# Patient Record
Sex: FEMALE | Race: WHITE | NOT HISPANIC OR LATINO | Employment: FULL TIME | URBAN - METROPOLITAN AREA
[De-identification: names, ages, dates, MRNs, and addresses within clinical notes are randomized per-mention and may not be internally consistent; named-entity substitution may affect disease eponyms.]

---

## 2019-02-08 ENCOUNTER — TRANSCRIBE ORDERS (OUTPATIENT)
Dept: ADMINISTRATIVE | Facility: HOSPITAL | Age: 34
End: 2019-02-08

## 2019-02-08 DIAGNOSIS — R10.9 ABDOMINAL PAIN, UNSPECIFIED ABDOMINAL LOCATION: Primary | ICD-10-CM

## 2019-02-08 DIAGNOSIS — E04.1 NONTOXIC UNINODULAR GOITER: ICD-10-CM

## 2019-02-08 DIAGNOSIS — N28.9 KIDNEY LESION: ICD-10-CM

## 2019-02-08 DIAGNOSIS — E01.0 THYROMEGALY: ICD-10-CM

## 2019-02-15 ENCOUNTER — HOSPITAL ENCOUNTER (OUTPATIENT)
Dept: RADIOLOGY | Facility: IMAGING CENTER | Age: 34
Discharge: HOME | End: 2019-02-15
Payer: COMMERCIAL

## 2019-02-15 DIAGNOSIS — E01.0 THYROMEGALY: ICD-10-CM

## 2019-02-15 DIAGNOSIS — R10.9 ABDOMINAL PAIN, UNSPECIFIED ABDOMINAL LOCATION: ICD-10-CM

## 2019-02-15 PROCEDURE — 76700 US EXAM ABDOM COMPLETE: CPT

## 2019-02-15 PROCEDURE — 76536 US EXAM OF HEAD AND NECK: CPT

## 2019-03-01 ENCOUNTER — HOSPITAL ENCOUNTER (OUTPATIENT)
Dept: RADIOLOGY | Facility: HOSPITAL | Age: 34
Discharge: HOME | End: 2019-03-01
Admitting: INTERNAL MEDICINE
Payer: COMMERCIAL

## 2019-03-01 DIAGNOSIS — E04.1 NONTOXIC UNINODULAR GOITER: ICD-10-CM

## 2019-03-01 PROCEDURE — 88173 CYTOPATH EVAL FNA REPORT: CPT | Performed by: PATHOLOGY

## 2019-03-01 PROCEDURE — 10005 FNA BX W/US GDN 1ST LES: CPT

## 2019-03-01 PROCEDURE — 88172 CYTP DX EVAL FNA 1ST EA SITE: CPT | Performed by: PATHOLOGY

## 2019-03-01 RX ORDER — LIDOCAINE HYDROCHLORIDE 10 MG/ML
1.5 INJECTION, SOLUTION INFILTRATION; PERINEURAL ONCE
Status: COMPLETED | OUTPATIENT
Start: 2019-03-01 | End: 2019-03-01

## 2019-03-01 RX ADMIN — LIDOCAINE HYDROCHLORIDE 1.5 ML: 10 INJECTION, SOLUTION INFILTRATION; PERINEURAL at 11:05

## 2019-03-06 ENCOUNTER — HOSPITAL ENCOUNTER (OUTPATIENT)
Dept: RADIOLOGY | Facility: HOSPITAL | Age: 34
Discharge: HOME | End: 2019-03-06
Payer: COMMERCIAL

## 2019-03-06 DIAGNOSIS — N28.9 KIDNEY LESION: ICD-10-CM

## 2019-03-06 PROCEDURE — 74170 CT ABD WO CNTRST FLWD CNTRST: CPT

## 2019-03-06 RX ADMIN — IOHEXOL 100 ML: 350 INJECTION, SOLUTION INTRAVENOUS at 19:07

## 2019-04-04 ENCOUNTER — OFFICE VISIT (OUTPATIENT)
Dept: URGENT CARE | Age: 34
End: 2019-04-04
Payer: COMMERCIAL

## 2019-04-04 VITALS
RESPIRATION RATE: 24 BRPM | OXYGEN SATURATION: 98 % | HEART RATE: 86 BPM | DIASTOLIC BLOOD PRESSURE: 108 MMHG | SYSTOLIC BLOOD PRESSURE: 160 MMHG | TEMPERATURE: 96.4 F | HEIGHT: 64 IN | WEIGHT: 293 LBS | BODY MASS INDEX: 50.02 KG/M2

## 2019-04-04 DIAGNOSIS — J22 LOWER RESPIRATORY INFECTION: Primary | ICD-10-CM

## 2019-04-04 PROCEDURE — 99213 OFFICE O/P EST LOW 20 MIN: CPT | Performed by: PHYSICIAN ASSISTANT

## 2019-04-04 RX ORDER — BENZONATATE 100 MG/1
100 CAPSULE ORAL 3 TIMES DAILY PRN
Qty: 15 CAPSULE | Refills: 0 | Status: SHIPPED | OUTPATIENT
Start: 2019-04-04 | End: 2021-06-15

## 2019-04-04 RX ORDER — LEVOTHYROXINE SODIUM 0.05 MG/1
50 TABLET ORAL DAILY
COMMUNITY
End: 2019-12-30 | Stop reason: DRUGHIGH

## 2019-04-04 RX ORDER — FLUTICASONE PROPIONATE 50 MCG
2 SPRAY, SUSPENSION (ML) NASAL DAILY
Qty: 16 G | Refills: 0 | Status: SHIPPED | OUTPATIENT
Start: 2019-04-04

## 2019-04-04 RX ORDER — ALBUTEROL SULFATE 90 UG/1
2 AEROSOL, METERED RESPIRATORY (INHALATION) EVERY 6 HOURS PRN
Qty: 1 INHALER | Refills: 0 | Status: SHIPPED | OUTPATIENT
Start: 2019-04-04 | End: 2021-06-15

## 2019-04-04 RX ORDER — PREDNISONE 50 MG/1
50 TABLET ORAL DAILY
Qty: 5 TABLET | Refills: 0 | Status: SHIPPED | OUTPATIENT
Start: 2019-04-04 | End: 2019-04-09

## 2019-04-04 RX ORDER — AZITHROMYCIN 250 MG/1
TABLET, FILM COATED ORAL
Qty: 6 TABLET | Refills: 0 | Status: SHIPPED | OUTPATIENT
Start: 2019-04-04 | End: 2019-04-08

## 2019-04-29 ENCOUNTER — TRANSCRIBE ORDERS (OUTPATIENT)
Dept: ADMINISTRATIVE | Facility: HOSPITAL | Age: 34
End: 2019-04-29

## 2019-04-29 DIAGNOSIS — R00.2 PALPITATION: Primary | ICD-10-CM

## 2019-04-29 DIAGNOSIS — R06.00 DYSPNEA, UNSPECIFIED TYPE: ICD-10-CM

## 2019-05-03 ENCOUNTER — OFFICE VISIT (OUTPATIENT)
Dept: URGENT CARE | Age: 34
End: 2019-05-03
Payer: COMMERCIAL

## 2019-05-03 VITALS
SYSTOLIC BLOOD PRESSURE: 130 MMHG | WEIGHT: 293 LBS | OXYGEN SATURATION: 99 % | BODY MASS INDEX: 50.02 KG/M2 | HEART RATE: 87 BPM | HEIGHT: 64 IN | TEMPERATURE: 97 F | RESPIRATION RATE: 16 BRPM | DIASTOLIC BLOOD PRESSURE: 80 MMHG

## 2019-05-03 DIAGNOSIS — A08.4 VIRAL GASTROENTERITIS: Primary | ICD-10-CM

## 2019-05-03 PROCEDURE — 99213 OFFICE O/P EST LOW 20 MIN: CPT | Performed by: PHYSICIAN ASSISTANT

## 2019-05-03 RX ORDER — DICYCLOMINE HCL 20 MG
20 TABLET ORAL EVERY 6 HOURS PRN
Qty: 20 TABLET | Refills: 0 | Status: SHIPPED | OUTPATIENT
Start: 2019-05-03 | End: 2021-06-15

## 2019-05-03 RX ORDER — LORATADINE 10 MG/1
10 TABLET ORAL DAILY
COMMUNITY

## 2019-05-03 RX ORDER — ONDANSETRON 4 MG/1
4 TABLET, ORALLY DISINTEGRATING ORAL EVERY 8 HOURS PRN
Qty: 15 TABLET | Refills: 0 | Status: SHIPPED | OUTPATIENT
Start: 2019-05-03

## 2019-12-30 ENCOUNTER — OFFICE VISIT (OUTPATIENT)
Dept: URGENT CARE | Age: 34
End: 2019-12-30
Payer: COMMERCIAL

## 2019-12-30 ENCOUNTER — APPOINTMENT (OUTPATIENT)
Dept: RADIOLOGY | Age: 34
End: 2019-12-30
Payer: COMMERCIAL

## 2019-12-30 VITALS
RESPIRATION RATE: 20 BRPM | DIASTOLIC BLOOD PRESSURE: 74 MMHG | OXYGEN SATURATION: 98 % | WEIGHT: 293 LBS | HEART RATE: 94 BPM | BODY MASS INDEX: 48.82 KG/M2 | SYSTOLIC BLOOD PRESSURE: 120 MMHG | HEIGHT: 65 IN | TEMPERATURE: 98.2 F

## 2019-12-30 DIAGNOSIS — J20.9 ACUTE BRONCHITIS, UNSPECIFIED ORGANISM: Primary | ICD-10-CM

## 2019-12-30 DIAGNOSIS — H93.8X1 EAR PRESSURE, RIGHT: ICD-10-CM

## 2019-12-30 DIAGNOSIS — R05.9 COUGH: ICD-10-CM

## 2019-12-30 PROCEDURE — 71046 X-RAY EXAM CHEST 2 VIEWS: CPT

## 2019-12-30 PROCEDURE — 99213 OFFICE O/P EST LOW 20 MIN: CPT | Performed by: PHYSICIAN ASSISTANT

## 2019-12-30 RX ORDER — LEVOFLOXACIN 750 MG/1
750 TABLET ORAL DAILY
Refills: 0 | COMMUNITY
Start: 2019-12-27 | End: 2021-06-15

## 2019-12-30 RX ORDER — GUAIFENESIN 600 MG
600 TABLET, EXTENDED RELEASE 12 HR ORAL EVERY 12 HOURS SCHEDULED
Qty: 10 TABLET | Refills: 0 | Status: SHIPPED | OUTPATIENT
Start: 2019-12-30 | End: 2021-06-15

## 2019-12-30 RX ORDER — MONTELUKAST SODIUM 10 MG/1
10 TABLET ORAL
COMMUNITY
Start: 2019-05-03 | End: 2021-06-15

## 2019-12-30 RX ORDER — LEVOTHYROXINE SODIUM 112 UG/1
112 TABLET ORAL DAILY
Refills: 5 | COMMUNITY
Start: 2019-12-14 | End: 2020-10-27

## 2019-12-30 RX ORDER — ALBUTEROL SULFATE 90 UG/1
2 AEROSOL, METERED RESPIRATORY (INHALATION) EVERY 6 HOURS PRN
Qty: 18 G | Refills: 0 | Status: SHIPPED | OUTPATIENT
Start: 2019-12-30 | End: 2022-04-29 | Stop reason: SDUPTHER

## 2019-12-30 NOTE — LETTER
December 30, 2019     Patient: Estevan Gama   YOB: 1985   Date of Visit: 12/30/2019       To Whom it May Concern:    Estevan Gama was seen in my clinic on 12/30/2019  She may return to work on 12/31/2019  If you have any questions or concerns, please don't hesitate to call           Sincerely,          Radha Coto PA-C        CC: No Recipients

## 2019-12-30 NOTE — PROGRESS NOTES
[facility]        NAME: Jayda Dhillon is a 36 y o  female  : 1985    MRN: [id number]  DATE: 2019  TIME: 8:40 PM    Assessment and Plan   Acute bronchitis, unspecified organism [J20 9]  1  Acute bronchitis, unspecified organism  albuterol (VENTOLIN HFA) 90 mcg/act inhaler    guaiFENesin (MUCINEX) 600 mg 12 hr tablet   2  Cough  XR chest pa & lateral   3  Ear pressure, right           Patient Instructions     Follow up with PCP in 3-5 days  Proceed to  ER if symptoms worsen  Patient placed on albuterol as needed  Continue with antibiotic treatment as per primary care doctor  Continue with symptomatic treatment  Continue with nasal saline as directed  Tylenol as needed for fever of chills   Warm salt gargles as needed for sore throat     Chief Complaint     Chief Complaint   Patient presents with    Earache     Right ear pain, worse stabbing when coughing observed, sounds more diminished than normal   PCP Friday, on levaquin for bronchitis (on day 4 of med)  States symptoms are worse when she wakes up with wheezing and "crackling" sound when she is flat  No recent doses of albuterol (ran out of supply)  Has been effective in the past   OTC APAP, warm moist compress to ear  History of Present Illness       Patient 29-year-old female presenting the office upper respiratory symptoms  Patient states her symptoms began approximately 5 days in duration  Patient states that she has seen Friday and was prescribed Levaquin for acute bacterial bronchitis  Patient states that she was not given steroids at that time  Patient states that she feels slightly better from the time of treatment but states that she still has chest congestion and wheezing  Patient states that she typically takes albuterol inhalers but states that she is currently out of the medication    It associates from with the upper respiratory infection she also has right ear pain and pressure which has been ongoing for past 2 days  Patient states that the pain is associated with coughing at night  Patient also has nasal congestion  Patient states she does have documented temperatures of 101°  Patient states her temperature is morning was 99 9  Patient admits to postnasal drip type symptoms  Patient denies any nausea vomiting does admit to having episodes of diarrhea  Earache    There is pain in the right ear  This is a new problem  The current episode started in the past 7 days  The problem occurs every few minutes  The problem has been gradually worsening  The maximum temperature recorded prior to her arrival was 100 4 - 100 9 F  The fever has been present for 1 to 2 days  The pain is at a severity of 6/10  The pain is moderate  Associated symptoms include coughing, diarrhea, hearing loss, rhinorrhea and a sore throat  Pertinent negatives include no abdominal pain, ear discharge, headaches, neck pain, rash or vomiting  She has tried acetaminophen and heat packs for the symptoms  The treatment provided mild relief  There is no history of a chronic ear infection, hearing loss or a tympanostomy tube  Review of Systems   Review of Systems   Constitutional: Positive for chills and fever  Negative for activity change, appetite change, diaphoresis, fatigue and unexpected weight change  HENT: Positive for congestion, ear pain, hearing loss, postnasal drip, rhinorrhea, sinus pressure, sinus pain and sore throat  Negative for dental problem, drooling, ear discharge, facial swelling, mouth sores, nosebleeds, sneezing, tinnitus, trouble swallowing and voice change  Eyes: Negative  Respiratory: Positive for cough and wheezing  Negative for apnea, choking, chest tightness, shortness of breath and stridor  Cardiovascular: Negative  Gastrointestinal: Positive for diarrhea  Negative for abdominal distention, abdominal pain, anal bleeding, blood in stool, constipation, nausea, rectal pain and vomiting  Endocrine: Negative  Genitourinary: Negative  Musculoskeletal: Negative  Negative for neck pain  Skin: Negative  Negative for rash  Allergic/Immunologic: Negative  Neurological: Negative  Negative for headaches  Hematological: Negative  Psychiatric/Behavioral: Negative            Current Medications       Current Outpatient Medications:     dicyclomine (BENTYL) 20 mg tablet, Take 1 tablet (20 mg total) by mouth every 6 (six) hours as needed (diarrhea), Disp: 20 tablet, Rfl: 0    fluticasone (FLONASE) 50 mcg/act nasal spray, 2 sprays into each nostril daily, Disp: 16 g, Rfl: 0    levofloxacin (LEVAQUIN) 750 mg tablet, Take 750 mg by mouth daily, Disp: , Rfl: 0    levonorgestrel (MIRENA) 20 MCG/24HR IUD, 1 each by Intrauterine route, Disp: , Rfl:     levothyroxine 112 mcg tablet, Take 112 mcg by mouth daily, Disp: , Rfl: 5    loratadine (CLARITIN) 10 mg tablet, Take 10 mg by mouth daily, Disp: , Rfl:     montelukast (SINGULAIR) 10 mg tablet, Take 10 mg by mouth, Disp: , Rfl:     ondansetron (ZOFRAN-ODT) 4 mg disintegrating tablet, Take 1 tablet (4 mg total) by mouth every 8 (eight) hours as needed for nausea or vomiting, Disp: 15 tablet, Rfl: 0    albuterol (PROVENTIL HFA,VENTOLIN HFA) 90 mcg/act inhaler, Inhale 2 puffs every 6 (six) hours as needed for wheezing (Patient not taking: Reported on 12/30/2019), Disp: 1 Inhaler, Rfl: 0    albuterol (VENTOLIN HFA) 90 mcg/act inhaler, Inhale 2 puffs every 6 (six) hours as needed for wheezing or shortness of breath, Disp: 18 g, Rfl: 0    benzonatate (TESSALON PERLES) 100 mg capsule, Take 1 capsule (100 mg total) by mouth 3 (three) times a day as needed for cough (Patient not taking: Reported on 5/3/2019), Disp: 15 capsule, Rfl: 0    guaiFENesin (MUCINEX) 600 mg 12 hr tablet, Take 1 tablet (600 mg total) by mouth every 12 (twelve) hours, Disp: 10 tablet, Rfl: 0    Current Allergies     Allergies as of 12/30/2019 - Reviewed 12/30/2019 Allergen Reaction Noted    Dust mite extract Other (See Comments) 12/03/2019    Latex Blisters 06/03/2019    Molds & smuts Other (See Comments) 03/20/2019    Penicillins  02/28/2019    Jorge Luis grass pollen allergen Other (See Comments) 12/03/2019            The following portions of the patient's history were reviewed and updated as appropriate: allergies, current medications, past family history, past medical history, past social history, past surgical history and problem list      Past Medical History:   Diagnosis Date    Cancer ([address] )     Disease of thyroid gland        Past Surgical History:   Procedure Laterality Date    ADENOIDECTOMY      TONSILLECTOMY      US GUIDED THYROID BIOPSY  3/1/2019       No family history on file  Medications have been verified  Objective   /74   Pulse 94   Temp 98 2 °F (36 8 °C) (Tympanic)   Resp 20   Ht 5' 5" (1 651 m)   Wt (!) 156 kg (343 lb)   LMP 12/17/2019 Comment: short, recent D&C procedure  SpO2 98%   BMI 57 08 kg/m²        Physical Exam     Physical Exam   Constitutional: She is oriented to person, place, and time  She appears well-developed and well-nourished  HENT:   Head: Normocephalic  Right Ear: Hearing, external ear and ear canal normal  Tympanic membrane is not perforated, not erythematous, not retracted and not bulging  Tympanic membrane mobility is normal  A middle ear effusion is present  Left Ear: Hearing, tympanic membrane, external ear and ear canal normal    Nose: Nose normal    Mouth/Throat: Oropharynx is clear and moist    Eyes: Pupils are equal, round, and reactive to light  Conjunctivae and EOM are normal    Neck: Normal range of motion  Cardiovascular: Normal rate, regular rhythm, normal heart sounds and intact distal pulses  Pulmonary/Chest: Effort normal and breath sounds normal    Abdominal: Soft  Bowel sounds are normal    Neurological: She is alert and oriented to person, place, and time     Skin: Skin is warm  Capillary refill takes less than 2 seconds  Psychiatric: She has a normal mood and affect  Her behavior is normal  Judgment and thought content normal    Nursing note and vitals reviewed

## 2019-12-30 NOTE — PATIENT INSTRUCTIONS
Follow up with PCP in 3-5 days  Proceed to  ER if symptoms worsen  Patient placed on albuterol as needed  Continue with antibiotic treatment as per primary care doctor  Continue with symptomatic treatment  Continue with nasal saline as directed  Tylenol as needed for fever of chills   Warm salt gargles as needed for sore throat     Acute Bronchitis   WHAT YOU NEED TO KNOW:   Acute bronchitis is swelling and irritation in the air passages of your lungs  This irritation may cause you to cough or have other breathing problems  Acute bronchitis often starts because of another illness, such as a cold or the flu  The illness spreads from your nose and throat to your windpipe and airways  Bronchitis is often called a chest cold  Acute bronchitis lasts about 3 to 6 weeks and is usually not a serious illness  Your cough can last for several weeks  DISCHARGE INSTRUCTIONS:   Return to the emergency department if:   · You cough up blood  · Your lips or fingernails turn blue  · You feel like you are not getting enough air when you breathe  Contact your healthcare provider if:   · You have a fever  · Your breathing problems do not go away or get worse  · Your cough does not get better within 4 weeks  · You have questions or concerns about your condition or care  Self-care:   · Get more rest   Rest helps your body to heal  Slowly start to do more each day  Rest when you feel it is needed  · Avoid irritants in the air  Avoid chemicals, fumes, and dust  Wear a face mask if you must work around dust or fumes  Stay inside on days when air pollution levels are high  If you have allergies, stay inside when pollen counts are high  Do not use aerosol products, such as spray-on deodorant, bug spray, and hair spray  · Do not smoke or be around others who smoke  Nicotine and other chemicals in cigarettes and cigars damages the cilia that move mucus out of your lungs   Ask your healthcare provider for information if you currently smoke and need help to quit  E-cigarettes or smokeless tobacco still contain nicotine  Talk to your healthcare provider before you use these products  · Drink liquids as directed  Liquids help keep your air passages moist and help you cough up mucus  You may need to drink more liquids when you have acute bronchitis  Ask how much liquid to drink each day and which liquids are best for you  · Use a humidifier or vaporizer  Use a cool mist humidifier or a vaporizer to increase air moisture in your home  This may make it easier for you to breathe and help decrease your cough  Decrease risk for acute bronchitis:   · Get the vaccinations you need  Ask your healthcare provider if you should get vaccinated against the flu or pneumonia  · Prevent the spread of germs  You can decrease your risk of acute bronchitis and other illnesses by doing the following:     [facility] your hands often with soap and water  Carry germ-killing hand lotion or gel with you  You can use the lotion or gel to clean your hands when soap and water are not available  ¨ Do not touch your eyes, nose, or mouth unless you have washed your hands first     ¨ Always cover your mouth when you cough to prevent the spread of germs  It is best to cough into a tissue or your shirt sleeve instead of into your hand  Ask those around you cover their mouths when they cough  ¨ Try to avoid people who have a cold or the flu  If you are sick, stay away from others as much as possible  Medicines: Your healthcare provider may  give you any of the following:  · Ibuprofen or acetaminophen  are medicines that help lower your fever  They are available without a doctor's order  Ask your healthcare provider which medicine is right for you  Ask how much to take and how often to take it  Follow directions  These medicines can cause stomach bleeding if not taken correctly  Ibuprofen can cause kidney damage   Do not take ibuprofen if you have kidney disease, an ulcer, or allergies to aspirin  Acetaminophen can cause liver damage  Do not take more than 4,000 milligrams in 24 hours  · Decongestants  help loosen mucus in your lungs and make it easier to cough up  This can help you breathe easier  · Cough suppressants  decrease your urge to cough  If your cough produces mucus, do not take a cough suppressant unless your healthcare provider tells you to  Your healthcare provider may suggest that you take a cough suppressant at night so you can rest     · Inhalers  may be given  Your healthcare provider may give you one or more inhalers to help you breathe easier and cough less  An inhaler gives your medicine to open your airways  Ask your healthcare provider to show you how to use your inhaler correctly  · Take your medicine as directed  Contact your healthcare provider if you think your medicine is not helping or if you have side effects  Tell him of her if you are allergic to any medicine  Keep a list of the medicines, vitamins, and herbs you take  Include the amounts, and when and why you take them  Bring the list or the pill bottles to follow-up visits  Carry your medicine list with you in case of an emergency  Follow up with your healthcare provider as directed:  Write down questions you have so you will remember to ask them during your follow-up visits  © 2017 [employer]  Information is for End User's use only and may not be sold, redistributed or otherwise used for commercial purposes  All illustrations and images included in CareNotes® are the copyrighted property of [employer]  or Jassi Stacy  The above information is an  only  It is not intended as medical advice for individual conditions or treatments  Talk to your doctor, nurse or pharmacist before following any medical regimen to see if it is safe and effective for you

## 2020-10-16 ENCOUNTER — TELEPHONE (OUTPATIENT)
Dept: INTERNAL MEDICINE CLINIC | Facility: CLINIC | Age: 35
End: 2020-10-16

## 2020-10-16 DIAGNOSIS — E03.8 HYPOTHYROIDISM DUE TO HASHIMOTO'S THYROIDITIS: Primary | ICD-10-CM

## 2020-10-16 DIAGNOSIS — E06.3 HYPOTHYROIDISM DUE TO HASHIMOTO'S THYROIDITIS: Primary | ICD-10-CM

## 2020-10-26 ENCOUNTER — TELEPHONE (OUTPATIENT)
Dept: INTERNAL MEDICINE CLINIC | Facility: CLINIC | Age: 35
End: 2020-10-26

## 2020-10-27 ENCOUNTER — TELEMEDICINE (OUTPATIENT)
Dept: INTERNAL MEDICINE CLINIC | Facility: CLINIC | Age: 35
End: 2020-10-27
Payer: COMMERCIAL

## 2020-10-27 DIAGNOSIS — R21 RASH OF FACE: ICD-10-CM

## 2020-10-27 DIAGNOSIS — J01.00 ACUTE MAXILLARY SINUSITIS, RECURRENCE NOT SPECIFIED: ICD-10-CM

## 2020-10-27 DIAGNOSIS — E06.3 HYPOTHYROIDISM DUE TO HASHIMOTO'S THYROIDITIS: Primary | ICD-10-CM

## 2020-10-27 DIAGNOSIS — M25.50 ARTHRALGIA, UNSPECIFIED JOINT: ICD-10-CM

## 2020-10-27 DIAGNOSIS — E03.8 HYPOTHYROIDISM DUE TO HASHIMOTO'S THYROIDITIS: Primary | ICD-10-CM

## 2020-10-27 PROBLEM — E66.01 MORBID OBESITY WITH BMI OF 50.0-59.9, ADULT (HCC): Status: ACTIVE | Noted: 2019-07-19

## 2020-10-27 PROBLEM — E04.1 THYROID NODULE: Status: ACTIVE | Noted: 2019-07-19

## 2020-10-27 PROBLEM — E03.9 HYPOTHYROIDISM: Status: ACTIVE | Noted: 2020-10-27

## 2020-10-27 PROBLEM — N93.9 ABNORMAL UTERINE BLEEDING: Status: ACTIVE | Noted: 2019-12-09

## 2020-10-27 PROBLEM — N85.02 EIN (ENDOMETRIAL INTRAEPITHELIAL NEOPLASIA): Status: ACTIVE | Noted: 2019-12-28

## 2020-10-27 PROCEDURE — 99214 OFFICE O/P EST MOD 30 MIN: CPT | Performed by: INTERNAL MEDICINE

## 2020-10-27 RX ORDER — SULFAMETHOXAZOLE AND TRIMETHOPRIM 800; 160 MG/1; MG/1
1 TABLET ORAL EVERY 12 HOURS SCHEDULED
Qty: 16 TABLET | Refills: 0 | Status: SHIPPED | OUTPATIENT
Start: 2020-10-27 | End: 2020-11-04

## 2020-10-27 RX ORDER — LEVOTHYROXINE SODIUM 0.12 MG/1
125 TABLET ORAL
Qty: 30 TABLET | Refills: 5 | Status: SHIPPED | OUTPATIENT
Start: 2020-10-27 | End: 2021-07-03

## 2020-11-02 ENCOUNTER — TELEMEDICINE (OUTPATIENT)
Dept: INTERNAL MEDICINE CLINIC | Facility: CLINIC | Age: 35
End: 2020-11-02
Payer: COMMERCIAL

## 2020-11-02 DIAGNOSIS — B34.9 VIRAL ILLNESS: Primary | ICD-10-CM

## 2020-11-02 DIAGNOSIS — R21 MALAR RASH: ICD-10-CM

## 2020-11-02 PROCEDURE — 99213 OFFICE O/P EST LOW 20 MIN: CPT | Performed by: INTERNAL MEDICINE

## 2020-11-02 PROCEDURE — 1036F TOBACCO NON-USER: CPT | Performed by: INTERNAL MEDICINE

## 2020-11-03 DIAGNOSIS — B34.9 VIRAL ILLNESS: ICD-10-CM

## 2020-11-03 PROCEDURE — U0003 INFECTIOUS AGENT DETECTION BY NUCLEIC ACID (DNA OR RNA); SEVERE ACUTE RESPIRATORY SYNDROME CORONAVIRUS 2 (SARS-COV-2) (CORONAVIRUS DISEASE [COVID-19]), AMPLIFIED PROBE TECHNIQUE, MAKING USE OF HIGH THROUGHPUT TECHNOLOGIES AS DESCRIBED BY CMS-2020-01-R: HCPCS | Performed by: INTERNAL MEDICINE

## 2020-11-04 LAB — SARS-COV-2 RNA SPEC QL NAA+PROBE: NOT DETECTED

## 2020-11-11 ENCOUNTER — TELEPHONE (OUTPATIENT)
Dept: INTERNAL MEDICINE CLINIC | Facility: CLINIC | Age: 35
End: 2020-11-11

## 2021-05-21 ENCOUNTER — OFFICE VISIT (OUTPATIENT)
Dept: INTERNAL MEDICINE CLINIC | Facility: CLINIC | Age: 36
End: 2021-05-21
Payer: COMMERCIAL

## 2021-05-21 VITALS
WEIGHT: 293 LBS | TEMPERATURE: 97.7 F | SYSTOLIC BLOOD PRESSURE: 146 MMHG | HEIGHT: 65 IN | BODY MASS INDEX: 48.82 KG/M2 | DIASTOLIC BLOOD PRESSURE: 72 MMHG | HEART RATE: 70 BPM

## 2021-05-21 DIAGNOSIS — L02.416 CELLULITIS AND ABSCESS OF LEFT LEG: Primary | ICD-10-CM

## 2021-05-21 DIAGNOSIS — L30.9 ECZEMA, UNSPECIFIED TYPE: ICD-10-CM

## 2021-05-21 DIAGNOSIS — L03.116 CELLULITIS AND ABSCESS OF LEFT LEG: Primary | ICD-10-CM

## 2021-05-21 PROCEDURE — 99214 OFFICE O/P EST MOD 30 MIN: CPT | Performed by: INTERNAL MEDICINE

## 2021-05-21 RX ORDER — AZITHROMYCIN 500 MG/1
500 TABLET, FILM COATED ORAL DAILY
Qty: 7 TABLET | Refills: 0 | Status: SHIPPED | OUTPATIENT
Start: 2021-05-21 | End: 2021-05-28

## 2021-05-21 RX ORDER — TRIAMCINOLONE ACETONIDE 1 MG/G
CREAM TOPICAL
Qty: 453.6 G | Refills: 1 | Status: SHIPPED | OUTPATIENT
Start: 2021-05-21 | End: 2021-07-19

## 2021-05-21 NOTE — PROGRESS NOTES
Assessment/Plan:    No problem-specific Assessment & Plan notes found for this encounter  Diagnoses and all orders for this visit:    Cellulitis and abscess of left leg  -     azithromycin (ZITHROMAX) 500 MG tablet; Take 1 tablet (500 mg total) by mouth daily for 7 days    Eczema, unspecified type  -     triamcinolone (KENALOG) 0 1 % cream; Apply topically daily at bedtime as needed for rash          Subjective:      Patient ID: Herminia Grady is a 30 y o  female  2019 cyst leg  No pain  Now bigger and pain 1 week  Non drainage  Leg elevated       Chapped hands  Wash hands alot      The following portions of the patient's history were reviewed and updated as appropriate: allergies, current medications, past family history, past medical history, past social history, past surgical history, and problem list     Review of Systems   Constitutional: Negative for activity change and fatigue  HENT: Negative for ear discharge, ear pain, rhinorrhea and sore throat  Eyes: Negative for pain and visual disturbance  Respiratory: Negative for cough and shortness of breath  Cardiovascular: Negative for chest pain and leg swelling  Gastrointestinal: Negative for abdominal pain, constipation and diarrhea  Endocrine: Negative for cold intolerance and polyuria  Genitourinary: Negative for flank pain and hematuria  Musculoskeletal: Negative for back pain and joint swelling  Skin: Negative for pallor and wound  Neurological: Negative for dizziness, seizures and speech difficulty  Psychiatric/Behavioral: Negative for confusion and hallucinations  Objective:      /72 (Cuff Size: Thigh)   Pulse 70   Temp 97 7 °F (36 5 °C)   Ht 5' 5" (1 651 m)   Wt (!) 173 kg (381 lb)   BMI 63 40 kg/m²          Physical Exam  Vitals signs and nursing note reviewed  Constitutional:       General: She is not in acute distress  Appearance: Normal appearance  She is not ill-appearing     HENT: Head: Normocephalic  Right Ear: External ear normal  There is no impacted cerumen  Left Ear: External ear normal  There is no impacted cerumen  Nose: No congestion or rhinorrhea  Mouth/Throat:      Pharynx: No posterior oropharyngeal erythema  Eyes:      General: No scleral icterus  Right eye: No discharge  Left eye: No discharge  Neck:      Musculoskeletal: No neck rigidity  Vascular: No carotid bruit  Cardiovascular:      Rate and Rhythm: Normal rate and regular rhythm  Heart sounds: Normal heart sounds  No murmur  No friction rub  No gallop  Pulmonary:      Breath sounds: No wheezing or rhonchi  Abdominal:      General: There is no distension  Tenderness: There is no abdominal tenderness  There is no guarding  Musculoskeletal:         General: No swelling  Right lower leg: No edema  Left lower leg: No edema  Lymphadenopathy:      Cervical: No cervical adenopathy  Skin:     Coloration: Skin is not jaundiced  Neurological:      Mental Status: She is alert  Cranial Nerves: No cranial nerve deficit  Motor: No weakness        Coordination: Coordination normal    Psychiatric:         Mood and Affect: Mood normal        warm red  Middle thigh

## 2021-06-01 ENCOUNTER — TELEPHONE (OUTPATIENT)
Dept: INTERNAL MEDICINE CLINIC | Facility: CLINIC | Age: 36
End: 2021-06-01

## 2021-06-01 NOTE — TELEPHONE ENCOUNTER
She called with update, has finished ab and the redness and hot in her leg is gone but still swollen and she now says the swelling is moving down her leg

## 2021-06-02 ENCOUNTER — TELEPHONE (OUTPATIENT)
Dept: INTERNAL MEDICINE CLINIC | Facility: CLINIC | Age: 36
End: 2021-06-02

## 2021-06-02 ENCOUNTER — TRANSCRIBE ORDERS (OUTPATIENT)
Dept: ADMINISTRATIVE | Facility: HOSPITAL | Age: 36
End: 2021-06-02

## 2021-06-02 DIAGNOSIS — L02.416 CUTANEOUS ABSCESS OF LEFT LOWER LIMB: ICD-10-CM

## 2021-06-02 DIAGNOSIS — L02.416 CELLULITIS AND ABSCESS OF LEFT LEG: Primary | ICD-10-CM

## 2021-06-02 DIAGNOSIS — L03.116 CELLULITIS OF LEFT LOWER LIMB: Primary | ICD-10-CM

## 2021-06-02 DIAGNOSIS — L03.116 CELLULITIS AND ABSCESS OF LEFT LEG: Primary | ICD-10-CM

## 2021-06-02 NOTE — TELEPHONE ENCOUNTER
Patient called back regarding the message sushma sent to you yesterday,  She needs an order for the ultrasound you wanted her to get

## 2021-06-04 ENCOUNTER — HOSPITAL ENCOUNTER (OUTPATIENT)
Dept: RADIOLOGY | Facility: IMAGING CENTER | Age: 36
Discharge: HOME | End: 2021-06-04
Payer: COMMERCIAL

## 2021-06-04 DIAGNOSIS — L03.116 CELLULITIS OF LEFT LOWER LIMB: ICD-10-CM

## 2021-06-04 DIAGNOSIS — L02.416 CUTANEOUS ABSCESS OF LEFT LOWER LIMB: ICD-10-CM

## 2021-06-04 PROCEDURE — 76882 US LMTD JT/FCL EVL NVASC XTR: CPT

## 2021-06-09 ENCOUNTER — TELEPHONE (OUTPATIENT)
Dept: INTERNAL MEDICINE CLINIC | Facility: CLINIC | Age: 36
End: 2021-06-09

## 2021-06-15 ENCOUNTER — OFFICE VISIT (OUTPATIENT)
Dept: INTERNAL MEDICINE CLINIC | Age: 36
End: 2021-06-15
Payer: COMMERCIAL

## 2021-06-15 VITALS
BODY MASS INDEX: 50.02 KG/M2 | OXYGEN SATURATION: 98 % | HEIGHT: 64 IN | SYSTOLIC BLOOD PRESSURE: 136 MMHG | TEMPERATURE: 97.8 F | HEART RATE: 92 BPM | WEIGHT: 293 LBS | DIASTOLIC BLOOD PRESSURE: 86 MMHG

## 2021-06-15 DIAGNOSIS — I89.0 LYMPH EDEMA: Primary | ICD-10-CM

## 2021-06-15 DIAGNOSIS — E03.8 HYPOTHYROIDISM DUE TO HASHIMOTO'S THYROIDITIS: ICD-10-CM

## 2021-06-15 DIAGNOSIS — E06.3 HYPOTHYROIDISM DUE TO HASHIMOTO'S THYROIDITIS: ICD-10-CM

## 2021-06-15 PROBLEM — N93.9 ABNORMAL UTERINE BLEEDING: Status: RESOLVED | Noted: 2019-12-09 | Resolved: 2021-06-15

## 2021-06-15 PROBLEM — N85.02 EIN (ENDOMETRIAL INTRAEPITHELIAL NEOPLASIA): Status: RESOLVED | Noted: 2019-12-28 | Resolved: 2021-06-15

## 2021-06-15 PROBLEM — R21 RASH OF FACE: Status: RESOLVED | Noted: 2020-10-27 | Resolved: 2021-06-15

## 2021-06-15 PROBLEM — B34.9 VIRAL ILLNESS: Status: RESOLVED | Noted: 2020-11-02 | Resolved: 2021-06-15

## 2021-06-15 PROCEDURE — 1036F TOBACCO NON-USER: CPT | Performed by: INTERNAL MEDICINE

## 2021-06-15 PROCEDURE — 3725F SCREEN DEPRESSION PERFORMED: CPT | Performed by: INTERNAL MEDICINE

## 2021-06-15 PROCEDURE — 3008F BODY MASS INDEX DOCD: CPT | Performed by: INTERNAL MEDICINE

## 2021-06-15 PROCEDURE — 99203 OFFICE O/P NEW LOW 30 MIN: CPT | Performed by: INTERNAL MEDICINE

## 2021-06-15 RX ORDER — FUROSEMIDE 40 MG/1
40 TABLET ORAL DAILY
Qty: 30 TABLET | Refills: 5 | Status: SHIPPED | OUTPATIENT
Start: 2021-06-15

## 2021-06-16 PROBLEM — L02.416 CELLULITIS AND ABSCESS OF LEFT LEG: Status: RESOLVED | Noted: 2021-05-21 | Resolved: 2021-06-16

## 2021-06-16 PROBLEM — I89.0 LYMPH EDEMA: Status: ACTIVE | Noted: 2021-06-16

## 2021-06-16 PROBLEM — L03.116 CELLULITIS AND ABSCESS OF LEFT LEG: Status: RESOLVED | Noted: 2021-05-21 | Resolved: 2021-06-16

## 2021-06-16 NOTE — ASSESSMENT & PLAN NOTE
In the past patient did have a thyroid nodule noted and eventually underwent surgery for removal of same

## 2021-06-16 NOTE — ASSESSMENT & PLAN NOTE
Patient remains morbidly obese and is starting to health difficulties due to she has been on except successful conservatively losing weight and is interested in bariatric surgery but her  is having at this year and many wise a cannot afford to do both of them the same year

## 2021-06-16 NOTE — PROGRESS NOTES
Assessment/Plan:    Thyroid nodule  In the past patient did have a thyroid nodule noted and eventually underwent surgery for removal of same  Hypothyroidism  She currently is on levothyroxine for control of same  Morbid obesity with BMI of 50 0-59 9, adult [facility])  Patient remains morbidly obese and is starting to health difficulties due to she has been on except successful conservatively losing weight and is interested in bariatric surgery but her  is having at this year and many wise a cannot afford to do both of them the same year    Lymph edema  Patient numb has had a workup for increased swelling from her knees down that failed to show anything other than adipose and lymphedema  Will try adding low-dose Lasix and refer her to the lymphedema clinic  I do agree that weight loss would help       Diagnoses and all orders for this visit:    Lymph edema  -     furosemide (LASIX) 40 mg tablet; Take 1 tablet (40 mg total) by mouth daily  -     Ambulatory referral to PT/OT lymphedema therapy; Future    BMI 50 0-59 9, adult (HCC)    Hypothyroidism due to Hashimoto's thyroiditis          Subjective:      Patient ID: Stephanie Blue is a 30 y o  female  Patient has been complaining over the last year of increasing swelling especially around her knees and extending down to her feet  She was thoroughly evaluated with an ultrasound that showed adipose tissue and lymphedema  There has been no cyst lipoma  She was even treated for a cellulitis without response  She is planning and treating the bariatric program next year but cannot afford it now because her  is in it  Thyroid disease appears stable cording to testing  Review of Systems   Constitutional: Negative for chills, fatigue, fever and unexpected weight change  HENT: Negative for congestion, ear pain, hearing loss, postnasal drip, sinus pressure, sore throat, trouble swallowing and voice change      Eyes: Negative for visual disturbance  Respiratory: Negative for cough, chest tightness, shortness of breath and wheezing  Cardiovascular: Positive for leg swelling  Negative for chest pain and palpitations  Gastrointestinal: Negative for abdominal distention, abdominal pain, anal bleeding, blood in stool, constipation, diarrhea and nausea  Endocrine: Negative for cold intolerance, polydipsia, polyphagia and polyuria  Genitourinary: Negative for dysuria, flank pain, frequency, hematuria and urgency  Musculoskeletal: Negative for arthralgias, back pain, gait problem, joint swelling, myalgias and neck pain  Skin: Negative for rash  Allergic/Immunologic: Negative for immunocompromised state  Neurological: Negative for dizziness, syncope, facial asymmetry, weakness, light-headedness, numbness and headaches  Hematological: Negative for adenopathy  Psychiatric/Behavioral: Negative for confusion, sleep disturbance and suicidal ideas  Objective:      /86 (BP Location: Left arm, Patient Position: Sitting, Cuff Size: Standard)   Pulse 92   Temp 97 8 °F (36 6 °C)   Ht 5' 4" (1 626 m)   Wt (!) 173 kg (382 lb 3 2 oz)   SpO2 98%   BMI 65 60 kg/m²          Physical Exam  Constitutional:       General: She is not in acute distress  Appearance: She is well-developed  HENT:      Right Ear: External ear normal       Left Ear: External ear normal       Nose: Nose normal       Mouth/Throat:      Pharynx: No oropharyngeal exudate  Eyes:      Pupils: Pupils are equal, round, and reactive to light  Neck:      Thyroid: No thyromegaly  Vascular: No JVD  Cardiovascular:      Rate and Rhythm: Normal rate and regular rhythm  Heart sounds: Normal heart sounds  No murmur heard  No gallop  Pulmonary:      Effort: Pulmonary effort is normal  No respiratory distress  Breath sounds: Normal breath sounds  No wheezing or rales  Abdominal:      General: Bowel sounds are normal  There is no distension  Palpations: Abdomen is soft  There is no mass  Tenderness: There is no abdominal tenderness  Musculoskeletal:         General: No tenderness  Normal range of motion  Cervical back: Normal range of motion and neck supple  Right lower leg: Edema present  Left lower leg: Edema present  Lymphadenopathy:      Cervical: No cervical adenopathy  Skin:     Findings: No rash  Neurological:      Mental Status: She is alert and oriented to person, place, and time  Cranial Nerves: No cranial nerve deficit  Coordination: Coordination normal    Psychiatric:         Behavior: Behavior normal          Thought Content:  Thought content normal          Judgment: Judgment normal

## 2021-06-16 NOTE — PATIENT INSTRUCTIONS
Leg Edema   WHAT YOU NEED TO KNOW:   Leg edema is swelling caused by fluid buildup  Your legs may swell if you sit or stand for long periods of time, are pregnant, or are injured  Swelling may also occur if you have heart failure or circulation problems  This means that your heart does not pump blood through your body as it should  DISCHARGE INSTRUCTIONS:   Call your local emergency number (911 in the [address] [facility] [facility]) for any of the following:   · You cannot walk  · You have chest pain or trouble breathing that is worse when you lie down  · You suddenly feel lightheaded and have trouble breathing  · You have new and sudden chest pain  You may have more pain when you take deep breaths or cough  · You cough up blood  Return to the emergency department if:   · You feel faint or confused  · Your skin turns blue or gray  · Your leg feels warm, tender, and painful  It may be swollen and red  Call your doctor if:   · You have a fever or feel more tired than usual     · The veins in your legs look larger than usual  They may look full or bulging  · Your legs itch or feel heavy  · You have red or white areas or sores on your legs  The skin may also appear dimpled or have indentations  · You are gaining weight  · You have trouble moving your ankles  · The swelling does not go away, or other parts of your body swell  · You have questions or concerns about your condition or care  Self-care:   · Elevate your legs  Raise your legs above the level of your heart as often as you can  This will help decrease swelling and pain  Prop your legs on pillows or blankets to keep them elevated comfortably  · Wear pressure stockings, if directed  These tight stockings put pressure on your legs to promote blood flow and prevent blood clots  Put them on before you get out of bed  Wear the stockings during the day  Do not wear them while you sleep  · Stay active    Do not stand or sit for long periods of time  Ask your healthcare provider about the best exercise plan for you  · Eat healthy foods  Healthy foods include fruits, vegetables, whole-grain breads, low-fat dairy products, beans, lean meats, and fish  Ask if you need to be on a special diet  · Limit sodium (salt)  Salt will make your body hold even more fluid  Your healthcare provider will tell you how many milligrams (mg) of salt you can have each day  Follow up with your doctor as directed:  Write down your questions so you remember to ask them during your visits  © Copyright [address] [place] [address] Drive Information is for End User's use only and may not be sold, redistributed or otherwise used for commercial purposes  All illustrations and images included in CareNotes® are the copyrighted property of [employer]  or [facility] Sandi Daley   The above information is an  only  It is not intended as medical advice for individual conditions or treatments  Talk to your doctor, nurse or pharmacist before following any medical regimen to see if it is safe and effective for you

## 2021-06-16 NOTE — ASSESSMENT & PLAN NOTE
Patient numb has had a workup for increased swelling from her knees down that failed to show anything other than adipose and lymphedema  Will try adding low-dose Lasix and refer her to the lymphedema clinic    I do agree that weight loss would help

## 2021-06-30 ENCOUNTER — EVALUATION (OUTPATIENT)
Dept: PHYSICAL THERAPY | Facility: CLINIC | Age: 36
End: 2021-06-30
Payer: COMMERCIAL

## 2021-06-30 DIAGNOSIS — I89.0 LYMPH EDEMA: Primary | ICD-10-CM

## 2021-06-30 DIAGNOSIS — Z13.220 SCREENING, LIPID: ICD-10-CM

## 2021-06-30 DIAGNOSIS — E06.3 HYPOTHYROIDISM DUE TO HASHIMOTO'S THYROIDITIS: Primary | ICD-10-CM

## 2021-06-30 DIAGNOSIS — E03.8 HYPOTHYROIDISM DUE TO HASHIMOTO'S THYROIDITIS: Primary | ICD-10-CM

## 2021-06-30 DIAGNOSIS — I89.0 LYMPH EDEMA: ICD-10-CM

## 2021-06-30 PROCEDURE — 97112 NEUROMUSCULAR REEDUCATION: CPT | Performed by: PHYSICAL THERAPIST

## 2021-06-30 PROCEDURE — 97161 PT EVAL LOW COMPLEX 20 MIN: CPT | Performed by: PHYSICAL THERAPIST

## 2021-07-01 DIAGNOSIS — E03.8 HYPOTHYROIDISM DUE TO HASHIMOTO'S THYROIDITIS: ICD-10-CM

## 2021-07-01 DIAGNOSIS — E06.3 HYPOTHYROIDISM DUE TO HASHIMOTO'S THYROIDITIS: ICD-10-CM

## 2021-07-01 NOTE — PROGRESS NOTES
PT Evaluation     Today's date: 2021  Patient name: Mikey Carrillo  : 1985  MRN: [id number]  Referring provider: Gerardo Lind MD  Dx:   Encounter Diagnosis     ICD-10-CM    1  Lymph edema  I89 0 Ambulatory referral to PT/OT lymphedema therapy                  Assessment  Assessment details: Mikey Carrillo is a 30y o  year old female presenting to PT with edema, decreased range of motion, decreased strength, and decreased tolerance to activity  Signs and symptoms are consistent with referring diagnosis of lymphedema  Zahida Burleson would benefit from skilled PT services to address these issues and to maximize function  Home exercise provided and all questions answered  Thank you for the referral     Impairments: abnormal or restricted ROM  Other impairment: pitting edema    Goals  STGs to be achieved in 2 - 4 weeks  Demonstrate at least 75% compliance with self MLD  Demonstrate at least 75% compliance with self compression  Demonstrate at least 75% compliance with self skin and nail inspection  Free from s/s of infection  Demonstrate understanding of importance of compliance with POC    LTGs to be achieved in 4 - 6 weeks  Demonstrate at least 100% compliance with self MLD  Demonstrate at least 100% compliance with self compression  Demonstrate at least 100% compliance with self skin and nail inspection  Free from s/s of infection  Demonstrate understanding of day/night compression wearing schedule  Obtain alternative compression to ensure independence with self management      Plan  Planned therapy interventions: manual therapy, patient education and therapeutic exercise  Frequency: 3x week  Duration in weeks: 6        Subjective Evaluation    History of Present Illness  Mechanism of injury: Elena Jordan reports increasing LLE swelling since CARLOTTA  Additionally, history of partial thyroid removal with Hashitmoto's  The combination of these have contributed to weight gain and fluid retention    Fluid retention is the worst in her LLE, with pitting edema and recent history of cellulitis infection  She is agreeable with PT treatment to include short stretch bandaging with MLD  She has been taking a water pill with some improvement in her symptoms      Pain  No pain reported    Treatments  Previous treatment: medication  Current treatment: medication and physical therapy  Patient Goals  Patient goals for therapy: increased motion and decreased edema          Objective     Hemosiderin staining (-)  Skin discoloration (-)  Open wounds/Infection - hx of cellulitis - now resolved  Peau d'orange (-)  Weeping  (-)  Pitting edema (+)  Skin dryness/flaking (-) current use of low pH moisturizer         Precautions: LLE lymphedema       Manuals                                                                 Neuro Re-Ed             Education compression, MLD, long term management 30'                                                                                          Ther Ex                                                                                                                     Ther Activity                                       Gait Training                                       Modalities

## 2021-07-03 RX ORDER — LEVOTHYROXINE SODIUM 0.12 MG/1
TABLET ORAL
Qty: 30 TABLET | Refills: 5 | Status: SHIPPED | OUTPATIENT
Start: 2021-07-03 | End: 2021-07-27 | Stop reason: SDUPTHER

## 2021-07-12 ENCOUNTER — OFFICE VISIT (OUTPATIENT)
Dept: PHYSICAL THERAPY | Facility: CLINIC | Age: 36
End: 2021-07-12
Payer: COMMERCIAL

## 2021-07-12 DIAGNOSIS — I89.0 LYMPH EDEMA: Primary | ICD-10-CM

## 2021-07-12 PROCEDURE — 97140 MANUAL THERAPY 1/> REGIONS: CPT | Performed by: PHYSICAL THERAPIST

## 2021-07-12 NOTE — PROGRESS NOTES
Daily Note     Today's date: 2021  Patient name: Tawnya Samuel  : 1985  MRN: [id number]  Referring provider: Osmel Ramires MD  Dx:   Encounter Diagnosis     ICD-10-CM    1  Lymph edema  I89 0                   Subjective: Isael Barraza agrees to begin short stretch bandaging  Recently lost nail for big toe on the LLE  Objective: See treatment diary below      Assessment: Tolerated treatment fair and difficulty finding a comfortable footwear option after wrapping  Modified lacing on her existing shoe in place of the medical shoe she purchased after IE  Short stretch bandaging aplied over tubigrip to minimize bulk  Discussed possible addiiton of open cell foam next visit to add structure to the wrapping, improve comfort at natural creases    Patient would benefit from continued PT      Plan: Continue per plan of care  Progress treatment as tolerated         Precautions: LLE lymphedema       Manuals             Short stretch bandaging 45                                                   Neuro Re-Ed             Education compression, MLD, long term management 30'                                                                                          Ther Ex                                                                                                                     Ther Activity                                       Gait Training                                       Modalities

## 2021-07-14 ENCOUNTER — OFFICE VISIT (OUTPATIENT)
Dept: PHYSICAL THERAPY | Facility: CLINIC | Age: 36
End: 2021-07-14
Payer: COMMERCIAL

## 2021-07-14 DIAGNOSIS — I89.0 LYMPH EDEMA: Primary | ICD-10-CM

## 2021-07-14 PROCEDURE — 97140 MANUAL THERAPY 1/> REGIONS: CPT | Performed by: PHYSICAL THERAPIST

## 2021-07-14 NOTE — PROGRESS NOTES
Daily Note     Today's date: 2021  Patient name: Kate Kraus  : 1985  MRN: [id number]  Referring provider: Larry Anand MD  Dx:   Encounter Diagnosis     ICD-10-CM    1  Lymph edema  I89 0                   Subjective: Pa Keller reports that her foot felt numb by the time she got home after last visit, and wraps fells off after going up 60 stairs to enter her house  Objective: See treatment diary below      Assessment: Tolerated treatment fair and modified wrapping to add open cell foam at the calf to better match the size of the thigh and reduce slipping of bandages  Also elected to compress foot using a sneaker at patient request, this allowed for increased comfort at the foot  Patient to enter house from a different entrance today, requiring only 24 stairs to enter    Patient would benefit from continued PT      Plan: Continue per plan of care  Progress treatment as tolerated         Precautions: LLE lymphedema       Manuals            Short stretch bandaging 45 35                                                  Neuro Re-Ed             Education compression, MLD, long term management 30'                                                                                          Ther Ex                                                                                                                     Ther Activity                                       Gait Training                                       Modalities

## 2021-07-16 ENCOUNTER — OFFICE VISIT (OUTPATIENT)
Dept: PHYSICAL THERAPY | Facility: CLINIC | Age: 36
End: 2021-07-16
Payer: COMMERCIAL

## 2021-07-16 DIAGNOSIS — I89.0 LYMPH EDEMA: Primary | ICD-10-CM

## 2021-07-16 PROCEDURE — 97140 MANUAL THERAPY 1/> REGIONS: CPT | Performed by: PHYSICAL THERAPIST

## 2021-07-16 NOTE — PROGRESS NOTES
Daily Note     Today's date: 2021  Patient name: Kristen Wong  : 1985  MRN: [id number]  Referring provider: Peyman Brooks MD  Dx:   Encounter Diagnosis     ICD-10-CM    1  Lymph edema  I89 0                   Subjective: Improved tolerance for wrapping, with reduction in LLE volume  Objective: See treatment diary below      Assessment: Tolerated treatment well and increased height of wrapping, plus addition of MLD to medial knee  Patient would benefit from continued PT      Plan: Continue per plan of care        Precautions: LLE lymphedema       Manuals           Short stretch bandaging 45 35 35          MLD   10'                                    Neuro Re-Ed             Education compression, MLD, long term management 30'                                                                                          Ther Ex                                                                                                                     Ther Activity                                       Gait Training                                       Modalities

## 2021-07-18 DIAGNOSIS — L30.9 ECZEMA, UNSPECIFIED TYPE: ICD-10-CM

## 2021-07-19 ENCOUNTER — OFFICE VISIT (OUTPATIENT)
Dept: PHYSICAL THERAPY | Facility: CLINIC | Age: 36
End: 2021-07-19
Payer: COMMERCIAL

## 2021-07-19 DIAGNOSIS — I89.0 LYMPH EDEMA: Primary | ICD-10-CM

## 2021-07-19 PROCEDURE — 97140 MANUAL THERAPY 1/> REGIONS: CPT | Performed by: PHYSICAL THERAPIST

## 2021-07-19 RX ORDER — TRIAMCINOLONE ACETONIDE 1 MG/G
CREAM TOPICAL
Qty: 80 G | Refills: 1 | Status: SHIPPED | OUTPATIENT
Start: 2021-07-19

## 2021-07-19 NOTE — PROGRESS NOTES
Daily Note     Today's date: 2021  Patient name: Opal King  : 1985  MRN: [id number]  Referring provider: Wild Javier MD  Dx:   Encounter Diagnosis     ICD-10-CM    1  Lymph edema  I89 0                   Subjective: Natalie Quijano reports significant reduction in her lymphedema, with bandages falling off Saturday evening   the swelling returned with the lobe above her medial knee becoming red and swollen  Objective: See treatment diary below      Assessment: Tolerated treatment well and held MLD today due to tenderness throughout the medial knee  Wrapping modified to increase support along medial knee joint line    Patient would benefit from continued PT      Plan: Continue per plan of care        Precautions: LLE lymphedema       Manuals          Short stretch bandaging 45 35 35 40'         MLD   10'                                    Neuro Re-Ed             Education compression, MLD, long term management 27'                                                                                          Ther Ex                                                                                                                     Ther Activity                                       Gait Training                                       Modalities

## 2021-07-21 ENCOUNTER — OFFICE VISIT (OUTPATIENT)
Dept: PHYSICAL THERAPY | Facility: CLINIC | Age: 36
End: 2021-07-21
Payer: COMMERCIAL

## 2021-07-21 DIAGNOSIS — I89.0 LYMPH EDEMA: Primary | ICD-10-CM

## 2021-07-21 PROCEDURE — 97140 MANUAL THERAPY 1/> REGIONS: CPT | Performed by: PHYSICAL THERAPIST

## 2021-07-21 NOTE — PROGRESS NOTES
Daily Note     Today's date: 2021  Patient name: Karla Edwards  : 1985  MRN: [id number]  Referring provider: Mackenzie Beverly MD  Dx:   Encounter Diagnosis     ICD-10-CM    1  Lymph edema  I89 0                   Subjective: Wilfredo Ojeda reports bandages fell off late Wednesday even due to reduction in limb volume  Her knee is less painful today, but swelling returned this afternoon  Objective: See treatment diary below      Assessment: Tolerated treatment well and her spouse is present to learn strategies for LE wrapping and management of loosening bandages  They will trial adding layers to the thigh as edema reduces this evening to maintain the integrity of the wrapping longer between visits    Patient would benefit from continued PT      Plan: Continue per plan of care        Precautions: LLE lymphedema       Manuals         Short stretch bandaging 45 35 35 40' 30        MLD   10'                                    Neuro Re-Ed             Education compression, MLD, long term management 30'                                                                                          Ther Ex                                                                                                                     Ther Activity                                       Gait Training                                       Modalities

## 2021-07-23 ENCOUNTER — OFFICE VISIT (OUTPATIENT)
Dept: PHYSICAL THERAPY | Facility: CLINIC | Age: 36
End: 2021-07-23
Payer: COMMERCIAL

## 2021-07-23 DIAGNOSIS — I89.0 LYMPH EDEMA: Primary | ICD-10-CM

## 2021-07-23 PROCEDURE — 97140 MANUAL THERAPY 1/> REGIONS: CPT | Performed by: PHYSICAL THERAPIST

## 2021-07-23 NOTE — PROGRESS NOTES
Daily Note     Today's date: 2021  Patient name: Enedelia Herbert  : 1985  MRN: [id number]  Referring provider: Yefri Jackson MD  Dx:   Encounter Diagnosis     ICD-10-CM    1  Lymph edema  I89 0                   Subjective: Mona Dunlap notices continued reduction in LLE volume with short stretch bandaging  It has been helpful for her to have her  adjust bandages as they loosen  Objective: See treatment diary below      Assessment: Tolerated treatment well and increased bandaging above the knee today   Patient would benefit from continued PT      Plan: Continue per plan of care        Precautions: LLE lymphedema       Manuals        Short stretch bandaging 45 35 35 40' 30 40'       MLD   10'                                    Neuro Re-Ed             Education compression, MLD, long term management 27'                                                                                          Ther Ex                                                                                                                     Ther Activity                                       Gait Training                                       Modalities

## 2021-07-26 ENCOUNTER — OFFICE VISIT (OUTPATIENT)
Dept: PHYSICAL THERAPY | Facility: CLINIC | Age: 36
End: 2021-07-26
Payer: COMMERCIAL

## 2021-07-26 DIAGNOSIS — I89.0 LYMPH EDEMA: Primary | ICD-10-CM

## 2021-07-26 PROCEDURE — 97140 MANUAL THERAPY 1/> REGIONS: CPT | Performed by: PHYSICAL THERAPIST

## 2021-07-26 PROCEDURE — 97112 NEUROMUSCULAR REEDUCATION: CPT | Performed by: PHYSICAL THERAPIST

## 2021-07-26 NOTE — PROGRESS NOTES
PT Re-Evaluation     Today's date: 2021  Patient name: Rayo Sol  : 1985  MRN: [id number]  Referring provider: Everette Severs, MD  Dx:   Encounter Diagnosis     ICD-10-CM    1  Lymph edema  I89 0                   Assessment  Assessment details: Maldonado Elder has been compliant with attending PT and home exercise program since initial eval and reports 25% improvement since start of care  Maldonado Eledr reports and demonstrates decreased pain, improved joint mobility and decreased joint effusion since initial eval but is still limited compared to prior level of function  One barrier to PT has been limited duration of short stretch bandaging before limb volume decreases and the bandages fall down  Her  has attended PT to learn how to assist with wrapping as able  In addition, she will benefit from use of home compression pumps to manage edema between PT visits as bandages fall off regularly  Maldonado Elder continues with above listed impairments and would benefit from additional skilled PT to address these deficits to return to prior level of function        Impairments: abnormal or restricted ROM  Other impairment: pitting edema  Understanding of Dx/Px/POC: good   Prognosis: good    Goals  STGs to be achieved in 2 - 4 weeks  Demonstrate at least 75% compliance with self MLD - met  Demonstrate at least 75% compliance with self compression - met  Demonstrate at least 75% compliance with self skin and nail inspection - met  Free from s/s of infection - met  Demonstrate understanding of importance of compliance with POC - met    LTGs to be achieved in 4 - 6 weeks  Demonstrate at least 100% compliance with self MLD - progressing  Demonstrate at least 100% compliance with self compression - progressing  Demonstrate at least 100% compliance with self skin and nail inspection - progressing  Free from s/s of infection - progressing  Demonstrate understanding of day/night compression wearing schedule - progressing  Obtain alternative compression to ensure independence with self management - progressing      Plan  Planned therapy interventions: manual therapy, patient education and therapeutic exercise  Frequency: 3x week  Duration in weeks: 6        Subjective Evaluation    History of Present Illness  Mechanism of injury: [employer] reports increasing LLE swelling since CARLOTTA  Additionally, history of partial thyroid removal with Hashitmoto's  The combination of these have contributed to weight gain and fluid retention  Fluid retention is the worst in her LLE, with pitting edema and recent history of cellulitis infection  She is agreeable with PT treatment to include short stretch bandaging with MLD  She has been taking a water pill with some improvement in her symptoms      Pain  No pain reported    Treatments  Previous treatment: medication  Current treatment: medication and physical therapy  Patient Goals  Patient goals for therapy: increased motion and decreased edema          Objective     Hemosiderin staining (-)  Skin discoloration (-)  Open wounds/Infection - hx of cellulitis - now resolved  Peau d'orange (-)  Weeping  (-)  Pitting edema (+)  Skin dryness/flaking (-) current use of low pH moisturizer    7/26/21  LE girth measurements  Right Left    Metatarsals     Malleolus 31 5 34 5   +10 cm  43 0 45 8   +20 cm  61 8 68 8   +30 cm      Knee joint line 66 7 76 0   +40 cm  82 7 88 2   +50 cm      Inseam  59 0            Precautions: LLE lymphedema       Manuals 7/12 7/14 7/16 7/19 7/21 7/23 7/26      Short stretch bandaging 45 35 35 40' 30 40' 20      MLD   10'                                    Neuro Re-Ed             Education compression, MLD, long term management 30'      15                                                                                    Ther Ex                                                                                                                     Ther Activity Gait Training                                       Modalities

## 2021-07-27 ENCOUNTER — OFFICE VISIT (OUTPATIENT)
Dept: INTERNAL MEDICINE CLINIC | Age: 36
End: 2021-07-27
Payer: COMMERCIAL

## 2021-07-27 VITALS
BODY MASS INDEX: 50.02 KG/M2 | SYSTOLIC BLOOD PRESSURE: 128 MMHG | HEART RATE: 89 BPM | TEMPERATURE: 97.6 F | RESPIRATION RATE: 16 BRPM | OXYGEN SATURATION: 97 % | DIASTOLIC BLOOD PRESSURE: 82 MMHG | WEIGHT: 293 LBS | HEIGHT: 64 IN

## 2021-07-27 DIAGNOSIS — E03.8 HYPOTHYROIDISM DUE TO HASHIMOTO'S THYROIDITIS: ICD-10-CM

## 2021-07-27 DIAGNOSIS — E06.3 HYPOTHYROIDISM DUE TO HASHIMOTO'S THYROIDITIS: ICD-10-CM

## 2021-07-27 DIAGNOSIS — E66.01 MORBID OBESITY WITH BMI OF 50.0-59.9, ADULT (HCC): Primary | ICD-10-CM

## 2021-07-27 DIAGNOSIS — E66.01 MORBID OBESITY WITH BMI OF 60.0-69.9, ADULT (HCC): ICD-10-CM

## 2021-07-27 DIAGNOSIS — Z00.00 ANNUAL PHYSICAL EXAM: ICD-10-CM

## 2021-07-27 DIAGNOSIS — I89.0 LYMPH EDEMA: ICD-10-CM

## 2021-07-27 PROCEDURE — 3008F BODY MASS INDEX DOCD: CPT | Performed by: INTERNAL MEDICINE

## 2021-07-27 PROCEDURE — 99395 PREV VISIT EST AGE 18-39: CPT | Performed by: INTERNAL MEDICINE

## 2021-07-27 PROCEDURE — 1036F TOBACCO NON-USER: CPT | Performed by: INTERNAL MEDICINE

## 2021-07-27 RX ORDER — LEVOTHYROXINE SODIUM 0.12 MG/1
125 TABLET ORAL DAILY
Qty: 30 TABLET | Refills: 5 | Status: SHIPPED | OUTPATIENT
Start: 2021-07-27 | End: 2021-08-03 | Stop reason: SDUPTHER

## 2021-07-27 NOTE — PROGRESS NOTES
[facility] [employer]    NAME: Leslie Bailey  AGE: 30 y o  SEX: female  : 1985     DATE: 2021     Assessment and Plan:     Problem List Items Addressed This Visit        Endocrine    Hypothyroidism       We are still awaiting repeat blood work         Relevant Medications    levothyroxine 125 mcg tablet       Other    Morbid obesity with BMI of 50 0-59 9, adult ([facility] [address] ) - Primary      She has not lost any weight yet         Relevant Orders    XR foot 3+ vw right    Lymph edema      She is faithful [address] attending the lymphedema program 3 times a week and is having some improvement  In the upcoming week they are planning on measuring her for custom compression stockings  Other Visit Diagnoses     Annual physical exam        Morbid obesity with BMI of 60 0-69 9, adult ([facility] )              Immunizations and preventive care screenings were discussed with patient today  Appropriate education was printed on patient's after visit summary  Counseling:  · Exercise: the importance of regular exercise/physical activity was discussed  Recommend exercise 3-5 times per week for at least 30 minutes  BMI Counseling: Body mass index is 65 83 kg/m²  The BMI is above normal  Nutrition recommendations include decreasing portion sizes, encouraging healthy choices of fruits and vegetables and consuming healthier snacks  Exercise recommendations include exercising 3-5 times per week and strength training exercises  No follow-ups on file  Chief Complaint:     Chief Complaint   Patient presents with    Follow-up      History of Present Illness:     Adult Annual Physical   Patient here for a comprehensive physical exam  The patient reports Morbid obesity and lymphedema along with hypothyroidism  Diet and Physical Activity  · Diet/Nutrition: low carb diet  · Exercise: walking        Depression Screening  PHQ-9 Worried About [facility] in the Last Year:    [address] in the Last Year:    Transportation Needs:     Lack of Transportation (Medical):  Lack of Transportation (Non-Medical):    Physical Activity:     Days of Exercise per Week:     Minutes of Exercise per Session:    Stress:     Feeling of Stress :    Social Connections:     Frequency of Communication with Friends and Family:     Frequency of Social Gatherings with Friends and Family:     Attends Sikhism Services:     Active Member of Clubs or Organizations:     Attends Club or Organization Meetings:     Marital Status:    Intimate Partner Violence:     Fear of Current or Ex-Partner:     Emotionally Abused:     Physically Abused:     Sexually Abused:       Family History:     No family history on file  Current Medications:     Current Outpatient Medications   Medication Sig Dispense Refill    albuterol (VENTOLIN HFA) 90 mcg/act inhaler Inhale 2 puffs every 6 (six) hours as needed for wheezing or shortness of breath 18 g 0    fluticasone (FLONASE) 50 mcg/act nasal spray 2 sprays into each nostril daily 16 g 0    furosemide (LASIX) 40 mg tablet Take 1 tablet (40 mg total) by mouth daily 30 tablet 5    levothyroxine 125 mcg tablet Take 1 tablet (125 mcg total) by mouth daily 30 tablet 5    loratadine (CLARITIN) 10 mg tablet Take 10 mg by mouth daily      ondansetron (ZOFRAN-ODT) 4 mg disintegrating tablet Take 1 tablet (4 mg total) by mouth every 8 (eight) hours as needed for nausea or vomiting 15 tablet 0    triamcinolone (KENALOG) 0 1 % cream APPLY TOPICALLY DAILY AT BEDTIME AS NEEDED FOR RASH 80 g 1     No current facility-administered medications for this visit  Allergies:      Allergies   Allergen Reactions    Dust Mite Extract Other (See Comments)     Sinus inflammation    Latex Blisters    Molds & Smuts Other (See Comments)     Sinus inflammation    Penicillins     Jorge Luis Grass Pollen Allergen Other (See

## 2021-07-27 NOTE — PATIENT INSTRUCTIONS
Wellness Visit for Adults   AMBULATORY CARE:   A wellness visit  is when you see your healthcare provider to get screened for health problems  Your healthcare provider will also give you advice on how to stay healthy  Write down your questions so you remember to ask them  Ask your healthcare provider how often you should have a wellness visit  What happens at a wellness visit:  Your healthcare provider will ask about your health, and your family history of health problems  This includes high blood pressure, heart disease, and cancer  He or she will ask if you have symptoms that concern you, if you smoke, and about your mood  You may also be asked about your intake of medicines, supplements, food, and alcohol  Any of the following may be done:  · Your weight  will be checked  Your height may also be checked so your body mass index (BMI) can be calculated  Your BMI shows if you are at a healthy weight  · Your blood pressure  and heart rate will be checked  Your temperature may also be checked  · Blood and urine tests  may be done  Blood tests may be done to check your cholesterol levels  Abnormal cholesterol levels increase your risk for heart disease and stroke  You may also need a blood or urine test to check for diabetes if you are at increased risk  Urine tests may be done to look for signs of an infection or kidney disease  · A physical exam  includes checking your heartbeat and lungs with a stethoscope  Your healthcare provider may also check your skin to look for sun damage  · Screening tests  may be recommended  A screening test is done to check for diseases that may not cause symptoms  The screening tests you may need depend on your age, gender, family history, and lifestyle habits  For example, colorectal screening may be recommended if you are 48years old or older  Screening tests you need if you are a woman:   · A Pap smear  is used to screen for cervical cancer   Pap smears are usually done every 3 to 5 years depending on your age  You may need them more often if you have had abnormal Pap smear test results in the past  Ask your healthcare provider how often you should have a Pap smear  · A mammogram  is an x-ray of your breasts to screen for breast cancer  Experts recommend mammograms every 2 years starting at age 48 years  You may need a mammogram at age 52 years or younger if you have an increased risk for breast cancer  Talk to your healthcare provider about when you should start having mammograms and how often you need them  Vaccines you may need:   · Get an influenza vaccine  every year  The influenza vaccine protects you from the flu  Several types of viruses cause the flu  The viruses change over time, so new vaccines are made each year  · Get a tetanus-diphtheria (Td) booster vaccine  every 10 years  This vaccine protects you against tetanus and diphtheria  Tetanus is a severe infection that may cause painful muscle spasms and lockjaw  Diphtheria is a severe bacterial infection that causes a thick covering in the back of your mouth and throat  · Get a human papillomavirus (HPV) vaccine  if you are female and aged 23 to 32 or male 23 to 24 and never received it  This vaccine protects you from HPV infection  HPV is the most common infection spread by sexual contact  HPV may also cause vaginal, penile, and anal cancers  · Get a pneumococcal vaccine  if you are aged 72 years or older  The pneumococcal vaccine is an injection given to protect you from pneumococcal disease  Pneumococcal disease is an infection caused by pneumococcal bacteria  The infection may cause pneumonia, meningitis, or an ear infection  · Get a shingles vaccine  if you are 60 or older, even if you have had shingles before  The shingles vaccine is an injection to protect you from the varicella-zoster virus  This is the same virus that causes chickenpox   Shingles is a painful rash that develops in people who had chickenpox or have been exposed to the virus  How to eat healthy:  My Plate is a model for planning healthy meals  It shows the types and amounts of foods that should go on your plate  Fruits and vegetables make up about half of your plate, and grains and protein make up the other half  A serving of dairy is included on the side of your plate  The amount of calories and serving sizes you need depends on your age, gender, weight, and height  Examples of healthy foods are listed below:  · Eat a variety of vegetables  such as dark green, red, and orange vegetables  You can also include canned vegetables low in sodium (salt) and frozen vegetables without added butter or sauces  · Eat a variety of fresh fruits , canned fruit in 100% juice, frozen fruit, and dried fruit  · Include whole grains  At least half of the grains you eat should be whole grains  Examples include whole-wheat bread, wheat pasta, brown rice, and whole-grain cereals such as oatmeal     · Eat a variety of protein foods such as seafood (fish and shellfish), lean meat, and poultry without skin (turkey and chicken)  Examples of lean meats include pork leg, shoulder, or tenderloin, and beef round, sirloin, tenderloin, and extra lean ground beef  Other protein foods include eggs and egg substitutes, beans, peas, soy products, nuts, and seeds  · Choose low-fat dairy products such as skim or 1% milk or low-fat yogurt, cheese, and cottage cheese  · Limit unhealthy fats  such as butter, hard margarine, and shortening  Exercise:  Exercise at least 30 minutes per day on most days of the week  Some examples of exercise include walking, biking, dancing, and swimming  You can also fit in more physical activity by taking the stairs instead of the elevator or parking farther away from stores  Include muscle strengthening activities 2 days each week  Regular exercise provides many health benefits   It helps you manage your weight, and decreases your risk for type 2 diabetes, heart disease, stroke, and high blood pressure  Exercise can also help improve your mood  Ask your healthcare provider about the best exercise plan for you  General health and safety guidelines:   · Do not smoke  Nicotine and other chemicals in cigarettes and cigars can cause lung damage  Ask your healthcare provider for information if you currently smoke and need help to quit  E-cigarettes or smokeless tobacco still contain nicotine  Talk to your healthcare provider before you use these products  · Limit alcohol  A drink of alcohol is 12 ounces of beer, 5 ounces of wine, or 1½ ounces of liquor  · Lose weight, if needed  Being overweight increases your risk of certain health conditions  These include heart disease, high blood pressure, type 2 diabetes, and certain types of cancer  · Protect your skin  Do not sunbathe or use tanning beds  Use sunscreen with a SPF 15 or higher  Apply sunscreen at least 15 minutes before you go outside  Reapply sunscreen every 2 hours  Wear protective clothing, hats, and sunglasses when you are outside  · Drive safely  Always wear your seatbelt  Make sure everyone in your car wears a seatbelt  A seatbelt can save your life if you are in an accident  Do not use your cell phone when you are driving  This could distract you and cause an accident  Pull over if you need to make a call or send a text message  · Practice safe sex  Use latex condoms if are sexually active and have more than one partner  Your healthcare provider may recommend screening tests for sexually transmitted infections (STIs)  · Wear helmets, lifejackets, and protective gear  Always wear a helmet when you ride a bike or motorcycle, go skiing, or play sports that could cause a head injury  Wear protective equipment when you play sports  Wear a lifejacket when you are on a boat or doing water sports      © Copyright [employer] 2021 Information is for End User's use only and may not be sold, redistributed or otherwise used for commercial purposes  All illustrations and images included in CareNotes® are the copyrighted property of [employer] M [employer]  or Theresa Collins  The above information is an  only  It is not intended as medical advice for individual conditions or treatments  Talk to your doctor, nurse or pharmacist before following any medical regimen to see if it is safe and effective for you  Obesity   AMBULATORY CARE:   Obesity  is when your body mass index (BMI) is greater than 30  Your healthcare provider will use your height and weight to measure your BMI  The risks of obesity include  many health problems, such as injuries or physical disability  You may need tests to check for the following:  · Diabetes    · High blood pressure or high cholesterol    · Heart disease    · Gallbladder or liver disease    · Cancer of the colon, breast, prostate, liver, or kidney    · Sleep apnea    · Arthritis or gout    Seek care immediately if:   · You have a severe headache, confusion, or difficulty speaking  · You have weakness on one side of your body  · You have chest pain, sweating, or shortness of breath  Contact your healthcare provider if:   · You have symptoms of gallbladder or liver disease, such as pain in your upper abdomen  · You have knee or hip pain and discomfort while walking  · You have symptoms of diabetes, such as intense hunger and thirst, and frequent urination  · You have symptoms of sleep apnea, such as snoring or daytime sleepiness  · You have questions or concerns about your condition or care  Treatment for obesity  focuses on helping you lose weight to improve your health  Even a small decrease in BMI can reduce the risk for many health problems  Your healthcare provider will help you set a weight-loss goal   · Lifestyle changes  are the first step in treating obesity   These include making healthy food choices and getting regular physical activity  Your healthcare provider may suggest a weight-loss program that involves coaching, education, and therapy  · Medicine  may help you lose weight when it is used with a healthy diet and physical activity  · Surgery  can help you lose weight if you are very obese and have other health problems  There are several types of weight-loss surgery  Ask your healthcare provider for more information  Be successful losing weight:   · Set small, realistic goals  An example of a small goal is to walk for 20 minutes 5 days a week  Anther goal is to lose 5% of your body weight  · Tell friends, family members, and coworkers about your goals  and ask for their support  Ask a friend to lose weight with you, or join a weight-loss support group  · Identify foods or triggers that may cause you to overeat , and find ways to avoid them  Remove tempting high-calorie foods from your home and workplace  Place a bowl of fresh fruit on your kitchen counter  If stress causes you to eat, then find other ways to cope with stress  · Keep a diary to track what you eat and drink  Also write down how many minutes of physical activity you do each day  Weigh yourself once a week and record it in your diary  Eating changes: You will need to eat 500 to 1,000 fewer calories each day than you currently eat to lose 1 to 2 pounds a week  The following changes will help you cut calories:  · Eat smaller portions  Use small plates, no larger than 9 inches in diameter  Fill your plate half full of fruits and vegetables  Measure your food using measuring cups until you know what a serving size looks like  · Eat 3 meals and 1 or 2 snacks each day  Plan your meals in advance  Rob Lambing and eat at home most of the time  Eat slowly  Do not skip meals  Skipping meals can lead to overeating later in the day  This can make it harder for you to lose weight   Talk with a dietitian to help you make a meal plan and schedule that is right for you  · Eat fruits and vegetables at every meal   They are low in calories and high in fiber, which makes you feel full  Do not add butter, margarine, or cream sauce to vegetables  Use herbs to season steamed vegetables  · Eat less fat and fewer fried foods  Eat more baked or grilled chicken and fish  These protein sources are lower in calories and fat than red meat  Limit fast food  Dress your salads with olive oil and vinegar instead of bottled dressing  · Limit the amount of sugar you eat  Do not drink sugary beverages  Limit alcohol  Activity changes:  Physical activity is good for your body in many ways  It helps you burn calories and build strong muscles  It decreases stress and depression, and improves your mood  It can also help you sleep better  Talk to your healthcare provider before you begin an exercise program   · Exercise for at least 30 minutes 5 days a week  Start slowly  Set aside time each day for physical activity that you enjoy and that is convenient for you  It is best to do both weight training and an activity that increases your heart rate, such as walking, bicycling, or swimming  · Find ways to be more active  Do yard work and housecleaning  Walk up the stairs instead of using elevators  Spend your leisure time going to events that require walking, such as outdoor festivals or fairs  This extra physical activity can help you lose weight and keep it off  Follow up with your healthcare provider as directed: You may need to meet with a dietitian  Write down your questions so you remember to ask them during your visits  © Copyright [employer] 2021 Information is for End User's use only and may not be sold, redistributed or otherwise used for commercial purposes   All illustrations and images included in CareNotes® are the copyrighted property of A [employer] A M , Inc  or Theresa Daley   The above information is an  only  It is not intended as medical advice for individual conditions or treatments  Talk to your doctor, nurse or pharmacist before following any medical regimen to see if it is safe and effective for you  Leg Edema   WHAT YOU NEED TO KNOW:   Leg edema is swelling caused by fluid buildup  Your legs may swell if you sit or stand for long periods of time, are pregnant, or are injured  Swelling may also occur if you have heart failure or circulation problems  This means that your heart does not pump blood through your body as it should  DISCHARGE INSTRUCTIONS:   Call your local emergency number (911 in the [address] [facility]) for any of the following:   · You cannot walk  · You have chest pain or trouble breathing that is worse when you lie down  · You suddenly feel lightheaded and have trouble breathing  · You have new and sudden chest pain  You may have more pain when you take deep breaths or cough  · You cough up blood  Return to the emergency department if:   · You feel faint or confused  · Your skin turns blue or gray  · Your leg feels warm, tender, and painful  It may be swollen and red  Call your doctor if:   · You have a fever or feel more tired than usual     · The veins in your legs look larger than usual  They may look full or bulging  · Your legs itch or feel heavy  · You have red or white areas or sores on your legs  The skin may also appear dimpled or have indentations  · You are gaining weight  · You have trouble moving your ankles  · The swelling does not go away, or other parts of your body swell  · You have questions or concerns about your condition or care  Self-care:   · Elevate your legs  Raise your legs above the level of your heart as often as you can  This will help decrease swelling and pain  Prop your legs on pillows or blankets to keep them elevated comfortably  · Wear pressure stockings, if directed    These tight stockings put pressure on your legs to promote blood flow and prevent blood clots  Put them on before you get out of bed  Wear the stockings during the day  Do not wear them while you sleep  · Stay active  Do not stand or sit for long periods of time  Ask your healthcare provider about the best exercise plan for you  · Eat healthy foods  Healthy foods include fruits, vegetables, whole-grain breads, low-fat dairy products, beans, lean meats, and fish  Ask if you need to be on a special diet  · Limit sodium (salt)  Salt will make your body hold even more fluid  Your healthcare provider will tell you how many milligrams (mg) of salt you can have each day  Follow up with your doctor as directed:  Write down your questions so you remember to ask them during your visits  © Copyright [employer] 2021 Information is for End User's use only and may not be sold, redistributed or otherwise used for commercial purposes  All illustrations and images included in CareNotes® are the copyrighted property of [employer] M [employer] Inc  or [facility] Sandi Daley   The above information is an  only  It is not intended as medical advice for individual conditions or treatments  Talk to your doctor, nurse or pharmacist before following any medical regimen to see if it is safe and effective for you

## 2021-07-28 ENCOUNTER — OFFICE VISIT (OUTPATIENT)
Dept: PHYSICAL THERAPY | Facility: CLINIC | Age: 36
End: 2021-07-28
Payer: COMMERCIAL

## 2021-07-28 DIAGNOSIS — I89.0 LYMPH EDEMA: Primary | ICD-10-CM

## 2021-07-28 PROCEDURE — 97140 MANUAL THERAPY 1/> REGIONS: CPT | Performed by: PHYSICAL THERAPIST

## 2021-07-28 NOTE — PROGRESS NOTES
Daily Note     Today's date: 2021  Patient name: Santa Carter  : 1985  MRN: [id number]  Referring provider: Jacky Terrazas MD  Dx:   Encounter Diagnosis     ICD-10-CM    1  Lymph edema  I89 0                   Subjective: Cele jung notes continued improvement in her leg swelling  She was contacted for   Home pumps today, awaiting PCP approval    Objective: See treatment diary below      Assessment: Tolerated treatment well  Patient demonstrated fatigue post treatment and would benefit from continued PT      Plan: Continue per plan of care        Precautions: LLE lymphedema       Manuals      Short stretch bandaging 45 35 35 40' 30 40' 20 35     MLD   10'                                    Neuro Re-Ed             Education compression, MLD, long term management 30'      15                                                                                    Ther Ex                                                                                                                     Ther Activity                                       Gait Training                                       Modalities

## 2021-07-28 NOTE — ASSESSMENT & PLAN NOTE
She is faithful [place] attending the lymphedema program 3 times a week and is having some improvement  In the upcoming week they are planning on measuring her for custom compression stockings

## 2021-07-30 ENCOUNTER — OFFICE VISIT (OUTPATIENT)
Dept: PHYSICAL THERAPY | Facility: CLINIC | Age: 36
End: 2021-07-30
Payer: COMMERCIAL

## 2021-07-30 DIAGNOSIS — I89.0 LYMPH EDEMA: Primary | ICD-10-CM

## 2021-07-30 PROCEDURE — 97140 MANUAL THERAPY 1/> REGIONS: CPT | Performed by: PHYSICAL THERAPIST

## 2021-07-30 NOTE — PROGRESS NOTES
Daily Note     Today's date: 2021  Patient name: Karla Edwards  : 1985  MRN: [id number]  Referring provider: Mackenzie Beverly MD  Dx:   Encounter Diagnosis     ICD-10-CM    1  Lymph edema  I89 0                   Subjective: Wilfredo Ojeda continues to notice rapid reduction in limb volume with wrapping, but she has been unable to replicate compression once bandage falls off or is removed  Objective: See treatment diary below      Assessment: Tolerated treatment well  Patient would benefit from continued PT      Plan: Continue per plan of care  Request submitted for home compression pumps to manage edema between visits        Precautions: LLE lymphedema       Manuals     Short stretch bandaging 45 35 35 40' 30 40' 20 35 35      MLD   10'                                    Neuro Re-Ed             Education compression, MLD, long term management 30'      15                                                                                    Ther Ex                                                                                                                     Ther Activity                                       Gait Training                                       Modalities

## 2021-08-02 ENCOUNTER — APPOINTMENT (OUTPATIENT)
Dept: PHYSICAL THERAPY | Facility: CLINIC | Age: 36
End: 2021-08-02
Payer: COMMERCIAL

## 2021-08-03 ENCOUNTER — APPOINTMENT (OUTPATIENT)
Dept: RADIOLOGY | Age: 36
End: 2021-08-03
Payer: COMMERCIAL

## 2021-08-03 ENCOUNTER — APPOINTMENT (OUTPATIENT)
Dept: LAB | Age: 36
End: 2021-08-03
Payer: COMMERCIAL

## 2021-08-03 DIAGNOSIS — E66.01 MORBID OBESITY WITH BMI OF 50.0-59.9, ADULT (HCC): ICD-10-CM

## 2021-08-03 DIAGNOSIS — I89.0 LYMPH EDEMA: ICD-10-CM

## 2021-08-03 DIAGNOSIS — E06.3 HYPOTHYROIDISM DUE TO HASHIMOTO'S THYROIDITIS: ICD-10-CM

## 2021-08-03 DIAGNOSIS — E03.8 HYPOTHYROIDISM DUE TO HASHIMOTO'S THYROIDITIS: ICD-10-CM

## 2021-08-03 DIAGNOSIS — Z13.220 SCREENING, LIPID: ICD-10-CM

## 2021-08-03 LAB
ALBUMIN SERPL BCP-MCNC: 3.2 G/DL (ref 3.5–5)
ALP SERPL-CCNC: 99 U/L (ref 46–116)
ALT SERPL W P-5'-P-CCNC: 30 U/L (ref 12–78)
ANION GAP SERPL CALCULATED.3IONS-SCNC: 4 MMOL/L (ref 4–13)
AST SERPL W P-5'-P-CCNC: 25 U/L (ref 5–45)
BILIRUB SERPL-MCNC: 0.4 MG/DL (ref 0.2–1)
BUN SERPL-MCNC: 16 MG/DL (ref 5–25)
CALCIUM ALBUM COR SERPL-MCNC: 9.9 MG/DL (ref 8.3–10.1)
CALCIUM SERPL-MCNC: 9.3 MG/DL (ref 8.3–10.1)
CHLORIDE SERPL-SCNC: 107 MMOL/L (ref 100–108)
CHOLEST SERPL-MCNC: 195 MG/DL (ref 50–200)
CO2 SERPL-SCNC: 26 MMOL/L (ref 21–32)
CREAT SERPL-MCNC: 0.81 MG/DL (ref 0.6–1.3)
GFR SERPL CREATININE-BSD FRML MDRD: 95 ML/MIN/1.73SQ M
GLUCOSE P FAST SERPL-MCNC: 82 MG/DL (ref 65–99)
HDLC SERPL-MCNC: 57 MG/DL
LDLC SERPL CALC-MCNC: 113 MG/DL (ref 0–100)
NONHDLC SERPL-MCNC: 138 MG/DL
POTASSIUM SERPL-SCNC: 4.8 MMOL/L (ref 3.5–5.3)
PROT SERPL-MCNC: 8.2 G/DL (ref 6.4–8.2)
SODIUM SERPL-SCNC: 137 MMOL/L (ref 136–145)
T4 FREE SERPL-MCNC: 0.92 NG/DL (ref 0.76–1.46)
TRIGL SERPL-MCNC: 126 MG/DL
TSH SERPL DL<=0.05 MIU/L-ACNC: 6.92 UIU/ML (ref 0.36–3.74)

## 2021-08-03 PROCEDURE — 80061 LIPID PANEL: CPT

## 2021-08-03 PROCEDURE — 36415 COLL VENOUS BLD VENIPUNCTURE: CPT

## 2021-08-03 PROCEDURE — 80053 COMPREHEN METABOLIC PANEL: CPT

## 2021-08-03 PROCEDURE — 84443 ASSAY THYROID STIM HORMONE: CPT

## 2021-08-03 PROCEDURE — 84439 ASSAY OF FREE THYROXINE: CPT

## 2021-08-03 PROCEDURE — 73630 X-RAY EXAM OF FOOT: CPT

## 2021-08-04 ENCOUNTER — APPOINTMENT (OUTPATIENT)
Dept: PHYSICAL THERAPY | Facility: CLINIC | Age: 36
End: 2021-08-04
Payer: COMMERCIAL

## 2021-08-06 ENCOUNTER — APPOINTMENT (OUTPATIENT)
Dept: PHYSICAL THERAPY | Facility: CLINIC | Age: 36
End: 2021-08-06
Payer: COMMERCIAL

## 2021-08-16 ENCOUNTER — OFFICE VISIT (OUTPATIENT)
Dept: PHYSICAL THERAPY | Facility: CLINIC | Age: 36
End: 2021-08-16
Payer: COMMERCIAL

## 2021-08-16 DIAGNOSIS — I89.0 LYMPH EDEMA: Primary | ICD-10-CM

## 2021-08-16 PROCEDURE — 97140 MANUAL THERAPY 1/> REGIONS: CPT | Performed by: PHYSICAL THERAPIST

## 2021-08-17 ENCOUNTER — OFFICE VISIT (OUTPATIENT)
Dept: INTERNAL MEDICINE CLINIC | Age: 36
End: 2021-08-17

## 2021-08-17 VITALS
TEMPERATURE: 97.7 F | HEIGHT: 64 IN | WEIGHT: 293 LBS | OXYGEN SATURATION: 97 % | SYSTOLIC BLOOD PRESSURE: 154 MMHG | BODY MASS INDEX: 50.02 KG/M2 | HEART RATE: 90 BPM | DIASTOLIC BLOOD PRESSURE: 98 MMHG

## 2021-08-17 DIAGNOSIS — F41.8 MIXED ANXIETY DEPRESSIVE DISORDER: Primary | ICD-10-CM

## 2021-08-17 PROCEDURE — 3008F BODY MASS INDEX DOCD: CPT | Performed by: INTERNAL MEDICINE

## 2021-08-17 PROCEDURE — 1036F TOBACCO NON-USER: CPT | Performed by: INTERNAL MEDICINE

## 2021-08-17 PROCEDURE — 3725F SCREEN DEPRESSION PERFORMED: CPT | Performed by: INTERNAL MEDICINE

## 2021-08-17 PROCEDURE — 99213 OFFICE O/P EST LOW 20 MIN: CPT | Performed by: INTERNAL MEDICINE

## 2021-08-17 RX ORDER — ALPRAZOLAM 0.25 MG/1
0.25 TABLET ORAL 3 TIMES DAILY PRN
Qty: 60 TABLET | Refills: 0 | Status: SHIPPED | OUTPATIENT
Start: 2021-08-17

## 2021-08-17 RX ORDER — CITALOPRAM 20 MG/1
20 TABLET ORAL DAILY
Qty: 30 TABLET | Refills: 5 | Status: SHIPPED | OUTPATIENT
Start: 2021-08-17 | End: 2022-04-22 | Stop reason: SDUPTHER

## 2021-08-17 NOTE — PROGRESS NOTES
Assessment/Plan:    Hypothyroidism   She recently had her thyroid medicine increased and actually feels more active and energy eyes since    Mixed anxiety depressive disorder   She is seeing a counselor but recently she has started having increasing anxiety and mild depression  Add Celexa 20 and Xanax p r n  Diagnoses and all orders for this visit:    Mixed anxiety depressive disorder  -     citalopram (CeleXA) 20 mg tablet; Take 1 tablet (20 mg total) by mouth daily  -     ALPRAZolam (XANAX) 0 25 mg tablet; Take 1 tablet (0 25 mg total) by mouth 3 (three) times a day as needed for anxiety          Subjective:      Patient ID: Tono Elizabeth is a 30 y o  female  Patient does see a therapist but she has been having increasing anxiety and even some mild depression and the therapist feels she gone some sort of medicine  She denies any new stressors such is marital issues or job issues but of course we are in [facility]  She does intermittently have sleep problems but is not significantly worse than it was  And she does admit to a little bit of chronic anxiety          Review of Systems   Constitutional: Positive for fatigue  Negative for chills, fever and unexpected weight change  HENT: Negative for congestion, ear pain, hearing loss, postnasal drip, sinus pressure, sore throat, trouble swallowing and voice change  Eyes: Negative for visual disturbance  Respiratory: Negative for cough, chest tightness, shortness of breath and wheezing  Cardiovascular: Positive for leg swelling ( chronic)  Negative for chest pain and palpitations  Gastrointestinal: Negative for abdominal distention, abdominal pain, anal bleeding, blood in stool, constipation, diarrhea and nausea  Endocrine: Negative for cold intolerance, polydipsia, polyphagia and polyuria  Genitourinary: Negative for dysuria, flank pain, frequency, hematuria and urgency     Musculoskeletal: Negative for arthralgias, back pain, gait problem, joint swelling, myalgias and neck pain  Skin: Negative for rash  Allergic/Immunologic: Negative for immunocompromised state  Neurological: Negative for dizziness, syncope, facial asymmetry, weakness, light-headedness, numbness and headaches  Hematological: Negative for adenopathy  Psychiatric/Behavioral: Positive for sleep disturbance  Negative for confusion and suicidal ideas  The patient is nervous/anxious  Objective:      /98 (BP Location: Left arm, Patient Position: Sitting, Cuff Size: Standard)   Pulse 90   Temp 97 7 °F (36 5 °C)   Ht 5' 4" (1 626 m)   Wt (!) 174 kg (383 lb 9 6 oz)   LMP 12/17/2019   SpO2 97%   BMI 65 84 kg/m²          Physical Exam  Constitutional:       General: She is not in acute distress  Appearance: She is well-developed  She is obese  HENT:      Right Ear: External ear normal       Left Ear: External ear normal       Nose: Nose normal       Mouth/Throat:      Pharynx: No oropharyngeal exudate  Eyes:      Pupils: Pupils are equal, round, and reactive to light  Neck:      Thyroid: No thyromegaly  Vascular: No JVD  Cardiovascular:      Rate and Rhythm: Normal rate and regular rhythm  Heart sounds: Normal heart sounds  No murmur heard  No gallop  Pulmonary:      Effort: Pulmonary effort is normal  No respiratory distress  Breath sounds: Normal breath sounds  No wheezing or rales  Abdominal:      General: Bowel sounds are normal  There is no distension  Palpations: Abdomen is soft  There is no mass  Tenderness: There is no abdominal tenderness  Musculoskeletal:         General: No tenderness  Normal range of motion  Cervical back: Normal range of motion and neck supple  Comments: Chronic lymphedema   Lymphadenopathy:      Cervical: No cervical adenopathy  Skin:     Findings: No rash  Neurological:      Mental Status: She is alert and oriented to person, place, and time        Cranial Nerves: No cranial nerve deficit  Coordination: Coordination normal    Psychiatric:         Mood and Affect: Mood normal          Behavior: Behavior normal          Thought Content:  Thought content normal          Judgment: Judgment normal

## 2021-08-17 NOTE — ASSESSMENT & PLAN NOTE
She recently had her thyroid medicine increased and actually feels more active and energy eyes since

## 2021-08-17 NOTE — ASSESSMENT & PLAN NOTE
She is seeing a counselor but recently she has started having increasing anxiety and mild depression  Add Celexa 20 and Xanax cheryl lopez n

## 2021-08-17 NOTE — PATIENT INSTRUCTIONS
Anxiety   AMBULATORY CARE:   Anxiety  is a condition that causes you to feel extremely worried or nervous  The feelings are so strong that they can cause problems with your daily activities or sleep  Anxiety may be triggered by something you fear, or it may happen without a cause  Family or work stress, smoking, caffeine, and alcohol can increase your risk for anxiety  Certain medicines or health conditions can also increase your risk  Anxiety can become a long-term condition if it is not managed or treated  Common signs and symptoms that may occur with anxiety:   · Fatigue or muscle tightness    · Shaking, restlessness, or irritability    · Problems focusing    · Trouble sleeping    · Feeling jumpy, easily startled, or dizzy    · Rapid heartbeat or shortness of breath    Call your local emergency number (911 in the [address]) if:   · You have chest pain, tightness, or heaviness that may spread to your shoulders, arms, jaw, neck, or back  · You feel like hurting yourself or someone else  Call your doctor if:   · Your symptoms get worse or do not get better with treatment  · Your anxiety keeps you from doing your regular daily activities  · You have new symptoms since your last visit  · You have questions or concerns about your condition or care  Treatment for anxiety  may include medicines to help you feel calm and relaxed, and decrease your symptoms  Medicines are usually given together with therapy or other treatments  Manage anxiety:   · Talk to someone about your anxiety  Your healthcare provider may suggest counseling  Cognitive behavioral therapy can help you understand and change how you react to events that trigger your symptoms  You might feel more comfortable talking with a friend or family member about your anxiety  Choose someone you know will be supportive and encouraging  · Find ways to relax  Activities such as exercise, meditation, or listening to music can help you relax   Spend time with friends, or do things you enjoy  · Practice deep breathing  Deep breathing can help you relax when you feel anxious  Focus on taking slow, deep breaths several times a day, or during an anxiety attack  Breathe in through your nose and out through your mouth  · Create a regular sleep routine  Regular sleep can help you feel calmer during the day  Go to sleep and wake up at the same times every day  Do not watch television or use the computer right before bed  Your room should be comfortable, dark, and quiet  · Eat a variety of healthy foods  Healthy foods include fruits, vegetables, low-fat dairy products, lean meats, fish, whole-grain breads, and cooked beans  Healthy foods can help you feel less anxious and have more energy  · Exercise regularly  Exercise can increase your energy level  Exercise may also lift your mood and help you sleep better  Your healthcare provider can help you create an exercise plan  · Do not smoke  Nicotine and other chemicals in cigarettes and cigars can increase anxiety  Ask your healthcare provider for information if you currently smoke and need help to quit  E-cigarettes or smokeless tobacco still contain nicotine  Talk to your healthcare provider before you use these products  · Do not have caffeine  Caffeine can make your symptoms worse  Do not have foods or drinks that are meant to increase your energy level  · Limit or do not drink alcohol  Ask your healthcare provider if alcohol is safe for you  You may not be able to drink alcohol if you take certain anxiety or depression medicines  Limit alcohol to 1 drink per day if you are a woman  Limit alcohol to 2 drinks per day if you are a man  A drink of alcohol is 12 ounces of beer, 5 ounces of wine, or 1½ ounces of liquor  · Do not use drugs  Drugs can make your anxiety worse  It can also make anxiety hard to manage   Talk to your healthcare provider if you use drugs and want help to quit     Follow up with your doctor within 2 weeks or as directed:  Write down your questions so you remember to ask them during your visits  © Copyright [employer] 2021 Information is for End User's use only and may not be sold, redistributed or otherwise used for commercial purposes  All illustrations and images included in CareNotes® are the copyrighted property of A D A M , Inc  or [facility] Sandi Daley   The above information is an  only  It is not intended as medical advice for individual conditions or treatments  Talk to your doctor, nurse or pharmacist before following any medical regimen to see if it is safe and effective for you

## 2021-08-18 ENCOUNTER — APPOINTMENT (OUTPATIENT)
Dept: PHYSICAL THERAPY | Facility: CLINIC | Age: 36
End: 2021-08-18
Payer: COMMERCIAL

## 2021-08-18 NOTE — PROGRESS NOTES
Daily Note     Today's date: 2021  Patient name: Jean-Pierre Negrete  : 1985  MRN: [id number]  Referring provider: Anna Mary MD  Dx:   Encounter Diagnosis     ICD-10-CM    1  Lymph edema  I89 0                   Subjective: Soheila Gomez has not yet received compression pumps for home use  She would like to resume wrapping in clinic today due to difficulty wrapping with assistance at home  Objective: See treatment diary below      Assessment: Tolerated treatment well and overall demonstrates decrease in LLE limb volume, with greatest reduction in the lower leg  She is able to schedule compression pump delivery for tomorrow with assistance in clinic today    Patient would benefit from continued PT      Plan: Continue per plan of care        Precautions: LLE lymphedema       Manuals    Short stretch bandaging 45 35 35 40' 30 40' 20 35 35   30   MLD   10'                                    Neuro Re-Ed             Education compression, MLD, long term management 30'      15                                                                                    Ther Ex                                                                                                                     Ther Activity                                       Gait Training                                       Modalities

## 2021-08-20 ENCOUNTER — APPOINTMENT (OUTPATIENT)
Dept: PHYSICAL THERAPY | Facility: CLINIC | Age: 36
End: 2021-08-20
Payer: COMMERCIAL

## 2021-08-25 ENCOUNTER — APPOINTMENT (OUTPATIENT)
Dept: PHYSICAL THERAPY | Facility: CLINIC | Age: 36
End: 2021-08-25
Payer: COMMERCIAL

## 2021-08-27 ENCOUNTER — APPOINTMENT (OUTPATIENT)
Dept: PHYSICAL THERAPY | Facility: CLINIC | Age: 36
End: 2021-08-27
Payer: COMMERCIAL

## 2021-08-30 ENCOUNTER — OFFICE VISIT (OUTPATIENT)
Dept: PHYSICAL THERAPY | Facility: CLINIC | Age: 36
End: 2021-08-30
Payer: COMMERCIAL

## 2021-08-30 DIAGNOSIS — I89.0 LYMPH EDEMA: Primary | ICD-10-CM

## 2021-08-30 PROCEDURE — 97140 MANUAL THERAPY 1/> REGIONS: CPT | Performed by: PHYSICAL THERAPIST

## 2021-08-31 NOTE — PROGRESS NOTES
Daily Note     Today's date: 2021  Patient name: Kulwinder Knott  : 1985  MRN: [id number]  Referring provider: James Abraham MD  Dx:   Encounter Diagnosis     ICD-10-CM    1  Lymph edema  I89 0                   Subjective: Sandy received her pumps and and has been tolerating them very well  She is currently using them twice a day  Objective: See treatment diary below      Assessment: Tolerated treatment well and measurements taken for circaid garments for daytime use today  Goal of transition to circaid for daytime use, pumps 2x/day and pm short stretch bandaging PRN  Jensen Spruce Patient wiill continue to monitor limb volume and response to compression      Plan: Continue per plan of care        Precautions: LLE lymphedema       Manuals    Short stretch bandaging 30     40' 20 35 35   30   MLD                                       Neuro Re-Ed             Education compression, MLD, long term management       15                                                                                    Ther Ex                                                                                                                     Ther Activity                                       Gait Training                                       Modalities

## 2021-09-01 ENCOUNTER — APPOINTMENT (OUTPATIENT)
Dept: PHYSICAL THERAPY | Facility: CLINIC | Age: 36
End: 2021-09-01
Payer: COMMERCIAL

## 2021-09-03 ENCOUNTER — APPOINTMENT (OUTPATIENT)
Dept: PHYSICAL THERAPY | Facility: CLINIC | Age: 36
End: 2021-09-03
Payer: COMMERCIAL

## 2021-09-08 ENCOUNTER — OFFICE VISIT (OUTPATIENT)
Dept: PHYSICAL THERAPY | Facility: CLINIC | Age: 36
End: 2021-09-08
Payer: COMMERCIAL

## 2021-09-08 DIAGNOSIS — I89.0 LYMPH EDEMA: Primary | ICD-10-CM

## 2021-09-08 PROCEDURE — 97140 MANUAL THERAPY 1/> REGIONS: CPT | Performed by: PHYSICAL THERAPIST

## 2021-09-08 NOTE — PROGRESS NOTES
Daily Note     Today's date: 2021  Patient name: Marcia Cheung  : 1985  MRN: [id number]  Referring provider: Isiah Pedraza MD  Dx:   Encounter Diagnosis     ICD-10-CM    1  Lymph edema  I89 0                   Subjective: Luis Angel Aguilar reports improved control of edema using home pumps  Objective: See treatment diary below      Assessment: Tolerated treatment well and is noted with decreasing edema and decreased LE tension/fibrosis  She will order circaid  reduction kit for daytime wear    Patient independent with self compression strategies      Plan: Potential discharge next visit       Precautions: LLE lymphedema       Manuals    Short stretch bandaging 30 30    40' 20 35 35   30   MLD                                       Neuro Re-Ed             Education compression, MLD, long term management       15                                                                                    Ther Ex                                                                                                                     Ther Activity                                       Gait Training                                       Modalities

## 2021-09-17 ENCOUNTER — OFFICE VISIT (OUTPATIENT)
Dept: INTERNAL MEDICINE CLINIC | Age: 36
End: 2021-09-17
Payer: COMMERCIAL

## 2021-09-17 VITALS
SYSTOLIC BLOOD PRESSURE: 158 MMHG | WEIGHT: 293 LBS | HEART RATE: 92 BPM | DIASTOLIC BLOOD PRESSURE: 82 MMHG | TEMPERATURE: 97.8 F | OXYGEN SATURATION: 98 % | BODY MASS INDEX: 50.02 KG/M2 | HEIGHT: 64 IN

## 2021-09-17 DIAGNOSIS — I10 ESSENTIAL HYPERTENSION: Primary | ICD-10-CM

## 2021-09-17 DIAGNOSIS — J06.9 VIRAL UPPER RESPIRATORY TRACT INFECTION: ICD-10-CM

## 2021-09-17 PROCEDURE — 3008F BODY MASS INDEX DOCD: CPT | Performed by: INTERNAL MEDICINE

## 2021-09-17 PROCEDURE — 99213 OFFICE O/P EST LOW 20 MIN: CPT | Performed by: INTERNAL MEDICINE

## 2021-09-17 PROCEDURE — 1036F TOBACCO NON-USER: CPT | Performed by: INTERNAL MEDICINE

## 2021-09-17 RX ORDER — LISINOPRIL 20 MG/1
20 TABLET ORAL DAILY
Qty: 90 TABLET | Refills: 3 | Status: SHIPPED | OUTPATIENT
Start: 2021-09-17 | End: 2022-04-27 | Stop reason: SDUPTHER

## 2021-09-17 NOTE — ASSESSMENT & PLAN NOTE
Blood pressure continues to stay elevated so will start lisinopril 20   Have to get an EKG next time too

## 2021-09-17 NOTE — ASSESSMENT & PLAN NOTE
She also complains over the last 2 days increasing viral or URI symptoms with congestion sore throat and postnasal drip she is fully vaccinated for COVID but would like to have it checked

## 2021-09-17 NOTE — PATIENT INSTRUCTIONS
Chronic Hypertension, Ambulatory Care   GENERAL INFORMATION:   Chronic hypertension  is a long-term condition in which your blood pressure (BP) is higher than normal  Your BP is the force of your blood moving against the walls of your arteries  Hypertension is a BP of 140/90 or higher  Common symptoms include the following:   · Headache     · Blurred vision    · Chest pain     · Dizziness or weakness     · Trouble breathing     · Nosebleeds  Seek immediate care for the following symptoms:   · Severe headache or vision loss    · Weakness in an arm or leg    · Confusion or difficulty speaking    · Discomfort in your chest that feels like squeezing, pressure, fullness, or pain    · Suddenly feeling lightheaded or trouble breathing    · Pain or discomfort in your back, neck, jaw, stomach, or arm  Treatment for chronic hypertension  may include medicine to lower your BP  You may also need to make lifestyle changes  Take your medicine exactly as directed  Manage chronic hypertension:   · Take your BP at home  Sit and rest for 5 minutes before you take your BP  Extend your arm and support it on a flat surface  Your arm should be at the same level as your heart  Follow the directions that came with your BP monitor  If possible, take at least 2 BP readings each time  Take your BP at least twice a day at the same times each day, such as morning and evening  Keep a log of your BP readings and bring it to your follow-up visits  · Eat less sodium (salt)  Do not add sodium to your food  Limit foods that are high in sodium, such as canned foods, potato chips, and cold cuts  Your healthcare provider may suggest that you follow the [address] [place] [address] Street  The plan is low in sodium, unhealthy fats, and total fat  It is high in potassium, calcium, and fiber  · Exercise regularly  Exercise at least 30 minutes per day, on most days of the week  This will help decrease your BP   Ask your healthcare provider about the best exercise plan for you  · Limit alcohol  Women should limit alcohol to 1 drink a day  Men should limit alcohol to 2 drinks a day  A drink of alcohol is 12 ounces of beer, 5 ounces of wine, or 1½ ounces of liquor  · Do not smoke  If you smoke, it is never too late to quit  Smoking can increase your BP  Smoking also worsens other health conditions you may have that can increase your risk for hypertension  Ask your healthcare provider for information if you need help quitting  Follow up with your healthcare provider as directed: You will need to return to have your BP checked and to have other lab tests done  Write down your questions so you remember to ask them during your visits  CARE AGREEMENT:   You have the right to help plan your care  Learn about your health condition and how it may be treated  Discuss treatment options with your caregivers to decide what care you want to receive  You always have the right to refuse treatment  The above information is an  only  It is not intended as medical advice for individual conditions or treatments  Talk to your doctor, nurse or pharmacist before following any medical regimen to see if it is safe and effective for you  © 2014 [address] is for End User's use only and may not be sold, redistributed or otherwise used for commercial purposes  All illustrations and images included in CareNotes® are the copyrighted property of A D A M , Inc  or Jassi Stacy

## 2021-09-17 NOTE — PROGRESS NOTES
Assessment/Plan:    Essential hypertension   Blood pressure continues to stay elevated so will start lisinopril 20  Have to get an EKG next time too    Viral upper respiratory tract infection   She also complains over the last 2 days increasing viral or URI symptoms with congestion sore throat and postnasal drip she is fully vaccinated for COVID but would like to have it checked  Diagnoses and all orders for this visit:    Essential hypertension  -     lisinopril (ZESTRIL) 20 mg tablet; Take 1 tablet (20 mg total) by mouth daily    Viral upper respiratory tract infection  -     Novel Coronavirus (COVID-19), PCR [facility] Collected at   [facility] or [facility]          Subjective:      Patient ID: Kulwinder Knott is a 30 y o  female  Hypertension  This is a new problem  The current episode started more than 1 month ago  The problem is unchanged  The problem is uncontrolled  Associated symptoms include palpitations  Pertinent negatives include no chest pain, headaches, neck pain or shortness of breath  (Edema) There are no associated agents to hypertension  Risk factors for coronary artery disease include obesity  Past treatments include diuretics and lifestyle changes  The current treatment provides no improvement  Compliance problems include exercise and diet  There is no history of angina, PVD or retinopathy  Review of Systems   Constitutional: Negative for chills, fatigue, fever and unexpected weight change  HENT: Negative for congestion, ear pain, hearing loss, postnasal drip, sinus pressure, sore throat, trouble swallowing and voice change  Eyes: Negative for pain and visual disturbance  Respiratory: Negative for cough, chest tightness, shortness of breath and wheezing  Cardiovascular: Positive for palpitations and leg swelling  Negative for chest pain     Gastrointestinal: Negative for abdominal distention, abdominal pain, anal bleeding, blood in stool, constipation, diarrhea, nausea and vomiting  Endocrine: Negative for cold intolerance, polydipsia, polyphagia and polyuria  Genitourinary: Negative for dysuria, flank pain, frequency, hematuria and urgency  Musculoskeletal: Negative for arthralgias, back pain, gait problem, joint swelling, myalgias and neck pain  Skin: Negative for color change and rash  Allergic/Immunologic: Negative for immunocompromised state  Neurological: Negative for dizziness, seizures, syncope, facial asymmetry, weakness, light-headedness, numbness and headaches  Hematological: Negative for adenopathy  Psychiatric/Behavioral: Negative for confusion, sleep disturbance and suicidal ideas  The patient is nervous/anxious  All other systems reviewed and are negative  Objective:      /82 (BP Location: Left arm, Patient Position: Sitting, Cuff Size: Standard)   Pulse 92   Temp 97 8 °F (36 6 °C)   Ht 5' 4" (1 626 m)   Wt (!) 175 kg (386 lb 9 6 oz)   LMP 12/17/2019   SpO2 98%   BMI 66 36 kg/m²          Physical Exam  Constitutional:       General: She is not in acute distress  Appearance: She is well-developed  She is obese  HENT:      Right Ear: External ear normal       Left Ear: External ear normal       Nose: Nose normal       Mouth/Throat:      Pharynx: No oropharyngeal exudate  Eyes:      Pupils: Pupils are equal, round, and reactive to light  Neck:      Thyroid: No thyromegaly  Vascular: No JVD  Cardiovascular:      Rate and Rhythm: Normal rate and regular rhythm  Heart sounds: Normal heart sounds  No murmur heard  No gallop  Pulmonary:      Effort: Pulmonary effort is normal  No respiratory distress  Breath sounds: Normal breath sounds  No wheezing or rales  Abdominal:      General: Bowel sounds are normal  There is no distension  Palpations: Abdomen is soft  There is no mass  Tenderness: There is no abdominal tenderness  Musculoskeletal:         General: No tenderness   Normal range of motion  Cervical back: Normal range of motion and neck supple  Right lower leg: Edema present  Left lower leg: Edema present  Lymphadenopathy:      Cervical: No cervical adenopathy  Skin:     Findings: No rash  Neurological:      Mental Status: She is alert and oriented to person, place, and time  Cranial Nerves: No cranial nerve deficit  Coordination: Coordination normal    Psychiatric:         Mood and Affect: Mood normal          Behavior: Behavior normal          Thought Content:  Thought content normal          Judgment: Judgment normal

## 2021-09-20 DIAGNOSIS — Z20.822 CLOSE EXPOSURE TO COVID-19 VIRUS: Primary | ICD-10-CM

## 2021-09-20 DIAGNOSIS — R05.9 COUGH: ICD-10-CM

## 2021-09-20 DIAGNOSIS — J02.9 SORE THROAT: ICD-10-CM

## 2021-09-20 PROCEDURE — U0005 INFEC AGEN DETEC AMPLI PROBE: HCPCS | Performed by: INTERNAL MEDICINE

## 2021-09-20 PROCEDURE — U0003 INFECTIOUS AGENT DETECTION BY NUCLEIC ACID (DNA OR RNA); SEVERE ACUTE RESPIRATORY SYNDROME CORONAVIRUS 2 (SARS-COV-2) (CORONAVIRUS DISEASE [COVID-19]), AMPLIFIED PROBE TECHNIQUE, MAKING USE OF HIGH THROUGHPUT TECHNOLOGIES AS DESCRIBED BY CMS-2020-01-R: HCPCS | Performed by: INTERNAL MEDICINE

## 2021-09-21 LAB — SARS-COV-2 RNA RESP QL NAA+PROBE: NEGATIVE

## 2021-09-24 ENCOUNTER — APPOINTMENT (OUTPATIENT)
Dept: PHYSICAL THERAPY | Facility: CLINIC | Age: 36
End: 2021-09-24
Payer: COMMERCIAL

## 2021-10-08 ENCOUNTER — APPOINTMENT (OUTPATIENT)
Dept: PHYSICAL THERAPY | Facility: CLINIC | Age: 36
End: 2021-10-08
Payer: COMMERCIAL

## 2021-10-11 ENCOUNTER — APPOINTMENT (OUTPATIENT)
Dept: PHYSICAL THERAPY | Facility: CLINIC | Age: 36
End: 2021-10-11
Payer: COMMERCIAL

## 2021-10-18 ENCOUNTER — EVALUATION (OUTPATIENT)
Dept: PHYSICAL THERAPY | Facility: CLINIC | Age: 36
End: 2021-10-18
Payer: COMMERCIAL

## 2021-10-18 DIAGNOSIS — I89.0 LYMPH EDEMA: Primary | ICD-10-CM

## 2021-10-18 PROCEDURE — 97112 NEUROMUSCULAR REEDUCATION: CPT | Performed by: PHYSICAL THERAPIST

## 2021-10-18 PROCEDURE — 97164 PT RE-EVAL EST PLAN CARE: CPT | Performed by: PHYSICAL THERAPIST

## 2021-10-26 ENCOUNTER — OFFICE VISIT (OUTPATIENT)
Dept: INTERNAL MEDICINE CLINIC | Age: 36
End: 2021-10-26
Payer: COMMERCIAL

## 2021-10-26 VITALS
OXYGEN SATURATION: 98 % | WEIGHT: 293 LBS | HEIGHT: 64 IN | BODY MASS INDEX: 50.02 KG/M2 | DIASTOLIC BLOOD PRESSURE: 85 MMHG | SYSTOLIC BLOOD PRESSURE: 135 MMHG | TEMPERATURE: 97.2 F | HEART RATE: 88 BPM

## 2021-10-26 DIAGNOSIS — E06.3 HYPOTHYROIDISM DUE TO HASHIMOTO'S THYROIDITIS: ICD-10-CM

## 2021-10-26 DIAGNOSIS — F41.8 MIXED ANXIETY DEPRESSIVE DISORDER: ICD-10-CM

## 2021-10-26 DIAGNOSIS — E66.01 MORBID OBESITY WITH BMI OF 50.0-59.9, ADULT (HCC): ICD-10-CM

## 2021-10-26 DIAGNOSIS — Z23 NEED FOR IMMUNIZATION AGAINST INFLUENZA: Primary | ICD-10-CM

## 2021-10-26 DIAGNOSIS — E03.8 HYPOTHYROIDISM DUE TO HASHIMOTO'S THYROIDITIS: ICD-10-CM

## 2021-10-26 DIAGNOSIS — I10 ESSENTIAL HYPERTENSION: ICD-10-CM

## 2021-10-26 DIAGNOSIS — I89.0 LYMPH EDEMA: ICD-10-CM

## 2021-10-26 PROBLEM — J06.9 VIRAL UPPER RESPIRATORY TRACT INFECTION: Status: RESOLVED | Noted: 2021-09-17 | Resolved: 2021-10-26

## 2021-10-26 PROBLEM — E04.1 THYROID NODULE: Status: RESOLVED | Noted: 2019-07-19 | Resolved: 2021-10-26

## 2021-10-26 PROCEDURE — 90471 IMMUNIZATION ADMIN: CPT | Performed by: INTERNAL MEDICINE

## 2021-10-26 PROCEDURE — 3075F SYST BP GE 130 - 139MM HG: CPT | Performed by: INTERNAL MEDICINE

## 2021-10-26 PROCEDURE — 90682 RIV4 VACC RECOMBINANT DNA IM: CPT | Performed by: INTERNAL MEDICINE

## 2021-10-26 PROCEDURE — 99213 OFFICE O/P EST LOW 20 MIN: CPT | Performed by: INTERNAL MEDICINE

## 2021-10-26 PROCEDURE — 3008F BODY MASS INDEX DOCD: CPT | Performed by: INTERNAL MEDICINE

## 2021-10-26 PROCEDURE — 3079F DIAST BP 80-89 MM HG: CPT | Performed by: INTERNAL MEDICINE

## 2021-11-01 ENCOUNTER — OFFICE VISIT (OUTPATIENT)
Dept: INTERNAL MEDICINE CLINIC | Age: 36
End: 2021-11-01
Payer: COMMERCIAL

## 2021-11-01 VITALS
BODY MASS INDEX: 50.02 KG/M2 | OXYGEN SATURATION: 98 % | HEART RATE: 88 BPM | WEIGHT: 293 LBS | SYSTOLIC BLOOD PRESSURE: 136 MMHG | TEMPERATURE: 97.8 F | DIASTOLIC BLOOD PRESSURE: 80 MMHG | HEIGHT: 64 IN

## 2021-11-01 DIAGNOSIS — R10.11 RUQ ABDOMINAL PAIN: Primary | ICD-10-CM

## 2021-11-01 PROCEDURE — 3008F BODY MASS INDEX DOCD: CPT | Performed by: INTERNAL MEDICINE

## 2021-11-01 PROCEDURE — 1036F TOBACCO NON-USER: CPT | Performed by: INTERNAL MEDICINE

## 2021-11-01 PROCEDURE — 3075F SYST BP GE 130 - 139MM HG: CPT | Performed by: INTERNAL MEDICINE

## 2021-11-01 PROCEDURE — 99214 OFFICE O/P EST MOD 30 MIN: CPT | Performed by: INTERNAL MEDICINE

## 2021-11-01 PROCEDURE — 3079F DIAST BP 80-89 MM HG: CPT | Performed by: INTERNAL MEDICINE

## 2021-11-01 RX ORDER — CIPROFLOXACIN 500 MG/1
500 TABLET, FILM COATED ORAL EVERY 12 HOURS SCHEDULED
Qty: 14 TABLET | Refills: 0 | Status: SHIPPED | OUTPATIENT
Start: 2021-11-01 | End: 2021-11-08

## 2021-11-02 ENCOUNTER — HOSPITAL ENCOUNTER (OUTPATIENT)
Dept: RADIOLOGY | Facility: IMAGING CENTER | Age: 36
Discharge: HOME | End: 2021-11-02
Payer: COMMERCIAL

## 2021-11-02 DIAGNOSIS — R10.11 RUQ ABDOMINAL PAIN: ICD-10-CM

## 2021-11-02 PROCEDURE — 76700 US EXAM ABDOM COMPLETE: CPT

## 2021-12-09 ENCOUNTER — OFFICE VISIT (OUTPATIENT)
Dept: INTERNAL MEDICINE CLINIC | Age: 36
End: 2021-12-09
Payer: COMMERCIAL

## 2021-12-09 VITALS
HEIGHT: 64 IN | WEIGHT: 293 LBS | DIASTOLIC BLOOD PRESSURE: 78 MMHG | SYSTOLIC BLOOD PRESSURE: 126 MMHG | TEMPERATURE: 97.8 F | HEART RATE: 98 BPM | BODY MASS INDEX: 50.02 KG/M2 | OXYGEN SATURATION: 97 %

## 2021-12-09 DIAGNOSIS — R05.9 COUGH: Primary | ICD-10-CM

## 2021-12-09 DIAGNOSIS — E66.01 MORBID OBESITY WITH BMI OF 50.0-59.9, ADULT (HCC): ICD-10-CM

## 2021-12-09 PROCEDURE — 3074F SYST BP LT 130 MM HG: CPT | Performed by: INTERNAL MEDICINE

## 2021-12-09 PROCEDURE — 3078F DIAST BP <80 MM HG: CPT | Performed by: INTERNAL MEDICINE

## 2021-12-09 PROCEDURE — 3008F BODY MASS INDEX DOCD: CPT | Performed by: INTERNAL MEDICINE

## 2021-12-09 PROCEDURE — 99213 OFFICE O/P EST LOW 20 MIN: CPT | Performed by: INTERNAL MEDICINE

## 2021-12-09 PROCEDURE — 1036F TOBACCO NON-USER: CPT | Performed by: INTERNAL MEDICINE

## 2021-12-09 RX ORDER — DOXYCYCLINE HYCLATE 100 MG/1
100 CAPSULE ORAL EVERY 12 HOURS SCHEDULED
Qty: 14 CAPSULE | Refills: 0 | Status: SHIPPED | OUTPATIENT
Start: 2021-12-09 | End: 2021-12-16

## 2021-12-17 ENCOUNTER — RA CDI HCC (OUTPATIENT)
Dept: OTHER | Facility: HOSPITAL | Age: 36
End: 2021-12-17

## 2022-01-11 ENCOUNTER — OFFICE VISIT (OUTPATIENT)
Dept: BARIATRICS | Facility: CLINIC | Age: 37
End: 2022-01-11

## 2022-01-11 VITALS
HEIGHT: 65 IN | SYSTOLIC BLOOD PRESSURE: 130 MMHG | DIASTOLIC BLOOD PRESSURE: 86 MMHG | TEMPERATURE: 96.8 F | HEART RATE: 95 BPM | WEIGHT: 293 LBS | BODY MASS INDEX: 48.82 KG/M2

## 2022-01-11 DIAGNOSIS — Z01.812 BLOOD TESTS PRIOR TO TREATMENT OR PROCEDURE: ICD-10-CM

## 2022-01-11 DIAGNOSIS — E66.01 MORBID OBESITY (HCC): Primary | ICD-10-CM

## 2022-01-11 DIAGNOSIS — R63.5 ABNORMAL WEIGHT GAIN: ICD-10-CM

## 2022-01-11 DIAGNOSIS — Z01.818 PREOPERATIVE CLEARANCE: ICD-10-CM

## 2022-01-11 DIAGNOSIS — E66.01 MORBID OBESITY WITH BMI OF 50.0-59.9, ADULT (HCC): ICD-10-CM

## 2022-01-11 DIAGNOSIS — E66.01 MORBID (SEVERE) OBESITY DUE TO EXCESS CALORIES (HCC): Primary | ICD-10-CM

## 2022-01-11 PROCEDURE — RECHECK: Performed by: DIETITIAN, REGISTERED
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
## 2022-01-11 NOTE — PROGRESS NOTES
Bariatric Nutrition Assessment Note    Type of surgery    Preop  Surgery Date: TBD  Surgeon: Dr Miki Whitley  29 y o   female     Wt with BMI of 25: 150lbs  Pre-Op Excess Wt: 228 5lbs  /86 (BP Location: Left arm, Patient Position: Sitting, Cuff Size: Standard)   Pulse 95   Temp (!) 96 8 °F (36 °C) (Tympanic)   Ht 5' 5" (1 651 m)   Wt (!) 172 kg (378 lb 8 oz)   LMP 12/17/2019   BMI 62 99 kg/m²     [facility] Equation:     Weight maintenance= 2895 kcal/day  Estimated calories for weight loss 3329-0424 kcal/day ( 1-2# per wk wt loss - sedentary )  Estimated protein needs 68 2-81 8 g/day (1 0-1 2 gms/kg IBW )   Estimated fluid needs 8212-7829 ml/day (30-35 ml/kg IBW )      Weight History   Onset of Obesity: Childhood/puberty  Family history of obesity: No   Spouse had bariatric surgery    Wt Loss Attempts: Commercial Programs ([employer], [employer], etc )  Exercise  FAD Diets (Cabbage soup, Grapefruit, Cleanse, etc )  High Protein/Low CHO diets (Atkins, Union, etc )  Meal Replacements (Medifast, Slim Fast, etc )  Self Created Diets (Portion Control, Healthy Food Choices, etc )  Patient has tried the above for 6 months or more with insufficient weight loss or weight regain, which is why patient has requested to be evaluated for weight loss surgery today  Maximum Wt Lost: 30lbs over 18 months    Review of History and Medications   Past Medical History:   Diagnosis Date    Bruises easily     Cancer ([address] )     Disease of thyroid gland     DELVALLE (dyspnea on exertion)     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    Endometriosis     Follicular thyroid cancer ([address] )     Gastroenteritis     Hepatic steatosis     Hyperlipidemia     Hypothyroid     IBS (irritable bowel syndrome)     Kidney lesion, native, left     Obesity     Palpitation     Pancreatitis     Pneumonia     Polycystic ovarian disease     Polyuria     Thyroid nodule     Thyromegaly     Tinea corporis     Tinea pedis      Past Surgical History:   Procedure Laterality Date    ADENOIDECTOMY      HYSTERECTOMY  02/2020    left ovaries    THYROIDECTOMY, PARTIAL Left 06/2019    benign    TONSILLECTOMY      US GUIDED THYROID BIOPSY  3/1/2019     Social History     Socioeconomic History    Marital status: /Civil Union     Spouse name: None    Number of children: None    Years of education: None    Highest education level: None   Occupational History    None   Tobacco Use    Smoking status: Never Smoker    Smokeless tobacco: Never Used   Vaping Use    Vaping Use: Never used   Substance and Sexual Activity    Alcohol use: Not Currently     Alcohol/week: 0 0 standard drinks    Drug use: Never    Sexual activity: None   Other Topics Concern    None   Social History Narrative    None     Social Determinants of Health     Financial Resource Strain: Not on file   Food Insecurity: Not on file   Transportation Needs: Not on file   Physical Activity: Not on file   Stress: Not on file   Social Connections: Not on file   Intimate Partner Violence: Not on file   Housing Stability: Not on file       Current Outpatient Medications:     albuterol (VENTOLIN HFA) 90 mcg/act inhaler, Inhale 2 puffs every 6 (six) hours as needed for wheezing or shortness of breath, Disp: 18 g, Rfl: 0    ALPRAZolam (XANAX) 0 25 mg tablet, Take 1 tablet (0 25 mg total) by mouth 3 (three) times a day as needed for anxiety, Disp: 60 tablet, Rfl: 0    citalopram (CeleXA) 20 mg tablet, Take 1 tablet (20 mg total) by mouth daily, Disp: 30 tablet, Rfl: 5    fluticasone (FLONASE) 50 mcg/act nasal spray, 2 sprays into each nostril daily, Disp: 16 g, Rfl: 0    furosemide (LASIX) 40 mg tablet, Take 1 tablet (40 mg total) by mouth daily, Disp: 30 tablet, Rfl: 5    levothyroxine 150 mcg tablet, Take 1 tablet (150 mcg total) by mouth daily, Disp: 30 tablet, Rfl: 5    lisinopril (ZESTRIL) 20 mg tablet, Take 1 tablet (20 mg total) by mouth daily, Disp: 90 tablet, Rfl: 3    loratadine (CLARITIN) 10 mg tablet, Take 10 mg by mouth daily, Disp: , Rfl:     ondansetron (ZOFRAN-ODT) 4 mg disintegrating tablet, Take 1 tablet (4 mg total) by mouth every 8 (eight) hours as needed for nausea or vomiting, Disp: 15 tablet, Rfl: 0    triamcinolone (KENALOG) 0 1 % cream, APPLY TOPICALLY DAILY AT BEDTIME AS NEEDED FOR RASH, Disp: 80 g, Rfl: 1     Food Intake and Lifestyle Assessment   Food Intake Assessment completed via usual diet recall  Breakfast: 8:45-9:00am: Kind breakfast bar or wrap with eggwhite  Snack: none   Lunch: sometimes skips, sometimes packs, sometimes orders small soup or [employer] salad or 1 slice pizza  Snack: picks on cashews or 1-2 apples  Dinner: cooks 3-4 nights per week: baked chicken and vegetable  Orders out 3 nights per week: sandwich or 3 slices sicilian pizza  Snack: sugar-free popsicles 1-2  Beverage intake: water and coffee  Protein supplement: none currently  Estimated protein intake per day: 50-70g  Estimated fluid intake per day: 1-3 cups coffee, 32 oz sapp x 4-5  Meals eaten away from home: 3 nights per week orders out for dinner  Gets lunch from bistro or pizza place several days per week  Typical meal pattern: 2 meals per day and 2-3 snacks per day  Eating Behaviors: Consumption of high calorie/ high fat foods and Frequent snacking/ grazing  Food allergies or intolerances: uses almond milk, states sometimes regular milk gives her stomach upset  Allergies   Allergen Reactions    Dust Mite Extract Other (See Comments)     Sinus inflammation    Latex Blisters    Molds & Smuts Other (See Comments)     Sinus inflammation    Penicillins Rash     Skin rash and sheds    Jorge Luis Grass Pollen Allergen Other (See Comments)     Sinus inflammation     Cultural or Catholic considerations: none noted    Physical Assessment  Physical Activity  Types of exercise: None  Sedentary job  Tries to walk around work building on lunch break  Current physical limitations: lymphedema in left leg since hysterectomy, back pain    Psychosocial Assessment   Support systems: spouse:  Spouse had bariatric surgery in August and is very successful  Cousin and friend are also post bariatric   Socioeconomic factors: works for [employer]  Mental health services  Nutrition Diagnosis  Diagnosis: Overweight / Obesity (NC-3 3) and Excessive energy intake (NI-1 5)  Related to: Physical inactivity and Excessive energy intake  As Evidenced by: BMI >25, Excessive energy intake and Unintentional weight gain     Nutrition Prescription: Recommend the following diet  Regular    Interventions and Teaching   Discussed pre-op and post-op nutrition guidelines  Patient educated and handouts provided    Surgical changes to stomach / GI  Capacity of post-surgery stomach  Diet progression  Adequate hydration  Sugar and fat restriction to decrease "dumping syndrome"  Expected weight loss  Weight loss plateaus/ possibility of weight regain  Exercise  Suggestions for pre-op diet  Nutrition considerations after surgery  Protein supplements  Meal planning and preparation  Appropriate carbohydrate, protein, and fat intake, and food/fluid choices to maximize safe weight loss, nutrient intake, and tolerance   Dietary and lifestyle changes  Possible problems with poor eating habits  Techniques for self monitoring and keeping daily food journal  Potential for food intolerance after surgery, and ways to deal with them including: lactose intolerance, nausea, reflux, vomiting, diarrhea, food intolerance, appetite changes, gas  Vitamin / Mineral supplementation of Multivitamin with minerals and Vitamin D  Takes one a day multi    Patient is not currently pregnant and doesn't desire to become pregnant a minimum of one year post-op    Education provided to: patient and spouse  Barriers to learning: No barriers identified  Readiness to change: preparation  Prior research on procedure: pre-op class and friends or family  Comprehension: needs reinforcement and verbalizes understanding   Expected Compliance: good    Recommendations  Pt is an appropriate candidate for surgery  Yes  5% wt loss=18 925#=Goal of 359 575# TO SCHEDULE SURGERY  10% wt loss=37 85#=Goal of 340 65# ON DAY OF SURGERY  Pt had TSH, Lipids, CMP drawn 8/3/21  TSH elevated, needs repeated  Printed copy of lab order from PCP from October  Needs updated CBC+CMP after 2/3/22  Ordered today       Evaluation / Monitoring  Dietitian to Monitor: Eating pattern as discussed Tolerance of nutrition prescription Body weight Lab values Physical activity    Goals  Eliminate sugar sweetened beverages, Food journal, Exercise 30 minutes 5 times per week, Complete lession plans 1-6, Eat 3 meals per day and Eliminate mindless snacking    Time Spent:   1 Hour Tissue Cultured Epidermal Autograft Text: The defect edges were debeveled with a #15 scalpel blade.  Given the location of the defect, shape of the defect and the proximity to free margins a tissue cultured epidermal autograft was deemed most appropriate.  The graft was then trimmed to fit the size of the defect.  The graft was then placed in the primary defect and oriented appropriately.

## 2022-01-14 ENCOUNTER — OFFICE VISIT (OUTPATIENT)
Dept: BARIATRICS | Facility: CLINIC | Age: 37
End: 2022-01-14
Payer: COMMERCIAL

## 2022-01-14 VITALS
WEIGHT: 293 LBS | BODY MASS INDEX: 48.82 KG/M2 | HEIGHT: 65 IN | HEART RATE: 99 BPM | SYSTOLIC BLOOD PRESSURE: 142 MMHG | TEMPERATURE: 98 F | DIASTOLIC BLOOD PRESSURE: 88 MMHG

## 2022-01-14 DIAGNOSIS — E66.01 MORBID (SEVERE) OBESITY DUE TO EXCESS CALORIES (HCC): Primary | ICD-10-CM

## 2022-01-14 PROCEDURE — 3079F DIAST BP 80-89 MM HG: CPT | Performed by: SURGERY

## 2022-01-14 PROCEDURE — 99203 OFFICE O/P NEW LOW 30 MIN: CPT | Performed by: SURGERY

## 2022-01-14 PROCEDURE — 3077F SYST BP >= 140 MM HG: CPT | Performed by: SURGERY

## 2022-01-14 PROCEDURE — 3008F BODY MASS INDEX DOCD: CPT | Performed by: SURGERY

## 2022-01-14 PROCEDURE — 1036F TOBACCO NON-USER: CPT | Performed by: SURGERY

## 2022-01-14 NOTE — PROGRESS NOTES
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    Endometriosis     Follicular [facility]     Gastroenteritis     Hepatic steatosis     Hyperlipidemia     Hypothyroid     IBS (irritable bowel syndrome)     Kidney lesion, native, left     Obesity     Palpitation     Pancreatitis     Pneumonia     Polycystic ovarian disease     Polyuria     Thyroid nodule     Thyromegaly     Tinea corporis     Tinea pedis      Past Surgical History:   Procedure Laterality Date    ADENOIDECTOMY      HYSTERECTOMY  02/2020    left ovaries    THYROIDECTOMY, PARTIAL Left 06/2019    benign    TONSILLECTOMY      US GUIDED THYROID BIOPSY  3/1/2019     Social History   Social History     Substance and Sexual Activity   Alcohol Use Not Currently    Alcohol/week: 0 0 standard drinks     Social History     Substance and Sexual Activity   Drug Use Never     Social History     Tobacco Use   Smoking Status Never Smoker   Smokeless Tobacco Never Used     Family History: non-contributory    Meds/Allergies   all medications and allergies reviewed  Allergies   Allergen Reactions    Dust Mite Extract Other (See Comments)     Sinus inflammation    Latex Blisters    Molds & Smuts Other (See Comments)     Sinus inflammation    Penicillins Rash     Skin rash and sheds    Jorge Luis Grass Pollen Allergen Other (See Comments)     Sinus inflammation       Objective       Current Vitals:   Blood Pressure: 142/88 (01/14/22 1425)  Pulse: 99 (01/14/22 1425)  Temperature: 98 °F (36 7 °C) (01/14/22 1425)  Temp Source: Tympanic (01/14/22 1425)  Height: 5' 5" (165 1 cm) (01/14/22 1425)  Weight - Scale: (!) 173 kg (381 lb) (01/14/22 1425)  Body mass index is 63 4 kg/m²  Invasive Devices  Report    None                 Physical Exam    Lab Results: I have personally reviewed pertinent lab results  Imaging: I have personally reviewed pertinent reports  EKG, Pathology, and Other Studies: I have personally reviewed pertinent reports  Code Status: [unfilled]  Advance Directive and Living Will:      Power of :    POLST:      Assessment/PLAN:            Patient has a long history of morbid obesity and is presenting to discuss the surgical weight loss options  Despite the patient best efforts patient was unable to lose any meaningful or sustainable weight using nonsurgical means  We had a long discussion regarding all the surgical weight-loss options at our disposal at this point and reviewed the risks and benefits of each procedure in details as it relates to her age, BMI and medical conditions  Patient elected to undergo RYGB    Risks and benefits were explained to the patient  We also discussed the importance and need of a preoperative workup to make sure that the patient can undergo the procedure safely  Preoperative workup includes sleep apnea screening, cardiac evaluation, nutrition/psych and preoperative EGD  Risks and benefits of all the preoperative diagnostic tests were discussed with the patient including but not limited to the upper endoscopy  Alternatives to surgery and alternative forms of surgery were also explained  Postsurgical commitment and aftercare programs were discussed and explained to the patient in details       In terms of comorbidities patient suffers mostly of   Past Medical History:   Diagnosis Date    Bruises easily     Cancer ([address] )     Disease of thyroid gland     DELVALLE (dyspnea on exertion)     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  Endometriosis     [facility] [address] [facility]     Gastroenteritis     Hepatic steatosis     Hyperlipidemia     Hypothyroid     IBS (irritable bowel syndrome)     Kidney lesion, native, left     Obesity     Palpitation     Pancreatitis     Pneumonia     Polycystic ovarian disease     Polyuria     Thyroid nodule     Thyromegaly     Tinea corporis     Tinea pedis        I informed the patient that the rate of resolution of comorbid conditions following weight loss surgery is between 60 and 90% depending on the severity of the specific medical condition  I discussed and educated the patient regarding the different components of our multidisciplinary program and the importance of compliance and follow-up in the postoperative period  All questions answered  Patient understands risks and benefits  An image of the procedure was also shown to the patient  After showing the image we discussed all the technical aspects of the procedure and also the potential complications including but not limited to gastrointestinal perforation, leak, obstruction, stricture and hemorrhage  I spent 30 min with the patient more than 50% of the time was spent educating the patient and coordinating care

## 2022-01-31 ENCOUNTER — PREP FOR PROCEDURE (OUTPATIENT)
Dept: BARIATRICS | Facility: CLINIC | Age: 37
End: 2022-01-31

## 2022-01-31 DIAGNOSIS — E66.01 MORBID (SEVERE) OBESITY DUE TO EXCESS CALORIES (HCC): Primary | ICD-10-CM

## 2022-02-10 ENCOUNTER — OFFICE VISIT (OUTPATIENT)
Dept: BARIATRICS | Facility: CLINIC | Age: 37
End: 2022-02-10

## 2022-02-10 VITALS — BODY MASS INDEX: 62.85 KG/M2 | WEIGHT: 293 LBS

## 2022-02-10 DIAGNOSIS — E66.9 OBESITY, CLASS I, BMI 30-34.9: Primary | ICD-10-CM

## 2022-02-10 PROCEDURE — RECHECK

## 2022-02-10 NOTE — PROGRESS NOTES
Behavioral Health Follow Up Note:      No required Weight Checks: Dr Kolby Langston  ( had surgery with Dr Ele Casarez in the past )      Starting weight 378 5  #  Today's weight 377 7  #  Goal:   359 58 #   (lymphedema will challenge her weight loss efforts)      Mental health and wellness -   Watching her calories    is post surgery and already following the bariatric recommendations  Working with her therapist on her insecurities and childhood trauma  Interested in excess skin removal post surgery  Eating behaviors -   Stress eating identified  Activity -  Gym for an hour:  Cardio  Lymphedema in her leg interferes  (has compression pumps, not helping)    Progress toward program requirements    Workflow:  · Psych and/or D+A Clearance: N/A  · PCP Letter: 10/26/2021  · Support Group: PENDING  · Surgeon Appt  : 1/14/2022  · EGD : 3/16/2022  · Cardiac Risk Assessment: 2/24/2022  · Sleep Studies: 2/17/2022  · Bloodwork: PENDING  (plans to get done in march around her EGD)       Goals: Follow up on her pending appointments  Attend a support group    Control her stress eating   (will over eat healthy foods too)  Goal of losing 5# by next month

## 2022-02-17 ENCOUNTER — OFFICE VISIT (OUTPATIENT)
Dept: SLEEP CENTER | Facility: CLINIC | Age: 37
End: 2022-02-17
Payer: COMMERCIAL

## 2022-02-17 VITALS
HEART RATE: 94 BPM | HEIGHT: 65 IN | BODY MASS INDEX: 48.82 KG/M2 | SYSTOLIC BLOOD PRESSURE: 111 MMHG | WEIGHT: 293 LBS | DIASTOLIC BLOOD PRESSURE: 57 MMHG

## 2022-02-17 DIAGNOSIS — Z91.89 AT RISK FOR OBSTRUCTIVE SLEEP APNEA: Primary | ICD-10-CM

## 2022-02-17 DIAGNOSIS — E66.01 MORBID OBESITY (HCC): ICD-10-CM

## 2022-02-17 DIAGNOSIS — G47.33 OSA (OBSTRUCTIVE SLEEP APNEA): ICD-10-CM

## 2022-02-17 PROCEDURE — 99203 OFFICE O/P NEW LOW 30 MIN: CPT | Performed by: PSYCHIATRY & NEUROLOGY

## 2022-02-17 NOTE — ASSESSMENT & PLAN NOTE
She is at risk for obstructive sleep apnea with morbid obesity and snoring  We will plan for a home sleep test   We reviewed that undetected obstructive sleep apnea can increase perioperative morbidity  She is open to this plan, notes her  is treated with CPAP  If her sleep study shows obstructive sleep apnea, I will plan to order her a CPAP machine and then follow up with her approximately 1 month after starting treatment    If her home sleep test is in adequate, we will follow up with an attended polysomnogram

## 2022-02-17 NOTE — PROGRESS NOTES
Assessment/Plan:    1  At risk for obstructive sleep apnea  Assessment & Plan:  She is at risk for obstructive sleep apnea with morbid obesity and snoring  We will plan for a home sleep test   We reviewed that undetected obstructive sleep apnea can increase perioperative morbidity  She is open to this plan, notes her  is treated with CPAP  If her sleep study shows obstructive sleep apnea, I will plan to order her a CPAP machine and then follow up with her approximately 1 month after starting treatment  If her home sleep test is in adequate, we will follow up with an attended polysomnogram     Orders:  -     Home Study; Future; Expected date: 02/17/2022    2  Morbid obesity ([address] )  -     Ambulatory referral to Sleep Medicine    3  ANA LAURA (obstructive sleep apnea)  -     Home Study; Future; Expected date: 02/17/2022        Subjective:      Patient ID: Juan Manuel Alston is a 29 y o  female  Ms Enmanuel Spencer presents as she is planning bariatric surgery, considering a gastric bypass  She recalls that at the age of 8 she had apnea, had a adenotonsillectomy which seemed to have helped  Has not had a sleep study before  She does not have much concerned about her sleep quality  She often sleeps through the night  She is described as a light snorer  She gets in bed at 10 pm and falls asleep quickly  She sleeps thorough the night or wakes once  She awakens at 6 AM to an alarm  Sleep schedule is not much different when not working  When she first awakens she is refreshed  She has thyroid disease and some days feels more fatigued which she attributes to that  Does not doze much except sometimes watching TV at night   Reports napping twice a week on weekends   No sleepiness with driving    Lindsay Sleepiness Scale  Sitting and reading: Slight chance of dozing  Watching TV: High chance of dozing  Sitting, inactive in a public place (e g  a theatre or a meeting): Slight chance of dozing  As a passenger in a car for an hour without a break: Would never doze  Lying down to rest in the afternoon when circumstances permit: Moderate chance of dozing  Sitting and talking to someone: Would never doze  Sitting quietly after a lunch without alcohol: Slight chance of dozing  In a car, while stopped for a few minutes in traffic: Would never doze  Total score: 8    She is told she snores sometimes, more when she has sinus issues  She uses sinus rinse at night  She has not had gasping or choking  No witnessed apnea  Not many AM headaches  She notes when sitting for a long time, more at night, she feels a jerk in her legs  Getting up and walking helps, has had this for 10 years  Occurs most nights  Does not affect sleep  Not present when sitting  Drinks coffee- cutting back and has 1 cup a day, sometimes 2  The following portions of the patient's history were reviewed and updated as appropriate: allergies, current medications, past family history, past medical history, past social history, past surgical history and problem list     Review of Systems      Objective:      /57 (BP Location: Other (Comment), Patient Position: Sitting, Cuff Size: Large) Comment (BP Location): forearm  Pulse 94   Ht 5' 5" (1 651 m)   Wt (!) 173 kg (381 lb)   LMP 12/17/2019   BMI 63 40 kg/m²          Physical Exam  Constitutional:       Appearance: She is obese  HENT:      Head: Normocephalic and atraumatic  Mouth/Throat:      Comments: mallampati 3-4 airway, no tonsillar hypertrophy,  Normal uvula   Eyes:      Extraocular Movements: Extraocular movements intact  Pupils: Pupils are equal, round, and reactive to light  Cardiovascular:      Rate and Rhythm: Normal rate and regular rhythm  Pulses: Normal pulses  Heart sounds: Normal heart sounds  Pulmonary:      Effort: Pulmonary effort is normal       Breath sounds: Normal breath sounds  Musculoskeletal:      Right lower leg: Edema present        Left lower leg: Edema (L> R) present  Psychiatric:         Mood and Affect: Mood normal          Behavior: Behavior normal          Thought Content: Thought content normal          Judgment: Judgment normal          Data reviewed-notes from bariatrics, blood work including serum carbon dioxide level (26 mmol/l = does not raise risk for obesity hypoventilation syndrome) and TSH

## 2022-02-17 NOTE — PROGRESS NOTES
Review of Systems      Genitourinary hot flashes at night   Cardiology ankle/leg swelling   Gastrointestinal none   Neurology forgetfulness, poor concentration or confusion,  and balance problems   Constitutional fatigue and excessive sweating at night   Integumentary rash or dry skin and itching   Psychiatry anxiety, depression and mood change   Musculoskeletal joint pain, muscle aches, back pain and legs twitching/jerking   Pulmonary shortness of breath with activity, wheezing and frequent cough   ENT ringing in ears   Endocrine frequent urination   Hematological none

## 2022-02-24 ENCOUNTER — CONSULT (OUTPATIENT)
Dept: CARDIOLOGY CLINIC | Facility: CLINIC | Age: 37
End: 2022-02-24
Payer: COMMERCIAL

## 2022-02-24 VITALS
WEIGHT: 293 LBS | DIASTOLIC BLOOD PRESSURE: 70 MMHG | HEART RATE: 79 BPM | BODY MASS INDEX: 48.82 KG/M2 | SYSTOLIC BLOOD PRESSURE: 118 MMHG | HEIGHT: 65 IN

## 2022-02-24 DIAGNOSIS — I10 ESSENTIAL HYPERTENSION: ICD-10-CM

## 2022-02-24 DIAGNOSIS — Z01.810 PREOP CARDIOVASCULAR EXAM: Primary | ICD-10-CM

## 2022-02-24 PROCEDURE — 93000 ELECTROCARDIOGRAM COMPLETE: CPT | Performed by: INTERNAL MEDICINE

## 2022-02-24 PROCEDURE — 3074F SYST BP LT 130 MM HG: CPT | Performed by: INTERNAL MEDICINE

## 2022-02-24 PROCEDURE — 99203 OFFICE O/P NEW LOW 30 MIN: CPT | Performed by: INTERNAL MEDICINE

## 2022-02-24 PROCEDURE — 3008F BODY MASS INDEX DOCD: CPT | Performed by: INTERNAL MEDICINE

## 2022-02-24 PROCEDURE — 3078F DIAST BP <80 MM HG: CPT | Performed by: INTERNAL MEDICINE

## 2022-02-24 NOTE — H&P (VIEW-ONLY)
Consultation - Cardiology   Garrett Arenas 29 y o  female MRN: [id number]    Encounter: [id number]  1  Preop cardiovascular exam  Ambulatory referral to Cardiology    POCT ECG   2  Essential hypertension  POCT ECG       Assessment/Plan     Assessment:     Preoperative Cardiac Clearance    Plan:    Barbara Mir has no history of CAD  Her Bp is well controlled on lisinopril  Her resting EKG is normal  She is low cardiovascular risk for surgery  History of Present Illness   Physician Requesting Consult: No att  providers found  Reason for Consult / Principal Problem: Cardiac clearance  HPI: Garrett Arenas is a 29y o  year old female who presents with cardiac clearance for bariatric surgery  She has a history of partial thyroidectomy  She has a history of hypertension  She has no prior history of CAD, CHF or arrhythmia  She has no significant chest pain, dyspnea or palpitations  She has tolerated prior surgeries without any CV complications  Review of Systems   Constitutional: Negative  HENT: Negative  Eyes: Negative  Cardiovascular: Negative  Respiratory: Negative  Endocrine: Negative  Hematologic/Lymphatic: Negative  Skin: Negative  Musculoskeletal: Negative  Gastrointestinal: Negative  Genitourinary: Negative  Neurological: Negative  Psychiatric/Behavioral: Negative  Allergic/Immunologic: Negative          Historical Information   Past Medical History:   Diagnosis Date    Bruises easily     Cancer ([address] )     Disease of thyroid gland     DELVALLE (dyspnea on exertion)     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    Endometriosis     [facility]licular [facility] [address] [address]     Gastroenteritis     Hepatic steatosis     Hyperlipidemia     Hypothyroid     IBS (irritable bowel syndrome)     Kidney lesion, native, left     Obesity     Palpitation     Pancreatitis     Pneumonia     Polycystic ovarian disease     Polyuria     Thyroid nodule     Thyromegaly     Tinea corporis     Tinea pedis      Past Surgical History:   Procedure Laterality Date    ADENOIDECTOMY      HYSTERECTOMY  02/2020    left ovaries    THYROIDECTOMY, PARTIAL Left 06/2019    benign    TONSILLECTOMY      US GUIDED THYROID BIOPSY  3/1/2019       Social History:  Social History     Substance and Sexual Activity   Alcohol Use Not Currently    Alcohol/week: 0 0 standard drinks     Social History     Substance and Sexual Activity   Drug Use Never     Social History     Tobacco Use   Smoking Status Never Smoker   Smokeless Tobacco Never Used       Family History:   Family History   Problem Relation Age of Onset    Sleep apnea Father        Meds/Allergies   Allergies   Allergen Reactions    Dust Mite Extract Other (See Comments)     Sinus inflammation    Latex Blisters    Molds & Smuts Other (See Comments)     Sinus inflammation    Penicillins Rash     Skin rash and sheds    [employer] Pollen Allergen Other (See Comments)     Sinus inflammation       Current Outpatient Medications:     albuterol (VENTOLIN HFA) 90 mcg/act inhaler, Inhale 2 puffs every 6 (six) hours as needed for wheezing or shortness of breath, Disp: 18 g, Rfl: 0    ALPRAZolam (XANAX) 0 25 mg tablet, Take 1 tablet (0 25 mg total) by mouth 3 (three) times a day as needed for anxiety, Disp: 60 tablet, Rfl: 0    citalopram (CeleXA) 20 mg tablet, Take 1 tablet (20 mg total) by mouth daily, Disp: 30 tablet, Rfl: 5    fluticasone (FLONASE) 50 mcg/act nasal spray, 2 sprays into each nostril daily, Disp: 16 g, Rfl: 0    furosemide (LASIX) 40 mg tablet, Take 1 tablet (40 mg total) by mouth daily, Disp: 30 tablet, Rfl: 5    levothyroxine 150 mcg tablet, Take 1 tablet (150 mcg total) by mouth daily, Disp: 30 tablet, Rfl: 5    lisinopril (ZESTRIL) 20 mg tablet, Take 1 tablet (20 mg total) by mouth daily, Disp: 90 tablet, Rfl: 3    loratadine (CLARITIN) 10 mg tablet, Take 10 mg by mouth daily, Disp: , Rfl:    ondansetron (ZOFRAN-ODT) 4 mg disintegrating tablet, Take 1 tablet (4 mg total) by mouth every 8 (eight) hours as needed for nausea or vomiting, Disp: 15 tablet, Rfl: 0    triamcinolone (KENALOG) 0 1 % cream, APPLY TOPICALLY DAILY AT BEDTIME AS NEEDED FOR RASH, Disp: 80 g, Rfl: 1    Vitals:   Pulse: 79  Blood Pressue: 118/70  Weight: (!) 174 kg (383 lb)      Physical Exam  Constitutional:       General: She is not in acute distress  Appearance: She is well-developed  She is not diaphoretic  HENT:      Head: Normocephalic  Eyes:      General: No scleral icterus  Right eye: No discharge  Conjunctiva/sclera: Conjunctivae normal    Neck:      Vascular: No JVD  Cardiovascular:      Rate and Rhythm: Normal rate and regular rhythm  Heart sounds: No murmur heard  No friction rub  No gallop  Pulmonary:      Effort: Pulmonary effort is normal  No respiratory distress  Breath sounds: Normal breath sounds  No wheezing or rales  Abdominal:      General: Bowel sounds are normal  There is no distension  Palpations: Abdomen is soft  Tenderness: There is no abdominal tenderness  There is no rebound  Musculoskeletal:         General: No tenderness or deformity  Cervical back: Normal range of motion  Skin:     General: Skin is warm and dry  Neurological:      Mental Status: She is alert and oriented to person, place, and time  [unfilled]    Invasive Devices  Report    None                 Lab Results   Component Value Date     08/03/2021    CO2 26 08/03/2021    BUN 16 08/03/2021    CREATININE 0 81 08/03/2021    EGFR 95 08/03/2021    CALCIUM 9 3 08/03/2021    AST 25 08/03/2021    ALT 30 08/03/2021    ALKPHOS 99 08/03/2021     No results found for: WBC, HGB, PLT  No components found for: TROP    Imaging:     EKG: NSr Normal ECG     Counseling / Coordination of Care  Total floor / unit time spent today 45 minutes    Greater than 50% of total time was spent with the patient and / or family counseling and / or coordination of care  A description of the counseling / coordination of care

## 2022-02-24 NOTE — PROGRESS NOTES
Consultation - Cardiology   Mason Burroughs 29 y o  female MRN: [id number]    Encounter: [id number]  1  Preop cardiovascular exam  Ambulatory referral to Cardiology    POCT ECG   2  Essential hypertension  POCT ECG       Assessment/Plan     Assessment:     Preoperative Cardiac Clearance    Plan:    Austin Duong has no history of CAD  Her Bp is well controlled on lisinopril  Her resting EKG is normal  She is low cardiovascular risk for surgery  History of Present Illness   Physician Requesting Consult: No att  providers found  Reason for Consult / Principal Problem: Cardiac clearance  HPI: Mason Burroughs is a 29y o  year old female who presents with cardiac clearance for bariatric surgery  She has a history of partial thyroidectomy  She has a history of hypertension  She has no prior history of CAD, CHF or arrhythmia  She has no significant chest pain, dyspnea or palpitations  She has tolerated prior surgeries without any CV complications  Review of Systems   Constitutional: Negative  HENT: Negative  Eyes: Negative  Cardiovascular: Negative  Respiratory: Negative  Endocrine: Negative  Hematologic/Lymphatic: Negative  Skin: Negative  Musculoskeletal: Negative  Gastrointestinal: Negative  Genitourinary: Negative  Neurological: Negative  Psychiatric/Behavioral: Negative  Allergic/Immunologic: Negative          Historical Information   Past Medical History:   Diagnosis Date    Bruises easily     Cancer ([address] )     Disease of thyroid gland     DELVALLE (dyspnea on exertion)     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    Endometriosis     Follicular thyroid cancer ([address] )     Gastroenteritis     Hepatic steatosis     Hyperlipidemia     Hypothyroid     IBS (irritable bowel syndrome)     Kidney lesion, native, left     Obesity     Palpitation     Pancreatitis     Pneumonia     Polycystic ovarian disease     Polyuria     Thyroid nodule     Thyromegaly     Tinea corporis     Tinea pedis      Past Surgical History:   Procedure Laterality Date    ADENOIDECTOMY      HYSTERECTOMY  02/2020    left ovaries    THYROIDECTOMY, PARTIAL Left 06/2019    benign    TONSILLECTOMY      US GUIDED THYROID BIOPSY  3/1/2019       Social History:  Social History     Substance and Sexual Activity   Alcohol Use Not Currently    Alcohol/week: 0 0 standard drinks     Social History     Substance and Sexual Activity   Drug Use Never     Social History     Tobacco Use   Smoking Status Never Smoker   Smokeless Tobacco Never Used       Family History:   Family History   Problem Relation Age of Onset    Sleep apnea Father        Meds/Allergies   Allergies   Allergen Reactions    Dust Mite Extract Other (See Comments)     Sinus inflammation    Latex Blisters    Molds & Smuts Other (See Comments)     Sinus inflammation    Penicillins Rash     Skin rash and sheds    [employer] Pollen Allergen Other (See Comments)     Sinus inflammation       Current Outpatient Medications:     albuterol (VENTOLIN HFA) 90 mcg/act inhaler, Inhale 2 puffs every 6 (six) hours as needed for wheezing or shortness of breath, Disp: 18 g, Rfl: 0    ALPRAZolam (XANAX) 0 25 mg tablet, Take 1 tablet (0 25 mg total) by mouth 3 (three) times a day as needed for anxiety, Disp: 60 tablet, Rfl: 0    citalopram (CeleXA) 20 mg tablet, Take 1 tablet (20 mg total) by mouth daily, Disp: 30 tablet, Rfl: 5    fluticasone (FLONASE) 50 mcg/act nasal spray, 2 sprays into each nostril daily, Disp: 16 g, Rfl: 0    furosemide (LASIX) 40 mg tablet, Take 1 tablet (40 mg total) by mouth daily, Disp: 30 tablet, Rfl: 5    levothyroxine 150 mcg tablet, Take 1 tablet (150 mcg total) by mouth daily, Disp: 30 tablet, Rfl: 5    lisinopril (ZESTRIL) 20 mg tablet, Take 1 tablet (20 mg total) by mouth daily, Disp: 90 tablet, Rfl: 3    loratadine (CLARITIN) 10 mg tablet, Take 10 mg by mouth daily, Disp: , Rfl:    ondansetron (ZOFRAN-ODT) 4 mg disintegrating tablet, Take 1 tablet (4 mg total) by mouth every 8 (eight) hours as needed for nausea or vomiting, Disp: 15 tablet, Rfl: 0    triamcinolone (KENALOG) 0 1 % cream, APPLY TOPICALLY DAILY AT BEDTIME AS NEEDED FOR RASH, Disp: 80 g, Rfl: 1    Vitals:   Pulse: 79  Blood Pressue: 118/70  Weight: (!) 174 kg (383 lb)      Physical Exam  Constitutional:       General: She is not in acute distress  Appearance: She is well-developed  She is not diaphoretic  HENT:      Head: Normocephalic  Eyes:      General: No scleral icterus  Right eye: No discharge  Conjunctiva/sclera: Conjunctivae normal    Neck:      Vascular: No JVD  Cardiovascular:      Rate and Rhythm: Normal rate and regular rhythm  Heart sounds: No murmur heard  No friction rub  No gallop  Pulmonary:      Effort: Pulmonary effort is normal  No respiratory distress  Breath sounds: Normal breath sounds  No wheezing or rales  Abdominal:      General: Bowel sounds are normal  There is no distension  Palpations: Abdomen is soft  Tenderness: There is no abdominal tenderness  There is no rebound  Musculoskeletal:         General: No tenderness or deformity  Cervical back: Normal range of motion  Skin:     General: Skin is warm and dry  Neurological:      Mental Status: She is alert and oriented to person, place, and time  [unfilled]    Invasive Devices  Report    None                 Lab Results   Component Value Date     08/03/2021    CO2 26 08/03/2021    BUN 16 08/03/2021    CREATININE 0 81 08/03/2021    EGFR 95 08/03/2021    CALCIUM 9 3 08/03/2021    AST 25 08/03/2021    ALT 30 08/03/2021    ALKPHOS 99 08/03/2021     No results found for: WBC, HGB, PLT  No components found for: TROP    Imaging:     EKG: NSr Normal ECG     Counseling / Coordination of Care  Total floor / unit time spent today 45 minutes    Greater than 50% of total time was spent with the patient and / or family counseling and / or coordination of care  A description of the counseling / coordination of care

## 2022-02-25 ENCOUNTER — TELEPHONE (OUTPATIENT)
Dept: BARIATRICS | Facility: CLINIC | Age: 37
End: 2022-02-25

## 2022-02-25 ENCOUNTER — HOSPITAL ENCOUNTER (OUTPATIENT)
Dept: SLEEP CENTER | Facility: CLINIC | Age: 37
Discharge: HOME | End: 2022-02-25
Attending: PSYCHIATRY & NEUROLOGY
Payer: COMMERCIAL

## 2022-02-25 DIAGNOSIS — G47.33 OSA (OBSTRUCTIVE SLEEP APNEA): ICD-10-CM

## 2022-02-25 DIAGNOSIS — Z91.89 AT RISK FOR OBSTRUCTIVE SLEEP APNEA: ICD-10-CM

## 2022-02-25 PROCEDURE — G0399 HOME SLEEP TEST/TYPE 3 PORTA: HCPCS

## 2022-02-25 NOTE — TELEPHONE ENCOUNTER
I called left a message for her to call me wondering why her sleep center apts are scheduled for May not sooner

## 2022-02-25 NOTE — TELEPHONE ENCOUNTER
I spoke with Cardiology office requesting something in writing stating she is cleared to proceed with bariatric surgery either through addendum in consult note or under letters

## 2022-02-26 NOTE — PROGRESS NOTES
Home Sleep Study Documentation    Pre-Sleep Home Study:    Set-up and instructions performed by: SOFY    Technician performed demonstration for Patient: yes    Return demonstration performed by Patient: yes    Written instructions provided to Patient: yes    Patient signed consent form: yes        Post-Sleep Home Study:    Additional comments by Patient:        Home Sleep Study Failed:no:    Failure reason: N/A    Reported or Detected: N/A    Scored by: FRANSICO Lee

## 2022-02-28 PROCEDURE — 95806 SLEEP STUDY UNATT&RESP EFFT: CPT | Performed by: PSYCHIATRY & NEUROLOGY

## 2022-03-01 ENCOUNTER — TELEPHONE (OUTPATIENT)
Dept: SLEEP CENTER | Facility: CLINIC | Age: 37
End: 2022-03-01

## 2022-03-01 DIAGNOSIS — G47.33 OSA (OBSTRUCTIVE SLEEP APNEA): Primary | ICD-10-CM

## 2022-03-01 DIAGNOSIS — G47.34 SLEEP RELATED HYPOXIA: ICD-10-CM

## 2022-03-01 DIAGNOSIS — F41.8 MIXED ANXIETY DEPRESSIVE DISORDER: ICD-10-CM

## 2022-03-01 NOTE — TELEPHONE ENCOUNTER
Dr Leslye Patton you recommended CPAP titration study in your report  Did you want to order?    Please advise

## 2022-03-03 ENCOUNTER — TELEPHONE (OUTPATIENT)
Dept: SLEEP CENTER | Facility: CLINIC | Age: 37
End: 2022-03-03

## 2022-03-03 NOTE — TELEPHONE ENCOUNTER
----- Message from Lupe Talbot MD sent at 3/1/2022  1:17 PM EST -----  Please let the patient know she had mild obstructive sleep apnea but I am and I am also a little bit concerned about her oxygen levels when sleeping, due to this I would like her to have a CPAP titration so we can verify that her oxygen levels normalized with treatment, after which I will order her a CPAP machine

## 2022-03-03 NOTE — TELEPHONE ENCOUNTER
Left message for the patient to call back for sleep study results  Patient needs to schedule CPAP study   DME appointment canceled for 3/11//022

## 2022-03-15 ENCOUNTER — APPOINTMENT (OUTPATIENT)
Dept: LAB | Age: 37
End: 2022-03-15
Payer: COMMERCIAL

## 2022-03-15 DIAGNOSIS — Z01.812 BLOOD TESTS PRIOR TO TREATMENT OR PROCEDURE: ICD-10-CM

## 2022-03-15 DIAGNOSIS — E06.3 HYPOTHYROIDISM DUE TO HASHIMOTO'S THYROIDITIS: ICD-10-CM

## 2022-03-15 DIAGNOSIS — E03.8 HYPOTHYROIDISM DUE TO HASHIMOTO'S THYROIDITIS: ICD-10-CM

## 2022-03-15 DIAGNOSIS — E66.01 MORBID OBESITY WITH BMI OF 50.0-59.9, ADULT (HCC): ICD-10-CM

## 2022-03-15 DIAGNOSIS — Z01.818 PREOPERATIVE CLEARANCE: ICD-10-CM

## 2022-03-15 DIAGNOSIS — R63.5 ABNORMAL WEIGHT GAIN: ICD-10-CM

## 2022-03-15 DIAGNOSIS — E66.01 MORBID (SEVERE) OBESITY DUE TO EXCESS CALORIES (HCC): ICD-10-CM

## 2022-03-15 LAB
ALBUMIN SERPL BCP-MCNC: 3.5 G/DL (ref 3.5–5)
ALP SERPL-CCNC: 101 U/L (ref 46–116)
ALT SERPL W P-5'-P-CCNC: 30 U/L (ref 12–78)
ANION GAP SERPL CALCULATED.3IONS-SCNC: 0 MMOL/L (ref 4–13)
AST SERPL W P-5'-P-CCNC: 18 U/L (ref 5–45)
BILIRUB SERPL-MCNC: 0.36 MG/DL (ref 0.2–1)
BUN SERPL-MCNC: 23 MG/DL (ref 5–25)
CALCIUM SERPL-MCNC: 9.6 MG/DL (ref 8.3–10.1)
CHLORIDE SERPL-SCNC: 106 MMOL/L (ref 100–108)
CO2 SERPL-SCNC: 28 MMOL/L (ref 21–32)
CREAT SERPL-MCNC: 0.98 MG/DL (ref 0.6–1.3)
ERYTHROCYTE [DISTWIDTH] IN BLOOD BY AUTOMATED COUNT: 15.8 % (ref 11.6–15.1)
GFR SERPL CREATININE-BSD FRML MDRD: 74 ML/MIN/1.73SQ M
GLUCOSE P FAST SERPL-MCNC: 87 MG/DL (ref 65–99)
HCT VFR BLD AUTO: 40.8 % (ref 34.8–46.1)
HGB BLD-MCNC: 12 G/DL (ref 11.5–15.4)
MCH RBC QN AUTO: 25.2 PG (ref 26.8–34.3)
MCHC RBC AUTO-ENTMCNC: 29.4 G/DL (ref 31.4–37.4)
MCV RBC AUTO: 86 FL (ref 82–98)
PLATELET # BLD AUTO: 290 THOUSANDS/UL (ref 149–390)
PMV BLD AUTO: 12.1 FL (ref 8.9–12.7)
POTASSIUM SERPL-SCNC: 4.4 MMOL/L (ref 3.5–5.3)
PROT SERPL-MCNC: 8.3 G/DL (ref 6.4–8.2)
RBC # BLD AUTO: 4.77 MILLION/UL (ref 3.81–5.12)
SODIUM SERPL-SCNC: 134 MMOL/L (ref 136–145)
T4 FREE SERPL-MCNC: 1.08 NG/DL (ref 0.76–1.46)
TSH SERPL DL<=0.05 MIU/L-ACNC: 3.95 UIU/ML (ref 0.36–3.74)
WBC # BLD AUTO: 10.08 THOUSAND/UL (ref 4.31–10.16)

## 2022-03-15 PROCEDURE — 36415 COLL VENOUS BLD VENIPUNCTURE: CPT

## 2022-03-15 PROCEDURE — 84443 ASSAY THYROID STIM HORMONE: CPT

## 2022-03-15 PROCEDURE — 85027 COMPLETE CBC AUTOMATED: CPT

## 2022-03-15 PROCEDURE — 84439 ASSAY OF FREE THYROXINE: CPT

## 2022-03-15 PROCEDURE — 80053 COMPREHEN METABOLIC PANEL: CPT

## 2022-03-16 ENCOUNTER — HOSPITAL ENCOUNTER (OUTPATIENT)
Dept: GASTROENTEROLOGY | Facility: HOSPITAL | Age: 37
Setting detail: OUTPATIENT SURGERY
Discharge: HOME | End: 2022-03-16
Attending: SURGERY | Admitting: SURGERY
Payer: COMMERCIAL

## 2022-03-16 ENCOUNTER — ANESTHESIA (OUTPATIENT)
Dept: GASTROENTEROLOGY | Facility: HOSPITAL | Age: 37
End: 2022-03-16

## 2022-03-16 ENCOUNTER — ANESTHESIA EVENT (OUTPATIENT)
Dept: GASTROENTEROLOGY | Facility: HOSPITAL | Age: 37
End: 2022-03-16

## 2022-03-16 VITALS
DIASTOLIC BLOOD PRESSURE: 60 MMHG | SYSTOLIC BLOOD PRESSURE: 110 MMHG | OXYGEN SATURATION: 97 % | HEART RATE: 73 BPM | WEIGHT: 293 LBS | HEIGHT: 65 IN | TEMPERATURE: 97.5 F | BODY MASS INDEX: 48.82 KG/M2 | RESPIRATION RATE: 22 BRPM

## 2022-03-16 DIAGNOSIS — E66.01 MORBID (SEVERE) OBESITY DUE TO EXCESS CALORIES (HCC): ICD-10-CM

## 2022-03-16 PROCEDURE — 43239 EGD BIOPSY SINGLE/MULTIPLE: CPT | Performed by: SURGERY

## 2022-03-16 PROCEDURE — 88305 TISSUE EXAM BY PATHOLOGIST: CPT | Performed by: PATHOLOGY

## 2022-03-16 RX ORDER — SODIUM CHLORIDE 9 MG/ML
125 INJECTION, SOLUTION INTRAVENOUS CONTINUOUS
Status: DISCONTINUED | OUTPATIENT
Start: 2022-03-16 | End: 2022-03-20 | Stop reason: HOSPADM

## 2022-03-16 RX ORDER — LIDOCAINE HYDROCHLORIDE 20 MG/ML
INJECTION, SOLUTION EPIDURAL; INFILTRATION; INTRACAUDAL; PERINEURAL AS NEEDED
Status: DISCONTINUED | OUTPATIENT
Start: 2022-03-16 | End: 2022-03-16

## 2022-03-16 RX ORDER — PROPOFOL 10 MG/ML
INJECTION, EMULSION INTRAVENOUS AS NEEDED
Status: DISCONTINUED | OUTPATIENT
Start: 2022-03-16 | End: 2022-03-16

## 2022-03-16 RX ADMIN — LIDOCAINE HYDROCHLORIDE 100 MG: 20 INJECTION, SOLUTION EPIDURAL; INFILTRATION; INTRACAUDAL; PERINEURAL at 11:10

## 2022-03-16 RX ADMIN — SODIUM CHLORIDE 125 ML/HR: 9 INJECTION, SOLUTION INTRAVENOUS at 10:40

## 2022-03-16 RX ADMIN — PROPOFOL 150 MG: 10 INJECTION, EMULSION INTRAVENOUS at 11:11

## 2022-03-16 NOTE — ANESTHESIA POSTPROCEDURE EVALUATION
Post-Op Assessment Note    CV Status:  Stable    Pain management: adequate     Mental Status:  Alert and awake   Hydration Status:  Euvolemic   PONV Controlled:  Controlled   Airway Patency:  Patent      Post Op Vitals Reviewed: Yes      Staff: Anesthesiologist         No complications documented      BP      Temp      Pulse     Resp      SpO2      /60   Pulse 73   Temp 97 5 °F (36 4 °C) (Temporal)   Resp 22   Ht 5' 5" (1 651 m)   Wt (!) 170 kg (375 lb)   LMP 12/17/2019   SpO2 97%   BMI 62 40 kg/m²

## 2022-03-16 NOTE — ANESTHESIA PREPROCEDURE EVALUATION
Procedure:  EGD    Relevant Problems   CARDIO   (+) Essential hypertension      ENDO   (+) Hypothyroidism      NEURO/PSYCH   (+) Mixed anxiety depressive disorder      PULMONARY   (+) ANA LAURA (obstructive sleep apnea)      Other   (+) BMI 60 0-69 9, adult [facility])        Physical Exam    Airway    Mallampati score: II  TM Distance: >3 FB  Neck ROM: full     Dental   No notable dental hx     Cardiovascular  Rhythm: regular, Rate: normal, Cardiovascular exam normal    Pulmonary  Pulmonary exam normal Breath sounds clear to auscultation,     Other Findings        Anesthesia Plan  ASA Score- 3     Anesthesia Type- general with ASA Monitors  Additional Monitors:   Airway Plan:           Plan Factors-    Chart reviewed  Patient summary reviewed  Induction-     Postoperative Plan-     Informed Consent- Anesthetic plan and risks discussed with patient

## 2022-03-18 ENCOUNTER — OFFICE VISIT (OUTPATIENT)
Dept: BARIATRICS | Facility: CLINIC | Age: 37
End: 2022-03-18

## 2022-03-18 VITALS — WEIGHT: 293 LBS | BODY MASS INDEX: 62 KG/M2

## 2022-03-18 PROCEDURE — RECHECK: Performed by: DIETITIAN, REGISTERED

## 2022-03-18 NOTE — PROGRESS NOTES
Bariatric Nutrition Assessment Note    Type of surgery    Preop  Surgery Date: TBD- tentative May-June 2022  Deadline September 2022  Surgeon: Dr Fara Holland  29 y o   female     Wt with BMI of 25: 150lbs  Pre-Op Excess Wt: 228 5lbs  Wt (!) 169 kg (372 lb 9 6 oz)   LMP 12/17/2019   BMI 62 00 kg/m²    5 1lb wt loss from last appointment x 1 month  5 9lb total wt loss since start of program   Pt now needs 13lb wt loss to schedule surgery and 31 95lbs by surgery date  5% wt loss=18 925#=Goal of 359 575# TO SCHEDULE SURGERY  10% wt loss=37 85#=Goal of 340 65# ON DAY OF SURGERY      [facility] Equation:     Weight maintenance= 2895 kcal/day  Estimated calories for weight loss 0215-8446 kcal/day ( 1-2# per wk wt loss - sedentary )  Estimated protein needs 68 2-81 8 g/day (1 0-1 2 gms/kg IBW )   Estimated fluid needs 6962-6872 ml/day (30-35 ml/kg IBW )      Review of History and Medications   Past Medical History:   Diagnosis Date    Anxiety     Bruises easily     Cancer ([address] )     Depression     Disease of thyroid gland     DELVALLE (dyspnea on exertion)     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    Endometriosis     Follicular thyroid cancer ([address] )     Gastroenteritis     Hashimoto's disease     Hepatic steatosis     Hyperlipidemia     Hypertension     Hypothyroid     IBS (irritable bowel syndrome)     Kidney lesion, native, left     Lymphedema     left leg    Obesity     Palpitation     Pancreatitis     Pneumonia     Polycystic ovarian disease     Polyuria     Sleep apnea     Thyroid nodule     Thyromegaly     Tinea corporis     Tinea pedis      Past Surgical History:   Procedure Laterality Date    ADENOIDECTOMY      DILATION AND CURETTAGE OF UTERUS  2020    HYSTERECTOMY  02/2020    left ovaries    THYROIDECTOMY, PARTIAL Left 06/2019    benign    TONSILLECTOMY      US GUIDED THYROID BIOPSY  03/01/2019     Social History     Socioeconomic History    Marital status: /Civil Union     Spouse name: Not on file    Number of children: Not on file    Years of education: Not on file    Highest education level: Not on file   Occupational History    Not on file   Tobacco Use    Smoking status: Never Smoker    Smokeless tobacco: Never Used   Vaping Use    Vaping Use: Never used   Substance and Sexual Activity    Alcohol use: Not Currently     Alcohol/week: 0 0 standard drinks    Drug use: Never    Sexual activity: Not on file   Other Topics Concern    Not on file   Social History Narrative    Not on file     Social Determinants of Health     Financial Resource Strain: Not on file   Food Insecurity: Not on file   Transportation Needs: Not on file   Physical Activity: Not on file   Stress: Not on file   Social Connections: Not on file   Intimate Partner Violence: Not on file   Housing Stability: Not on file       Current Outpatient Medications:     albuterol (VENTOLIN HFA) 90 mcg/act inhaler, Inhale 2 puffs every 6 (six) hours as needed for wheezing or shortness of breath, Disp: 18 g, Rfl: 0    ALPRAZolam (XANAX) 0 25 mg tablet, Take 1 tablet (0 25 mg total) by mouth 3 (three) times a day as needed for anxiety, Disp: 60 tablet, Rfl: 0    citalopram (CeleXA) 20 mg tablet, Take 1 tablet (20 mg total) by mouth daily, Disp: 30 tablet, Rfl: 5    fluticasone (FLONASE) 50 mcg/act nasal spray, 2 sprays into each nostril daily, Disp: 16 g, Rfl: 0    furosemide (LASIX) 40 mg tablet, Take 1 tablet (40 mg total) by mouth daily, Disp: 30 tablet, Rfl: 5    levothyroxine 150 mcg tablet, Take 1 tablet (150 mcg total) by mouth daily, Disp: 30 tablet, Rfl: 5    lisinopril (ZESTRIL) 20 mg tablet, Take 1 tablet (20 mg total) by mouth daily, Disp: 90 tablet, Rfl: 3    loratadine (CLARITIN) 10 mg tablet, Take 10 mg by mouth daily, Disp: , Rfl:     ondansetron (ZOFRAN-ODT) 4 mg disintegrating tablet, Take 1 tablet (4 mg total) by mouth every 8 (eight) hours as needed for nausea or vomiting, Disp: 15 tablet, Rfl: 0    triamcinolone (KENALOG) 0 1 % cream, APPLY TOPICALLY DAILY AT BEDTIME AS NEEDED FOR RASH, Disp: 80 g, Rfl: 1     Food Intake and Lifestyle Assessment   Food Intake Assessment completed via usual diet recall  Breakfast: yogurt, [employer] shake  Lunch: sometimes skips, sometimes packs, sometimes orders small soup or panera salad or 1 slice pizza  Snack: baby carrots and hummus  Dinner: meal prepping: lean protein: chicken, ground turkey, ground beef chili, bariatric recipes   Snack: sugar-free popsicles 1-2  Beverage intake: water and coffee  Protein supplement: none currently  Estimated protein intake per day: 50-70g  Estimated fluid intake per day: 1-3 cups coffee, 32 oz sapp x 4-5  Meals eaten away from home: 3 nights per week orders out for dinner  Gets lunch from bistro or pizza place several days per week  Typical meal pattern: 2 meals per day and 2-3 snacks per day  Eating Behaviors: Consumption of high calorie/ high fat foods and Frequent snacking/ grazing  Food allergies or intolerances: uses almond milk, states sometimes regular milk gives her stomach upset  Allergies   Allergen Reactions    Dust Mite Extract Other (See Comments)     Sinus inflammation    Latex Blisters    Molds & Smuts Other (See Comments)     Sinus inflammation    Penicillins Rash     Skin rash and sheds    [employer] Grass Pollen Allergen Other (See Comments)     Sinus inflammation     Cultural or Anabaptism considerations: none noted    Physical Assessment  Physical Activity  Types of exercise: None  Sedentary job  Tries to walk around work building on lunch break  Current physical limitations: lymphedema in left leg since hysterectomy, back pain    Psychosocial Assessment   Support systems: spouse:  Spouse had bariatric surgery in August and is very successful    Cousin and friend are also post bariatric   Socioeconomic factors: works for [employer]:  Mental health services  Nutrition Diagnosis-continued  Diagnosis: Overweight / Obesity (NC-3 3) and Excessive energy intake (NI-1 5)  Related to: Physical inactivity and Excessive energy intake  As Evidenced by: BMI >25, Excessive energy intake and Unintentional weight gain     Nutrition Prescription: Recommend the following diet  Regular    Interventions and Teaching   Discussed pre-op and post-op nutrition guidelines  Patient educated and handouts provided    Surgical changes to stomach / GI  Capacity of post-surgery stomach  Diet progression  Adequate hydration  Sugar and fat restriction to decrease "dumping syndrome"  Expected weight loss  Weight loss plateaus/ possibility of weight regain  Exercise  Suggestions for pre-op diet  Nutrition considerations after surgery  Protein supplements  Meal planning and preparation  Appropriate carbohydrate, protein, and fat intake, and food/fluid choices to maximize safe weight loss, nutrient intake, and tolerance   Dietary and lifestyle changes  Possible problems with poor eating habits  Techniques for self monitoring and keeping daily food journal  Potential for food intolerance after surgery, and ways to deal with them including: lactose intolerance, nausea, reflux, vomiting, diarrhea, food intolerance, appetite changes, gas  Vitamin / Mineral supplementation of Multivitamin with minerals and Vitamin D  Takes one a day multi    Patient is not currently pregnant and doesn't desire to become pregnant a minimum of one year post-op    Education provided to: patient and spouse  Barriers to learning: No barriers identified  Readiness to change: preparation  Prior research on procedure: pre-op class and friends or family  Comprehension: needs reinforcement and verbalizes understanding   Expected Compliance: good    Evaluation / Monitoring  Dietitian to Monitor: Eating pattern as discussed Tolerance of nutrition prescription Body weight Lab values Physical activity    Goals  Eliminate sugar sweetened beverages, Food journal, Exercise 30 minutes 5 times per week, Complete lession plans 1-6, Eat 3 meals per day and Eliminate mindless snacking    Remaining Workflow:   Blood work:  o CBC, CMP, TSH done 3/15/22  o Lipids done 8/3/21   Weight Loss:  o 5% wt loss=18 925#=Goal of 359 575# TO SCHEDULE SURGERY  o 10% wt loss=37 85#=Goal of 340 65# ON DAY OF SURGERY      Time Spent:   30 minutes

## 2022-03-22 ENCOUNTER — TELEPHONE (OUTPATIENT)
Dept: INTERNAL MEDICINE CLINIC | Age: 37
End: 2022-03-22

## 2022-03-22 NOTE — TELEPHONE ENCOUNTER
Pt had TSH completed and it was 3 950 and T$ was 1 08  She would like to know if you want to adjust her thyroid med? If so, please send to pharm w/ new dose or send in refill please

## 2022-03-23 ENCOUNTER — HOSPITAL ENCOUNTER (OUTPATIENT)
Dept: SLEEP CENTER | Facility: CLINIC | Age: 37
Discharge: HOME | End: 2022-03-23
Payer: COMMERCIAL

## 2022-03-23 DIAGNOSIS — G47.34 SLEEP RELATED HYPOXIA: ICD-10-CM

## 2022-03-23 DIAGNOSIS — G47.33 OSA (OBSTRUCTIVE SLEEP APNEA): ICD-10-CM

## 2022-03-23 DIAGNOSIS — F41.8 MIXED ANXIETY DEPRESSIVE DISORDER: ICD-10-CM

## 2022-03-23 PROCEDURE — 95811 POLYSOM 6/>YRS CPAP 4/> PARM: CPT

## 2022-03-24 DIAGNOSIS — G47.33 OSA (OBSTRUCTIVE SLEEP APNEA): Primary | ICD-10-CM

## 2022-03-24 NOTE — PROGRESS NOTES
Sleep Study Documentation    Pre-Sleep Study       Sleep testing procedure explained to patient:YES    Patient napped prior to study:NO    Caffeine:Dayshift worker after 12PM   Caffeine use:NO    Alcohol:Dayshift workers after 5PM: Alcohol use:NO    Typical day for patient:YES       Study Documentation    Sleep Study Indications: ANA LAURA    Sleep Study: Treatment   Optimal PAP pressure: 15  Leak:None  Snore:Eliminated  REM Obtained:yes  Supplemental O2: no    Minimum SaO2 75  Baseline SaO2 98  PAP mask tried (list all)Airfit P10  PAP mask choice (final)Airfit P10  PAP mask type:pillows  PAP pressure at which snoring was eliminated 4  Minimum SaO2 at final PAP pressure 88  Mode of Therapy:CPAP  ETCO2:No  CPAP changed to BiPAP:No    Mode of Therapy:CPAP    EKG abnormalities: no     EEG abnormalities: no    Sleep Study Recorded < 2 hours: N/A    Sleep Study Recorded > 2 hours but incomplete study: N/A    Sleep Study Recorded 6 hours but no sleep obtained: NO    Patient classification: employed       Post-Sleep Study    Medication used at bedtime or during sleep study:YES other prescription medications    Patient reports time it took to fall asleep:30 to 60 minutes    Patient reports waking up during study:1 to 2 times  Patient reports returning to sleep in 10 to 30 minutes  Patient reports sleeping 4 to 6 hours with dreaming  Patient reports sleep during study:better than usual    Patient rated sleepiness: Not sleepy or tired    PAP treatment:yes: Post PAP treatment patient reports feeling better and  would wear PAP mask at home

## 2022-03-25 ENCOUNTER — TELEPHONE (OUTPATIENT)
Dept: SLEEP CENTER | Facility: CLINIC | Age: 37
End: 2022-03-25

## 2022-03-25 NOTE — TELEPHONE ENCOUNTER
Left message for patient  Advised sleep study resulted and APAP ordered  Patient has DME set up scheduled 5/13/22 and compliance follow up 7/14/22  Advised patient in message to call back to reschedule these appointments sooner

## 2022-03-25 NOTE — TELEPHONE ENCOUNTER
Reviewed sleep study results with patient and rescheduled DME set up and compliance follow up appointments  DME set up appointment information emailed to [facility]

## 2022-03-25 NOTE — TELEPHONE ENCOUNTER
----- Message from Huong Doshi MD sent at 3/24/2022 11:38 AM EDT -----  CPAP titration was effective, her oxygen levels normalized with CPAP  Order placed for CPAP machine, she should follow up with me approximately 1 month after starting treatment  I see her set up is scheduled in mid-May, in general would be better to move this up if possible

## 2022-03-28 DIAGNOSIS — E03.8 HYPOTHYROIDISM DUE TO HASHIMOTO'S THYROIDITIS: ICD-10-CM

## 2022-03-28 DIAGNOSIS — E06.3 HYPOTHYROIDISM DUE TO HASHIMOTO'S THYROIDITIS: ICD-10-CM

## 2022-03-28 RX ORDER — LEVOTHYROXINE SODIUM 0.15 MG/1
150 TABLET ORAL DAILY
Qty: 30 TABLET | Refills: 0 | Status: SHIPPED | OUTPATIENT
Start: 2022-03-28 | End: 2022-04-22 | Stop reason: SDUPTHER

## 2022-04-08 ENCOUNTER — TELEPHONE (OUTPATIENT)
Dept: SLEEP CENTER | Facility: CLINIC | Age: 37
End: 2022-04-08

## 2022-04-15 ENCOUNTER — OFFICE VISIT (OUTPATIENT)
Dept: BARIATRICS | Facility: CLINIC | Age: 37
End: 2022-04-15

## 2022-04-15 ENCOUNTER — TELEPHONE (OUTPATIENT)
Dept: BARIATRICS | Facility: CLINIC | Age: 37
End: 2022-04-15

## 2022-04-15 VITALS — BODY MASS INDEX: 61.21 KG/M2 | WEIGHT: 293 LBS

## 2022-04-15 DIAGNOSIS — G47.33 OSA (OBSTRUCTIVE SLEEP APNEA): ICD-10-CM

## 2022-04-15 PROCEDURE — RECHECK: Performed by: DIETITIAN, REGISTERED

## 2022-04-15 NOTE — PROGRESS NOTES
Bariatric Nutrition Follow-Up Note    Type of surgery    Preop, no required weight checks  Surgery Date: TBD- tentative May-June 2022  Deadline September 2022  Surgeon: Dr Deepak Vaughn  29 y o   female     Wt with BMI of 25: 150lbs  Pre-Op Excess Wt: 228 5lbs  Wt (!) 167 kg (367 lb 12 8 oz)   LMP 12/17/2019   BMI 61 21 kg/m²    4 8lb wt loss from last appointment x 1 month   10 8lb total wt loss since start of program   Pt now needs 8 225lb wt loss to schedule surgery and 27 15lbs by surgery date  5% wt loss=18 925#=Goal of 359 575# TO SCHEDULE SURGERY  10% wt loss=37 85#=Goal of 340 65# ON DAY OF SURGERY      [facility] Equation:     Weight maintenance= 2895 kcal/day  Estimated calories for weight loss 4931-2253 kcal/day ( 1-2# per wk wt loss - sedentary )  Estimated protein needs 68 2-81 8 g/day (1 0-1 2 gms/kg IBW )   Estimated fluid needs 1898-5703 ml/day (30-35 ml/kg IBW )      Review of History and Medications   Past Medical History:   Diagnosis Date    Anxiety     Bruises easily     Cancer ([address] )     Depression     Disease of thyroid gland     DELVALLE (dyspnea on exertion)     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    Endometriosis     Follicular [address] [address]     Gastroenteritis     Hashimoto's disease     Hepatic steatosis     Hyperlipidemia     Hypertension     Hypothyroid     IBS (irritable bowel syndrome)     Kidney lesion, native, left     Lymphedema     left leg    Obesity     Palpitation     Pancreatitis     Pneumonia     Polycystic ovarian disease     Polyuria     Sleep apnea     Thyroid nodule     Thyromegaly     Tinea corporis     Tinea pedis      Past Surgical History:   Procedure Laterality Date    ADENOIDECTOMY      DILATION AND CURETTAGE OF UTERUS  2020    HYSTERECTOMY  02/2020    left ovaries    THYROIDECTOMY, PARTIAL Left 06/2019    benign    TONSILLECTOMY      US GUIDED THYROID BIOPSY  03/01/2019     Social History     Socioeconomic History    Marital status: /Civil Union     Spouse name: Not on file    Number of children: Not on file    Years of education: Not on file    Highest education level: Not on file   Occupational History    Not on file   Tobacco Use    Smoking status: Never Smoker    Smokeless tobacco: Never Used   Vaping Use    Vaping Use: Never used   Substance and Sexual Activity    Alcohol use: Not Currently     Alcohol/week: 0 0 standard drinks    Drug use: Never    Sexual activity: Not on file   Other Topics Concern    Not on file   Social History Narrative    Not on file     Social Determinants of Health     Financial Resource Strain: Not on file   Food Insecurity: Not on file   Transportation Needs: Not on file   Physical Activity: Not on file   Stress: Not on file   Social Connections: Not on file   Intimate Partner Violence: Not on file   Housing Stability: Not on file       Current Outpatient Medications:     albuterol (VENTOLIN HFA) 90 mcg/act inhaler, Inhale 2 puffs every 6 (six) hours as needed for wheezing or shortness of breath, Disp: 18 g, Rfl: 0    ALPRAZolam (XANAX) 0 25 mg tablet, Take 1 tablet (0 25 mg total) by mouth 3 (three) times a day as needed for anxiety, Disp: 60 tablet, Rfl: 0    citalopram (CeleXA) 20 mg tablet, Take 1 tablet (20 mg total) by mouth daily, Disp: 30 tablet, Rfl: 5    fluticasone (FLONASE) 50 mcg/act nasal spray, 2 sprays into each nostril daily, Disp: 16 g, Rfl: 0    furosemide (LASIX) 40 mg tablet, Take 1 tablet (40 mg total) by mouth daily, Disp: 30 tablet, Rfl: 5    levothyroxine 150 mcg tablet, Take 1 tablet (150 mcg total) by mouth daily, Disp: 30 tablet, Rfl: 0    lisinopril (ZESTRIL) 20 mg tablet, Take 1 tablet (20 mg total) by mouth daily, Disp: 90 tablet, Rfl: 3    loratadine (CLARITIN) 10 mg tablet, Take 10 mg by mouth daily, Disp: , Rfl:     ondansetron (ZOFRAN-ODT) 4 mg disintegrating tablet, Take 1 tablet (4 mg total) by mouth every 8 (eight) hours as needed for nausea or vomiting, Disp: 15 tablet, Rfl: 0    triamcinolone (KENALOG) 0 1 % cream, APPLY TOPICALLY DAILY AT BEDTIME AS NEEDED FOR RASH, Disp: 80 g, Rfl: 1     Food Intake and Lifestyle Assessment   Food Intake Assessment completed via usual diet recall  Breakfast: Fair Life shake, apple or orange  Lunch: 1pm: Fair Life shake 30g protein  Snack: baby carrots and hummus (1 g protein)  Dinner: meal prepping: lean protein: chicken, ground turkey, ground beef chili, bariatric recipes   Snack: sugar-free popsicles 1-2  Beverage intake: water and coffee  Protein supplement: Fair Life  Estimated protein intake per day: 50-70g  Estimated fluid intake per day: 1-3 cups coffee, 32 oz sapp x 4-5  Meals eaten away from home: 3 nights per week orders out for dinner  Gets lunch from bistro or pizza place several days per week  Typical meal pattern: 2 meals per day and 2-3 snacks per day  Eating Behaviors: Consumption of high calorie/ high fat foods and Frequent snacking/ grazing  Food allergies or intolerances: uses almond milk, states sometimes regular milk gives her stomach upset  Allergies   Allergen Reactions    Dust Mite Extract Other (See Comments)     Sinus inflammation    Latex Blisters    Molds & Smuts Other (See Comments)     Sinus inflammation    Penicillins Rash     Skin rash and sheds    Jorge Luis Grass Pollen Allergen Other (See Comments)     Sinus inflammation     Cultural or Methodist considerations: none noted    Physical Assessment  Physical Activity  Types of exercise: Sedentary job  Tries to walk around work building on lunch break  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  Pt reports she will sometimes put meetings on her cell phone with earbuds and walk during them    Current physical limitations: lymphedema in left leg since hysterectomy, back pain    Psychosocial Assessment   Support systems: spouse:  Spouse had bariatric surgery in August and is very successful  Cousin and friend are also post bariatric   Socioeconomic factors: works for [employer]:  Mental health services  Nutrition Diagnosis-continued  Diagnosis: Overweight / Obesity (NC-3 3) and Excessive energy intake (NI-1 5)  Related to: Physical inactivity and Excessive energy intake  As Evidenced by: BMI >25, Excessive energy intake and Unintentional weight gain     Nutrition Prescription: Recommend the following diet  Regular    Interventions and Teaching   Discussed pre-op and post-op nutrition guidelines  Patient educated and handouts provided  Surgical changes to stomach / GI  Capacity of post-surgery stomach  Diet progression  Adequate hydration  Sugar and fat restriction to decrease "dumping syndrome"  Expected weight loss:  Reviewed pre-op weight loss goals and current weight loss progress  Weight loss plateaus/ possibility of weight regain  Exercise:  Discussed using office and home stairs  Suggestions for pre-op diet  Nutrition considerations after surgery  Protein supplements  Meal planning and preparation:  Discussed meal preparation methods for moist/tender meats post-op    Appropriate carbohydrate, protein, and fat intake, and food/fluid choices to maximize safe weight loss, nutrient intake, and tolerance   Dietary and lifestyle changes  Possible problems with poor eating habits  Techniques for self monitoring and keeping daily food journal  Potential for food intolerance after surgery, and ways to deal with them including: lactose intolerance, nausea, reflux, vomiting, diarrhea, food intolerance, appetite changes, gas  Vitamin / Mineral supplementation of Multivitamin with minerals and Vitamin D  Takes one a day multi    Patient is not currently pregnant and doesn't desire to become pregnant a minimum of one year post-op    Education provided to: patient and spouse  Barriers to learning: No barriers identified  Readiness to change: action  Prior research on procedure: pre-op class and friends or family  Comprehension: needs reinforcement and verbalizes understanding   Expected Compliance: good    Evaluation / Monitoring  Dietitian to Monitor: Eating pattern as discussed Tolerance of nutrition prescription Body weight Lab values Physical activity    Goals  Eliminate sugar sweetened beverages, Food journal, Exercise 30 minutes 5 times per week, Complete lession plans 1-6, Eat 3 meals per day and Eliminate mindless snacking     Provided pt with sample 4-day modified pre-op diet sample manus  Goal under 360lbs by next appointment  Remaining Workflow: Complete except for weight loss:   Weight Loss:  o 5% wt loss=18 925#=Goal of 359 575# TO SCHEDULE SURGERY  o 10% wt loss=37 85#=Goal of 340 65# ON DAY OF SURGERY  Time Spent:   30 minutes  Follow-up scheduled with RD in two weeks for weight check

## 2022-04-22 DIAGNOSIS — E03.8 HYPOTHYROIDISM DUE TO HASHIMOTO'S THYROIDITIS: ICD-10-CM

## 2022-04-22 DIAGNOSIS — E06.3 HYPOTHYROIDISM DUE TO HASHIMOTO'S THYROIDITIS: ICD-10-CM

## 2022-04-22 DIAGNOSIS — F41.8 MIXED ANXIETY DEPRESSIVE DISORDER: ICD-10-CM

## 2022-04-25 RX ORDER — CITALOPRAM 20 MG/1
20 TABLET ORAL DAILY
Qty: 30 TABLET | Refills: 0 | Status: SHIPPED | OUTPATIENT
Start: 2022-04-25 | End: 2022-04-29 | Stop reason: SDUPTHER

## 2022-04-25 RX ORDER — LEVOTHYROXINE SODIUM 0.15 MG/1
150 TABLET ORAL DAILY
Qty: 30 TABLET | Refills: 0 | Status: SHIPPED | OUTPATIENT
Start: 2022-04-25 | End: 2022-06-01

## 2022-04-27 DIAGNOSIS — I10 ESSENTIAL HYPERTENSION: ICD-10-CM

## 2022-04-28 ENCOUNTER — OFFICE VISIT (OUTPATIENT)
Dept: BARIATRICS | Facility: CLINIC | Age: 37
End: 2022-04-28

## 2022-04-28 VITALS — WEIGHT: 293 LBS | HEIGHT: 65 IN | BODY MASS INDEX: 48.82 KG/M2

## 2022-04-28 DIAGNOSIS — E66.01 MORBID (SEVERE) OBESITY DUE TO EXCESS CALORIES (HCC): Primary | ICD-10-CM

## 2022-04-28 PROCEDURE — RECHECK: Performed by: DIETITIAN, REGISTERED

## 2022-04-28 RX ORDER — LISINOPRIL 20 MG/1
20 TABLET ORAL DAILY
Qty: 90 TABLET | Refills: 0 | Status: SHIPPED | OUTPATIENT
Start: 2022-04-28 | End: 2022-04-29

## 2022-04-28 NOTE — PROGRESS NOTES
Bariatric Nutrition Follow-Up Note    Type of surgery    Preop, no required weight checks  Surgery Date: TBD- tentative May-June 2022  Deadline September 2022  Surgeon: Dr Mayes Age  29 y o   female     Wt with BMI of 25: 150lbs  Pre-Op Excess Wt: 228 5lbs  LMP 12/17/2019    4 8lb wt loss from last appointment x 1 month   10 8lb total wt loss since start of program   Pt now needs 8 225lb wt loss to schedule surgery and 27 15lbs by surgery date  5% wt loss=18 925#=Goal of 359 575# TO SCHEDULE SURGERY  10% wt loss=37 85#=Goal of 340 65# ON DAY OF SURGERY      [facility] Equation:     Weight maintenance= 2895 kcal/day  Estimated calories for weight loss 3248-6194 kcal/day ( 1-2# per wk wt loss - sedentary )  Estimated protein needs 68 2-81 8 g/day (1 0-1 2 gms/kg IBW )   Estimated fluid needs 6988-4516 ml/day (30-35 ml/kg IBW )      Review of History and Medications   Past Medical History:   Diagnosis Date    Anxiety     Bruises easily     Cancer ([address] )     Depression     Disease of thyroid gland     DELVALLE (dyspnea on exertion)     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    Endometriosis     Follicular thyroid cancer ([address] )     Gastroenteritis     Hashimoto's disease     Hepatic steatosis     Hyperlipidemia     Hypertension     Hypothyroid     IBS (irritable bowel syndrome)     Kidney lesion, native, left     Lymphedema     left leg    Obesity     Palpitation     Pancreatitis     Pneumonia     Polycystic ovarian disease     Polyuria     Sleep apnea     Thyroid nodule     Thyromegaly     Tinea corporis     Tinea pedis      Past Surgical History:   Procedure Laterality Date    ADENOIDECTOMY      DILATION AND CURETTAGE OF UTERUS  2020    HYSTERECTOMY  02/2020    left ovaries    THYROIDECTOMY, PARTIAL Left 06/2019    benign    TONSILLECTOMY      US GUIDED THYROID BIOPSY  03/01/2019     Social History     Socioeconomic History    Marital status: /Civil Union     Spouse name: Not on file    Number of children: Not on file    Years of education: Not on file    Highest education level: Not on file   Occupational History    Not on file   Tobacco Use    Smoking status: Never Smoker    Smokeless tobacco: Never Used   Vaping Use    Vaping Use: Never used   Substance and Sexual Activity    Alcohol use: Not Currently     Alcohol/week: 0 0 standard drinks    Drug use: Never    Sexual activity: Not on file   Other Topics Concern    Not on file   Social History Narrative    Not on file     Social Determinants of Health     Financial Resource Strain: Not on file   Food Insecurity: Not on file   Transportation Needs: Not on file   Physical Activity: Not on file   Stress: Not on file   Social Connections: Not on file   Intimate Partner Violence: Not on file   Housing Stability: Not on file       Current Outpatient Medications:     albuterol (VENTOLIN HFA) 90 mcg/act inhaler, Inhale 2 puffs every 6 (six) hours as needed for wheezing or shortness of breath, Disp: 18 g, Rfl: 0    ALPRAZolam (XANAX) 0 25 mg tablet, Take 1 tablet (0 25 mg total) by mouth 3 (three) times a day as needed for anxiety, Disp: 60 tablet, Rfl: 0    citalopram (CeleXA) 20 mg tablet, Take 1 tablet (20 mg total) by mouth daily, Disp: 30 tablet, Rfl: 0    fluticasone (FLONASE) 50 mcg/act nasal spray, 2 sprays into each nostril daily, Disp: 16 g, Rfl: 0    furosemide (LASIX) 40 mg tablet, Take 1 tablet (40 mg total) by mouth daily, Disp: 30 tablet, Rfl: 5    levothyroxine 150 mcg tablet, Take 1 tablet (150 mcg total) by mouth daily, Disp: 30 tablet, Rfl: 0    lisinopril (ZESTRIL) 20 mg tablet, Take 1 tablet (20 mg total) by mouth daily, Disp: 90 tablet, Rfl: 3    loratadine (CLARITIN) 10 mg tablet, Take 10 mg by mouth daily, Disp: , Rfl:     ondansetron (ZOFRAN-ODT) 4 mg disintegrating tablet, Take 1 tablet (4 mg total) by mouth every 8 (eight) hours as needed for nausea or vomiting, Disp: 15 tablet, Rfl: 0    triamcinolone (KENALOG) 0 1 % cream, APPLY TOPICALLY DAILY AT BEDTIME AS NEEDED FOR RASH, Disp: 80 g, Rfl: 1     Food Intake and Lifestyle Assessment updates in bold  Sick for the past 2 weeks - respiratory and GI (diarrhea, stomach cramping) so was incorporating things like toast to help add bulk to her stool, also reports increased swelling in her left leg (lymphadema)  Food Intake Assessment completed via usual diet recall  Breakfast: Fair Life shake, apple or orange  Lunch: 1pm: Fair Life shake 30g protein switched to modified pre-op menu using 1 protein shake so no including a meal at lunch (4oz protein, 1 5 cup no starchy vegetables, 1/2 SF pudding)  Snack: baby carrots and hummus (1 g protein)  Dinner: meal prepping: lean protein: chicken, ground turkey, ground beef chili, bariatric recipes   Snack: sugar-free popsicles 1-2 keto snack (ie 2 hard boiled eggs)  Beverage intake: water and coffee  Protein supplement: Fair Life  Estimated protein intake per day: 50-70g  Estimated fluid intake per day: 1-3 cups coffee, 32 oz sapp x 4-5  Meals eaten away from home: 3 nights per week orders out for dinner  Gets lunch from [employer] or [employer] several days per week  1 night per week (salad works)  Typical meal pattern: 2 meals per day and 2-3 snacks per day  Eating Behaviors: Consumption of high calorie/ high fat foods and Frequent snacking/ grazing  Food allergies or intolerances: uses almond milk, states sometimes regular milk gives her stomach upset    Allergies   Allergen Reactions    Dust Mite Extract Other (See Comments)     Sinus inflammation    Latex Blisters    Molds & Smuts Other (See Comments)     Sinus inflammation    Penicillins Rash     Skin rash and sheds    Jorge Luis Grass Pollen Allergen Other (See Comments)     Sinus inflammation     Cultural or Gnosticism considerations: none noted    Physical Assessment -updates in bold  Physical Activity  Types of exercise: Sedentary job  Tries to walk around work building on lunch break  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  Pt reports she will sometimes put meetings on her cell phone with earbuds and walk during them  Will go to the gym across from her office and walkon treadmill or ell[employer]ical  Current physical limitations: lymphedema in left leg since hysterectomy, back pain    Psychosocial Assessment   Support systems: spouse:  Spouse had bariatric surgery in August and is very successful  Cousin and friend are also post bariatric   Socioeconomic factors: works for [employer]:  Mental health services  Nutrition Diagnosis-continued  Diagnosis: Overweight / Obesity (NC-3 3) and Excessive energy intake (NI-1 5)  Related to: Physical inactivity and Excessive energy intake  As Evidenced by: BMI >25, Excessive energy intake and Unintentional weight gain     Nutrition Prescription: Recommend the following diet  Regular    Interventions and Teaching   Discussed pre-op and post-op nutrition guidelines  Patient educated and handouts provided  Surgical changes to stomach / GI  Capacity of post-surgery stomach  Diet progression  Adequate hydration  Sugar and fat restriction to decrease "dumping syndrome"  Expected weight loss:  Reviewed pre-op weight loss goals and current weight loss progress  Weight loss plateaus/ possibility of weight regain  Exercise:  Discussed using office and home stairs  Suggestions for pre-op diet  Nutrition considerations after surgery  Protein supplements  Meal planning and preparation:  Discussed meal preparation methods for moist/tender meats post-op    Appropriate carbohydrate, protein, and fat intake, and food/fluid choices to maximize safe weight loss, nutrient intake, and tolerance   Dietary and lifestyle changes  Possible problems with poor eating habits  Techniques for self monitoring and keeping daily food journal  Potential for food intolerance after surgery, and ways to deal with them including: lactose intolerance, nausea, reflux, vomiting, diarrhea, food intolerance, appetite changes, gas  Vitamin / Mineral supplementation of Multivitamin with minerals and Vitamin D  Takes one a day multi    Patient is not currently pregnant and doesn't desire to become pregnant a minimum of one year post-op    Education provided to: patient and spouse  Barriers to learning: No barriers identified  Readiness to change: action  Prior research on procedure: pre-op class and friends or family  Comprehension: needs reinforcement and verbalizes understanding   Expected Compliance: good    Evaluation / Monitoring  Dietitian to Monitor: Eating pattern as discussed Tolerance of nutrition prescription Body weight Lab values Physical activity    Goals  Eliminate sugar sweetened beverages, Food journal, Exercise 30 minutes 5 times per week, Complete lession plans 1-6, Eat 3 meals per day and Eliminate mindless snacking     Patient to continue with modified pre-op diet as previosuly prescribed due to not being able to completely follow due to being sick  Patient to food journal daily  Patient to f/u in 1 month with ANTON  Rec patient f/u with PCP in regards to recent illness and complaints about increasing lymphadeama    Remaining Workflow: Complete except for weight loss:   Weight Loss:  o 5% wt loss=18 925#=Goal of 359 575# TO SCHEDULE SURGERY  o 10% wt loss=37 85#=Goal of 340 65# ON DAY OF SURGERY  Time Spent:   30 minutes  Follow-up scheduled with ANTON in4 weeks for weight check

## 2022-04-29 ENCOUNTER — OFFICE VISIT (OUTPATIENT)
Dept: INTERNAL MEDICINE CLINIC | Age: 37
End: 2022-04-29
Payer: COMMERCIAL

## 2022-04-29 VITALS
SYSTOLIC BLOOD PRESSURE: 134 MMHG | BODY MASS INDEX: 48.82 KG/M2 | WEIGHT: 293 LBS | OXYGEN SATURATION: 94 % | DIASTOLIC BLOOD PRESSURE: 88 MMHG | HEIGHT: 65 IN | TEMPERATURE: 98.3 F | HEART RATE: 100 BPM

## 2022-04-29 DIAGNOSIS — J20.9 ACUTE BRONCHITIS, UNSPECIFIED ORGANISM: ICD-10-CM

## 2022-04-29 DIAGNOSIS — E03.8 HYPOTHYROIDISM DUE TO HASHIMOTO'S THYROIDITIS: ICD-10-CM

## 2022-04-29 DIAGNOSIS — J06.9 UPPER RESPIRATORY TRACT INFECTION, UNSPECIFIED TYPE: ICD-10-CM

## 2022-04-29 DIAGNOSIS — E06.3 HYPOTHYROIDISM DUE TO HASHIMOTO'S THYROIDITIS: ICD-10-CM

## 2022-04-29 DIAGNOSIS — I10 ESSENTIAL HYPERTENSION: ICD-10-CM

## 2022-04-29 DIAGNOSIS — F41.8 MIXED ANXIETY DEPRESSIVE DISORDER: ICD-10-CM

## 2022-04-29 PROBLEM — E28.2 PCOS (POLYCYSTIC OVARIAN SYNDROME): Status: ACTIVE | Noted: 2022-04-29

## 2022-04-29 PROBLEM — J00 ACUTE NASOPHARYNGITIS: Status: ACTIVE | Noted: 2022-04-29

## 2022-04-29 PROCEDURE — 3075F SYST BP GE 130 - 139MM HG: CPT | Performed by: INTERNAL MEDICINE

## 2022-04-29 PROCEDURE — 99213 OFFICE O/P EST LOW 20 MIN: CPT | Performed by: INTERNAL MEDICINE

## 2022-04-29 PROCEDURE — 1036F TOBACCO NON-USER: CPT | Performed by: INTERNAL MEDICINE

## 2022-04-29 PROCEDURE — 3008F BODY MASS INDEX DOCD: CPT | Performed by: INTERNAL MEDICINE

## 2022-04-29 PROCEDURE — 3079F DIAST BP 80-89 MM HG: CPT | Performed by: INTERNAL MEDICINE

## 2022-04-29 RX ORDER — AZITHROMYCIN 250 MG/1
TABLET, FILM COATED ORAL
Qty: 6 TABLET | Refills: 0 | Status: SHIPPED | OUTPATIENT
Start: 2022-04-29 | End: 2022-05-04

## 2022-04-29 RX ORDER — LOSARTAN POTASSIUM 100 MG/1
100 TABLET ORAL DAILY
Qty: 90 TABLET | Refills: 3 | Status: SHIPPED | OUTPATIENT
Start: 2022-04-29

## 2022-04-29 RX ORDER — CITALOPRAM 20 MG/1
20 TABLET ORAL DAILY
Qty: 30 TABLET | Refills: 0 | Status: SHIPPED | OUTPATIENT
Start: 2022-04-29 | End: 2022-07-12 | Stop reason: SDUPTHER

## 2022-04-29 RX ORDER — ALBUTEROL SULFATE 90 UG/1
2 AEROSOL, METERED RESPIRATORY (INHALATION) EVERY 6 HOURS PRN
Qty: 18 G | Refills: 0 | Status: SHIPPED | OUTPATIENT
Start: 2022-04-29

## 2022-04-29 NOTE — PATIENT INSTRUCTIONS
Acute Cough   AMBULATORY CARE:   An acute cough  can last up to 3 weeks  Common causes of an acute cough include a cold, allergies, or a lung infection  Seek care immediately if:   · You have trouble breathing or feel short of breath  · You cough up blood, or you see blood in your mucus  · You faint or feel weak or dizzy  · You have chest pain when you cough or take a deep breath  · You have new wheezing  Contact your healthcare provider if:   · You have a fever  · Your cough lasts longer than 4 weeks  · Your symptoms do not improve with treatment  · You have questions or concerns about your condition or care  Treatment:  An acute cough usually goes away on its own  Ask your healthcare provider about medicines you can take to decrease your cough  You may need medicine to stop the cough, decrease swelling in your airways, or help open your airways  Medicine may also be given to help you cough up mucus  If you have an infection caused by bacteria, you may need antibiotics  Manage your symptoms:   · Do not smoke and stay away from others who smoke  Nicotine and other chemicals in cigarettes and cigars can cause lung damage and make your cough worse  Ask your healthcare provider for information if you currently smoke and need help to quit  E-cigarettes or smokeless tobacco still contain nicotine  Talk to your healthcare provider before you use these products  · Drink extra liquids as directed  Liquids will help thin and loosen mucus so you can cough it up  Liquids will also help prevent dehydration  Examples of good liquids to drink include water, fruit juice, and broth  Do not drink liquids that contain caffeine  Caffeine can increase your risk for dehydration  Ask your healthcare provider how much liquid to drink each day  · Rest as directed  Do not do activities that make your cough worse, such as exercise  · Use a humidifier or vaporizer    Use a cool mist humidifier or a vaporizer to increase air moisture in your home  This may make it easier for you to breathe and help decrease your cough  · Eat 2 to 5 mL of honey 2 times each day  Honey can help thin mucus and decrease your cough  · Use cough drops or lozenges  These can help decrease throat irritation and your cough  Follow up with your healthcare provider as directed:  Write down your questions so you remember to ask them during your visits  © Copyright [employer] 2022 Information is for End User's use only and may not be sold, redistributed or otherwise used for commercial purposes  All illustrations and images included in CareNotes® are the copyrighted property of A D A M , Inc  or [facility] Sandi Daley   The above information is an  only  It is not intended as medical advice for individual conditions or treatments  Talk to your doctor, nurse or pharmacist before following any medical regimen to see if it is safe and effective for you

## 2022-04-29 NOTE — PROGRESS NOTES
Assessment/Plan:    No problem-specific Assessment & Plan notes found for this encounter  Diagnoses and all orders for this visit:    BMI 60 0-69 9, adult ([address] )    Essential hypertension  -     losartan (Cozaar) 100 MG tablet; Take 1 tablet (100 mg total) by mouth daily    Upper respiratory tract infection, unspecified type  -     azithromycin (ZITHROMAX) 250 mg tablet; Take 2 tablets today then 1 tablet daily x 4 days    Acute bronchitis, unspecified organism  -     albuterol (Ventolin HFA) 90 mcg/act inhaler; Inhale 2 puffs every 6 (six) hours as needed for wheezing or shortness of breath    Mixed anxiety depressive disorder  -     citalopram (CeleXA) 20 mg tablet; Take 1 tablet (20 mg total) by mouth daily    Hypothyroidism due to Hashimoto's thyroiditis  -     TSH, 3rd generation with Free T4 reflex; Future          Subjective:      Patient ID: Esa Agee is a 29 y o  female  Cough  The current episode started more than 1 month ago  The problem has been waxing and waning  The problem occurs hourly  The cough is non-productive, productive of blood-tinged sputum and productive of purulent sputum  Associated symptoms include ear congestion, ear pain, headaches, nasal congestion, postnasal drip, rhinorrhea, a sore throat and wheezing  Pertinent negatives include no chest pain, chills, fever, heartburn, hemoptysis, myalgias, rash, shortness of breath, sweats or weight loss  Nothing aggravates the symptoms  She has tried cool air and a beta-agonist inhaler for the symptoms  The treatment provided no relief  Her past medical history is significant for asthma  hypertension       Review of Systems   Constitutional: Negative for chills, fever and weight loss  HENT: Positive for ear pain, postnasal drip, rhinorrhea and sore throat  Respiratory: Positive for cough and wheezing  Negative for hemoptysis, chest tightness and shortness of breath  Cardiovascular: Negative for chest pain and leg swelling  Gastrointestinal: Negative for heartburn  Musculoskeletal: Negative for myalgias  Skin: Negative for rash  Neurological: Positive for headaches  Objective:      /88 (BP Location: Left arm, Patient Position: Sitting)   Pulse 100   Temp 98 3 °F (36 8 °C) (Tympanic)   Ht 5' 5" (1 651 m)   Wt (!) 168 kg (371 lb 3 2 oz)   LMP 12/17/2019   SpO2 94%   BMI 61 77 kg/m²          Physical Exam  Constitutional:       General: She is not in acute distress  Appearance: She is well-developed  She is obese  HENT:      Right Ear: Tympanic membrane and external ear normal       Left Ear: Tympanic membrane and external ear normal       Nose: Congestion (Mild) present  Mouth/Throat:      Pharynx: No oropharyngeal exudate  Eyes:      Pupils: Pupils are equal, round, and reactive to light  Neck:      Thyroid: No thyromegaly  Vascular: No JVD  Cardiovascular:      Rate and Rhythm: Normal rate and regular rhythm  Heart sounds: Normal heart sounds  No murmur heard  No gallop  Pulmonary:      Effort: Pulmonary effort is normal  No respiratory distress  Breath sounds: Normal breath sounds  No wheezing or rales  Abdominal:      General: Bowel sounds are normal  There is no distension  Palpations: Abdomen is soft  There is no mass  Tenderness: There is no abdominal tenderness  Musculoskeletal:         General: No tenderness  Normal range of motion  Cervical back: Normal range of motion and neck supple  Lymphadenopathy:      Cervical: No cervical adenopathy  Skin:     Findings: No rash  Neurological:      Mental Status: She is alert and oriented to person, place, and time  Cranial Nerves: No cranial nerve deficit  Coordination: Coordination normal    Psychiatric:         Behavior: Behavior normal          Thought Content:  Thought content normal          Judgment: Judgment normal

## 2022-04-29 NOTE — ASSESSMENT & PLAN NOTE
Patient complains of an ongoing dry cough and postnasal drip over the last several weeks  I do wonder if the starting of lisinopril is making a difference  In the meantime will give her a Z-Andres

## 2022-05-10 ENCOUNTER — OFFICE VISIT (OUTPATIENT)
Dept: SLEEP CENTER | Facility: CLINIC | Age: 37
End: 2022-05-10
Payer: COMMERCIAL

## 2022-05-10 VITALS
WEIGHT: 293 LBS | SYSTOLIC BLOOD PRESSURE: 125 MMHG | BODY MASS INDEX: 48.82 KG/M2 | HEIGHT: 65 IN | HEART RATE: 103 BPM | DIASTOLIC BLOOD PRESSURE: 73 MMHG

## 2022-05-10 DIAGNOSIS — E61.1 IRON DEFICIENCY: ICD-10-CM

## 2022-05-10 DIAGNOSIS — G47.33 OSA (OBSTRUCTIVE SLEEP APNEA): Primary | ICD-10-CM

## 2022-05-10 DIAGNOSIS — G25.81 RLS (RESTLESS LEGS SYNDROME): ICD-10-CM

## 2022-05-10 PROBLEM — Z91.89 AT RISK FOR OBSTRUCTIVE SLEEP APNEA: Status: RESOLVED | Noted: 2022-02-17 | Resolved: 2022-05-10

## 2022-05-10 PROCEDURE — 3078F DIAST BP <80 MM HG: CPT | Performed by: PSYCHIATRY & NEUROLOGY

## 2022-05-10 PROCEDURE — 99214 OFFICE O/P EST MOD 30 MIN: CPT | Performed by: PSYCHIATRY & NEUROLOGY

## 2022-05-10 PROCEDURE — 3074F SYST BP LT 130 MM HG: CPT | Performed by: PSYCHIATRY & NEUROLOGY

## 2022-05-10 PROCEDURE — 1036F TOBACCO NON-USER: CPT | Performed by: PSYCHIATRY & NEUROLOGY

## 2022-05-10 NOTE — PATIENT INSTRUCTIONS
Your CPAP numbers look great- continue to use the machine each night, It should be used around bariatric surgery and I would recommend continuing it after surgery

## 2022-05-10 NOTE — PROGRESS NOTES
Assessment/Plan:      1  ANA LAURA (obstructive sleep apnea)  Assessment & Plan:  Ms Mg Marley is doing very well with treatment, I reviewed her CPAP data and her AHI is normal, treatment is beneficial and effective  No change is needed in her current settings  She has my clearance for bariatric surgery  CPAP should be use in the perioperative state and when asleep and caution should be used by Anesthesia  Her current settings are auto CPAP 8-13 cm H2O, if one set pressure is needed, I would recommend 13 cm H2O  We discussed that her case of sleep apnea may improve or resolve after bariatric surgery in significant weight loss  Her CPAP machine does not seem to be transmitting data, I will try to reach out to [employer] to see if I can determine if this is a widespread problem or an issue specific to her machine, as her machine should be under are warranty      2  Iron deficiency  -     Iron Panel (Includes Ferritin, Iron Sat%, Iron, and TIBC); Future    3  RLS (restless legs syndrome)  Comments:  She notes symptoms of restless leg syndrome in the middle the night, I would like to check iron levels to assess for iron deficiency  Orders:  -     Iron Panel (Includes Ferritin, Iron Sat%, Iron, and TIBC); Future         Subjective:      Patient ID: Ermias Plascencia is a 29 y o  female  Ms Mg Marley returns in followup for ANA LAURA, started CPAP last month with a [employer] Airsense 11 Auto,  Uses it regularly and notes benefit, sleeping better- more rested  Snoring is better  She goes to bed 930-10 pm and is asleep in 15 min,  Wakes 6 AM    [place] [address]    Prior sleep test- The test results are from Night of 2/25/2022  The total time in bed (analysis time) was 6 hours 57 minutes  The patient had a total of 82 respiratory events made up of 4 obstructive apneas, 0 central/mixed apneas, and 78 hypopneas resulting in a respiratory event index (PIERCE) of 11 9  The lowest SpO2 recorded is 75 0%      Mild dry mouth, no nose bleeds  + nasal congestion but she has pre-existing sinus problems  PAP Data- airsense 11 auto set at 8-13 cm h20  Average session 5 hr 19 min   AHI 0 5  Used 30/30 nights  95% pressure- 12 7     She wakes up and has a feeling of restless legs, lasts 15 min - feels tingly,has to get up and move, R leg       [place] Sleepiness Scale:     Sitting and reading: Moderate chance of dozing  Watching TV: Moderate chance of dozing  Sitting, inactive in a public place (e g  a theatre or a meeting): High chance of dozing  As a passenger in a car for an hour without a break:  Moderate chance of dozing  Lying down to rest in the afternoon when circumstances permit: Moderate chance of dozing  Sitting and talking to someone: Would never doze  Sitting quietly after a lunch without alcohol: Would never doze  In a car, while stopped for a few minutes in traffic: Would never doze  Total score: 11     The following portions of the patient's history were reviewed and updated as appropriate: allergies, current medications, past family history, past medical history, past social history, past surgical history, and problem list     Review of Systems    Genitourinary hot flashes at night   Cardiology ankle/leg swelling   Gastrointestinal none   Neurology frequent headaches, awaken with headache, need to move extremities, numbness/tingling of an extremity and difficulty with memory   Constitutional fatigue and excessive sweating at night   Integumentary rash or dry skin and itching   Psychiatry anxiety and depression   Musculoskeletal legs twitching/jerking   Pulmonary shortness of breath with activity, frequent cough and difficulty breathing when lying flat    ENT ringing in ears   Endocrine frequent urination   Hematological none        Objective:     /73   Pulse 103   Ht 5' 5" (1 651 m)   Wt (!) 170 kg (374 lb 9 6 oz)   LMP 12/17/2019   BMI 62 34 kg/m²     Data reviewed-CPAP data, previous ferritin level from 2020-level was low at 10 ng/mL, note from bariatric

## 2022-05-10 NOTE — ASSESSMENT & PLAN NOTE
Ms Burgess Leon is doing very well with treatment, I reviewed her CPAP data and her AHI is normal, treatment is beneficial and effective  No change is needed in her current settings  She has my clearance for bariatric surgery  CPAP should be use in the perioperative state and when asleep and caution should be used by Anesthesia  Her current settings are auto CPAP 8-13 cm H2O, if one set pressure is needed, I would recommend 13 cm H2O  We discussed that her case of sleep apnea may improve or resolve after bariatric surgery in significant weight loss      Her CPAP machine does not seem to be transmitting data, I will try to reach out to [employer] to see if I can determine if this is a widespread problem or an issue specific to her machine, as her machine should be under are warranty

## 2022-05-10 NOTE — PROGRESS NOTES
Review of Systems      Genitourinary hot flashes at night   Cardiology ankle/leg swelling   Gastrointestinal none   Neurology frequent headaches, awaken with headache, need to move extremities, numbness/tingling of an extremity and difficulty with memory   Constitutional fatigue and excessive sweating at night   Integumentary rash or dry skin and itching   Psychiatry anxiety and depression   Musculoskeletal legs twitching/jerking   Pulmonary shortness of breath with activity, frequent cough and difficulty breathing when lying flat    ENT ringing in ears   Endocrine frequent urination   Hematological none

## 2022-06-01 DIAGNOSIS — E03.8 HYPOTHYROIDISM DUE TO HASHIMOTO'S THYROIDITIS: ICD-10-CM

## 2022-06-01 DIAGNOSIS — E06.3 HYPOTHYROIDISM DUE TO HASHIMOTO'S THYROIDITIS: ICD-10-CM

## 2022-06-01 RX ORDER — LEVOTHYROXINE SODIUM 0.15 MG/1
TABLET ORAL
Qty: 30 TABLET | Refills: 2 | Status: SHIPPED | OUTPATIENT
Start: 2022-06-01

## 2022-06-14 ENCOUNTER — OFFICE VISIT (OUTPATIENT)
Dept: BARIATRICS | Facility: CLINIC | Age: 37
End: 2022-06-14

## 2022-06-14 VITALS — BODY MASS INDEX: 48.82 KG/M2 | HEIGHT: 65 IN | WEIGHT: 293 LBS

## 2022-06-14 DIAGNOSIS — E66.01 MORBID OBESITY (HCC): Primary | ICD-10-CM

## 2022-06-14 DIAGNOSIS — E66.01 MORBID (SEVERE) OBESITY DUE TO EXCESS CALORIES (HCC): Primary | ICD-10-CM

## 2022-06-14 PROCEDURE — RECHECK: Performed by: DIETITIAN, REGISTERED

## 2022-06-14 PROCEDURE — 3008F BODY MASS INDEX DOCD: CPT | Performed by: PSYCHIATRY & NEUROLOGY

## 2022-06-14 NOTE — PROGRESS NOTES
Bariatric Nutrition Follow-Up Note    Type of surgery    Preop, no required weight checks  Surgery Date: TBD- tentative May-June 2022  Deadline September 2022  Surgeon: Dr Kassie Ross  29 y o   female     Wt with BMI of 25: 150lbs  Pre-Op Excess Wt: 228 5lbs  Ht 5' 5" (1 651 m)   Wt (!) 167 kg (367 lb 11 2 oz)   LMP 12/17/2019   BMI 61 19 kg/m²    10 8lb total wt loss since start of program (3%)  Pt now needs 8 225lb wt loss to schedule surgery and 27 15lbs by surgery date  5% wt loss=18 925#=Goal of 359 575# TO SCHEDULE SURGERY  10% wt loss=37 85#=Goal of 340 65# ON DAY OF SURGERY      [facility] Equation:     Weight maintenance= 2895 kcal/day  Estimated calories for weight loss 7193-1826 kcal/day ( 1-2# per wk wt loss - sedentary )  Estimated protein needs 68 2-81 8 g/day (1 0-1 2 gms/kg IBW )   Estimated fluid needs 3216-3893 ml/day (30-35 ml/kg IBW )      Review of History and Medications   Past Medical History:   Diagnosis Date    Anxiety     Bruises easily     Cancer ([address] )     Depression     Disease of thyroid gland     DELVALLE (dyspnea on exertion)     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    Endometriosis     Follicular thyroid cancer ([address] )     Gastroenteritis     Hashimoto's disease     Hepatic steatosis     Hyperlipidemia     Hypertension     Hypothyroid     IBS (irritable bowel syndrome)     Kidney lesion, native, left     Lymphedema     left leg    Obesity     Palpitation     Pancreatitis     Pneumonia     Polycystic ovarian disease     Polyuria     Sleep apnea     Thyroid nodule     Thyromegaly     Tinea corporis     Tinea pedis      Past Surgical History:   Procedure Laterality Date    ADENOIDECTOMY      DILATION AND CURETTAGE OF UTERUS  2020    HYSTERECTOMY  02/2020    left ovaries    THYROIDECTOMY, PARTIAL Left 06/2019    benign    TONSILLECTOMY      US GUIDED THYROID BIOPSY  03/01/2019 Social History     Socioeconomic History    Marital status: /Civil Union     Spouse name: Not on file    Number of children: Not on file    Years of education: Not on file    Highest education level: Not on file   Occupational History    Not on file   Tobacco Use    Smoking status: Never Smoker    Smokeless tobacco: Never Used   Vaping Use    Vaping Use: Never used   Substance and Sexual Activity    Alcohol use: Not Currently     Alcohol/week: 0 0 standard drinks    Drug use: Never    Sexual activity: Not on file   Other Topics Concern    Not on file   Social History Narrative    Not on file     Social Determinants of Health     Financial Resource Strain: Not on file   Food Insecurity: Not on file   Transportation Needs: Not on file   Physical Activity: Not on file   Stress: Not on file   Social Connections: Not on file   Intimate Partner Violence: Not on file   Housing Stability: Not on file       Current Outpatient Medications:     albuterol (Ventolin HFA) 90 mcg/act inhaler, Inhale 2 puffs every 6 (six) hours as needed for wheezing or shortness of breath, Disp: 18 g, Rfl: 0    ALPRAZolam (XANAX) 0 25 mg tablet, Take 1 tablet (0 25 mg total) by mouth 3 (three) times a day as needed for anxiety, Disp: 60 tablet, Rfl: 0    citalopram (CeleXA) 20 mg tablet, Take 1 tablet (20 mg total) by mouth daily, Disp: 30 tablet, Rfl: 0    fluticasone (FLONASE) 50 mcg/act nasal spray, 2 sprays into each nostril daily, Disp: 16 g, Rfl: 0    furosemide (LASIX) 40 mg tablet, Take 1 tablet (40 mg total) by mouth daily, Disp: 30 tablet, Rfl: 5    levothyroxine 150 mcg tablet, TAKE ONE TABLET BY MOUTH EVERY DAY, Disp: 30 tablet, Rfl: 2    loratadine (CLARITIN) 10 mg tablet, Take 10 mg by mouth daily, Disp: , Rfl:     losartan (Cozaar) 100 MG tablet, Take 1 tablet (100 mg total) by mouth daily, Disp: 90 tablet, Rfl: 3    ondansetron (ZOFRAN-ODT) 4 mg disintegrating tablet, Take 1 tablet (4 mg total) by mouth every 8 (eight) hours as needed for nausea or vomiting, Disp: 15 tablet, Rfl: 0    triamcinolone (KENALOG) 0 1 % cream, APPLY TOPICALLY DAILY AT BEDTIME AS NEEDED FOR RASH, Disp: 80 g, Rfl: 1     Food Intake and Lifestyle Assessment updates in bold    Food Intake Assessment completed via usual diet recall  Breakfast: Fair Life shake, apple or orange   Lunch: 1pm: [employer] Life shake 30g protein switched to modified pre-op menu using 1 protein shake so no including a meal at lunch (4oz protein, 1 5 cup no starchy vegetables, 1/2 SF pudding)  Snack: baby carrots and hummus (1 g protein)  Dinner: meal prepping: lean protein: chicken, ground turkey, ground beef chili, bariatric recipes   Snack: sugar-free popsicles 1-2 keto snack (ie 2 hard boiled eggs)  Beverage intake: water and coffee  Protein supplement: Fair Life  Estimated protein intake per day: 50-70g  Estimated fluid intake per day: 1-3 cups coffee, 32 oz sapp x 4-5  Meals eaten away from home: 3 nights per week orders out for dinner  Gets lunch from [employer] or [employer] place several days per week  1 night per week (salad works)  Typical meal pattern: 2 meals per day and 2-3 snacks per day  Eating Behaviors: Consumption of high calorie/ high fat foods and Frequent snacking/ grazing  Food allergies or intolerances: uses almond milk, states sometimes regular milk gives her stomach upset  Allergies   Allergen Reactions    Dust Mite Extract Other (See Comments)     Sinus inflammation    Latex Blisters    Molds & Smuts Other (See Comments)     Sinus inflammation    Penicillins Rash     Skin rash and sheds    Jorge Luis Grass Pollen Allergen Other (See Comments)     Sinus inflammation     Cultural or Christian considerations: none noted    Physical Assessment -updates in bold  Physical Activity  Types of exercise: Sedentary job  Tries to walk around work building on lunch break  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    Pt reports she will sometimes put meetings on her cell phone with earbuds and walk during them  Will go to the gym across from her office and walkon treadmill or ellpitical  Current physical limitations: lymphedema in left leg since hysterectomy, back pain    Psychosocial Assessment   Support systems: spouse:  Spouse had bariatric surgery in August and is very successful  Cousin and friend are also post bariatric   Socioeconomic factors: works for [employer]:  Mental health services  Nutrition Diagnosis-continued  Diagnosis: Overweight / Obesity (NC-3 3) and Excessive energy intake (NI-1 5)  Related to: Physical inactivity and Excessive energy intake  As Evidenced by: BMI >25, Excessive energy intake and Unintentional weight gain     Nutrition Prescription: Recommend the following diet  Regular    Interventions and Teaching   Discussed pre-op and post-op nutrition guidelines  Patient educated and handouts provided  Surgical changes to stomach / GI  Capacity of post-surgery stomach  Diet progression  Adequate hydration  Sugar and fat restriction to decrease "dumping syndrome"  Expected weight loss:  Reviewed pre-op weight loss goals and current weight loss progress  Weight loss plateaus/ possibility of weight regain  Exercise:  Discussed using office and home stairs  Suggestions for pre-op diet  Nutrition considerations after surgery  Protein supplements  Meal planning and preparation:  Discussed meal preparation methods for moist/tender meats post-op    Appropriate carbohydrate, protein, and fat intake, and food/fluid choices to maximize safe weight loss, nutrient intake, and tolerance   Dietary and lifestyle changes  Possible problems with poor eating habits  Techniques for self monitoring and keeping daily food journal  Potential for food intolerance after surgery, and ways to deal with them including: lactose intolerance, nausea, reflux, vomiting, diarrhea, food intolerance, appetite changes, gas  Vitamin / Mineral supplementation of Multivitamin with minerals and Vitamin D  Takes one a day multi    Patient is not currently pregnant and doesn't desire to become pregnant a minimum of one year post-op    Education provided to: patient and spouse  Barriers to learning: No barriers identified  Readiness to change: action  Prior research on procedure: pre-op class and friends or family  Comprehension: needs reinforcement and verbalizes understanding   Expected Compliance: good    Evaluation / Monitoring  Dietitian to Monitor: Eating pattern as discussed Tolerance of nutrition prescription Body weight Lab values Physical activity   Patient admits to not journaling as diligently as she should be  She has followed up with her PCP about her lyphadema  Reports that not seeing the weight can trigger her depression and emotional eating  Patient is scheduled to meet with MARIAA next weight check  She also does work with a therapist    Goals  Eliminate sugar sweetened beverages, Food journal, Exercise 30 minutes 5 times per week, Complete lession plans 1-6, Eat 3 meals per day and Eliminate mindless snacking     Patient to continue with modified pre-op diet as previosuly prescribed due to not being able to completely follow due to being sick  Patient to food journal daily  Patient to f/u in 1 month with RD    Remaining Workflow: Complete except for weight loss:   Weight Loss:  o 5% wt loss=18 925#=Goal of 359 575# TO SCHEDULE SURGERY  o 10% wt loss=37 85#=Goal of 340 65# ON DAY OF SURGERY  Time Spent:   30 minutes  Follow-up scheduled with RD in4 weeks for weight check

## 2022-07-12 DIAGNOSIS — F41.8 MIXED ANXIETY DEPRESSIVE DISORDER: ICD-10-CM

## 2022-07-12 RX ORDER — CITALOPRAM 20 MG/1
20 TABLET ORAL DAILY
Qty: 30 TABLET | Refills: 0 | Status: SHIPPED | OUTPATIENT
Start: 2022-07-12 | End: 2022-08-07 | Stop reason: SDUPTHER

## 2022-07-14 ENCOUNTER — OFFICE VISIT (OUTPATIENT)
Dept: BARIATRICS | Facility: CLINIC | Age: 37
End: 2022-07-14

## 2022-07-14 VITALS — BODY MASS INDEX: 62.1 KG/M2 | WEIGHT: 293 LBS

## 2022-07-14 DIAGNOSIS — I10 ESSENTIAL HYPERTENSION: ICD-10-CM

## 2022-07-14 DIAGNOSIS — Z01.812 BLOOD TESTS PRIOR TO TREATMENT OR PROCEDURE: ICD-10-CM

## 2022-07-14 DIAGNOSIS — E66.01 MORBID (SEVERE) OBESITY DUE TO EXCESS CALORIES (HCC): ICD-10-CM

## 2022-07-14 DIAGNOSIS — G47.33 OSA (OBSTRUCTIVE SLEEP APNEA): Primary | ICD-10-CM

## 2022-07-14 DIAGNOSIS — Z01.818 PREOPERATIVE CLEARANCE: ICD-10-CM

## 2022-07-14 DIAGNOSIS — R63.5 ABNORMAL WEIGHT GAIN: ICD-10-CM

## 2022-07-14 PROCEDURE — RECHECK: Performed by: DIETITIAN, REGISTERED

## 2022-07-14 NOTE — PROGRESS NOTES
Bariatric Nutrition Follow-Up Note    Type of surgery    Preop, no required weight checks  Surgery Date: TBD- tentative May-June 2022  Deadline September 2022  Surgeon: Dr Brett Keller  29 y o   female     Wt with BMI of 25: 150lbs  Pre-Op Excess Wt: 228 5lbs  Wt (!) 169 kg (373 lb 3 2 oz)   LMP 12/17/2019   BMI 62 10 kg/m²    5 5lb weight regain since last month  Net 5 3lb wt loss since starting program in January  Pt now needs 13 625lb wt loss to schedule surgery and 32 55lbs by surgery date  5% wt loss=18 925#=Goal of 359 575# TO SCHEDULE SURGERY  10% wt loss=37 85#=Goal of 340 65# ON DAY OF SURGERY      [facility] Equation:     Weight maintenance= 2895 kcal/day  Estimated calories for weight loss 5610-4261 kcal/day ( 1-2# per wk wt loss - sedentary )  Estimated protein needs 68 2-81 8 g/day (1 0-1 2 gms/kg IBW )   Estimated fluid needs 5368-4785 ml/day (30-35 ml/kg IBW )      Review of History and Medications   Past Medical History:   Diagnosis Date    Anxiety     Bruises easily     Cancer ([address] )     Depression     Disease of thyroid gland     DELVALLE (dyspnea on exertion)     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    Endometriosis     [facility]licular [address] [facility] [address]     Gastroenteritis     Hashimoto's disease     Hepatic steatosis     Hyperlipidemia     Hypertension     Hypothyroid     IBS (irritable bowel syndrome)     Kidney lesion, native, left     Lymphedema     left leg    Obesity     Palpitation     Pancreatitis     Pneumonia     Polycystic ovarian disease     Polyuria     Sleep apnea     Thyroid nodule     Thyromegaly     Tinea corporis     Tinea pedis      Past Surgical History:   Procedure Laterality Date    ADENOIDECTOMY      DILATION AND CURETTAGE OF UTERUS  2020    HYSTERECTOMY  02/2020    left ovaries    THYROIDECTOMY, PARTIAL Left 06/2019    benign    TONSILLECTOMY      US GUIDED THYROID BIOPSY  03/01/2019     Social History     Socioeconomic History    Marital status: /Civil Union     Spouse name: Not on file    Number of children: Not on file    Years of education: Not on file    Highest education level: Not on file   Occupational History    Not on file   Tobacco Use    Smoking status: Never Smoker    Smokeless tobacco: Never Used   Vaping Use    Vaping Use: Never used   Substance and Sexual Activity    Alcohol use: Not Currently     Alcohol/week: 0 0 standard drinks    Drug use: Never    Sexual activity: Not on file   Other Topics Concern    Not on file   Social History Narrative    Not on file     Social Determinants of Health     Financial Resource Strain: Not on file   Food Insecurity: Not on file   Transportation Needs: Not on file   Physical Activity: Not on file   Stress: Not on file   Social Connections: Not on file   Intimate Partner Violence: Not on file   Housing Stability: Not on file       Current Outpatient Medications:     albuterol (Ventolin HFA) 90 mcg/act inhaler, Inhale 2 puffs every 6 (six) hours as needed for wheezing or shortness of breath, Disp: 18 g, Rfl: 0    ALPRAZolam (XANAX) 0 25 mg tablet, Take 1 tablet (0 25 mg total) by mouth 3 (three) times a day as needed for anxiety, Disp: 60 tablet, Rfl: 0    citalopram (CeleXA) 20 mg tablet, Take 1 tablet (20 mg total) by mouth daily, Disp: 30 tablet, Rfl: 0    fluticasone (FLONASE) 50 mcg/act nasal spray, 2 sprays into each nostril daily, Disp: 16 g, Rfl: 0    furosemide (LASIX) 40 mg tablet, Take 1 tablet (40 mg total) by mouth daily, Disp: 30 tablet, Rfl: 5    levothyroxine 150 mcg tablet, TAKE ONE TABLET BY MOUTH EVERY DAY, Disp: 30 tablet, Rfl: 2    loratadine (CLARITIN) 10 mg tablet, Take 10 mg by mouth daily, Disp: , Rfl:     losartan (Cozaar) 100 MG tablet, Take 1 tablet (100 mg total) by mouth daily, Disp: 90 tablet, Rfl: 3    ondansetron (ZOFRAN-ODT) 4 mg disintegrating tablet, Take 1 tablet (4 mg total) by mouth every 8 (eight) hours as needed for nausea or vomiting, Disp: 15 tablet, Rfl: 0    triamcinolone (KENALOG) 0 1 % cream, APPLY TOPICALLY DAILY AT BEDTIME AS NEEDED FOR RASH, Disp: 80 g, Rfl: 1     Food Intake and Lifestyle Assessment updates in bold  Food Intake Assessment completed via usual diet recall  Breakfast: Fair Life shake, apple or orange   Lunch: 1pm: Fair Life shake 30g protein switched to modified pre-op menu using 1 protein shake so no including a meal at lunch (4oz protein, 1 5 cup no starchy vegetables, 1/2 SF pudding)  Snack: baby carrots and hummus (1 g protein)  Dinner: meal prepping: lean protein: chicken, ground turkey, ground beef chili, bariatric recipes   Snack: sugar-free popsicles 1-2 keto snack (ie 2 hard boiled eggs)  Beverage intake: water and coffee  Protein supplement: Fair Life or Premier  Estimated protein intake per day: 50-70g  Estimated fluid intake per day: 1-3 cups coffee, 32 oz sapp x 4-5  Meals eaten away from home: 3 nights per week orders out for dinner  Gets lunch from [employer] or pizza place several days per week  1 night per week (salad works)  Typical meal pattern: 2 meals per day and 2-3 snacks per day  Eating Behaviors: Consumption of high calorie/ high fat foods and Frequent snacking/ grazing  Pt reports that in the last month she has struggled with stress eating/emotional eating  Pt reports main triggers of stress are financial and her stressful job  Food allergies or intolerances: uses almond milk, states sometimes regular milk gives her stomach upset    Allergies   Allergen Reactions    Dust Mite Extract Other (See Comments)     Sinus inflammation    Latex Blisters    Molds & Smuts Other (See Comments)     Sinus inflammation    Penicillins Rash     Skin rash and sheds    Jorge Luis Grass Pollen Allergen Other (See Comments)     Sinus inflammation     Cultural or Orthodoxy considerations: none noted    Physical Assessment -updates in bold  Physical Activity  Types of exercise: Sedentary job  Tries to walk around work building on lunch break  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  Pt reports she will sometimes put meetings on her cell phone with earbuds and walk during them  Will go to the gym across from her office and walk on treadmill or ellpitical  Current physical limitations: lymphedema in left leg since hysterectomy, back pain    Psychosocial Assessment   Support systems: spouse:  Spouse had bariatric surgery in August 2021 and is very successful  Cousin and friend are also post bariatric  Socioeconomic factors: works for [employer]:  Mental health services  Pt reports that she has been addressing her emotional eating with her therapist whom she sees monthly  Also provided pt with the [place] today  Nutrition Diagnosis-continued  Diagnosis: Overweight / Obesity (NC-3 3) and Excessive energy intake (NI-1 5)  Related to: Physical inactivity and Excessive energy intake  As Evidenced by: BMI >25, Excessive energy intake and Unintentional weight gain     Nutrition Prescription: Recommend the following diet  Regular    Interventions and Teaching   Discussed pre-op and post-op nutrition guidelines  Patient educated and handouts provided  Surgical changes to stomach / GI  Capacity of post-surgery stomach  Diet progression  Adequate hydration  Sugar and fat restriction to decrease "dumping syndrome"  Expected weight loss:  Reviewed pre-op weight loss goals and current weight loss progress  Weight loss plateaus/ possibility of weight regain  Exercise:  Discussed using office and home stairs  Suggestions for pre-op diet  Nutrition considerations after surgery  Protein supplements  Meal planning and preparation:  Discussed meal preparation methods for moist/tender meats post-op    Appropriate carbohydrate, protein, and fat intake, and food/fluid choices to maximize safe weight loss, nutrient intake, and tolerance   Dietary and lifestyle changes  Possible problems with poor eating habits  Discussed with pt that if emotional eating and/or binge eating are not addressed pre-operatively it can manifest as grazing and lead to weight regain post-operatively  Discussed alternative coping methods  Discussed stimulus control  Techniques for self monitoring and keeping daily food journal   Discussed benefits of food journaling, which pt has not been compliant with  Potential for food intolerance after surgery, and ways to deal with them including: lactose intolerance, nausea, reflux, vomiting, diarrhea, food intolerance, appetite changes, gas  Vitamin / Mineral supplementation of Multivitamin with minerals and Vitamin D  Takes one a day multi    Patient is not currently pregnant and doesn't desire to become pregnant a minimum of one year post-op    Education provided to: patient  Barriers to learning: No barriers identified  Readiness to change: action  Prior research on procedure: pre-op class and friends or family  Comprehension: needs reinforcement and verbalizes understanding   Expected Compliance: fair    Evaluation / Monitoring  Dietitian to Monitor: Eating pattern as discussed Tolerance of nutrition prescription Body weight Lab values Physical activity   Patient admits to not journaling as diligently as she should be  She has followed up with her PCP about her lyphadema  Reports that not seeing the weight can trigger her depression and emotional eating  Patient is scheduled to meet with SW next weight check  She also does work with a therapist     Goals  Eliminate sugar sweetened beverages, Food journal, Exercise 30 minutes 5 times per week, Complete lession plans 1-6, Eat 3 meals per day and Eliminate mindless snacking   Previous Session Goals:  Patient to continue with modified pre-op diet as previosuly prescribed: Not done  Patient to food journal daily: not done      This Session Goals:  Reviewed Modified Pre-Op Diet sample menus with pt   Informed pt that if she follows this strictly she should be able to lose at least 8lbs by next appointment  Instructed pt to bring completed food journals to review at next appointment  If pt does not complete these goals she may not be ready to move forward with surgery  Remaining Workflow: Complete except for weight loss:   Weight Loss:  o 5% wt loss=18 925#=Goal of 359 575# TO SCHEDULE SURGERY  o 10% wt loss=37 85#=Goal of 340 65# ON DAY OF SURGERY  Time Spent:   30 minutes  Follow-up scheduled with ANTON+SW in 4 weeks to assess compliance

## 2022-07-22 ENCOUNTER — TELEPHONE (OUTPATIENT)
Dept: CARDIOLOGY CLINIC | Facility: CLINIC | Age: 37
End: 2022-07-22

## 2022-07-22 ENCOUNTER — OFFICE VISIT (OUTPATIENT)
Dept: LAB | Facility: CLINIC | Age: 37
End: 2022-07-22
Payer: COMMERCIAL

## 2022-07-22 DIAGNOSIS — E66.01 MORBID OBESITY (HCC): ICD-10-CM

## 2022-07-22 PROCEDURE — 93005 ELECTROCARDIOGRAM TRACING: CPT

## 2022-07-22 NOTE — TELEPHONE ENCOUNTER
Pt called requesting an updated CC for bariatric surgery   Pt LOV was 2/24/22     Pt to do a walk in EKG as she needs a more recent one prior to surgery, no date set as of yet for surgery     Please advise

## 2022-07-25 LAB
ATRIAL RATE: 78 BPM
P AXIS: 18 DEGREES
PR INTERVAL: 134 MS
QRS AXIS: 45 DEGREES
QRSD INTERVAL: 84 MS
QT INTERVAL: 396 MS
QTC INTERVAL: 451 MS
T WAVE AXIS: 28 DEGREES
VENTRICULAR RATE: 78 BPM

## 2022-07-25 PROCEDURE — 93010 ELECTROCARDIOGRAM REPORT: CPT | Performed by: INTERNAL MEDICINE

## 2022-07-26 ENCOUNTER — TELEPHONE (OUTPATIENT)
Dept: CARDIOLOGY CLINIC | Facility: CLINIC | Age: 37
End: 2022-07-26

## 2022-07-26 ENCOUNTER — TELEPHONE (OUTPATIENT)
Dept: BARIATRICS | Facility: CLINIC | Age: 37
End: 2022-07-26

## 2022-07-26 NOTE — LETTER
Cardiology Pre Operative Clearance      PRE OPERATIVE CARDIAC RISK ASSESSMENT    07/26/22    Luisa Plush  1985  [id number]    Date of Surgery: TBD     Type of Surgery: Bariatric Surgery     Surgeon: Charles Pool    No Cardiac Contraindication for Planned Surgical Procedures    Anticoagulation: no    Physician Comment: See office note      Electronically Signed: Hollie Peabody

## 2022-07-26 NOTE — TELEPHONE ENCOUNTER
I called cardiology after patient called me stating cardiology needed a fax number to fax clearance spoke with Arabella Hunter stating something written under letters is fine

## 2022-07-28 ENCOUNTER — APPOINTMENT (OUTPATIENT)
Dept: LAB | Age: 37
End: 2022-07-28
Payer: COMMERCIAL

## 2022-07-28 ENCOUNTER — TELEPHONE (OUTPATIENT)
Dept: SLEEP CENTER | Facility: CLINIC | Age: 37
End: 2022-07-28

## 2022-07-28 DIAGNOSIS — R63.5 ABNORMAL WEIGHT GAIN: ICD-10-CM

## 2022-07-28 DIAGNOSIS — G25.81 RLS (RESTLESS LEGS SYNDROME): ICD-10-CM

## 2022-07-28 DIAGNOSIS — Z01.818 PREOPERATIVE CLEARANCE: ICD-10-CM

## 2022-07-28 DIAGNOSIS — E06.3 HYPOTHYROIDISM DUE TO HASHIMOTO'S THYROIDITIS: ICD-10-CM

## 2022-07-28 DIAGNOSIS — E61.1 IRON DEFICIENCY: ICD-10-CM

## 2022-07-28 DIAGNOSIS — Z01.812 BLOOD TESTS PRIOR TO TREATMENT OR PROCEDURE: ICD-10-CM

## 2022-07-28 DIAGNOSIS — G47.33 OSA (OBSTRUCTIVE SLEEP APNEA): ICD-10-CM

## 2022-07-28 DIAGNOSIS — E66.01 MORBID (SEVERE) OBESITY DUE TO EXCESS CALORIES (HCC): ICD-10-CM

## 2022-07-28 DIAGNOSIS — E03.8 HYPOTHYROIDISM DUE TO HASHIMOTO'S THYROIDITIS: ICD-10-CM

## 2022-07-28 DIAGNOSIS — I10 ESSENTIAL HYPERTENSION: ICD-10-CM

## 2022-07-28 LAB
ALBUMIN SERPL BCP-MCNC: 3.3 G/DL (ref 3.5–5)
ALP SERPL-CCNC: 98 U/L (ref 46–116)
ALT SERPL W P-5'-P-CCNC: 30 U/L (ref 12–78)
ANION GAP SERPL CALCULATED.3IONS-SCNC: 3 MMOL/L (ref 4–13)
AST SERPL W P-5'-P-CCNC: 17 U/L (ref 5–45)
BASOPHILS # BLD AUTO: 0.08 THOUSANDS/ΜL (ref 0–0.1)
BASOPHILS NFR BLD AUTO: 1 % (ref 0–1)
BILIRUB SERPL-MCNC: 0.42 MG/DL (ref 0.2–1)
BUN SERPL-MCNC: 20 MG/DL (ref 5–25)
CALCIUM ALBUM COR SERPL-MCNC: 9.8 MG/DL (ref 8.3–10.1)
CALCIUM SERPL-MCNC: 9.2 MG/DL (ref 8.3–10.1)
CHLORIDE SERPL-SCNC: 108 MMOL/L (ref 96–108)
CHOLEST SERPL-MCNC: 191 MG/DL
CO2 SERPL-SCNC: 27 MMOL/L (ref 21–32)
CREAT SERPL-MCNC: 0.79 MG/DL (ref 0.6–1.3)
EOSINOPHIL # BLD AUTO: 0.16 THOUSAND/ΜL (ref 0–0.61)
EOSINOPHIL NFR BLD AUTO: 2 % (ref 0–6)
ERYTHROCYTE [DISTWIDTH] IN BLOOD BY AUTOMATED COUNT: 16.7 % (ref 11.6–15.1)
FERRITIN SERPL-MCNC: 26 NG/ML (ref 8–388)
GFR SERPL CREATININE-BSD FRML MDRD: 97 ML/MIN/1.73SQ M
GLUCOSE P FAST SERPL-MCNC: 87 MG/DL (ref 65–99)
HCT VFR BLD AUTO: 37.8 % (ref 34.8–46.1)
HDLC SERPL-MCNC: 56 MG/DL
HGB BLD-MCNC: 11.1 G/DL (ref 11.5–15.4)
IMM GRANULOCYTES # BLD AUTO: 0.06 THOUSAND/UL (ref 0–0.2)
IMM GRANULOCYTES NFR BLD AUTO: 1 % (ref 0–2)
IRON SATN MFR SERPL: 10 % (ref 15–50)
IRON SERPL-MCNC: 44 UG/DL (ref 50–170)
LDLC SERPL CALC-MCNC: 117 MG/DL (ref 0–100)
LYMPHOCYTES # BLD AUTO: 2.27 THOUSANDS/ΜL (ref 0.6–4.47)
LYMPHOCYTES NFR BLD AUTO: 22 % (ref 14–44)
MCH RBC QN AUTO: 24.9 PG (ref 26.8–34.3)
MCHC RBC AUTO-ENTMCNC: 29.4 G/DL (ref 31.4–37.4)
MCV RBC AUTO: 85 FL (ref 82–98)
MONOCYTES # BLD AUTO: 0.71 THOUSAND/ΜL (ref 0.17–1.22)
MONOCYTES NFR BLD AUTO: 7 % (ref 4–12)
NEUTROPHILS # BLD AUTO: 7.18 THOUSANDS/ΜL (ref 1.85–7.62)
NEUTS SEG NFR BLD AUTO: 67 % (ref 43–75)
NRBC BLD AUTO-RTO: 0 /100 WBCS
PLATELET # BLD AUTO: 273 THOUSANDS/UL (ref 149–390)
PMV BLD AUTO: 11.6 FL (ref 8.9–12.7)
POTASSIUM SERPL-SCNC: 4.4 MMOL/L (ref 3.5–5.3)
PROT SERPL-MCNC: 8.1 G/DL (ref 6.4–8.4)
RBC # BLD AUTO: 4.46 MILLION/UL (ref 3.81–5.12)
SODIUM SERPL-SCNC: 138 MMOL/L (ref 135–147)
T4 FREE SERPL-MCNC: 0.78 NG/DL (ref 0.76–1.46)
TIBC SERPL-MCNC: 444 UG/DL (ref 250–450)
TRIGL SERPL-MCNC: 91 MG/DL
TSH SERPL DL<=0.05 MIU/L-ACNC: 18.2 UIU/ML (ref 0.45–4.5)
WBC # BLD AUTO: 10.46 THOUSAND/UL (ref 4.31–10.16)

## 2022-07-28 PROCEDURE — 82728 ASSAY OF FERRITIN: CPT

## 2022-07-28 PROCEDURE — 80061 LIPID PANEL: CPT

## 2022-07-28 PROCEDURE — 85025 COMPLETE CBC W/AUTO DIFF WBC: CPT

## 2022-07-28 PROCEDURE — 80053 COMPREHEN METABOLIC PANEL: CPT

## 2022-07-28 PROCEDURE — 36415 COLL VENOUS BLD VENIPUNCTURE: CPT

## 2022-07-28 PROCEDURE — 84439 ASSAY OF FREE THYROXINE: CPT

## 2022-07-28 PROCEDURE — 83540 ASSAY OF IRON: CPT

## 2022-07-28 PROCEDURE — 84443 ASSAY THYROID STIM HORMONE: CPT

## 2022-07-28 PROCEDURE — 83550 IRON BINDING TEST: CPT

## 2022-07-28 NOTE — TELEPHONE ENCOUNTER
----- Message from Christie Blevins MD sent at 7/28/2022 12:15 PM EDT -----  Test shows iron deficiency, this may contribute to restless leg syndrome  I would recommend iron supplementation- Vitron C, one tablet daily with recheck of blood work in 3 months  She should also review with her PCP to determine if any other workup is needed regarding cause of iron deficiency

## 2022-08-02 DIAGNOSIS — E03.8 HYPOTHYROIDISM DUE TO HASHIMOTO'S THYROIDITIS: ICD-10-CM

## 2022-08-02 DIAGNOSIS — E06.3 HYPOTHYROIDISM DUE TO HASHIMOTO'S THYROIDITIS: ICD-10-CM

## 2022-08-02 DIAGNOSIS — R79.89 ELEVATED TSH: ICD-10-CM

## 2022-08-02 DIAGNOSIS — E61.1 LOW SERUM IRON: ICD-10-CM

## 2022-08-02 DIAGNOSIS — D64.9 LOW HEMOGLOBIN: ICD-10-CM

## 2022-08-02 DIAGNOSIS — E06.3 HASHIMOTO'S THYROIDITIS: Primary | ICD-10-CM

## 2022-08-07 DIAGNOSIS — F41.8 MIXED ANXIETY DEPRESSIVE DISORDER: ICD-10-CM

## 2022-08-08 RX ORDER — CITALOPRAM 20 MG/1
20 TABLET ORAL DAILY
Qty: 30 TABLET | Refills: 0 | Status: SHIPPED | OUTPATIENT
Start: 2022-08-08 | End: 2022-09-05 | Stop reason: SDUPTHER

## 2022-08-08 NOTE — PROGRESS NOTES
Behavioral Health Follow Up Note:       No required Weight Checks: Dr Jimbo Adler  ( had surgery with Dr Charles Garsia in the past )  Approved for surgery         Starting weight 378 5  #  Today's weight 368 6  #     Goal:   359 58 #   (lymphedema will challenge her weight loss efforts)        Mental health and wellness -   Watching her calories   Received her blood result letter and added iron to her routine  Feels more energy since adding iron   has even noticed the change  Making an appointment with PCP to address her TSH level  Some bowel issues  Her supervisor was out of the office and just returned  Barbara Mir was able to request time off for her PCP appt  To address the TSH  Going to call today for the appt  Less stress and feels her emotional eating is more controlled  Working with her therapist for life coping skills  Feels it has been helpful  Working on stress management through coping skills  Doing more compression socks due to heat and humidity  Eating behaviors -   using the Baritastic to track her food intake  Also writing it down  Did bring in to show the RD  Had a protein shake today for breakfast and a servicing of cashews  Hydrating with water  May drink a gallon a day      Activity -  Gym at work for an hour:  Cardio  Using the stairs at work  Being mindful with her tolerance  Lymphedema in her leg interferes  (has compression pumps, not helping)     Progress toward program requirements     Workflow:  · Psych and/or D+A Clearance: N/A  · PCP Letter: 10/26/2021  · Support Group: 2/22/2022  · Surgeon Appt  : 1/14/2022  · EGD : 3/16/2022  · Cardiac Risk Assessment: 2/24/2022    New letter 7/26/22  · Sleep Studies: 2/17/2022  · Bloodwork:    PENDING  TSH high

## 2022-08-08 NOTE — PROGRESS NOTES
Bariatric Nutrition Follow-Up Note    Type of surgery    Preop, no required weight checks  Surgery Date: TBD- tentative May-June 2022  Deadline September 2022  Surgeon: Dr Ricardo Lui  29 y o   female     Wt with BMI of 25: 150lbs  Pre-Op Excess Wt: 228 5lbs  Wt (!) 167 kg (368 lb 9 6 oz)   LMP 12/17/2019   BMI 61 34 kg/m²    4 6lb weight loss since last month  Net 9 9lb wt loss since starting program in January  Pt now needs 9lb wt loss to schedule surgery and 28lbs by surgery date  5% wt loss=18 925#=Goal of 359 575# TO SCHEDULE SURGERY  10% wt loss=37 85#=Goal of 340 65# ON DAY OF SURGERY      [facility] Equation:     Weight maintenance= 2895 kcal/day  Estimated calories for weight loss 5946-8895 kcal/day ( 1-2# per wk wt loss - sedentary )  Estimated protein needs 68 2-81 8 g/day (1 0-1 2 gms/kg IBW )   Estimated fluid needs 9295-6446 ml/day (30-35 ml/kg IBW )      Review of History and Medications   Past Medical History:   Diagnosis Date    Anxiety     Bruises easily     Cancer ([address] )     Depression     Disease of thyroid gland     DELVALLE (dyspnea on exertion)     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    Endometriosis     [facility]licular [facility] [address]     Gastroenteritis     Hashimoto's disease     Hepatic steatosis     Hyperlipidemia     Hypertension     Hypothyroid     IBS (irritable bowel syndrome)     Kidney lesion, native, left     Lymphedema     left leg    Obesity     Palpitation     Pancreatitis     Pneumonia     Polycystic ovarian disease     Polyuria     Sleep apnea     Thyroid nodule     Thyromegaly     Tinea corporis     Tinea pedis      Past Surgical History:   Procedure Laterality Date    ADENOIDECTOMY      DILATION AND CURETTAGE OF UTERUS  2020    HYSTERECTOMY  02/2020    left ovaries    THYROIDECTOMY, PARTIAL Left 06/2019    benign    TONSILLECTOMY      US GUIDED THYROID BIOPSY 03/01/2019     Social History     Socioeconomic History    Marital status: /Civil Union     Spouse name: Not on file    Number of children: Not on file    Years of education: Not on file    Highest education level: Not on file   Occupational History    Not on file   Tobacco Use    Smoking status: Never Smoker    Smokeless tobacco: Never Used   Vaping Use    Vaping Use: Never used   Substance and Sexual Activity    Alcohol use: Not Currently     Alcohol/week: 0 0 standard drinks    Drug use: Never    Sexual activity: Not on file   Other Topics Concern    Not on file   Social History Narrative    Not on file     Social Determinants of Health     Financial Resource Strain: Not on file   Food Insecurity: Not on file   Transportation Needs: Not on file   Physical Activity: Not on file   Stress: Not on file   Social Connections: Not on file   Intimate Partner Violence: Not on file   Housing Stability: Not on file       Current Outpatient Medications:     albuterol (Ventolin HFA) 90 mcg/act inhaler, Inhale 2 puffs every 6 (six) hours as needed for wheezing or shortness of breath, Disp: 18 g, Rfl: 0    ALPRAZolam (XANAX) 0 25 mg tablet, Take 1 tablet (0 25 mg total) by mouth 3 (three) times a day as needed for anxiety, Disp: 60 tablet, Rfl: 0    citalopram (CeleXA) 20 mg tablet, Take 1 tablet (20 mg total) by mouth daily, Disp: 30 tablet, Rfl: 0    fluticasone (FLONASE) 50 mcg/act nasal spray, 2 sprays into each nostril daily, Disp: 16 g, Rfl: 0    furosemide (LASIX) 40 mg tablet, Take 1 tablet (40 mg total) by mouth daily, Disp: 30 tablet, Rfl: 5    levothyroxine 150 mcg tablet, TAKE ONE TABLET BY MOUTH EVERY DAY, Disp: 30 tablet, Rfl: 2    loratadine (CLARITIN) 10 mg tablet, Take 10 mg by mouth daily, Disp: , Rfl:     losartan (Cozaar) 100 MG tablet, Take 1 tablet (100 mg total) by mouth daily, Disp: 90 tablet, Rfl: 3    ondansetron (ZOFRAN-ODT) 4 mg disintegrating tablet, Take 1 tablet (4 mg total) by mouth every 8 (eight) hours as needed for nausea or vomiting, Disp: 15 tablet, Rfl: 0    triamcinolone (KENALOG) 0 1 % cream, APPLY TOPICALLY DAILY AT BEDTIME AS NEEDED FOR RASH, Disp: 80 g, Rfl: 1     Food Intake and Lifestyle Assessment  Food Intake Assessment completed via [address] logs  Breakfast: Fair Life shake, apple or orange   Lunch: 1pm: 4oz protein, 1 5 cup no starchy vegetables, 1/2 SF pudding  Snack: baby carrots and hummus (1 g protein)  Dinner: meal prepping: lean protein: chicken, ground turkey, ground beef chili, bariatric recipes   Snack: sugar-free popsicles, light popcorn  Beverage intake: water and coffee, propel  Protein supplement: Fair Life or Premier  Estimated protein intake per day: 50-70g  Estimated fluid intake per day: 1-3 cups coffee, 32 oz sapp x 4-5  Meals eaten away from home: 3 nights per week orders out for dinner  Gets lunch from [employer] or [employer] place several days per week  1 night per week (salad works)  Typical meal pattern: 2 meals per day and 2-3 snacks per day  Eating Behaviors: Consumption of high calorie/ high fat foods and Frequent snacking/ grazing  Pt reports past struggles with stress eating/emotional eating  Pt reports main triggers of stress are financial and her stressful job  Pt reports improvements in this the last month  Much less stress eating triggers  Food allergies or intolerances: uses almond milk, states sometimes regular milk gives her stomach upset  Allergies   Allergen Reactions    Dust Mite Extract Other (See Comments)     Sinus inflammation    Latex Blisters    Molds & Smuts Other (See Comments)     Sinus inflammation    Penicillins Rash     Skin rash and sheds    Jorge Luis Grass Pollen Allergen Other (See Comments)     Sinus inflammation     Cultural or Mormonism considerations: none noted    Physical Assessment -updates in bold  Physical Activity  Types of exercise: Sedentary job    Tries to walk around work building on lunch break  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  Pt reports she will sometimes put meetings on her cell phone with earbuds and walk during them  Pt reports since our last meeting she has increased her walking up and down stairs: six flights of stairs per day  Will go to the gym across from her office and walk on treadmill or ellpitical  Current physical limitations: lymphedema in left leg since hysterectomy, back pain    Psychosocial Assessment   Support systems: spouse:  Spouse had bariatric surgery in August 2021 and is very successful  Cousin and friend are also post bariatric  Socioeconomic factors: works for [employer]:  Mental health services  Pt reports that she has been addressing her emotional eating with her therapist whom she sees monthly  Also provided pt with the [employer]  Nutrition Diagnosis-continued  Diagnosis: Overweight / Obesity (NC-3 3) and Excessive energy intake (NI-1 5)  Related to: Physical inactivity and Excessive energy intake  As Evidenced by: BMI >25, Excessive energy intake and Unintentional weight gain     Nutrition Prescription: Recommend the following diet  Regular    Interventions and Teaching   Discussed pre-op and post-op nutrition guidelines  Patient educated and handouts provided  Surgical changes to stomach / GI  Capacity of post-surgery stomach  Diet progression  Adequate hydration  Sugar and fat restriction to decrease "dumping syndrome"  Expected weight loss:  Reviewed pre-op weight loss goals and current weight loss progress    Weight loss plateaus/ possibility of weight regain  Exercise:  Discussed using office and home stairs  Suggestions for pre-op diet  Nutrition considerations after surgery  Protein supplements  Meal planning and preparation:   Appropriate carbohydrate, protein, and fat intake, and food/fluid choices to maximize safe weight loss, nutrient intake, and tolerance   Dietary and lifestyle changes  Possible problems with poor eating habits  Discussed with pt that if emotional eating and/or binge eating are not addressed pre-operatively it can manifest as grazing and lead to weight regain post-operatively  Discussed alternative coping methods  Discussed stimulus control  Techniques for self monitoring and keeping daily food journal   Reviewed Baritastic journals with pt  Potential for food intolerance after surgery, and ways to deal with them including: lactose intolerance, nausea, reflux, vomiting, diarrhea, food intolerance, appetite changes, gas  Reviewed post-op bowel habit protocol in ch 3 of manual with pt  Vitamin / Mineral supplementation of Multivitamin with minerals and Vitamin D  Takes one a day multi    Patient is not currently pregnant and doesn't desire to become pregnant a minimum of one year post-op    Education provided to: patient  Barriers to learning: No barriers identified  Readiness to change: action  Prior research on procedure: pre-op class and friends or family  Comprehension: needs reinforcement and verbalizes understanding   Expected Compliance: fair    Evaluation / Monitoring  Dietitian to Monitor: Eating pattern as discussed Tolerance of nutrition prescription Body weight Lab values Physical activity    Goals  Eliminate sugar sweetened beverages, Food journal, Exercise 30 minutes 5 times per week, Complete lession plans 1-6, Eat 3 meals per day and Eliminate mindless snacking   Previous Session Goals:  Reviewed Modified Pre-Op Diet sample menus with pt  Informed pt that if she follows this strictly she should be able to lose at least 8lbs by next appointment  Pt lost 4 6lbs  Instructed pt to bring completed food journals to review at next appointment  Pt has been doing some food logging  Pt has made progress towards being ready for surgery  Remaining Workflow:    TSH significantly elevated:  Needs to check with PCP about med adjustments, re-check TSH in 6 weeks   Iron stores low    Continue Vitron-C daily as tolerated   Weight Loss:  o 5% wt loss=18 925#=Goal of 359 575# TO SCHEDULE SURGERY  o 10% wt loss=37 85#=Goal of 340 65# ON DAY OF SURGERY      Time Spent:   30 minutes

## 2022-08-09 ENCOUNTER — TELEPHONE (OUTPATIENT)
Dept: FAMILY MEDICINE CLINIC | Facility: CLINIC | Age: 37
End: 2022-08-09

## 2022-08-09 NOTE — TELEPHONE ENCOUNTER
----- Message from Roma Archer MD sent at 8/3/2022  9:20 AM EDT -----  Needs apptf or thyroid    ----- Message -----  From: Heavenly Aguirre RD  Sent: 8/2/2022  10:52 AM EDT  To: Roma Archer MD

## 2022-08-11 ENCOUNTER — OFFICE VISIT (OUTPATIENT)
Dept: BARIATRICS | Facility: CLINIC | Age: 37
End: 2022-08-11

## 2022-08-11 VITALS — BODY MASS INDEX: 61.34 KG/M2 | WEIGHT: 293 LBS

## 2022-08-11 DIAGNOSIS — G47.33 OSA (OBSTRUCTIVE SLEEP APNEA): ICD-10-CM

## 2022-08-11 PROCEDURE — RECHECK

## 2022-08-13 DIAGNOSIS — I10 ESSENTIAL HYPERTENSION: ICD-10-CM

## 2022-08-15 RX ORDER — LOSARTAN POTASSIUM 100 MG/1
100 TABLET ORAL DAILY
Qty: 90 TABLET | Refills: 0 | Status: SHIPPED | OUTPATIENT
Start: 2022-08-15

## 2022-08-16 ENCOUNTER — OFFICE VISIT (OUTPATIENT)
Dept: INTERNAL MEDICINE CLINIC | Age: 37
End: 2022-08-16
Payer: COMMERCIAL

## 2022-08-16 VITALS
SYSTOLIC BLOOD PRESSURE: 140 MMHG | TEMPERATURE: 98.2 F | DIASTOLIC BLOOD PRESSURE: 98 MMHG | BODY MASS INDEX: 48.82 KG/M2 | HEART RATE: 94 BPM | OXYGEN SATURATION: 98 % | WEIGHT: 293 LBS | HEIGHT: 65 IN

## 2022-08-16 DIAGNOSIS — E03.8 HYPOTHYROIDISM DUE TO HASHIMOTO'S THYROIDITIS: ICD-10-CM

## 2022-08-16 DIAGNOSIS — E06.3 HYPOTHYROIDISM DUE TO HASHIMOTO'S THYROIDITIS: ICD-10-CM

## 2022-08-16 PROBLEM — R05.9 COUGH: Status: RESOLVED | Noted: 2021-12-09 | Resolved: 2022-08-16

## 2022-08-16 PROBLEM — J00 ACUTE NASOPHARYNGITIS: Status: RESOLVED | Noted: 2022-04-29 | Resolved: 2022-08-16

## 2022-08-16 PROCEDURE — 3077F SYST BP >= 140 MM HG: CPT | Performed by: INTERNAL MEDICINE

## 2022-08-16 PROCEDURE — 3080F DIAST BP >= 90 MM HG: CPT | Performed by: INTERNAL MEDICINE

## 2022-08-16 PROCEDURE — 99214 OFFICE O/P EST MOD 30 MIN: CPT | Performed by: INTERNAL MEDICINE

## 2022-08-16 RX ORDER — LEVOTHYROXINE SODIUM 0.2 MG/1
200 TABLET ORAL DAILY
Qty: 30 TABLET | Refills: 5 | Status: SHIPPED | OUTPATIENT
Start: 2022-08-16

## 2022-08-16 NOTE — ASSESSMENT & PLAN NOTE
Continues to have increasing TSH levels despite having her dose of Synthroid increased    She is status post after her thyroid being surgically removed in the past   Will increase her dose to 200 put in a referral for endocrine face a dozen or

## 2022-08-16 NOTE — ASSESSMENT & PLAN NOTE
Her blood pressure elsewhere is always in the 120s over 80    She does remain on losartan and diuretic

## 2022-08-16 NOTE — PATIENT INSTRUCTIONS
Hypothyroidism   WHAT YOU NEED TO KNOW:   What is hypothyroidism? Hypothyroidism is a condition that develops when the thyroid gland does not make enough thyroid hormone  Thyroid hormones help control body temperature, heart rate, growth, and weight  What causes or increases my risk for hypothyroidism? A family history of hypothyroidism    Age 61 years or older or being female    Autoimmune disease, such as inflammation of your thyroid, or Hashimoto disease    Thyroid surgery, head or neck radiation therapy, or medicines such as lithium, sedatives, or narcotics    Thyroid cancer, a goiter, or a viral infection    Low iodine level    Down syndrome or Huff syndrome    What are the signs and symptoms of hypothyroidism? The signs and symptoms may develop slowly, sometimes over several years  Exhaustion, depression, or irritability    Sensitivity to cold    Muscle aches, headaches, weakness, or trouble concentrating    Dry, flaky skin, brittle nails, or thinning hair    Recent weight gain without overeating, or constipation    Heavy or irregular monthly periods    Puffiness around your eyes or swelling in your hands and feet    Low heart rate, changes in your blood pressure    How is hypothyroidism diagnosed? Your healthcare provider will ask about your symptoms and what medicines you take  He or she will ask about your medical history and if anyone in your family has hypothyroidism  A blood test will show your thyroid hormone level  How is hypothyroidism treated? Thyroid hormone replacement medicine may bring your thyroid hormone level back to normal  You will need to take this medicine for the rest of your life to control hypothyroidism  It is important to take the medicine every day as directed  You will be given instructions for what to do if you miss a dose  Ask your healthcare provider for more information on other medicines you may need  How can I manage hypothyroidism? Get more iodine    The thyroid gland uses iodine to work correctly and to make thyroid hormones  Your healthcare provider may tell you to eat foods that are rich in iodine  He or she will tell you how much of these foods to eat  Milk and seafood are good sources of iodine  You may also need iodine supplements  Keep track of your blood pressure and weight:      Check your blood pressure  and write it down as often as directed  It is important to measure your blood pressure on the same arm and in the same position each time  Keep track of your blood pressure readings, along with the date and time you took them  Take this record with you to your followup visits  Weigh yourself daily  before breakfast after you urinate  Weight gain may be a sign of extra fluid in your body  Keep track of your daily weights and take the record with you to your followup visits  Call your local emergency number [phone] in the [facility]) or have someone call if:   You have sudden chest pain or shortness of breath  You have a seizure  You feel like you are going to faint  When should I seek immediate care? You have diarrhea, tremors, or trouble sleeping  Your legs, ankles, or feet are swollen  When should I call my doctor? You have a fever  You have chills, a cough, or feel weak and achy  You have pain and swelling in your muscles and joints  Your skin is itchy, swollen, or you have a rash  Your signs and symptoms return or get worse, even after treatment  You have questions or concerns about your condition or care  CARE AGREEMENT:   You have the right to help plan your care  Learn about your health condition and how it may be treated  Discuss treatment options with your healthcare providers to decide what care you want to receive  You always have the right to refuse treatment  The above information is an  only  It is not intended as medical advice for individual conditions or treatments   Talk to your doctor, nurse or pharmacist before following any medical regimen to see if it is safe and effective for you  © Copyright [facility] 2022 Information is for End User's use only and may not be sold, redistributed or otherwise used for commercial purposes   All illustrations and images included in CareNotes® are the copyrighted property of [employer]  or [address] [place] [address]

## 2022-08-16 NOTE — PROGRESS NOTES
Assessment/Plan:    Hypothyroidism  Continues to have increasing TSH levels despite having her dose of Synthroid increased  She is status post after her thyroid being surgically removed in the past   Will increase her dose to 200 put in a referral for endocrine face a dozen or    PCOS (polycystic ovarian syndrome)  She continues to follow with gyn    Essential hypertension  Her blood pressure elsewhere is always in the 120s over 80  She does remain on losartan and diuretic    BMI 60 0-69 9, adult [facility])  She continues to follow bariatric step and the hopes that she can have bariatric surgery in the near future  However they want her thyroid fixed before hand    Mixed anxiety depressive disorder  Remains on p r n  Xanax and an SSRI       Diagnoses and all orders for this visit:    Hypothyroidism due to Hashimoto's thyroiditis  -     levothyroxine 200 mcg tablet; Take 1 tablet (200 mcg total) by mouth daily  -     Ambulatory Referral to Endocrinology; Future  -     Iron-Vitamin C  MG TABS; Take 1 tablet by mouth 2 (two) times a day  -     TSH, 3rd generation with Free T4 reflex; Future          Subjective:      Patient ID: Luisa Hernandez is a 29 y o  female  Patient is currently in the process of getting approved for bariatric surgery  However despite having increasing doses of Synthroid her TSH continues to increase  They want that stabilize before they will consider going to surgery    Thyroid Problem  Presents for follow-up visit  Symptoms include anxiety, depressed mood, fatigue, leg swelling and weight gain  Patient reports no cold intolerance, constipation, diarrhea or palpitations  The symptoms have been stable  Review of Systems   Constitutional: Positive for fatigue and weight gain  Negative for chills, fever and unexpected weight change  HENT: Negative for congestion, ear pain, hearing loss, postnasal drip, sinus pressure, sore throat, trouble swallowing and voice change      Eyes: Negative for visual disturbance  Respiratory: Negative for cough, chest tightness, shortness of breath and wheezing  Cardiovascular: Positive for leg swelling  Negative for chest pain and palpitations  Gastrointestinal: Negative for abdominal distention, abdominal pain, anal bleeding, blood in stool, constipation, diarrhea and nausea  Endocrine: Negative for cold intolerance, polydipsia, polyphagia and polyuria  Genitourinary: Negative for dysuria, flank pain, frequency, hematuria and urgency  Musculoskeletal: Negative for arthralgias, back pain, gait problem, joint swelling, myalgias and neck pain  Skin: Negative for rash  Allergic/Immunologic: Negative for immunocompromised state  Neurological: Negative for dizziness, syncope, facial asymmetry, weakness, light-headedness, numbness and headaches  Hematological: Negative for adenopathy  Psychiatric/Behavioral: Positive for dysphoric mood  Negative for confusion, sleep disturbance and suicidal ideas  The patient is nervous/anxious  Objective:      /98 (BP Location: Left arm, Patient Position: Sitting, Cuff Size: Standard)   Pulse 94   Temp 98 2 °F (36 8 °C)   Ht 5' 5" (1 651 m)   Wt (!) 170 kg (374 lb)   LMP 12/17/2019   SpO2 98%   BMI 62 24 kg/m²          Physical Exam  Constitutional:       General: She is not in acute distress  Appearance: She is well-developed  She is obese  HENT:      Right Ear: External ear normal       Left Ear: External ear normal       Nose: Nose normal       Mouth/Throat:      Pharynx: No oropharyngeal exudate  Eyes:      Pupils: Pupils are equal, round, and reactive to light  Neck:      Thyroid: No thyromegaly  Vascular: No JVD  Comments: Goiter  Cardiovascular:      Rate and Rhythm: Normal rate and regular rhythm  Heart sounds: Normal heart sounds  No murmur heard  No gallop  Pulmonary:      Effort: Pulmonary effort is normal  No respiratory distress        Breath sounds: Normal breath sounds  No wheezing or rales  Abdominal:      General: Bowel sounds are normal  There is no distension  Palpations: Abdomen is soft  There is no mass  Tenderness: There is no abdominal tenderness  Musculoskeletal:         General: No tenderness  Normal range of motion  Cervical back: Normal range of motion and neck supple  Right lower leg: Edema present  Left lower leg: Edema present  Lymphadenopathy:      Cervical: No cervical adenopathy  Skin:     Findings: No rash  Neurological:      Mental Status: She is alert and oriented to person, place, and time  Mental status is at baseline  Cranial Nerves: No cranial nerve deficit  Coordination: Coordination normal    Psychiatric:         Behavior: Behavior normal          Thought Content:  Thought content normal          Judgment: Judgment normal

## 2022-08-16 NOTE — ASSESSMENT & PLAN NOTE
She continues to follow bariatric step and the hopes that she can have bariatric surgery in the near future    However they want her thyroid fixed before hand

## 2022-09-05 DIAGNOSIS — F41.8 MIXED ANXIETY DEPRESSIVE DISORDER: ICD-10-CM

## 2022-09-06 RX ORDER — CITALOPRAM 20 MG/1
20 TABLET ORAL DAILY
Qty: 30 TABLET | Refills: 0 | Status: SHIPPED | OUTPATIENT
Start: 2022-09-06 | End: 2022-10-12 | Stop reason: SDUPTHER

## 2022-09-08 ENCOUNTER — OFFICE VISIT (OUTPATIENT)
Dept: BARIATRICS | Facility: CLINIC | Age: 37
End: 2022-09-08

## 2022-09-08 VITALS — WEIGHT: 293 LBS | BODY MASS INDEX: 62.2 KG/M2

## 2022-09-08 DIAGNOSIS — E03.8 HYPOTHYROIDISM DUE TO HASHIMOTO'S THYROIDITIS: ICD-10-CM

## 2022-09-08 DIAGNOSIS — E28.2 PCOS (POLYCYSTIC OVARIAN SYNDROME): ICD-10-CM

## 2022-09-08 DIAGNOSIS — I10 ESSENTIAL HYPERTENSION: ICD-10-CM

## 2022-09-08 DIAGNOSIS — E06.3 HYPOTHYROIDISM DUE TO HASHIMOTO'S THYROIDITIS: ICD-10-CM

## 2022-09-08 DIAGNOSIS — G47.33 OSA (OBSTRUCTIVE SLEEP APNEA): ICD-10-CM

## 2022-09-08 PROCEDURE — RECHECK: Performed by: DIETITIAN, REGISTERED

## 2022-09-08 NOTE — PROGRESS NOTES
Bariatric Nutrition Follow-Up Note    Type of surgery    Preop, no required weight checks  Surgery Date: TBD- tentative May-June 2022  Deadline September 2022  Surgeon: Dr Evonne Moore  29 y o   female     Wt with BMI of 25: 150lbs  Pre-Op Excess Wt: 228 5lbs  Wt (!) 170 kg (373 lb 12 8 oz)   LMP 12/17/2019   BMI 62 20 kg/m²    5 2lb weight gain since last month  Net 4 6lb wt loss since starting program in January  Pt now needs 14 225lb wt loss to schedule surgery and 33 15lbs by surgery date  Started higher dose thyroid med on 8/16/22    5% wt loss=18 925#=Goal of 359 575# TO SCHEDULE SURGERY  10% wt loss=37 85#=Goal of 340 65# ON DAY OF SURGERY      [facility] Equation:     Weight maintenance= 2895 kcal/day  Estimated calories for weight loss 0027-7398 kcal/day ( 1-2# per wk wt loss - sedentary )  Estimated protein needs 68 2-81 8 g/day (1 0-1 2 gms/kg IBW )   Estimated fluid needs 0790-6813 ml/day (30-35 ml/kg IBW )      Review of History and Medications   Past Medical History:   Diagnosis Date    Anxiety     Bruises easily     Cancer ([address] )     Depression     Disease of thyroid gland     DELVALLE (dyspnea on exertion)     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    Endometriosis     [address]licular thyroid [facility] [address] [address]     Gastroenteritis     Hashimoto's disease     Hepatic steatosis     Hyperlipidemia     Hypertension     Hypothyroid     IBS (irritable bowel syndrome)     Kidney lesion, native, left     Lymphedema     left leg    Obesity     Palpitation     Pancreatitis     Pneumonia     Polycystic ovarian disease     Polyuria     Sleep apnea     Thyroid nodule     Thyromegaly     Tinea corporis     Tinea pedis      Past Surgical History:   Procedure Laterality Date    ADENOIDECTOMY      DILATION AND CURETTAGE OF UTERUS  2020    HYSTERECTOMY  02/2020    left ovaries    THYROIDECTOMY, PARTIAL Left 06/2019    benign [facility] THYROID BIOPSY  03/01/2019     Social History     Socioeconomic History    Marital status: /Civil Union     Spouse name: Not on file    Number of children: Not on file    Years of education: Not on file    Highest education level: Not on file   Occupational History    Not on file   Tobacco Use    Smoking status: Never Smoker    Smokeless tobacco: Never Used   Vaping Use    Vaping Use: Never used   Substance and Sexual Activity    Alcohol use: Not Currently     Alcohol/week: 0 0 standard drinks    Drug use: Never    Sexual activity: Not on file   Other Topics Concern    Not on file   Social History Narrative    Not on file     Social Determinants of Health     Financial Resource Strain: Not on file   Food Insecurity: Not on file   Transportation Needs: Not on file   Physical Activity: Not on file   Stress: Not on file   Social Connections: Not on file   Intimate Partner Violence: Not on file   Housing Stability: Not on file       Current Outpatient Medications:     albuterol (Ventolin HFA) 90 mcg/act inhaler, Inhale 2 puffs every 6 (six) hours as needed for wheezing or shortness of breath, Disp: 18 g, Rfl: 0    ALPRAZolam (XANAX) 0 25 mg tablet, Take 1 tablet (0 25 mg total) by mouth 3 (three) times a day as needed for anxiety, Disp: 60 tablet, Rfl: 0    citalopram (CeleXA) 20 mg tablet, Take 1 tablet (20 mg total) by mouth daily, Disp: 30 tablet, Rfl: 0    fluticasone (FLONASE) 50 mcg/act nasal spray, 2 sprays into each nostril daily, Disp: 16 g, Rfl: 0    furosemide (LASIX) 40 mg tablet, Take 1 tablet (40 mg total) by mouth daily, Disp: 30 tablet, Rfl: 5    Iron-Vitamin C  MG TABS, Take 1 tablet by mouth 2 (two) times a day, Disp: 100 tablet, Rfl: 3    levothyroxine 200 mcg tablet, Take 1 tablet (200 mcg total) by mouth daily, Disp: 30 tablet, Rfl: 5    loratadine (CLARITIN) 10 mg tablet, Take 10 mg by mouth daily, Disp: , Rfl:     losartan (Cozaar) 100 MG tablet, Take 1 tablet (100 mg total) by mouth daily, Disp: 90 tablet, Rfl: 0    ondansetron (ZOFRAN-ODT) 4 mg disintegrating tablet, Take 1 tablet (4 mg total) by mouth every 8 (eight) hours as needed for nausea or vomiting, Disp: 15 tablet, Rfl: 0    triamcinolone (KENALOG) 0 1 % cream, APPLY TOPICALLY DAILY AT BEDTIME AS NEEDED FOR RASH, Disp: 80 g, Rfl: 1     Food Intake and Lifestyle Assessment  Food Intake Assessment completed via [address] logs  Breakfast: Fair Life shake, apple or orange   Lunch: Premier Protein shake, 1/2 SF pudding  Snack: baby carrots and hummus (1 g protein)  Dinner: 2 cups turkey chili OR 1 cup chicken salad  Snack: sugar-free popsicles, kumquats  Beverage intake: water and coffee, propel, Powerade Zero occasionally  Protein supplement: Fair Life or Premier  Estimated protein intake per day: 50-70g  Estimated fluid intake per day: 1-3 cups coffee, 32 oz sapp x 4-5  Meals eaten away from home: 3 nights per week orders out for dinner  Gets lunch from [employer] or pizza place several days per week  1 night per week now ([employer] works)  Typical meal pattern: 2 meals per day and 2-3 snacks per day  Eating Behaviors: Consumption of high calorie/ high fat foods and Frequent snacking/ grazing  Pt reports past struggles with stress eating/emotional eating  Pt reports main triggers of stress are financial and her stressful job  Pt reports improvements in this  Much less stress eating triggers  Food allergies or intolerances: uses almond milk, states sometimes regular milk gives her stomach upset    Allergies   Allergen Reactions    Dust Mite Extract Other (See Comments)     Sinus inflammation    Latex Blisters    Molds & Smuts Other (See Comments)     Sinus inflammation    Penicillins Rash     Skin rash and sheds    Jorge Luis Grass Pollen Allergen Other (See Comments)     Sinus inflammation     Cultural or Hoahaoism considerations: none noted    Physical Assessment -updates in bold  Physical Activity  Types of exercise: Sedentary job  Tries to walk around work building on lunch break  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  Pt reports she will sometimes put meetings on her cell phone with earbuds and walk during them  Pt reports since our last meeting she has increased her walking up and down stairs: six flights of stairs per day  Will go to the gym across from her office and walk on treadmill or ellpitical  Current physical limitations: lymphedema in left leg since hysterectomy, back pain    Psychosocial Assessment   Support systems: spouse:  Spouse had bariatric surgery in August 2021 and is very successful  Cousin and friend are also post bariatric  Socioeconomic factors: works for [employer]:  Mental health services  Pt reports that she has been addressing her emotional eating with her therapist whom she sees monthly  Also provided pt with the [place]  Nutrition Diagnosis-continued  Diagnosis: Overweight / Obesity (NC-3 3) and Excessive energy intake (NI-1 5)  Related to: Physical inactivity and Excessive energy intake  As Evidenced by: BMI >25, Excessive energy intake and Unintentional weight gain     Nutrition Prescription: Recommend the following diet  Regular    Interventions and Teaching   Discussed pre-op and post-op nutrition guidelines  Patient educated and handouts provided  Surgical changes to stomach / GI  Capacity of post-surgery stomach  Diet progression  Adequate hydration  Sugar and fat restriction to decrease "dumping syndrome"  Expected weight loss:  Reviewed pre-op weight loss goals and current weight loss progress    Weight loss plateaus/ possibility of weight regain  Exercise:  Discussed using office and home stairs  Suggestions for pre-op diet  Nutrition considerations after surgery  Protein supplements  Meal planning and preparation:   Appropriate carbohydrate, protein, and fat intake, and food/fluid choices to maximize safe weight loss, nutrient intake, and tolerance   Dietary and lifestyle changes  Possible problems with poor eating habits  Techniques for self monitoring and keeping daily food journal   Reviewed Baritastic journals with pt  Potential for food intolerance after surgery, and ways to deal with them including: lactose intolerance, nausea, reflux, vomiting, diarrhea, food intolerance, appetite changes, gas  Reviewed post-op bowel habit protocol in ch 3 of manual with pt  Vitamin / Mineral supplementation of Multivitamin with minerals and Vitamin D  Takes one a day multi    Patient is not currently pregnant and doesn't desire to become pregnant a minimum of one year post-op    Education provided to: patient  Barriers to learning: No barriers identified  Readiness to change: action  Prior research on procedure: pre-op class and friends or family  Comprehension: needs reinforcement and verbalizes understanding   Expected Compliance: fair    Evaluation / Monitoring  Dietitian to Monitor: Eating pattern as discussed Tolerance of nutrition prescription Body weight Lab values Physical activity    Pt's only remaining tasks to get to surgery are normalize TSH and get to 5% weight loss goal   (Pt is already precerted )  Pt had weight regain at today's appointment  Reinforced with pt that her surgery deadline was September  Recommedned pt schedule consultation with [facility] provider to discuss possible medications to help with pre-op weight loss  Reinforced with pt that if she is not ready to schedule her surgery by the end of this month she will likely have her surgery case halted  Pt has her Levothyroxine dose increased on 8/16/22 (3 weeks ago )  Instructed pt to continue the new dosage for another 3 weeks then can retest TSH   TSH level already ordered  by PCP  Pt also has endocrinology consult scheduled for 9/20/22  Pt reports significant fatigue and headaches since starting new medicine dosage  Pt also c/o dry skin    Pt reports she is drinking plenty of fluids, at least 64 oz per day  Pt also reports worsening lymphedema in her one leg  Pt reports she is consistent with her daily diuretic medication and only purchases low sodium foods  Pt brought several weeks of food logs which show pt drinking a Premier Protein or Fair Life protein drink for breakfast and lunch, eating one meal for dinner ,and having one to two snacks of fruit, sugar-free jello, or string cheese  Pt reports she is exercising daily  Goals  Eliminate sugar sweetened beverages, Food journal, Exercise 30 minutes 5 times per week, Complete lession plans 1-6, Eat 3 meals per day and Eliminate mindless snacking   Previous Session Goals:  Reviewed Modified Pre-Op Diet sample menus with pt  Informed pt that if she follows this strictly she should be able to lose at least 8lbs by next appointment  Pt reports drinking two meal replacements, one meal and 1-2 snacks per day  Instructed pt to bring completed food journals to review at next appointment  Pt brought several weeks of printed food journals and logged the last week in [place]  Remaining Workflow:    TSH significantly elevated: re-check TSH in 6 weeks  Today marks 6 weeks since last TSH level checked, 3 weeks since new med dose   o Endocrinology consult scheduled for 9/20/22   Iron stores low  Continue Vitron-C twice daily as tolerated   Weight Loss:  o 5% wt loss=18 925#=Goal of 359 575# TO SCHEDULE SURGERY  o 10% wt loss=37 85#=Goal of 340 65# ON DAY OF SURGERY      Time Spent:   30 minutes

## 2022-09-09 ENCOUNTER — CONSULT (OUTPATIENT)
Dept: BARIATRICS | Facility: CLINIC | Age: 37
End: 2022-09-09
Payer: COMMERCIAL

## 2022-09-09 VITALS
BODY MASS INDEX: 48.82 KG/M2 | RESPIRATION RATE: 18 BRPM | SYSTOLIC BLOOD PRESSURE: 134 MMHG | HEART RATE: 96 BPM | HEIGHT: 65 IN | DIASTOLIC BLOOD PRESSURE: 88 MMHG | WEIGHT: 293 LBS

## 2022-09-09 DIAGNOSIS — G47.33 OSA (OBSTRUCTIVE SLEEP APNEA): ICD-10-CM

## 2022-09-09 DIAGNOSIS — E66.01 MORBID OBESITY WITH BMI OF 60.0-69.9, ADULT (HCC): Primary | ICD-10-CM

## 2022-09-09 DIAGNOSIS — I10 ESSENTIAL HYPERTENSION: ICD-10-CM

## 2022-09-09 DIAGNOSIS — F41.8 MIXED ANXIETY DEPRESSIVE DISORDER: ICD-10-CM

## 2022-09-09 PROCEDURE — 3075F SYST BP GE 130 - 139MM HG: CPT | Performed by: FAMILY MEDICINE

## 2022-09-09 PROCEDURE — 99203 OFFICE O/P NEW LOW 30 MIN: CPT | Performed by: FAMILY MEDICINE

## 2022-09-09 PROCEDURE — 3079F DIAST BP 80-89 MM HG: CPT | Performed by: FAMILY MEDICINE

## 2022-09-09 RX ORDER — BUPROPION HYDROCHLORIDE 150 MG/1
150 TABLET ORAL DAILY
Qty: 30 TABLET | Refills: 2 | Status: SHIPPED | OUTPATIENT
Start: 2022-09-09

## 2022-09-09 NOTE — PROGRESS NOTES
Assessment/Plan:  Femi Ruvalcaba was seen today for consult  Diagnoses and all orders for this visit:    Morbid obesity with BMI of 60 0-69 9, adult (HCC)  -     buPROPion (Wellbutrin XL) 150 mg 24 hr tablet; Take 1 tablet (150 mg total) by mouth daily  Patient presents today for medical weight management consultation  She is participating in surgical weight management as well however has not reached her target weight in order to have surgery scheduled  She has been meeting with the dietitian and based on her daily caloric intake she should be having more weight loss  She is interested in medication to help with further weight loss  She did mention Ozempic however she does not have a diagnosis of diabetes  In her case [address] or [facility] would be the options however she does have history of thyroid nodule removal and based on pathology report the nodule did have potential to become malignant  Patient will be meeting with endocrinology on 9/20/2022 per [facility] and I recommended patient discuss the appropriateness of those medications based on her pathology report and also send me a message so I may reach out to endocrinology directly after her appointment  For now we are going to avoid GLP1 agonist medications  Patient does have hypertension as well as anxiety and depression therefore I recommend avoiding phentermine as this can worsen hypertension and worsen anxiety  I did discuss Wellbutrin with patient  She does not have any history of seizures  Wellbutrin can help improve her anxiety and depression for which she is already taking Celexa however it can also worsen her anxiety  Wellbutrin can also increase her blood pressure however this can be monitored and medications stopped if needed  Patient would like to try Wellbutrin    If patient does well on Wellbutrin she can remain on the medication even after surgery however it will need to be switched from extended release to either immediate release or sustained release  Avoid Topamax due to history of kidney stones  Mixed anxiety depressive disorder  Comments:  Monitor  May improve with addition of Wellbutrin however anxiety can worsen with addition of Wellbutrin  Orders:  -     buPROPion (Wellbutrin XL) 150 mg 24 hr tablet; Take 1 tablet (150 mg total) by mouth daily    ANA LAURA (obstructive sleep apnea)  Comments:  Should improve with weight loss  Essential hypertension  Comments:  Monitor  Blood pressure can increase with addition of Wellbutrin  - Discussed options of HealthyCORE-Intensive Lifestyle Intervention Program, Very Low Calorie Diet-VLCD and Conservative Program and the role of weight loss medications  - Explained the importance of making lifestyle changes first before starting any anti-obesity medications  Patient should demonstrate lifestyle changes first before anti-obesity medication can be initiated  - Patient is interested in pursuing Conservative Program  - Initial weight loss goal of 5-10% weight loss for improved health  - Weight loss can improve patient's co-morbid conditions and/or prevent weight-related complications  Goals:  Do not skip any meals! Food log (ie ) www myfitnesspal com,sparkpeople  com,loseit com,calorieking  com,etc  baritastic (use skinnytaste  com, dietdoctor  com or smartphone donaldo Screenie for recipes)  No sugary beverages  At least 64oz of water daily  Increase physical activity by 10 minutes daily  Gradually increase physical activity to a goal of 5 days per week for 30 minutes of MODERATE intensity PLUS 2 days per week of FULL BODY resistance training (use smartphone apps FitON, Home Workout, etc )  Start Wellbutrin 150mg by mouth daily  Monitor Blood Pressure  If top number is 140 or higher or bottom number is 90 or higher call the office  If depression or anxiety worsen or suicidal thoughts develop please go to the ER right away    Send a [employer] message when you see the Endocrinologist     Total time spent: 30 min, with >50% face-to-face time spent counseling patient on nonsurgical interventions for the treatment of excess weight  Discussed in detail nonsurgical options including intensive lifestyle intervention program, very low-calorie diet program and conservative program   Discussed the role of weight loss medications  Counseled patient on diet behavior and exercise modification for weight loss  Follow up in approximately 3 months with Non-Surgical Physician/Advanced Practitioner  Subjective:   Chief Complaint   Patient presents with    Consult     MWM consult; waist 54in; goal wt 158; pt has sleep apnea       Patient ID: Iveth Caruso  is a 29 y o  female with excess weight/obesity here to pursue weight management  Previous notes and records have been reviewed  Past Medical History:   Diagnosis Date    Anxiety     Bruises easily     Cancer [facility] [address] )     Depression     Disease of thyroid gland     DELVALLE (dyspnea on exertion)     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    Endometriosis     Follicular thyroid cancer ([facility] [address] )     Gastroenteritis     Hashimoto's disease     Hepatic steatosis     Hyperlipidemia     Hypertension     Hypothyroid     IBS (irritable bowel syndrome)     Kidney lesion, native, left     Lymphedema     left leg    Obesity     Palpitation     Pancreatitis     Pneumonia     Polycystic ovarian disease     Polyuria     Sleep apnea     Thyroid nodule     Thyromegaly     Tinea corporis     Tinea pedis      Past Surgical History:   Procedure Laterality Date    ADENOIDECTOMY      DILATION AND CURETTAGE OF UTERUS  2020    HYSTERECTOMY  02/2020    left ovaries    THYROIDECTOMY, PARTIAL Left 06/2019    benign    TONSILLECTOMY      US GUIDED THYROID BIOPSY  03/01/2019       HPI:  Patient presents for medical weight management consultation    She is currently pursuing bariatric surgery but has yet to meet her goal weight in order to have surgery scheduled  Patient tells me her goal weight is 359 575lbs to have surgery scheduled  Has been meeting with the dietitian regularly and is food logging 6478-5081 calories per day however continues to struggle with weight loss  Patient would be interested in starting Ozempic however does not have a diagnosis of diabetes and does have history of partial thyroid resection due to thyroid mass  She does have hypothyroidism and will be meeting with an endocrinologist later this month as her medication has been difficult to control  Wt Readings from Last 20 Encounters:   09/09/22 (!) 169 kg (372 lb 8 oz)   09/08/22 (!) 170 kg (373 lb 12 8 oz)   08/16/22 (!) 170 kg (374 lb)   08/11/22 (!) 167 kg (368 lb 9 6 oz)   07/14/22 (!) 169 kg (373 lb 3 2 oz)   06/14/22 (!) 167 kg (367 lb 11 2 oz)   05/10/22 (!) 170 kg (374 lb 9 6 oz)   04/29/22 (!) 168 kg (371 lb 3 2 oz)   04/28/22 (!) 167 kg (368 lb 3 2 oz)   04/15/22 (!) 167 kg (367 lb 12 8 oz)   03/18/22 (!) 169 kg (372 lb 9 6 oz)   03/16/22 (!) 170 kg (375 lb)   02/24/22 (!) 174 kg (383 lb)   02/17/22 (!) 173 kg (381 lb)   02/10/22 (!) 171 kg (377 lb 11 2 oz)   01/14/22 (!) 173 kg (381 lb)   01/11/22 (!) 172 kg (378 lb 8 oz)   12/09/21 (!) 176 kg (388 lb 9 6 oz)   11/01/21 (!) 172 kg (378 lb 9 6 oz)   10/26/21 (!) 174 kg (384 lb)     Obesity/Excess Weight:  Severity: Very Severe  Onset:  lifelong    Modifiers: Diet and Exercise  Contributing factors: Poor Food Choices and Insufficient Physical Activity  Associated symptoms: comorbid conditions and fatigue    Food logging 0312-9108 gabrielle /day    B- protein shake  S- fruit  L- protein shake w/ (SF jello or cheese stick)   S- nuts  D- protein and veggies  Sometimes a fruit as well  Avoids starch    S- SF jello    Hydration: 64+oz water daily, 16oz coffee w/ SF creamer and truvia  Alcohol: no  Smoking: no  Exercise: stairs at work 5 days/wk 360 steps up and 360 steps tenzin   Occupation: Works in [employer]  Sleep: 6-8hrs  STOP bang: ANA LAURA on CPAP    The following portions of the patient's history were reviewed and updated as appropriate: allergies, current medications, past family history, past medical history, past social history, past surgical history, and problem list     Review of Systems   Constitutional: Negative for fever  Respiratory: Negative for shortness of breath  Cardiovascular: Negative for chest pain  Gastrointestinal: Negative for abdominal pain and blood in stool  Genitourinary: Positive for dysuria (Has seen GYN for this)  Negative for hematuria  Musculoskeletal: Positive for arthralgias and myalgias  Skin: Negative for rash  Neurological: Positive for headaches  Psychiatric/Behavioral: Positive for dysphoric mood (On Celexa)  Negative for suicidal ideas  The patient is nervous/anxious (On Celexa)  Objective:  /88   Pulse 96   Resp 18   Ht 5' 5" (1 651 m)   Wt (!) 169 kg (372 lb 8 oz)   LMP 12/17/2019   Breastfeeding No   BMI 61 99 kg/m²   Constitutional: Well-developed, well-nourished and Obese Body mass index is 61 99 kg/m²  Gerhardt Sep HEENT: No conjunctival injection  Pulmonary: No increased work of breathing or signs of respiratory distress  CV: Well-perfused  GI: Obese  Non-distended  MSK: Edema   Neuro: Oriented to person, place and time  Normal Speech  Normal gait  Psych: Normal affect and mood  Labs and Imaging  Recent labs and imaging have been personally reviewed    Lab Results   Component Value Date    WBC 10 46 (H) 07/28/2022    HGB 11 1 (L) 07/28/2022    HCT 37 8 07/28/2022    MCV 85 07/28/2022     07/28/2022     Lab Results   Component Value Date    SODIUM 138 07/28/2022    K 4 4 07/28/2022     07/28/2022    CO2 27 07/28/2022    AGAP 3 (L) 07/28/2022    BUN 20 07/28/2022    CREATININE 0 79 07/28/2022    GLUF 87 07/28/2022    CALCIUM 9 2 07/28/2022    AST 17 07/28/2022    ALT 30 07/28/2022    ALKPHOS 98 07/28/2022    TP 8 1 07/28/2022    TBILI 0 42 07/28/2022    EGFR 97 07/28/2022     No results found for: HGBA1C  Lab Results   Component Value Date    MES3UGDUPSSI 18 200 (H) 07/28/2022     Lab Results   Component Value Date    CHOLESTEROL 191 07/28/2022     Lab Results   Component Value Date    HDL 56 07/28/2022     Lab Results   Component Value Date    TRIG 91 07/28/2022     Lab Results   Component Value Date    LDLCALC 117 (H) 07/28/2022

## 2022-09-15 ENCOUNTER — OFFICE VISIT (OUTPATIENT)
Dept: SLEEP CENTER | Facility: CLINIC | Age: 37
End: 2022-09-15
Payer: COMMERCIAL

## 2022-09-15 VITALS
WEIGHT: 293 LBS | DIASTOLIC BLOOD PRESSURE: 81 MMHG | HEIGHT: 65 IN | SYSTOLIC BLOOD PRESSURE: 140 MMHG | BODY MASS INDEX: 48.82 KG/M2 | HEART RATE: 89 BPM

## 2022-09-15 DIAGNOSIS — G47.33 OSA (OBSTRUCTIVE SLEEP APNEA): Primary | ICD-10-CM

## 2022-09-15 PROCEDURE — 99214 OFFICE O/P EST MOD 30 MIN: CPT | Performed by: PSYCHIATRY & NEUROLOGY

## 2022-09-15 NOTE — PROGRESS NOTES
Progress Note - Sleep Medicine  Kierra Díaz 29 y o  female MRN: [id number]       Impression & Plan:   1  ANA LAURA- on CPAP 8-13 cm H2O, compliance is good but there have been some days when the patient was not using it  Says there was some issues with her health as well as with the machine   - advised to continue using the machine in the same settings  - discuss that for the machine to have optimum effect she needs to use it every time she is sleeping   - discuss the adverse effects of untreated sleep apnea  2  Morbid obesity- she is following [facility] weight loss as well as bariatric program   Currently going medical management with plan for bariatric surgery in the future  Discussed that the treatment of obesity can result in better control of sleep apnea  3  Restless legs- she describes symptoms of twitchy feeling in her legs that get better when she lies in bed  Although not classic for restless legs, she had iron studies done in the past which revealed low ferritin  On supplemental iron  Advised to continue taking the iron supplement  Problem List Items Addressed This Visit        Respiratory    ANA LAURA (obstructive sleep apnea) - Primary            ______________________________________________________________________    HPI:    Kierra Díaz presents today for follow-up of obstructive sleep apnea  Patient was diagnosed sleep apnea in February 2022 and had a CPAP titration study in March 2022  She was started on CPAP 8-13 cm H2O and says her symptoms have significantly improved afterwards  She is using the CPAP most of the nights except for a few nights in the last month when she was having issues with her overall health as well as with the CPAP machine  Her AHI has been less than 1  She denies any issues with the use of the mask or machine and says there has been no significant side effects  She says the pressures are okay and she has no issues tolerating the machine    Sleep schedule:  Goes to bed before 10:00 p m  and falls asleep pretty quickly  She wakes up around 5:00 a m  in the morning to be at work at 6:00 a m  Recently she was started on Wellbutrin by the weight loss program and she states that since starting the Wellbutrin she has been having frequent awakenings at night  She also has fatigue during the daytime but says that it might be related to her thyroid issue and other endocrine issues rather than sleep apnea  She tries not to take naps during the days except on weekends when she may take 1 hour nap          Review of Systems:  Genitourinary hot flashes at night   Cardiology ankle/leg swelling   Gastrointestinal none   Neurology frequent headaches, need to move extremities, numbness/tingling of an extremity, forgetfulness, poor concentration or confusion,  and difficulty with memory   Constitutional fatigue, excessive sweating at night and weight change   Integumentary none   Psychiatry anxiety and depression   Musculoskeletal joint pain, back pain, legs twitching/jerking and leg cramps   Pulmonary none   ENT none   Endocrine frequent urination   Hematological none               Social history updates:  Social History     Tobacco Use   Smoking Status Never Smoker   Smokeless Tobacco Never Used     Social History     Socioeconomic History    Marital status: /Civil Union     Spouse name: Not on file    Number of children: Not on file    Years of education: Not on file    Highest education level: Not on file   Occupational History    Not on file   Tobacco Use    Smoking status: Never Smoker    Smokeless tobacco: Never Used   Vaping Use    Vaping Use: Never used   Substance and Sexual Activity    Alcohol use: Not Currently     Alcohol/week: 0 0 standard drinks    Drug use: Never    Sexual activity: Not on file   Other Topics Concern    Not on file   Social History Narrative    Not on file     Social Determinants of Health     Financial Resource Strain: Not on file   Food Insecurity: Not on file   Transportation Needs: Not on file   Physical Activity: Not on file   Stress: Not on file   Social Connections: Not on file   Intimate Partner Violence: Not on file   Housing Stability: Not on file       PhysicalExamination:  Vitals:   /81 (BP Location: Other (Comment), Patient Position: Sitting, Cuff Size: Adult) Comment (BP Location): forearm  Pulse 89   Ht 5' 5" (1 651 m)   Wt (!) 171 kg (376 lb)   LMP 12/17/2019   BMI 62 57 kg/m²     Physical Exam  Vitals and nursing note reviewed  Constitutional:       Appearance: Normal appearance  She is obese  HENT:      Head: Normocephalic and atraumatic  Nose: No congestion or rhinorrhea  Mouth/Throat:      Mouth: Mucous membranes are moist    Eyes:      General: No scleral icterus  Extraocular Movements: Extraocular movements intact  Cardiovascular:      Rate and Rhythm: Normal rate and regular rhythm  Heart sounds: Normal heart sounds  No murmur heard  No gallop  Pulmonary:      Effort: Pulmonary effort is normal  No respiratory distress  Breath sounds: Normal breath sounds  No wheezing  Musculoskeletal:         General: No swelling  Skin:     General: Skin is warm  Capillary Refill: Capillary refill takes less than 2 seconds  Neurological:      Mental Status: She is alert and oriented to person, place, and time  Diagnostic Data:  Labs:   I personally reviewed the most recent laboratory data pertinent to today's visit  Appointment on 07/28/2022   Component Date Value    TSH 3RD GENERATON 07/28/2022 18 200 (A)    WBC 07/28/2022 10 46 (A)    RBC 07/28/2022 4 46     Hemoglobin 07/28/2022 11 1 (A)    Hematocrit 07/28/2022 37 8     MCV 07/28/2022 85     MCH 07/28/2022 24 9 (A)    MCHC 07/28/2022 29 4 (A)    RDW 07/28/2022 16 7 (A)    MPV 07/28/2022 11 6     Platelets 34/72/7929 273     nRBC 07/28/2022 0     Neutrophils Relative 07/28/2022 67     Immat GRANS % 07/28/2022 1     Lymphocytes Relative 07/28/2022 22     Monocytes Relative 07/28/2022 7     Eosinophils Relative 07/28/2022 2     Basophils Relative 07/28/2022 1     Neutrophils Absolute 07/28/2022 7 18     Immature Grans Absolute 07/28/2022 0 06     Lymphocytes Absolute 07/28/2022 2 27     Monocytes Absolute 07/28/2022 0 71     Eosinophils Absolute 07/28/2022 0 16     Basophils Absolute 07/28/2022 0 08     Sodium 07/28/2022 138     Potassium 07/28/2022 4 4     Chloride 07/28/2022 108     CO2 07/28/2022 27     ANION GAP 07/28/2022 3 (A)    BUN 07/28/2022 20     Creatinine 07/28/2022 0 79     Glucose, Fasting 07/28/2022 87     Calcium 07/28/2022 9 2     Corrected Calcium 07/28/2022 9 8     AST 07/28/2022 17     ALT 07/28/2022 30     Alkaline Phosphatase 07/28/2022 98     Total Protein 07/28/2022 8 1     Albumin 07/28/2022 3 3 (A)    Total Bilirubin 07/28/2022 0 42     eGFR 07/28/2022 97     Cholesterol 07/28/2022 191     Triglycerides 07/28/2022 91     HDL, Direct 07/28/2022 56     LDL Calculated 07/28/2022 117 (A)    Iron Saturation 07/28/2022 10 (A)    TIBC 07/28/2022 444     Iron 07/28/2022 44 (A)    Ferritin 07/28/2022 26     Free T4 07/28/2022 0 78    Office Visit on 07/22/2022   Component Date Value    Ventricular Rate 07/22/2022 78     Atrial Rate 07/22/2022 78     NM Interval 07/22/2022 134     QRSD Interval 07/22/2022 84     QT Interval 07/22/2022 396     QTC Interval 07/22/2022 451     P Axis 07/22/2022 18     QRS Axis 07/22/2022 45     T Wave [place] 07/22/2022 28    Hospital Outpatient Visit on 03/16/2022   Component Date Value    Case Report 03/16/2022                      Value:Surgical Pathology Report                         Case: [id number]                                   Authorizing Provider:  Ransom Lanes, MD         Collected:           03/16/2022 1113              Ordering Location:     [facility]        Received: 03/16/2022 [facility]                                                          Pathologist:           Tala Vaz MD                                                    Specimen:    Stomach, r/o h pylori                                                                      Final Diagnosis 03/16/2022                      Value: This result contains rich text formatting which cannot be displayed here   Additional Information 03/16/2022                      Value: This result contains rich text formatting which cannot be displayed here  David Wetzel Gross Description 03/16/2022                      Value: This result contains rich text formatting which cannot be displayed here  I have personally reviewed pertinent lab results  Lab Results   Component Value Date    WBC 10 46 (H) 07/28/2022    HGB 11 1 (L) 07/28/2022    HCT 37 8 07/28/2022    MCV 85 07/28/2022     07/28/2022     Lab Results   Component Value Date    CALCIUM 9 2 07/28/2022    K 4 4 07/28/2022    CO2 27 07/28/2022     07/28/2022    BUN 20 07/28/2022    CREATININE 0 79 07/28/2022     No results found for: IGE  Lab Results   Component Value Date    ALT 30 07/28/2022    AST 17 07/28/2022    ALKPHOS 98 07/28/2022     Lab Results   Component Value Date    IRON 44 (L) 07/28/2022    TIBC 444 07/28/2022    FERRITIN 26 07/28/2022     No results found for: [id number]  No results found for: FOLATE          Sleep studies:  Diagnostic:  Home sleep study on 02/28/2022, PIERCE: 11 9  Titration:  03/23/2022, titrated to CPAP 13 cm H2O    Split:    Compliance Data:  Type of CPAP: ResMed AirView                                   Percent usage:  60%                                   Average time used:  5 hours 11 minutes                                   Residual AHI:  0 5                                         MD Suge[facility] Medicine Fellow

## 2022-09-15 NOTE — PROGRESS NOTES
Review of Systems      Genitourinary hot flashes at night   Cardiology ankle/leg swelling   Gastrointestinal none   Neurology frequent headaches, need to move extremities, numbness/tingling of an extremity, forgetfulness, poor concentration or confusion,  and difficulty with memory   Constitutional fatigue, excessive sweating at night and weight change   Integumentary none   Psychiatry anxiety and depression   Musculoskeletal joint pain, back pain, legs twitching/jerking and leg cramps   Pulmonary none   ENT none   Endocrine frequent urination   Hematological none

## 2022-09-19 ENCOUNTER — RA CDI HCC (OUTPATIENT)
Dept: OTHER | Facility: HOSPITAL | Age: 37
End: 2022-09-19

## 2022-09-19 NOTE — PROGRESS NOTES
[facility]  coding opportunities          Chart Reviewed number of suggestions sent to Provider: 1   Depression    Patients Insurance        Commercial Insurance: [employer]

## 2022-09-20 ENCOUNTER — CONSULT (OUTPATIENT)
Dept: ENDOCRINOLOGY | Facility: CLINIC | Age: 37
End: 2022-09-20
Payer: COMMERCIAL

## 2022-09-20 VITALS
WEIGHT: 293 LBS | BODY MASS INDEX: 48.82 KG/M2 | HEIGHT: 65 IN | DIASTOLIC BLOOD PRESSURE: 80 MMHG | HEART RATE: 94 BPM | SYSTOLIC BLOOD PRESSURE: 116 MMHG

## 2022-09-20 DIAGNOSIS — E03.8 HYPOTHYROIDISM DUE TO HASHIMOTO'S THYROIDITIS: Primary | ICD-10-CM

## 2022-09-20 DIAGNOSIS — G47.33 OBSTRUCTIVE SLEEP APNEA SYNDROME: ICD-10-CM

## 2022-09-20 DIAGNOSIS — E06.3 HYPOTHYROIDISM DUE TO HASHIMOTO'S THYROIDITIS: Primary | ICD-10-CM

## 2022-09-20 DIAGNOSIS — E55.9 VITAMIN D DEFICIENCY: ICD-10-CM

## 2022-09-20 DIAGNOSIS — E61.1 IRON DEFICIENCY: ICD-10-CM

## 2022-09-20 PROCEDURE — 99204 OFFICE O/P NEW MOD 45 MIN: CPT | Performed by: INTERNAL MEDICINE

## 2022-09-20 NOTE — PROGRESS NOTES
New Patient Progress Note    CC: hypothyroidism    History of Present Illness:   28 yo F who is presenting for evaluation of hypothyroidism  She reports having hypothyroidism diagnosed in 2019 but thinks it was undiagnosed for years  She reports history of hashimotos thyroiditis  She reports left side of thyroid was removed in 2019 due to nodule  She reports she was on 150mcg levothyroxine for over 6 months this year and was increased to 200mcg about 1 month ago, these are the highest doses she has been on  Takes levothyroxine on an empty stomach with water at 4am, takes other meds at 7am and iron at least 4 hours after levothyroxine  Takes multivitamin at 7am  Has forgotten levothyroxine 1-2x a month but otherwise does not forget it, sets an alarm  Other medical conditions include PCOS, sleep apnea using CPAP, iron deficiency, HTN  She is interested in gastric bypass through [address]  She was started on wellbutrin about a week ago and had discussed with medical bariatric team regarding GLP1 medications    She was diagnosed with PCOS around 2010, was having constant heavy menstrual period but then when it stopped it would stop for months, was having facial hair  Was started on birth control but this was stopped due to high blood pressure  In 2020, she had hysterectomy, after she was noted to have precancerous polyps during D&C  She has a history of a thyroid nodule removed in 2019 with hemithyroidectomy, pathology is below with follicular adenoma, oncocytic 3cm      Patient Active Problem List   Diagnosis    Hashimoto's thyroiditis    Hypothyroidism    BMI 60 0-69 9, adult ([facility] [address] )    Arthralgia    Malar rash    Eczema    Lymph edema    Mixed anxiety depressive disorder    Essential hypertension    RUQ abdominal pain    ANA LAURA (obstructive sleep apnea)    Sleep related hypoxia    PCOS (polycystic ovarian syndrome)     Past Medical History:   Diagnosis Date    Anxiety     Bruises easily     Cancer ([facility] [address] )     Depression     Disease of thyroid gland     DELVALLE (dyspnea on exertion)     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    Endometriosis     Follicular thyroid cancer ([facility] [address] )     Gastroenteritis     Hashimoto's disease     Hepatic steatosis     Hyperlipidemia     Hypertension     Hypothyroid     IBS (irritable bowel syndrome)     Kidney lesion, native, left     Lymphedema     left leg    Obesity     Palpitation     Pancreatitis     Pneumonia     Polycystic ovarian disease     Polyuria     Sleep apnea     Thyroid nodule     Thyromegaly     Tinea corporis     Tinea pedis       Past Surgical History:   Procedure Laterality Date    ADENOIDECTOMY      DILATION AND CURETTAGE OF UTERUS  2020    HYSTERECTOMY  02/2020    left ovaries    THYROIDECTOMY, PARTIAL Left 06/2019    benign    TONSILLECTOMY      US GUIDED THYROID BIOPSY  03/01/2019      Family History   Problem Relation Age of Onset    Hyperlipidemia Mother     Restless legs syndrome Mother     Sleep apnea Father     Hyperlipidemia Father     Hypertension Father     Hypertension Maternal Grandmother     Restless legs syndrome Maternal Grandmother     Ovarian cancer Maternal Grandmother     Cancer Paternal Grandmother      Social History     Tobacco Use    Smoking status: Never Smoker    Smokeless tobacco: Never Used   Substance Use Topics    Alcohol use: Not Currently     Alcohol/week: 0 0 standard drinks     Allergies   Allergen Reactions    Dust Mite Extract Other (See Comments)     Sinus inflammation    Latex Blisters    Molds & Smuts Other (See Comments)     Sinus inflammation    Penicillins Rash     Skin rash and sheds    [employer] Pollen Allergen Other (See Comments)     Sinus inflammation     Review of Systems   Constitutional: Positive for fatigue and unexpected weight change (trouble losing weight, has lost 13lbs in last one year per chart review)     Eyes: Negative for visual disturbance (has noticed worsened night vision in last 6 months)  Gastrointestinal: Positive for constipation and diarrhea  Alternating   Endocrine: Positive for cold intolerance and heat intolerance  Both   Skin:        Has noticed brittle hair and skin   Hematological: Bruises/bleeds easily  All other systems reviewed and are negative  Current Outpatient Medications:     albuterol (Ventolin HFA) 90 mcg/act inhaler, Inhale 2 puffs every 6 (six) hours as needed for wheezing or shortness of breath, Disp: 18 g, Rfl: 0    ALPRAZolam (XANAX) 0 25 mg tablet, Take 1 tablet (0 25 mg total) by mouth 3 (three) times a day as needed for anxiety, Disp: 60 tablet, Rfl: 0    buPROPion (Wellbutrin XL) 150 mg 24 hr tablet, Take 1 tablet (150 mg total) by mouth daily, Disp: 30 tablet, Rfl: 2    citalopram (CeleXA) 20 mg tablet, Take 1 tablet (20 mg total) by mouth daily, Disp: 30 tablet, Rfl: 0    fluticasone (FLONASE) 50 mcg/act nasal spray, 2 sprays into each nostril daily, Disp: 16 g, Rfl: 0    furosemide (LASIX) 40 mg tablet, Take 1 tablet (40 mg total) by mouth daily, Disp: 30 tablet, Rfl: 5    Iron-Vitamin C  MG TABS, Take 1 tablet by mouth 2 (two) times a day, Disp: 100 tablet, Rfl: 3    levothyroxine 200 mcg tablet, Take 1 tablet (200 mcg total) by mouth daily, Disp: 30 tablet, Rfl: 5    loratadine (CLARITIN) 10 mg tablet, Take 10 mg by mouth daily, Disp: , Rfl:     losartan (Cozaar) 100 MG tablet, Take 1 tablet (100 mg total) by mouth daily, Disp: 90 tablet, Rfl: 0    Multiple Vitamins-Minerals (ONE-A-DAY WOMENS PO), Take by mouth, Disp: , Rfl:     ondansetron (ZOFRAN-ODT) 4 mg disintegrating tablet, Take 1 tablet (4 mg total) by mouth every 8 (eight) hours as needed for nausea or vomiting, Disp: 15 tablet, Rfl: 0    triamcinolone (KENALOG) 0 1 % cream, APPLY TOPICALLY DAILY AT BEDTIME AS NEEDED FOR RASH, Disp: 80 g, Rfl: 1    Physical Exam:  Body mass index is 62 07 kg/m²    /80 (BP Location: Left arm, Patient Position: Sitting, Cuff Size: Extra-Large)   Pulse 94   Ht 5' 5" (1 651 m)   Wt (!) 169 kg (373 lb)   LMP 12/17/2019   BMI 62 07 kg/m²        Physical Exam  Vitals reviewed  Constitutional:       Appearance: She is well-developed  HENT:      Head: Normocephalic and atraumatic  Eyes:      General:         Right eye: No discharge  Left eye: No discharge  Cardiovascular:      Rate and Rhythm: Normal rate and regular rhythm  Heart sounds: Normal heart sounds  No murmur heard  No friction rub  No gallop  Pulmonary:      Effort: Pulmonary effort is normal  No respiratory distress  Breath sounds: Normal breath sounds  No wheezing or rales  Chest:      Chest wall: No tenderness  Abdominal:      General: Bowel sounds are normal  There is no distension  Palpations: Abdomen is soft  There is no mass  Tenderness: There is no abdominal tenderness  Musculoskeletal:         General: Normal range of motion  Cervical back: Normal range of motion and neck supple  Skin:     General: Skin is warm and dry  Neurological:      Mental Status: She is alert and oriented to person, place, and time     Psychiatric:         Behavior: Behavior normal        Labs:     Lab Results   Component Value Date    NHS0CDNEJFOG 18 200 (H) 07/28/2022       Lab Results   Component Value Date    CREATININE 0 79 07/28/2022    CREATININE 0 98 03/15/2022    CREATININE 0 81 08/03/2021    BUN 20 07/28/2022    K 4 4 07/28/2022     07/28/2022    CO2 27 07/28/2022     eGFR   Date Value Ref Range Status   07/28/2022 97 ml/min/1 73sq m Final       Lab Results   Component Value Date    ALT 30 07/28/2022    AST 17 07/28/2022    ALKPHOS 98 07/28/2022       Lab Results   Component Value Date    CHOLESTEROL 191 07/28/2022    CHOLESTEROL 195 08/03/2021     Lab Results   Component Value Date    HDL 56 07/28/2022    HDL 57 08/03/2021     Lab Results   Component Value Date    TRIG 91 07/28/2022    TRIG 126 08/03/2021     Lab Results   Component Value Date    Krys 138 08/03/2021 6/19/2019  SURGICAL PATHOLOGY REPORT  Order: [id number]  Component 6/3/19  2:24 PM   Final Diagnosis    1  Thyroid, left, 11 2 g, lobectomy:     Left:   - Follicular adenoma, oncocytic (Hurthle cell) type, 3 0 cm, with FNA site   changes, present   within < 0 1 cm of inked anterior and posterior margins    - Background thyroid with   chronic lymphocytic thyroiditis  Isthmus:   - Chronic lymphocytic thyroiditis  The case material was reviewed and the report verified by: Akash Niño MD     (Electronic signature)   Report Date/Time: 06/06/2019   9:16 AM EDT   ZB     [facility]   [address] [address]     Histopathology and Cytopathology  processing performed at the Department of   Pathology and Laboratory Medicine, [address] [place] [address],   [address], [place] [address]  Note    The specimen is entirely submitted for histopathologic evaluation  Pathologist(s)    MD Jett Thorpe MD, PhD    Gross Description    The case is received in one container, labeled with the patient's name and   medical record number  Specimen 1 is designated "left thyroid lobe: long stitch-left upper pole; short   stitch-isthmus",   and consists of a 4 8 x 3 2 x 2 0 cm, 11 2 g left hemithyroidectomy specimen   comprised of a   3 5 x 5 6 x 1 8 cm left lobe, and 2 0 x 0 8 x 0 5 cm isthmus  The capsular   surface is pink-tan   smooth to shaggy, and intact  A short suture designates the isthmus margin and a    long suture   designates the left upper pole  The specimen is inked as follows:   anterior-black, posterior-   blue, lateral-orange, and isthmus-green  The left lobe is sectioned from   superior to inferior     to reveal a 3 0 x 1 9 x 1 6 cm pink-tan to red soft encapsulated lower pole   lesion displaying   central areas of hemorrhage   The lesions grossly abuts the inked lateral,   anterior and posterior   surfaces as well as the inferior pole tip, is 1 8 cm from the superior pole tip,    and 1 5 cm   from the closest isthmus margin  The remaining thyroid parenchyma is white-pink   to tan-red   with multifocal areas of ill-defined white-pink fibrous nodularities  The   specimen is entirely   submitted as follows:     1A: Isthmus, entirely submitted   1B-1I: Left lobe from superior to inferior [lesion in cassettes 1D through 1I;   inferior pole tip   perpendicularly sectioned in cassette 1I]   Gross Description    SUMMARY OF SECTIONS: 9, multiple, 100%     Dictated by: VENKAT Pang([facility])   SER    Clinical Information    3 0 cm left thyroid nodule and hypothyroidism, diagnosed as [place] category   IV, Thyroseq   negative  Operation: Hemithyroidectomy   Specific questions to be answered: None   Molecular Algorithm work flow selected on Surgical Pathology requisition: No     Specimen:   1  Left Thyroid, Total/Lobe   Specimen Collected: 06/03/19  2:24 PM Last Resulted: 06/06/19  9:16 AM   [facility] From[facility]  Result Received: 08/16/21 10:03 AM       Impression:  1  Hypothyroidism due to Hashimoto's thyroiditis    2  Iron deficiency    3  Vitamin D deficiency    4  Obstructive sleep apnea syndrome    5  BMI 60 0-69 9, adult [facility])       Plan:    Diagnoses and all orders for this visit:    Hypothyroidism due to Hashimoto's thyroiditis, s/p hemithyroidectomy  -     Ambulatory Referral to Endocrinology  Most recently has been taking 200mcg levothyroxine since 8/16  Will add Free T4 to upcoming labwork in 2 weeks for recheck  Suspect that pt is not on enough hormone supplementation, will increase until at goal     Iron deficiency  Started on iron supplementation with vitamin C about 1-2 months ago    Vitamin D deficiency  Suspected, Check vitamin D level with next set of labs      Obstructive sleep apnea syndrome  Following with sleep medicine, using CPAP    Obesity  Following with weight management center and is interested in GLP1 medications as well as bariatric surgery  Ok from endocrine standpoint to proceed with GLP1 agonist medications for weight loss  Will send message to Dr Luda Ascencio as per pt request     I have spent 50 minutes with patient today in which greater than 50% of this time was spent in counseling/coordination of care  All questioned fully answered  She will call me if any problems arise  Educated/ Counseled patient on diagnostic test results, prognosis, risk vs benefit of treatment options, importance of treatment compliance, healthy life and lifestyle choices

## 2022-09-22 ENCOUNTER — TELEPHONE (OUTPATIENT)
Dept: BARIATRICS | Facility: CLINIC | Age: 37
End: 2022-09-22

## 2022-09-22 DIAGNOSIS — E66.01 MORBID OBESITY WITH BMI OF 60.0-69.9, ADULT (HCC): Primary | ICD-10-CM

## 2022-09-22 RX ORDER — PEN NEEDLE, DIABETIC 32 GX 1/4"
NEEDLE, DISPOSABLE MISCELLANEOUS
Qty: 90 EACH | Refills: 3 | Status: SHIPPED | OUTPATIENT
Start: 2022-09-22

## 2022-09-22 NOTE — TELEPHONE ENCOUNTER
----- Message from Sophie Rodriguez DO sent at 9/20/2022 10:46 AM EDT -----  Regarding: GLP1  Good morning Dr Charlene Goldsmith,    I saw Ms Adriana Schulte today with my attending, Dr Christelle Kraus  We discussed that GLP1 medications would not be contraindicated in her situation and would be okay from an endocrine standpoint  We reviewed her thyroid pathology results  We will continue to improve her thyroid hormone replacement to get her thyroid function tests back to normal  Thank you!     Sophie Rodriguez DO  Endocrinology Fellow, VCP0

## 2022-09-23 ENCOUNTER — TELEPHONE (OUTPATIENT)
Dept: BARIATRICS | Facility: CLINIC | Age: 37
End: 2022-09-23

## 2022-10-03 ENCOUNTER — APPOINTMENT (OUTPATIENT)
Dept: LAB | Age: 37
End: 2022-10-03
Payer: COMMERCIAL

## 2022-10-03 DIAGNOSIS — E03.8 HYPOTHYROIDISM DUE TO HASHIMOTO'S THYROIDITIS: Primary | ICD-10-CM

## 2022-10-03 DIAGNOSIS — E06.3 HYPOTHYROIDISM DUE TO HASHIMOTO'S THYROIDITIS: ICD-10-CM

## 2022-10-03 DIAGNOSIS — R79.89 ELEVATED TSH: ICD-10-CM

## 2022-10-03 DIAGNOSIS — E03.8 HYPOTHYROIDISM DUE TO HASHIMOTO'S THYROIDITIS: ICD-10-CM

## 2022-10-03 DIAGNOSIS — E06.3 HASHIMOTO'S THYROIDITIS: ICD-10-CM

## 2022-10-03 DIAGNOSIS — D64.9 LOW HEMOGLOBIN: ICD-10-CM

## 2022-10-03 DIAGNOSIS — E06.3 HYPOTHYROIDISM DUE TO HASHIMOTO'S THYROIDITIS: Primary | ICD-10-CM

## 2022-10-03 DIAGNOSIS — E61.1 LOW SERUM IRON: ICD-10-CM

## 2022-10-03 DIAGNOSIS — E55.9 VITAMIN D DEFICIENCY: ICD-10-CM

## 2022-10-03 LAB
25(OH)D3 SERPL-MCNC: 29.6 NG/ML (ref 30–100)
BASOPHILS # BLD AUTO: 0.07 THOUSANDS/ΜL (ref 0–0.1)
BASOPHILS NFR BLD AUTO: 1 % (ref 0–1)
EOSINOPHIL # BLD AUTO: 0.22 THOUSAND/ΜL (ref 0–0.61)
EOSINOPHIL NFR BLD AUTO: 2 % (ref 0–6)
ERYTHROCYTE [DISTWIDTH] IN BLOOD BY AUTOMATED COUNT: 17.3 % (ref 11.6–15.1)
FERRITIN SERPL-MCNC: 44 NG/ML (ref 8–388)
HCT VFR BLD AUTO: 39.5 % (ref 34.8–46.1)
HGB BLD-MCNC: 11.5 G/DL (ref 11.5–15.4)
IMM GRANULOCYTES # BLD AUTO: 0.06 THOUSAND/UL (ref 0–0.2)
IMM GRANULOCYTES NFR BLD AUTO: 1 % (ref 0–2)
IRON SATN MFR SERPL: 16 % (ref 15–50)
IRON SERPL-MCNC: 66 UG/DL (ref 50–170)
LYMPHOCYTES # BLD AUTO: 2.33 THOUSANDS/ΜL (ref 0.6–4.47)
LYMPHOCYTES NFR BLD AUTO: 20 % (ref 14–44)
MCH RBC QN AUTO: 24.7 PG (ref 26.8–34.3)
MCHC RBC AUTO-ENTMCNC: 29.1 G/DL (ref 31.4–37.4)
MCV RBC AUTO: 85 FL (ref 82–98)
MONOCYTES # BLD AUTO: 0.73 THOUSAND/ΜL (ref 0.17–1.22)
MONOCYTES NFR BLD AUTO: 6 % (ref 4–12)
NEUTROPHILS # BLD AUTO: 8.07 THOUSANDS/ΜL (ref 1.85–7.62)
NEUTS SEG NFR BLD AUTO: 70 % (ref 43–75)
NRBC BLD AUTO-RTO: 0 /100 WBCS
PLATELET # BLD AUTO: 288 THOUSANDS/UL (ref 149–390)
PMV BLD AUTO: 11.2 FL (ref 8.9–12.7)
RBC # BLD AUTO: 4.65 MILLION/UL (ref 3.81–5.12)
T4 FREE SERPL-MCNC: 0.98 NG/DL (ref 0.76–1.46)
T4 FREE SERPL-MCNC: 1.02 NG/DL (ref 0.76–1.46)
TIBC SERPL-MCNC: 403 UG/DL (ref 250–450)
TSH SERPL DL<=0.05 MIU/L-ACNC: 6.54 UIU/ML (ref 0.45–4.5)
WBC # BLD AUTO: 11.48 THOUSAND/UL (ref 4.31–10.16)

## 2022-10-03 PROCEDURE — 82728 ASSAY OF FERRITIN: CPT

## 2022-10-03 PROCEDURE — 83550 IRON BINDING TEST: CPT

## 2022-10-03 PROCEDURE — 36415 COLL VENOUS BLD VENIPUNCTURE: CPT

## 2022-10-03 PROCEDURE — 82306 VITAMIN D 25 HYDROXY: CPT

## 2022-10-03 PROCEDURE — 84443 ASSAY THYROID STIM HORMONE: CPT

## 2022-10-03 PROCEDURE — 83540 ASSAY OF IRON: CPT

## 2022-10-03 PROCEDURE — 85025 COMPLETE CBC W/AUTO DIFF WBC: CPT

## 2022-10-03 PROCEDURE — 84439 ASSAY OF FREE THYROXINE: CPT

## 2022-10-03 RX ORDER — LEVOTHYROXINE SODIUM 0.03 MG/1
25 TABLET ORAL DAILY
Qty: 30 TABLET | Refills: 3 | Status: ON HOLD | OUTPATIENT
Start: 2022-10-03 | End: 2022-12-06 | Stop reason: SDUPTHER

## 2022-10-04 ENCOUNTER — TELEPHONE (OUTPATIENT)
Dept: ENDOCRINOLOGY | Facility: CLINIC | Age: 37
End: 2022-10-04

## 2022-10-04 NOTE — TELEPHONE ENCOUNTER
----- Message from Duane Calhoun DO sent at 10/3/2022  4:35 PM EDT -----  Please let patient know results  TSH is elevated, we will increase to 225mcg levothyroxine daily and recheck labs in 6 weeks  I will send this to pharmacy today  Vitamin D is mildly low, start over the counter 1000 units vitamin D daily

## 2022-10-10 ENCOUNTER — TELEMEDICINE (OUTPATIENT)
Dept: INTERNAL MEDICINE CLINIC | Age: 37
End: 2022-10-10
Payer: COMMERCIAL

## 2022-10-10 DIAGNOSIS — U07.1 COVID-19: ICD-10-CM

## 2022-10-10 DIAGNOSIS — I10 ESSENTIAL HYPERTENSION: Primary | ICD-10-CM

## 2022-10-10 PROCEDURE — 99213 OFFICE O/P EST LOW 20 MIN: CPT | Performed by: INTERNAL MEDICINE

## 2022-10-10 RX ORDER — NIRMATRELVIR AND RITONAVIR 300-100 MG
3 KIT ORAL 2 TIMES DAILY
Qty: 30 TABLET | Refills: 0 | Status: SHIPPED | OUTPATIENT
Start: 2022-10-10 | End: 2022-10-15

## 2022-10-10 NOTE — PROGRESS NOTES
COVID-19 Outpatient Progress Note    Assessment/Plan:    Problem List Items Addressed This Visit        Cardiovascular and Mediastinum    Essential hypertension - Primary       Other    COVID-19         Disposition:     I have spent 15 minutes directly with the patient  Greater than 50% of this time was spent in counseling/coordination of care regarding: diagnostic results, prognosis, risks and benefits of treatment options, instructions for management, patient and family education, importance of treatment compliance, risk factor reductions and impressions  Encounter provider: Merry Medel MD     Provider located at: [address] [place] [address] [address] [facility]  [phone]     Recent Visits  No visits were found meeting these conditions  Showing recent visits within past 7 days and meeting all other requirements  Future Appointments  No visits were found meeting these conditions  Showing future appointments within next 150 days and meeting all other requirements     This virtual check-in was done via telephone and she agrees to proceed  Patient agrees to participate in a virtual check in via telephone or video visit instead of presenting to the office to address urgent/immediate medical needs  Patient is aware this is a billable service  She acknowledged consent and understanding of privacy and security of the video platform  The patient has agreed to participate and understands they can discontinue the visit at any time  After connecting through Telephone, the patient was identified by name and date of birth  Anand Pruitt was informed that this was a telemedicine visit and that the exam was being conducted confidentially over secure lines  My office door was closed  No one else was in the room  Anand Pruitt acknowledged consent and understanding of privacy and security of the telemedicine visit   I informed the patient that I have reviewed her record in [address] [address] and presented the opportunity for her to ask any questions regarding the visit today  The patient agreed to participate  It was my intent to perform this visit via video technology but the patient was not able to do a video connection so the visit was completed via audio telephone only  Verification of patient location:  Patient is located in the following state in which I hold an active license: [place]    Subjective:   Lowell Harden is a 29 y o  female who is concerned about COVID-19  Patient's symptoms include fever, chills, fatigue, nasal congestion, sore throat, anosmia, loss of taste and cough  Patient denies shortness of breath, chest tightness, abdominal pain, nausea, diarrhea, myalgias and headaches  - Date of symptom onset: 10/9/2022      COVID-19 vaccination status: Fully vaccinated (primary series)    Exposure:   Contact with a person who is under investigation (PUI) for or who is positive for COVID-19 within the last 14 days?: No    Hospitalized recently for fever and/or lower respiratory symptoms?: No      Currently a healthcare worker that is involved in direct patient care?: No      Works in a special setting where the risk of COVID-19 transmission may be high? (this may include long-term care, correctional and penitentiary facilities; homeless shelters; assisted-living facilities and group homes ): No      Resident in a special setting where the risk of COVID-19 transmission may be high? (this may include long-term care, correctional and penitentiary facilities; homeless shelters; assisted-living facilities and group homes ): No      Lab Results   Component Value Date    SARSCOV2 Negative 09/20/2021    [address] [facility] Not Detected 11/03/2020       Review of Systems   Constitutional: Positive for chills, fatigue and fever  Negative for unexpected weight change  HENT: Positive for congestion and sore throat   Negative for ear pain, hearing loss, postnasal drip, sinus pressure, trouble swallowing and voice change  Eyes: Negative for visual disturbance  Respiratory: Positive for cough  Negative for chest tightness, shortness of breath and wheezing  Cardiovascular: Negative for chest pain, palpitations and leg swelling  Gastrointestinal: Negative for abdominal distention, abdominal pain, anal bleeding, blood in stool, constipation, diarrhea and nausea  Endocrine: Negative for cold intolerance, polydipsia, polyphagia and polyuria  Genitourinary: Negative for dysuria, flank pain, frequency, hematuria and urgency  Musculoskeletal: Negative for arthralgias, back pain, gait problem, joint swelling, myalgias and neck pain  Skin: Negative for rash  Allergic/Immunologic: Negative for immunocompromised state  Neurological: Negative for dizziness, syncope, facial asymmetry, weakness, light-headedness, numbness and headaches  Hematological: Negative for adenopathy  Psychiatric/Behavioral: Negative for confusion, sleep disturbance and suicidal ideas  Current Outpatient Medications on File Prior to Visit   Medication Sig   • levothyroxine 25 mcg tablet Take 1 tablet (25 mcg total) by mouth daily In addition to 200mcg daily for total of 225mcg daily     • albuterol (Ventolin HFA) 90 mcg/act inhaler Inhale 2 puffs every 6 (six) hours as needed for wheezing or shortness of breath   • ALPRAZolam (XANAX) 0 25 mg tablet Take 1 tablet (0 25 mg total) by mouth 3 (three) times a day as needed for anxiety   • buPROPion (Wellbutrin XL) 150 mg 24 hr tablet Take 1 tablet (150 mg total) by mouth daily   • citalopram (CeleXA) 20 mg tablet Take 1 tablet (20 mg total) by mouth daily   • fluticasone (FLONASE) 50 mcg/act nasal spray 2 sprays into each nostril daily   • furosemide (LASIX) 40 mg tablet Take 1 tablet (40 mg total) by mouth daily   • Insulin Pen Needle (BD Pen Needle Micro U/F) 32G X 6 MM MISC Use daily as directed   • Iron-Vitamin C  MG TABS Take 1 tablet by mouth 2 (two) times a day   • levothyroxine 200 mcg tablet Take 1 tablet (200 mcg total) by mouth daily   • liraglutide (SAXENDA) injection Inject 0 6 mg subcutaneously once per day for the first week  Increase in weekly intervals by 0 6 mg until a dose of 3 mg is reached   • loratadine (CLARITIN) 10 mg tablet Take 10 mg by mouth daily   • losartan (Cozaar) 100 MG tablet Take 1 tablet (100 mg total) by mouth daily   • Multiple Vitamins-Minerals (ONE-A-DAY WOMENS PO) Take by mouth   • ondansetron (ZOFRAN-ODT) 4 mg disintegrating tablet Take 1 tablet (4 mg total) by mouth every 8 (eight) hours as needed for nausea or vomiting   • triamcinolone (KENALOG) 0 1 % cream APPLY TOPICALLY DAILY AT BEDTIME AS NEEDED FOR RASH       Objective:    LMP 12/17/2019      Physical Exam  Roma Carver MD  Virtual Brief Visit    Patient is located in the following state in which I hold an active license [place]      Assessment/Plan:    Problem List Items Addressed This Visit        Cardiovascular and Mediastinum    Essential hypertension - Primary       Other    COVID-19    Relevant Medications    nirmatrelvir & ritonavir (Paxlovid, 300/100,) tablet therapy pack          Recent Visits  No visits were found meeting these conditions  Showing recent visits within past 7 days and meeting all other requirements  Today's Visits  Date Type Provider Dept   10/10/22 Telemedicine Roma Carver MD [facility]   Showing today's visits and meeting all other requirements  Future Appointments  No visits were found meeting these conditions    Showing future appointments within next 150 days and meeting all other requirements         I spent 15 minutes directly with the patient during this visit

## 2022-10-12 DIAGNOSIS — F41.8 MIXED ANXIETY DEPRESSIVE DISORDER: ICD-10-CM

## 2022-10-13 RX ORDER — CITALOPRAM 20 MG/1
20 TABLET ORAL DAILY
Qty: 30 TABLET | Refills: 0 | Status: SHIPPED | OUTPATIENT
Start: 2022-10-13

## 2022-10-17 NOTE — PROGRESS NOTES
Behavioral Health Follow Up Note:       No required Weight Checks: Dr Yusra Betts  ( had surgery with Dr Charles Garsia in the past )  Approved for surgery         Starting weight 378 5  #  Today's weight 357 3  #     Following Dr Daune Kussmaul for weight loss  Goal:   487 02 #   (lymphedema will challenge her weight loss efforts)        Mental health and wellness -  following MWM with Rx  Had COVID (10/10/2022)  and lost her sense of smell/taste  On Wellbutrin  And feels the adjustment has been helpful with her mood  Mindful eating practices  Eating behaviors -   Food recall for today:  (is tracking) protein shake for breakfast  Small lunch due to today's weigh in  Feels Saxenda suppresses her appetite  Ordered on 9/22/2022     Activity -  Gym at work for an hour: [address]  Using the stairs at work    [employer] walking  Being mindful with her tolerance  Lymphedema in her leg interferes  (has compression pumps, not helping)     GOALS:   Could improve motivation,  Can improve her strength training  Progress toward program requirements     Workflow:  · Psych and/or D+A Clearance: N/A  · PCP Letter: 10/26/2021  · Support Group: 2/22/2022  · Surgeon Appt  : 1/14/2022  · EGD : 3/16/2022  · Cardiac Risk Assessment: 2/24/2022    New letter 7/26/22   Expires in January     · Sleep Studies: 2/17/2022  · Bloodwork:    PENDING  TSH = 6 54

## 2022-10-21 ENCOUNTER — OFFICE VISIT (OUTPATIENT)
Dept: BARIATRICS | Facility: CLINIC | Age: 37
End: 2022-10-21

## 2022-10-21 ENCOUNTER — PREP FOR PROCEDURE (OUTPATIENT)
Dept: BARIATRICS | Facility: CLINIC | Age: 37
End: 2022-10-21

## 2022-10-21 VITALS — BODY MASS INDEX: 59.46 KG/M2 | WEIGHT: 293 LBS

## 2022-10-21 DIAGNOSIS — E78.5 HYPERLIPIDEMIA, UNSPECIFIED HYPERLIPIDEMIA TYPE: ICD-10-CM

## 2022-10-21 DIAGNOSIS — I10 ESSENTIAL HYPERTENSION, BENIGN: ICD-10-CM

## 2022-10-21 DIAGNOSIS — E28.2 PCOS (POLYCYSTIC OVARIAN SYNDROME): ICD-10-CM

## 2022-10-21 DIAGNOSIS — Z99.89 OSA ON CPAP: ICD-10-CM

## 2022-10-21 DIAGNOSIS — I10 ESSENTIAL HYPERTENSION: ICD-10-CM

## 2022-10-21 DIAGNOSIS — G47.33 OSA (OBSTRUCTIVE SLEEP APNEA): ICD-10-CM

## 2022-10-21 DIAGNOSIS — E28.2 POLYCYSTIC OVARIES: ICD-10-CM

## 2022-10-21 DIAGNOSIS — G47.33 OSA ON CPAP: ICD-10-CM

## 2022-10-21 DIAGNOSIS — E66.9 OBESITY, CLASS I, BMI 30-34.9: Primary | ICD-10-CM

## 2022-10-21 DIAGNOSIS — E66.01 MORBID OBESITY (HCC): Primary | ICD-10-CM

## 2022-10-21 PROCEDURE — RECHECK

## 2022-10-21 PROCEDURE — RECHECK: Performed by: DIETITIAN, REGISTERED

## 2022-10-21 NOTE — PROGRESS NOTES
Bariatric Nutrition Follow-Up Note    Type of surgery    Initial surgery evaluation 1/11/2022  Preop, no required weight checks  Surgery Date: TBD- tentative May-June 2022  Deadline September 2022  Surgeon: Dr Pascual Odom  40 y o   female     Wt with BMI of 25: 150lbs  Pre-Op Excess Wt: 228 5lbs  Wt (!) 162 kg (357 lb 4 8 oz)   LMP 12/17/2019   BMI 59 46 kg/m²    15lb weight loss since last office visit 9/9/22  Net 21 2lb wt loss since starting program in January  Pt has met her goal weight to schedule surgery and needs additional 16 65lbs by surgery date  Started higher dose thyroid med on 8/16/22  Saw [facility] 9/9/22, Dana Garcia started  Endocrinology consult 9/20/22  Liraglutide ordered 9/22/22  Levothyroxine increased from 200 to 225mg on 10/3/22  [facility] F/U scheduled for 12/9/22     5% wt loss=18 925#=Goal of 359 575# TO SCHEDULE SURGERY  10% wt loss=37 85#=Goal of 340 65# ON DAY OF SURGERY      [facility] Equation:     Weight maintenance= 2895 kcal/day  Estimated calories for weight loss 4572-9272 kcal/day ( 1-2# per wk wt loss - sedentary )  Estimated protein needs 68 2-81 8 g/day (1 0-1 2 gms/kg IBW )   Estimated fluid needs 0120-0441 ml/day (30-35 ml/kg IBW )      Review of History and Medications   Past Medical History:   Diagnosis Date   • Anxiety    • Bruises easily    • Cancer ([facility] [address] )    • Depression    • Disease of thyroid gland    • DELVALLE (dyspnea on exertion)    • 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   • Endometriosis    • Follicular thyroid cancer ([address] )    • Gastroenteritis    • Hashimoto's disease    • Hepatic steatosis    • Hyperlipidemia    • Hypertension    • Hypothyroid    • IBS (irritable bowel syndrome)    • Kidney lesion, native, left    • Lymphedema     left leg   • Obesity    • Palpitation    • Pancreatitis    • Pneumonia    • Polycystic ovarian disease    • Polyuria    • Sleep apnea    • Thyroid nodule    • Thyromegaly    • Tinea corporis    • Tinea pedis      Past Surgical History:   Procedure Laterality Date   • ADENOIDECTOMY     • DILATION AND CURETTAGE OF UTERUS  2020   • HYSTERECTOMY  02/2020    left ovaries   • THYROIDECTOMY, PARTIAL Left 06/2019    benign   • TONSILLECTOMY     • US GUIDED THYROID BIOPSY  03/01/2019     Social History     Socioeconomic History   • Marital status: /Civil Union     Spouse name: Not on file   • Number of children: Not on file   • Years of education: Not on file   • Highest education level: Not on file   Occupational History   • Not on file   Tobacco Use   • Smoking status: Never Smoker   • Smokeless tobacco: Never Used   Vaping Use   • Vaping Use: Never used   Substance and Sexual Activity   • Alcohol use: Not Currently     Alcohol/week: 0 0 standard drinks   • Drug use: Never   • Sexual activity: Not on file   Other Topics Concern   • Not on file   Social History Narrative   • Not on file     Social Determinants of Health     Financial Resource Strain: Not on file   Food Insecurity: Not on file   Transportation Needs: Not on file   Physical Activity: Not on file   Stress: Not on file   Social Connections: Not on file   Intimate Partner Violence: Not on file   Housing Stability: Not on file       Current Outpatient Medications:   •  albuterol (Ventolin HFA) 90 mcg/act inhaler, Inhale 2 puffs every 6 (six) hours as needed for wheezing or shortness of breath, Disp: 18 g, Rfl: 0  •  ALPRAZolam (XANAX) 0 25 mg tablet, Take 1 tablet (0 25 mg total) by mouth 3 (three) times a day as needed for anxiety, Disp: 60 tablet, Rfl: 0  •  buPROPion (Wellbutrin XL) 150 mg 24 hr tablet, Take 1 tablet (150 mg total) by mouth daily, Disp: 30 tablet, Rfl: 2  •  citalopram (CeleXA) 20 mg tablet, Take 1 tablet (20 mg total) by mouth daily, Disp: 30 tablet, Rfl: 0  •  fluticasone (FLONASE) 50 mcg/act nasal spray, 2 sprays into each nostril daily, Disp: 16 g, Rfl: 0  •  furosemide (LASIX) 40 mg tablet, Take 1 tablet (40 mg total) by mouth daily, Disp: 30 tablet, Rfl: 5  •  Insulin Pen Needle (BD Pen Needle Micro U/F) 32G X 6 MM MISC, Use daily as directed, Disp: 90 each, Rfl: 3  •  Iron-Vitamin C  MG TABS, Take 1 tablet by mouth 2 (two) times a day, Disp: 100 tablet, Rfl: 3  •  levothyroxine 200 mcg tablet, Take 1 tablet (200 mcg total) by mouth daily, Disp: 30 tablet, Rfl: 5  •  levothyroxine 25 mcg tablet, Take 1 tablet (25 mcg total) by mouth daily In addition to 200mcg daily for total of 225mcg daily  , Disp: 30 tablet, Rfl: 3  •  liraglutide (SAXENDA) injection, Inject 0 6 mg subcutaneously once per day for the first week  Increase in weekly intervals by 0 6 mg until a dose of 3 mg is reached, Disp: 15 mL, Rfl: 0  •  loratadine (CLARITIN) 10 mg tablet, Take 10 mg by mouth daily, Disp: , Rfl:   •  losartan (Cozaar) 100 MG tablet, Take 1 tablet (100 mg total) by mouth daily, Disp: 90 tablet, Rfl: 0  •  Multiple Vitamins-Minerals (ONE-A-DAY WOMENS PO), Take by mouth, Disp: , Rfl:   •  ondansetron (ZOFRAN-ODT) 4 mg disintegrating tablet, Take 1 tablet (4 mg total) by mouth every 8 (eight) hours as needed for nausea or vomiting, Disp: 15 tablet, Rfl: 0  •  triamcinolone (KENALOG) 0 1 % cream, APPLY TOPICALLY DAILY AT BEDTIME AS NEEDED FOR RASH, Disp: 80 g, Rfl: 1     Food Intake and Lifestyle Assessment  Food Intake Assessment completed via [address] logs  Breakfast: Fair Life shake  Lunch: blueberries  Beverage intake: water and coffee, propel, Powerade Zero occasionally  Protein supplement: Fair Life or Premier  Estimated protein intake per day: 50-70g  Estimated fluid intake per day: 1-3 cups coffee, 32 oz sapp x 4-5  Meals eaten away from home: 3 nights per week orders out for dinner  Gets lunch from [employer] or [employer] place several days per week   1 night per week now (salad works)  Typical meal pattern: 2 meals per day and 2-3 snacks per day  Eating Behaviors: Consumption of high calorie/ high fat foods and Frequent snacking/ grazing  Pt reports past struggles with stress eating/emotional eating  Pt reports main triggers of stress are financial and her stressful job  Pt reports improvements in this  Much less stress eating triggers  Pt reports since starting the Wellbutrin and the Saxenda she has had very little appetite and desire for food and feels satisfied on much smaller portions  Pt is setting an alarm to remind herself to eat lunch at work  Food allergies or intolerances: uses almond milk, states sometimes regular milk gives her stomach upset  Allergies   Allergen Reactions   • Dust Mite Extract Other (See Comments)     Sinus inflammation   • Latex Blisters   • Molds & Smuts Other (See Comments)     Sinus inflammation   • Penicillins Rash     Skin rash and sheds   • Jorge Luis Grass Pollen Allergen Other (See Comments)     Sinus inflammation     Cultural or Restoration considerations: none noted    Physical Assessment -updates in bold  Physical Activity  Types of exercise: Sedentary job  Tries to walk around work building on lunch break  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  Pt reports she will sometimes put meetings on her cell phone with earbuds and walk during them  Pt reports since our last meeting she has increased her walking up and down stairs: six flights of stairs per day  Will go to the gym across from her office and walk on treadmill or ellpitical  Current physical limitations: lymphedema in left leg since hysterectomy, back pain    Psychosocial Assessment   Support systems: spouse:  Spouse had bariatric surgery in August 2021 and is very successful  Cousin and friend are also post bariatric  Socioeconomic factors: works for [employer]:  Mental health services  Pt reports that she has been addressing her emotional eating with her therapist whom she sees monthly  Also provided pt with the Bjond      Nutrition Diagnosis-continued  Diagnosis: Overweight / Obesity (NC-3 3) and Excessive energy intake (NI-1 5)  Related to: Physical inactivity and Excessive energy intake  As Evidenced by: BMI >25, Excessive energy intake and Unintentional weight gain     Nutrition Prescription: Recommend the following diet  Regular  Instructed pt 8407-4433 kcal idea calorie goal, no less than 1000 kcal/day to maintain lean body mass and BMR  Interventions and Teaching   Discussed pre-op and post-op nutrition guidelines  Patient educated and handouts provided  Surgical changes to stomach / GI  Capacity of post-surgery stomach  Diet progression  Adequate hydration  Sugar and fat restriction to decrease "dumping syndrome"  Expected weight loss:  Reviewed pre-op weight loss goals and current weight loss progress  Weight loss plateaus/ possibility of weight regain  Exercise:  Discussed using office and home stairs  Suggestions for pre-op diet  Nutrition considerations after surgery  Protein supplements  Meal planning and preparation:   Appropriate carbohydrate, protein, and fat intake, and food/fluid choices to maximize safe weight loss, nutrient intake, and tolerance   Dietary and lifestyle changes  Possible problems with poor eating habits  Techniques for self monitoring and keeping daily food journal   Reviewed Baritastic journals with pt  Potential for food intolerance after surgery, and ways to deal with them including: lactose intolerance, nausea, reflux, vomiting, diarrhea, food intolerance, appetite changes, gas  Reviewed post-op bowel habit protocol in ch 3 of manual with pt    Vitamin / Mineral supplementation of Multivitamin with minerals and Vitamin D  Takes one a day multi    Patient is not currently pregnant and doesn't desire to become pregnant a minimum of one year post-op    Education provided to: patient  Barriers to learning: No barriers identified  Readiness to change: action  Prior research on procedure: pre-op class and friends or family  Comprehension: needs reinforcement and verbalizes understanding   Expected Compliance: fair    Evaluation / Monitoring  Dietitian to Monitor: Eating pattern as discussed Tolerance of nutrition prescription Body weight Lab values Physical activity    Pt's only remaining tasks to get to surgery are normalize TSH Pt brought several weeks of food logs which show pt drinking a Premier Protein or Fair Life protein drink for breakfast and lunch, eating one meal for dinner ,and having one to two snacks of fruit, sugar-free jello, or string cheese  Pt reports she is exercising daily  Goals  Eliminate sugar sweetened beverages, Food journal, Exercise 30 minutes 5 times per week, Complete lession plans 1-6, Eat 3 meals per day and Eliminate mindless snacking   Previous Session Goals:   Pt reports drinking two meal replacements, one meal and 1-2 snacks per day  Pt brought several weeks of printed food journals and logged the last week in [employer]  Today's goals:  Remember to eat 3-4 times per day, set alarm if necessary  Continue partial liquid protein diet, 1000 kcal/day  Remaining Workflow:   o TSH significantly elevated:  Pt saw endocrinology, dosage adjusted, has had significant improvement, last TSH level down to 6 540, due to re-check again mid-November  • Weight Loss:  o 5% wt loss=18 925#=Goal of 359 575# TO SCHEDULE SURGERY  o 10% wt loss=37 85#=Goal of 340 65# ON DAY OF SURGERY      Time Spent:   30 minutes

## 2022-10-24 ENCOUNTER — TELEPHONE (OUTPATIENT)
Dept: BARIATRICS | Facility: CLINIC | Age: 37
End: 2022-10-24

## 2022-10-24 NOTE — TELEPHONE ENCOUNTER
Bee Cordova has been scheduled for surgery on 12/5 RNY  She stated she is taking [place] and Wellbutrin  She is asking if she needs to stop this medication prior to her surgery and if so how far in advance so I can provide her with this information  Spoke with Dr Johnathon Gaona he stated she must stop the Saxenda 2 weeks before surgery the Wellbutrin she can continue  Patient was called and provided information

## 2022-10-27 ENCOUNTER — TELEPHONE (OUTPATIENT)
Dept: BARIATRICS | Facility: CLINIC | Age: 37
End: 2022-10-27

## 2022-10-27 NOTE — TELEPHONE ENCOUNTER
Dimple: Agreed with stopping of Saxenda 2wks prior to surgery  Clinical:  Please inform patient that I am in agreement with her stopping Saxenda two weeks prior to surgery  She can continue Wellbutrin however as she will no longer be following with medical weight management she will need to follow up with her PCP for continuation of this medication  If patient has any questions or concerns she is welcome to schedule a follow-up in the office

## 2022-10-27 NOTE — TELEPHONE ENCOUNTER
----- Message from Ganga Frederick sent at 10/27/2022  9:16 AM EDT -----  Regarding: FW: MEDICATION QUESTION TO STOP BEFORE SURGERY OR NOT  Spoke with Dr Mauricio Contreras he stated she must stop the Saxenda 2 weeks before surgery the Wellbutrin she can continue  Patient was called and provided information     ----- Message -----  From: Ganga Giritech  Sent: 10/24/2022  10:32 AM EDT  To: Darrell Barragan DO  Subject: MEDICATION QUESTION TO STOP BEFORE SURGERY O#    Tomer Kaba has been scheduled for surgery on 12/5 RNY  She stated she is taking [place] and Wellbutrin  She is asking if she needs to stop this medication prior to her surgery and if so how far in advance so I can provide her with this information

## 2022-11-08 DIAGNOSIS — I10 ESSENTIAL HYPERTENSION: ICD-10-CM

## 2022-11-10 ENCOUNTER — OFFICE VISIT (OUTPATIENT)
Dept: BARIATRICS | Facility: CLINIC | Age: 37
End: 2022-11-10

## 2022-11-10 ENCOUNTER — CLINICAL SUPPORT (OUTPATIENT)
Dept: BARIATRICS | Facility: CLINIC | Age: 37
End: 2022-11-10

## 2022-11-10 VITALS
HEIGHT: 65 IN | HEART RATE: 80 BPM | SYSTOLIC BLOOD PRESSURE: 122 MMHG | TEMPERATURE: 97.8 F | DIASTOLIC BLOOD PRESSURE: 82 MMHG | WEIGHT: 293 LBS | BODY MASS INDEX: 48.82 KG/M2

## 2022-11-10 DIAGNOSIS — E66.01 MORBID (SEVERE) OBESITY DUE TO EXCESS CALORIES (HCC): Primary | ICD-10-CM

## 2022-11-10 RX ORDER — CELECOXIB 200 MG/1
200 CAPSULE ORAL ONCE
OUTPATIENT
Start: 2022-11-10 | End: 2022-11-10

## 2022-11-10 RX ORDER — METRONIDAZOLE 500 MG/100ML
500 INJECTION, SOLUTION INTRAVENOUS ONCE
OUTPATIENT
Start: 2022-11-10 | End: 2022-11-10

## 2022-11-10 RX ORDER — SCOLOPAMINE TRANSDERMAL SYSTEM 1 MG/1
1 PATCH, EXTENDED RELEASE TRANSDERMAL ONCE
OUTPATIENT
Start: 2022-11-10 | End: 2022-11-10

## 2022-11-10 RX ORDER — ACETAMINOPHEN 325 MG/1
975 TABLET ORAL ONCE
OUTPATIENT
Start: 2022-11-10 | End: 2022-11-10

## 2022-11-10 RX ORDER — LOSARTAN POTASSIUM 100 MG/1
TABLET ORAL
Qty: 90 TABLET | Refills: 0 | Status: SHIPPED | OUTPATIENT
Start: 2022-11-10 | End: 2022-11-16

## 2022-11-10 RX ORDER — LEVOFLOXACIN 5 MG/ML
750 INJECTION, SOLUTION INTRAVENOUS ONCE
OUTPATIENT
Start: 2022-11-10 | End: 2022-11-10

## 2022-11-10 RX ORDER — GABAPENTIN 300 MG/1
600 CAPSULE ORAL ONCE
OUTPATIENT
Start: 2022-11-10 | End: 2022-11-10

## 2022-11-10 RX ORDER — HEPARIN SODIUM 5000 [USP'U]/ML
5000 INJECTION, SOLUTION INTRAVENOUS; SUBCUTANEOUS
OUTPATIENT
Start: 2022-11-11 | End: 2022-11-12

## 2022-11-10 NOTE — PROGRESS NOTES
BARIATRIC HISTORY AND PHYSICAL - BARIATRIC SURGERY  Jaren Sanchez 40 y o  female MRN: [id number]  Unit/Bed#:  Encounter: [id number]      HPI:  Jaren Sanchez is a 40 y o  female who presents to review their preoperative workup and see if they are a good candidate to undergo a bariatric procedure  Review of Systems   Constitutional: Negative for chills and fever  HENT: Negative for ear pain and sore throat  Eyes: Negative for pain and visual disturbance  Respiratory: Negative for cough and shortness of breath  Cardiovascular: Negative for chest pain and palpitations  Gastrointestinal: Negative for abdominal pain and vomiting  Genitourinary: Negative for dysuria and hematuria  Musculoskeletal: Negative for arthralgias and back pain  Skin: Negative for color change and rash  Neurological: Negative for seizures and syncope  All other systems reviewed and are negative        Historical Information   Past Medical History:   Diagnosis Date   • Anxiety    • Bruises easily    • Cancer ([email or web address] )    • Depression    • Disease of thyroid gland    • DELVALLE (dyspnea on exertion)    • 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   • Endometriosis    • Follicular thyroid cancer ([email or web address] )    • Gastroenteritis    • Hashimoto's disease    • Hepatic steatosis    • Hyperlipidemia    • Hypertension    • Hypothyroid    • IBS (irritable bowel syndrome)    • Kidney lesion, native, left    • Lymphedema     left leg   • Obesity    • Palpitation    • Pancreatitis    • Pneumonia    • Polycystic ovarian disease    • Polyuria    • Sleep apnea    • Thyroid nodule    • Thyromegaly    • Tinea corporis    • Tinea pedis      Past Surgical History:   Procedure Laterality Date   • ADENOIDECTOMY     • DILATION AND CURETTAGE OF UTERUS  2020   • HYSTERECTOMY  02/2020    left ovaries   • THYROIDECTOMY, PARTIAL Left 06/2019    benign   • TONSILLECTOMY     • Lawanda Larsen THYROID BIOPSY  03/01/2019     Social History   Social History Substance and Sexual Activity   Alcohol Use Not Currently   • Alcohol/week: 0 0 standard drinks     Social History     Substance and Sexual Activity   Drug Use Never     Social History     Tobacco Use   Smoking Status Never Smoker   Smokeless Tobacco Never Used     Family History:   Family History   Problem Relation Age of Onset   • Hyperlipidemia Mother    • Restless legs syndrome Mother    • Sleep apnea Father    • Hyperlipidemia Father    • Hypertension Father    • Hypertension Maternal Grandmother    • Restless legs syndrome Maternal Grandmother    • Ovarian cancer Maternal Grandmother    • Cancer Paternal Grandmother        Meds/Allergies   all medications and allergies reviewed  Allergies   Allergen Reactions   • Dust Mite Extract Other (See Comments)     Sinus inflammation   • Latex Blisters   • Molds & Smuts Other (See Comments)     Sinus inflammation   • Penicillins Rash     Skin rash and sheds   • Jorge Luis Grass Pollen Allergen Other (See Comments)     Sinus inflammation       Objective     Current Vitals:      bowel movement  [unfilled]    Invasive Devices  Report    None                 Physical Exam  Constitutional:       Appearance: Normal appearance  She is obese  HENT:      Head: Normocephalic and atraumatic  Nose: Nose normal       Mouth/Throat:      Mouth: Mucous membranes are moist    Eyes:      Extraocular Movements: Extraocular movements intact  Pupils: Pupils are equal, round, and reactive to light  Cardiovascular:      Rate and Rhythm: Normal rate and regular rhythm  Pulses: Normal pulses  Heart sounds: Normal heart sounds  Pulmonary:      Effort: Pulmonary effort is normal       Breath sounds: Normal breath sounds  Abdominal:      Palpations: Abdomen is soft  Musculoskeletal:      Cervical back: Normal range of motion  Skin:     General: Skin is warm  Neurological:      General: No focal deficit present        Mental Status: She is alert and oriented to person, place, and time  Psychiatric:         Mood and Affect: Mood normal          Behavior: Behavior normal          Lab Results: I have personally reviewed pertinent lab results  Imaging: I have personally reviewed pertinent reports  EKG, Pathology, and Other Studies: I have personally reviewed pertinent reports  [id number]  [facility]  [address]  [phone]        DATE OF SERVICE:  3/16/22     PHYSICIAN(S):  Eugene Gonzalez MD Proceduralist         INDICATION:  Morbid (severe) obesity due to excess calories ([address] )     POST-OP DIAGNOSIS:  See the impression below      PREPROCEDURE:  Informed consent was obtained for the procedure, including sedation  Risks of perforation, hemorrhage, adverse drug reaction and aspiration were discussed  The patient was placed in the left lateral decubitus position      Patient was explained about the risks and benefits of the procedure  Risks including but not limited to bleeding, infection, and perforation were explained in detail  Also explained about less than 100% sensitivity with the exam and other alternatives      DETAILS OF PROCEDURE:  Patient was taken to the procedure room where a time out was performed to confirm correct patient and correct procedure  The patient underwent monitored anesthesia care, which was administered by an anesthesia professional  The patient's blood pressure, heart rate, level of consciousness, respirations and oxygen were monitored throughout the procedure  The scope was advanced to the second part of the duodenum  Retroflexion was performed in the fundus  The patient experienced no blood loss  The procedure was not difficult  The patient tolerated the procedure well   There were no apparent complications       ANESTHESIA INFORMATION:  ASA: III  Anesthesia Type: General     MEDICATIONS:  No administrations occurring from 1109 to 1111 on 03/16/22         FINDINGS:  · The esophagus and 2nd part of the duodenum appeared normal   · Performed single biopsy to rule out H  pylori in the antrum  · Regular Z-line 40 cm from the incisors  · Mild abnormal mucosa with erosion in the antrum        SPECIMENS:  * No specimens in log *        IMPRESSION:  Gastric erosions in the antrum  Otherwise normal findings     RECOMMENDATION:  There is no recommended follow-up for this procedure      Julius Chen MD     Specimens  A Stomach, r/o h pylori  Final Diagnosis  A  Stomach, biopsy:  - Benign gastric antral mucosa with mild chronic nonspecific inflammation   - No Helicobacter pylori organisms are identified with routine H&E stain  Code Status: [unfilled]  Advance Directive and Living Will:      Power of :    POLST:        Assessment/Plan:    The patient presented to review the preoperative workup and see if bariatric surgery is appropriate and indicated following the extensive preoperative workup and the enrollment in our weight loss program   Preoperative workup was complete  Results were reviewed with the patient including the blood work results and the endoscopy findings and the biopsy results  We also reviewed the cardiology evaluation  The patient was determined to be a good candidate for Laparoscopic RNYGB with robotic assist   ----------------------------------------------------------------------  Smoking: Denies  Blood thinner use: Denies  Home pain medication use and who manages it: Denies  CPAP use: Yes (If yes, sleep study obtained and reminded pt to bring CPAP to hospital)  History of blood clots: Denies  Previous abdominal surgeries: hysterectomy  Cardiac clearance obtained: 2/24 then 7/22 update   EKG performed: 7/22/22  EGD prior to surgery: 3/16/22 Gastric erosions  Screening Labs obtained (CBC, CMP): 7/28/22  H   Pylori status: Negative  Medication allergies: latex, PCN (rash)  SLIM consult Post-Op: Yes  Reminded patient about 2 week pre-op liquid diet: Yes  10% body weight loss pre-operatively met/discussed: Yes  Consent signed: Yes  Pre-Op ERAS Orders placed: DONE with levoquin  -----------------------------------------------------------------------  Risks and benefits explained one more time to the patient  Alternatives to surgery and alternative forms of surgery were also explained  Post-surgical commitment and after care programs were explained  We also discussed that the [employer] robot may be available to us to use on the day of the patient procedure and that the procedure may be performed robotically  I discussed in details the advantages and disadvantages of this approach including the potential decrease in postoperative pain because of the remote center technology  I also mentioned the lack of strong evidence for the use of robot in bariatric patients and the potential disadvantage to patients because of the prolonged operative time  Consent was signed  Questions were answered and concerns were addressed  Patient will need to start the 2 week liquid diet prior to surgery  Patient wishes to proceed  As per [facility] guidelines, I had a discussion with the patient regarding their CODE STATUS in the perioperative period and the patient is level 1 or FULL CODE STATUS      Neha Contreras PA-C  Bariatric Surgery   11/10/2022  11:48 AM

## 2022-11-10 NOTE — H&P (VIEW-ONLY)
BARIATRIC HISTORY AND PHYSICAL - BARIATRIC SURGERY  Luisa Hernandez 40 y o  female MRN: [id number]  Unit/Bed#:  Encounter: [id number]      HPI:  Luisa Hernandez is a 40 y o  female who presents to review their preoperative workup and see if they are a good candidate to undergo a bariatric procedure  Review of Systems   Constitutional: Negative for chills and fever  HENT: Negative for ear pain and sore throat  Eyes: Negative for pain and visual disturbance  Respiratory: Negative for cough and shortness of breath  Cardiovascular: Negative for chest pain and palpitations  Gastrointestinal: Negative for abdominal pain and vomiting  Genitourinary: Negative for dysuria and hematuria  Musculoskeletal: Negative for arthralgias and back pain  Skin: Negative for color change and rash  Neurological: Negative for seizures and syncope  All other systems reviewed and are negative        Historical Information   Past Medical History:   Diagnosis Date   • Anxiety    • Bruises easily    • [facility] ([facility] )    • Depression    • Disease of thyroid gland    • DELVALLE (dyspnea on exertion)    • 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   • Endometriosis    • Follicular thyroid cancer ([facility] )    • Gastroenteritis    • Hashimoto's disease    • Hepatic steatosis    • Hyperlipidemia    • Hypertension    • Hypothyroid    • IBS (irritable bowel syndrome)    • Kidney lesion, native, left    • Lymphedema     left leg   • Obesity    • Palpitation    • Pancreatitis    • Pneumonia    • Polycystic ovarian disease    • Polyuria    • Sleep apnea    • Thyroid nodule    • Thyromegaly    • Tinea corporis    • Tinea pedis      Past Surgical History:   Procedure Laterality Date   • ADENOIDECTOMY     • DILATION AND CURETTAGE OF UTERUS  2020   • HYSTERECTOMY  02/2020    left ovaries   • THYROIDECTOMY, PARTIAL Left 06/2019    benign   • TONSILLECTOMY     • Lawanda Shaikh4 THYROID BIOPSY  03/01/2019     Social History   Social History Substance and Sexual Activity   Alcohol Use Not Currently   • Alcohol/week: 0 0 standard drinks     Social History     Substance and Sexual Activity   Drug Use Never     Social History     Tobacco Use   Smoking Status Never Smoker   Smokeless Tobacco Never Used     Family History:   Family History   Problem Relation Age of Onset   • Hyperlipidemia Mother    • Restless legs syndrome Mother    • Sleep apnea Father    • Hyperlipidemia Father    • Hypertension Father    • Hypertension Maternal Grandmother    • Restless legs syndrome Maternal Grandmother    • Ovarian cancer Maternal Grandmother    • Cancer Paternal Grandmother        Meds/Allergies   all medications and allergies reviewed  Allergies   Allergen Reactions   • Dust Mite Extract Other (See Comments)     Sinus inflammation   • Latex Blisters   • Molds & Smuts Other (See Comments)     Sinus inflammation   • Penicillins Rash     Skin rash and sheds   • Jorge Luis Grass Pollen Allergen Other (See Comments)     Sinus inflammation       Objective     Current Vitals:      bowel movement  [unfilled]    Invasive Devices  Report    None                 Physical Exam  Constitutional:       Appearance: Normal appearance  She is obese  HENT:      Head: Normocephalic and atraumatic  Nose: Nose normal       Mouth/Throat:      Mouth: Mucous membranes are moist    Eyes:      Extraocular Movements: Extraocular movements intact  Pupils: Pupils are equal, round, and reactive to light  Cardiovascular:      Rate and Rhythm: Normal rate and regular rhythm  Pulses: Normal pulses  Heart sounds: Normal heart sounds  Pulmonary:      Effort: Pulmonary effort is normal       Breath sounds: Normal breath sounds  Abdominal:      Palpations: Abdomen is soft  Musculoskeletal:      Cervical back: Normal range of motion  Skin:     General: Skin is warm  Neurological:      General: No focal deficit present        Mental Status: She is alert and oriented to person, place, and time  Psychiatric:         Mood and Affect: Mood normal          Behavior: Behavior normal          Lab Results: I have personally reviewed pertinent lab results  Imaging: I have personally reviewed pertinent reports  EKG, Pathology, and Other Studies: I have personally reviewed pertinent reports  [id number]  [facility]  [phone]        DATE OF SERVICE:  3/16/22     PHYSICIAN(S):  Areli Nunn MD Proceduralist         INDICATION:  Morbid (severe) obesity due to excess calories ([address] )     POST-OP DIAGNOSIS:  See the impression below      PREPROCEDURE:  Informed consent was obtained for the procedure, including sedation  Risks of perforation, hemorrhage, adverse drug reaction and aspiration were discussed  The patient was placed in the left lateral decubitus position      Patient was explained about the risks and benefits of the procedure  Risks including but not limited to bleeding, infection, and perforation were explained in detail  Also explained about less than 100% sensitivity with the exam and other alternatives      DETAILS OF PROCEDURE:  Patient was taken to the procedure room where a time out was performed to confirm correct patient and correct procedure  The patient underwent monitored anesthesia care, which was administered by an anesthesia professional  The patient's blood pressure, heart rate, level of consciousness, respirations and oxygen were monitored throughout the procedure  The scope was advanced to the second part of the duodenum  Retroflexion was performed in the fundus  The patient experienced no blood loss  The procedure was not difficult  The patient tolerated the procedure well   There were no apparent complications       ANESTHESIA INFORMATION:  ASA: III  Anesthesia Type: General     MEDICATIONS:  No administrations occurring from 1109 to 1111 on 03/16/22         FINDINGS:  · The esophagus and 2nd part of the duodenum appeared normal   · Performed single biopsy to rule out H  pylori in the antrum  · Regular Z-line 40 cm from the incisors  · Mild abnormal mucosa with erosion in the antrum        SPECIMENS:  * No specimens in log *        IMPRESSION:  Gastric erosions in the antrum  Otherwise normal findings     RECOMMENDATION:  There is no recommended follow-up for this procedure      Nik Ortiz MD     Specimens  A Stomach, r/o h pylori  Final Diagnosis  A  Stomach, biopsy:  - Benign gastric antral mucosa with mild chronic nonspecific inflammation   - No Helicobacter pylori organisms are identified with routine H&E stain  Code Status: [unfilled]  Advance Directive and Living Will:      Power of :    POLST:        Assessment/Plan:    The patient presented to review the preoperative workup and see if bariatric surgery is appropriate and indicated following the extensive preoperative workup and the enrollment in our weight loss program   Preoperative workup was complete  Results were reviewed with the patient including the blood work results and the endoscopy findings and the biopsy results  We also reviewed the cardiology evaluation  The patient was determined to be a good candidate for Laparoscopic RNYGB with robotic assist   ----------------------------------------------------------------------  Smoking: Denies  Blood thinner use: Denies  Home pain medication use and who manages it: Denies  CPAP use: Yes (If yes, sleep study obtained and reminded pt to bring CPAP to hospital)  History of blood clots: Denies  Previous abdominal surgeries: hysterectomy  Cardiac clearance obtained: 2/24 then 7/22 update   EKG performed: 7/22/22  EGD prior to surgery: 3/16/22 Gastric erosions  Screening Labs obtained (CBC, CMP): 7/28/22  H   Pylori status: Negative  Medication allergies: latex, PCN (rash)  SLIM consult Post-Op: Yes  Reminded patient about 2 week pre-op liquid diet: Yes  10% body weight loss pre-operatively met/discussed: Yes  Consent signed: Yes  Pre-Op ERAS Orders placed: DONE with levoquin  -----------------------------------------------------------------------  Risks and benefits explained one more time to the patient  Alternatives to surgery and alternative forms of surgery were also explained  Post-surgical commitment and after care programs were explained  We also discussed that the [employer]an robot may be available to us to use on the day of the patient procedure and that the procedure may be performed robotically  I discussed in details the advantages and disadvantages of this approach including the potential decrease in postoperative pain because of the remote center technology  I also mentioned the lack of strong evidence for the use of robot in bariatric patients and the potential disadvantage to patients because of the prolonged operative time  Consent was signed  Questions were answered and concerns were addressed  Patient will need to start the 2 week liquid diet prior to surgery  Patient wishes to proceed  As per [address] [place] [address] guidelines, I had a discussion with the patient regarding their CODE STATUS in the perioperative period and the patient is level 1 or FULL CODE STATUS      Tarun Childress PA-C  Bariatric Surgery   11/10/2022  11:48 AM

## 2022-11-14 DIAGNOSIS — E66.01 MORBID (SEVERE) OBESITY DUE TO EXCESS CALORIES (HCC): Primary | ICD-10-CM

## 2022-11-16 DIAGNOSIS — F41.8 MIXED ANXIETY DEPRESSIVE DISORDER: ICD-10-CM

## 2022-11-16 DIAGNOSIS — I10 ESSENTIAL HYPERTENSION: ICD-10-CM

## 2022-11-16 RX ORDER — CITALOPRAM 20 MG/1
TABLET ORAL
Qty: 30 TABLET | Refills: 0 | Status: SHIPPED | OUTPATIENT
Start: 2022-11-16

## 2022-11-16 RX ORDER — LOSARTAN POTASSIUM 100 MG/1
TABLET ORAL
Qty: 90 TABLET | Refills: 0 | Status: SHIPPED | OUTPATIENT
Start: 2022-11-16

## 2022-11-21 ENCOUNTER — TELEPHONE (OUTPATIENT)
Dept: BARIATRICS | Facility: CLINIC | Age: 37
End: 2022-11-21

## 2022-11-21 RX ORDER — OXYCODONE HYDROCHLORIDE 5 MG/1
5 TABLET ORAL EVERY 4 HOURS PRN
Qty: 10 TABLET | Refills: 0 | Status: SHIPPED | OUTPATIENT
Start: 2022-12-06

## 2022-11-21 RX ORDER — OMEPRAZOLE 20 MG/1
20 CAPSULE, DELAYED RELEASE ORAL DAILY
Qty: 30 CAPSULE | Refills: 3 | Status: SHIPPED | OUTPATIENT
Start: 2022-12-06

## 2022-11-21 NOTE — PRE-PROCEDURE INSTRUCTIONS
Pre-Surgery Instructions:   Medication Instructions   • albuterol (Ventolin HFA) 90 mcg/act inhaler Uses PRN- OK to take day of surgery   • ALPRAZolam (XANAX) 0 25 mg tablet Uses PRN- OK to take day of surgery   • buPROPion (Wellbutrin XL) 150 mg 24 hr tablet Take night before surgery   • citalopram (CeleXA) 20 mg tablet Take night before surgery   • fluticasone (FLONASE) 50 mcg/act nasal spray Uses PRN- OK to take day of surgery   • furosemide (LASIX) 40 mg tablet Hold day of surgery  • Iron-Vitamin C  MG TABS Instructions provided by MD   • levothyroxine 200 mcg tablet Take day of surgery  • levothyroxine 25 mcg tablet Take day of surgery  • loratadine (CLARITIN) 10 mg tablet Uses PRN- OK to take day of surgery   • losartan (COZAAR) 100 MG tablet Hold day of surgery  • Multiple Vitamins-Minerals (ONE-A-DAY WOMENS PO) Instructions provided by MD   • ondansetron (ZOFRAN-ODT) 4 mg disintegrating tablet Uses PRN- OK to take day of surgery   • triamcinolone (KENALOG) 0 1 % cream Hold day of surgery  Preop bathing and medication instructions reviewed with pt via telephone call  Pt verbalizes understanding  Pt states the multi vit and iron/vit c was ordered by surgeon but OTC, OK to hold 7 days prior  Instructed pt no alcohol, drugs or smoking 24 hrs prior to surgery  No vitamins or supplements (not ordered by a physician) for 7 days prior to surgery  No NSAIDS 5-7 days prior to surgery  Tylenol is OK to use  NPO after midnight the night prior to surgery  The hospital will call the pt with time and instructions one business day prior to surgery  Pt verbalizes understanding and all questions/concerns addressed

## 2022-11-21 NOTE — TELEPHONE ENCOUNTER
VANITA sent a message she was covid positive in October but she was ok to have surgery it was 7 weeks past positive test  I called the patient to inform her that I received this message from VANITA nurse however she is still required to get a written clearance due to having covid to proceed with her surgery date on 12/5  Her PCP could put under letters if not required to go to office for an office visit   Patient said she will call to have PCP put something in writing

## 2022-11-25 ENCOUNTER — APPOINTMENT (OUTPATIENT)
Dept: LAB | Age: 37
End: 2022-11-25

## 2022-11-25 DIAGNOSIS — E06.3 HYPOTHYROIDISM DUE TO HASHIMOTO'S THYROIDITIS: ICD-10-CM

## 2022-11-25 DIAGNOSIS — E03.8 HYPOTHYROIDISM DUE TO HASHIMOTO'S THYROIDITIS: ICD-10-CM

## 2022-11-25 LAB
T4 FREE SERPL-MCNC: 1.34 NG/DL (ref 0.76–1.46)
TSH SERPL DL<=0.05 MIU/L-ACNC: 1.6 UIU/ML (ref 0.45–4.5)

## 2022-11-29 ENCOUNTER — TELEPHONE (OUTPATIENT)
Dept: BARIATRICS | Facility: CLINIC | Age: 37
End: 2022-11-29

## 2022-11-29 ENCOUNTER — OFFICE VISIT (OUTPATIENT)
Dept: INTERNAL MEDICINE CLINIC | Age: 37
End: 2022-11-29

## 2022-11-29 VITALS
HEIGHT: 65 IN | OXYGEN SATURATION: 98 % | DIASTOLIC BLOOD PRESSURE: 76 MMHG | SYSTOLIC BLOOD PRESSURE: 124 MMHG | TEMPERATURE: 97.4 F | HEART RATE: 74 BPM | WEIGHT: 293 LBS | BODY MASS INDEX: 48.82 KG/M2

## 2022-11-29 DIAGNOSIS — F41.8 MIXED ANXIETY DEPRESSIVE DISORDER: ICD-10-CM

## 2022-11-29 DIAGNOSIS — R21 MALAR RASH: ICD-10-CM

## 2022-11-29 DIAGNOSIS — Z01.818 PRE-OP EXAMINATION: ICD-10-CM

## 2022-11-29 DIAGNOSIS — E06.3 HYPOTHYROIDISM DUE TO HASHIMOTO'S THYROIDITIS: ICD-10-CM

## 2022-11-29 DIAGNOSIS — I10 ESSENTIAL HYPERTENSION: ICD-10-CM

## 2022-11-29 DIAGNOSIS — E03.8 HYPOTHYROIDISM DUE TO HASHIMOTO'S THYROIDITIS: ICD-10-CM

## 2022-11-29 DIAGNOSIS — I89.0 LYMPH EDEMA: ICD-10-CM

## 2022-11-29 DIAGNOSIS — E66.01 MORBID OBESITY WITH BMI OF 60.0-69.9, ADULT (HCC): ICD-10-CM

## 2022-11-29 DIAGNOSIS — U07.1 COVID-19: ICD-10-CM

## 2022-11-29 DIAGNOSIS — Z23 NEED FOR IMMUNIZATION AGAINST INFLUENZA: Primary | ICD-10-CM

## 2022-11-29 PROBLEM — R10.11 RUQ ABDOMINAL PAIN: Status: RESOLVED | Noted: 2021-11-01 | Resolved: 2022-11-29

## 2022-11-29 RX ORDER — BUPROPION HYDROCHLORIDE 150 MG/1
150 TABLET ORAL DAILY
Qty: 30 TABLET | Refills: 2 | Status: SHIPPED | OUTPATIENT
Start: 2022-11-29 | End: 2022-12-06

## 2022-11-29 NOTE — TELEPHONE ENCOUNTER
Pre-op call was made to patient to follow up on how they are doing and to remind them to continue with all medical and dietary directions that were given at pre-op class regarding liver shrinking diet and hydration  They were encouraged to purchase all vitamins and protein shakes for post op use as well as to begin Miralax three days prior to surgery as directed in Section 6 of their manual   They were reminded of the Ensure Pre-surgery drinks protocol and to bring their completed yellow form with them to surgery as well as their CPAP-BiPAP machine if they use one  Lastly, they were informed that they would be weighed the morning of surgery and to give the office a call if they had any further questions or concerns  Answered her question sent to the RD

## 2022-11-29 NOTE — ASSESSMENT & PLAN NOTE
She has been on a combination of Celexa and Wellbutrin with a rare Xanax    We will refill the Wellbutrin

## 2022-11-29 NOTE — ASSESSMENT & PLAN NOTE
She continues to have chronic lymphedema probably on the basis for weight    She has no obvious signs of infection

## 2022-11-29 NOTE — PATIENT INSTRUCTIONS
Obesity   AMBULATORY CARE:   Obesity  means your body mass index (BMI) is greater than 30  Your healthcare provider will use your height and weight to measure your BMI  The risks of obesity include  many health problems, including injuries or physical disability  Diabetes (high blood sugar level)    High blood pressure or high cholesterol    Heart disease    Stroke    Gallbladder or liver disease    Cancer of the colon, breast, prostate, liver, or kidney    Sleep apnea    Arthritis or gout    Screening  is done to check for health conditions before you have signs or symptoms  If you are 28to 79years old, your blood sugar level may be checked every 3 years for signs of prediabetes or diabetes  Your healthcare provider will check your blood pressure at each visit  High blood pressure can lead to a stroke or other problems  Your provider may check for signs of heart disease, cancer, or other health problems  Seek care immediately if:   You have a severe headache, confusion, or difficulty speaking  You have weakness on one side of your body  You have chest pain, sweating, or shortness of breath  Call your doctor if:   You have symptoms of gallbladder or liver disease, such as pain in your upper abdomen  You have knee or hip pain and discomfort while walking  You have symptoms of diabetes, such as intense hunger and thirst, and frequent urination  You have symptoms of sleep apnea, such as snoring or daytime sleepiness  You have questions or concerns about your condition or care  Treatment for obesity  focuses on helping you lose weight to improve your health  Even a small decrease in BMI can reduce the risk for many health problems  Your healthcare provider will help you set a weight-loss goal   Lifestyle changes  are the first step in treating obesity  These include making healthy food choices and getting regular physical activity   Your healthcare provider may suggest a weight-loss program that involves coaching, education, and therapy  Medicine  may help you lose weight when it is used with a healthy foods and physical activity  Surgery  can help you lose weight if you are very obese and have other health problems  There are several types of weight-loss surgery  Ask your healthcare provider for more information  Tips for safe weight loss:   Set small, realistic goals  An example of a small goal is to walk for 20 minutes 5 days a week  Anther goal is to lose 5% of your body weight  Tell friends, family members, and coworkers about your goals  and ask for their support  Ask a friend to lose weight with you, or join a weight-loss support group  Identify foods or triggers that may cause you to overeat , and find ways to avoid them  Remove tempting high-calorie foods from your home and workplace  Place a bowl of fresh fruit on your kitchen counter  If stress causes you to eat, then find other ways to cope with stress  A counselor or therapist may be able to help you  Keep a diary to track what you eat and drink  Also write down how many minutes of physical activity you do each day  Weigh yourself once a week and record it in your diary  Eating changes: You will need to eat 500 to 1,000 fewer calories each day than you currently eat to lose 1 to 2 pounds a week  The following changes will help you cut calories:  Eat smaller portions  Use small plates, no larger than 9 inches in diameter  Fill your plate half full of fruits and vegetables  Measure your food using measuring cups until you know what a serving size looks like  Eat 3 meals and 1 or 2 snacks each day  Plan your meals in advance  Pavan  and eat at home most of the time  Eat slowly  Do not skip meals  Skipping meals can lead to overeating later in the day  This can make it harder for you to lose weight  Talk with a dietitian to help you make a meal plan and schedule that is right for you      Eat fruits and vegetables at every meal   They are low in calories and high in fiber, which makes you feel full  Do not add butter, margarine, or cream sauce to vegetables  Use herbs to season steamed vegetables  Eat less fat and fewer fried foods  Eat more baked or grilled chicken and fish  These protein sources are lower in calories and fat than red meat  Limit fast food  Dress your salads with olive oil and vinegar instead of bottled dressing  Limit the amount of sugar you eat  Do not drink sugary beverages  Limit alcohol  Activity changes:  Physical activity is good for your body in many ways  It helps you burn calories and build strong muscles  It decreases stress and depression, and improves your mood  It can also help you sleep better  Talk to your healthcare provider before you begin an exercise program   Exercise for at least 30 minutes 5 days a week  Start slowly  Set aside time each day for physical activity that you enjoy and that is convenient for you  It is best to do both weight training and an activity that increases your heart rate, such as walking, bicycling, or swimming  Find ways to be more active  Do yard work and housecleaning  Walk up the stairs instead of using elevators  Spend your leisure time going to events that require walking, such as outdoor festivals or fairs  This extra physical activity can help you lose weight and keep it off  Follow up with your doctor as directed: You may need to meet with a dietitian  Write down your questions so you remember to ask them during your visits  © Copyright [employer] 2022 Information is for End User's use only and may not be sold, redistributed or otherwise used for commercial purposes  All illustrations and images included in CareNotes® are the copyrighted property of [employer]  or Theresa Collins  The above information is an  only   It is not intended as medical advice for individual conditions or treatments  Talk to your doctor, nurse or pharmacist before following any medical regimen to see if it is safe and effective for you

## 2022-11-29 NOTE — PROGRESS NOTES
Name: Lyndsay Martinez      : 1985      MRN: [id number]  Encounter Provider: Fili Perez MD  Encounter Date: 2022   Encounter department: [address] [place] [address]     1  Need for immunization against influenza  -     influenza vaccine, quadrivalent, recombinant, PF, 0 5 mL, for patients 18 yr+ ([facility])    2  Morbid obesity with BMI of 60 0-69 9, adult ([facility])  -     buPROPion (Wellbutrin XL) 150 mg 24 hr tablet; Take 1 tablet (150 mg total) by mouth daily    3  Mixed anxiety depressive disorder  Comments:  Monitor  May improve with addition of Wellbutrin however anxiety can worsen with addition of Wellbutrin  Orders:  -     buPROPion (Wellbutrin XL) 150 mg 24 hr tablet; Take 1 tablet (150 mg total) by mouth daily    4  BMI 50 0-59 9, adult ([address] )    5  Hypothyroidism due to Hashimoto's thyroiditis    6  Essential hypertension           Subjective      Patient is scheduled surgery on  by Dr Kori Gonzalez  For preoperative medical problems revolving controlled and she is medically cleared for same  At present she has no specific complaints  No chest pain shortnessof breath  EKG blood work are all acceptable  Review of Systems   Constitutional: Negative for chills and fever  HENT: Negative for ear pain and sore throat  Eyes: Negative for pain and visual disturbance  Respiratory: Negative for cough and shortness of breath  Cardiovascular: Positive for leg swelling  Negative for chest pain and palpitations  Gastrointestinal: Negative for abdominal pain and vomiting  Genitourinary: Negative for dysuria and hematuria  Musculoskeletal: Positive for arthralgias  Negative for back pain  Skin: Negative for color change and rash  Neurological: Negative for seizures and syncope  Psychiatric/Behavioral: Positive for sleep disturbance  All other systems reviewed and are negative        Current Outpatient Medications on File Prior to Visit   Medication Sig   • albuterol (Ventolin HFA) 90 mcg/act inhaler Inhale 2 puffs every 6 (six) hours as needed for wheezing or shortness of breath   • ALPRAZolam (XANAX) 0 25 mg tablet Take 1 tablet (0 25 mg total) by mouth 3 (three) times a day as needed for anxiety   • citalopram (CeleXA) 20 mg tablet TAKE ONE TABLET BY MOUTH EVERY DAY   • fluticasone (FLONASE) 50 mcg/act nasal spray 2 sprays into each nostril daily (Patient taking differently: 2 sprays into each nostril if needed)   • furosemide (LASIX) 40 mg tablet Take 1 tablet (40 mg total) by mouth daily   • Iron-Vitamin C  MG TABS Take 1 tablet by mouth 2 (two) times a day   • levothyroxine 200 mcg tablet Take 1 tablet (200 mcg total) by mouth daily   • levothyroxine 25 mcg tablet Take 1 tablet (25 mcg total) by mouth daily In addition to 200mcg daily for total of 225mcg daily  • loratadine (CLARITIN) 10 mg tablet Take 10 mg by mouth if needed   • losartan (COZAAR) 100 MG tablet TAKE ONE TABLET BY MOUTH EVERY DAY   • Multiple Vitamins-Minerals (ONE-A-DAY WOMENS PO) Take by mouth   • [START ON 12/6/2022] omeprazole (PriLOSEC) 20 mg delayed release capsule Take 1 capsule (20 mg total) by mouth daily Do not start before December 6, 2022  • ondansetron (ZOFRAN-ODT) 4 mg disintegrating tablet Take 1 tablet (4 mg total) by mouth every 8 (eight) hours as needed for nausea or vomiting   • [START ON 12/6/2022] oxyCODONE (Roxicodone) 5 immediate release tablet Take 1 tablet (5 mg total) by mouth every 4 (four) hours as needed for moderate pain Max Daily Amount: 30 mg Do not start before December 6, 2022     • triamcinolone (KENALOG) 0 1 % cream APPLY TOPICALLY DAILY AT BEDTIME AS NEEDED FOR RASH   • [DISCONTINUED] buPROPion (Wellbutrin XL) 150 mg 24 hr tablet Take 1 tablet (150 mg total) by mouth daily   • [DISCONTINUED] Insulin Pen Needle (BD Pen Needle Micro U/F) 32G X 6 MM MISC Use daily as directed (Patient not taking: Reported on 11/10/2022)   • [DISCONTINUED] liraglutide (SAXENDA) injection Inject 0 6 mg subcutaneously once per day for the first week  Increase in weekly intervals by 0 6 mg until a dose of 3 mg is reached (Patient not taking: Reported on 11/10/2022)       Objective     /76 (BP Location: Left arm, Patient Position: Sitting, Cuff Size: Standard)   Pulse 74   Temp (!) 97 4 °F (36 3 °C)   Ht 5' 5" (1 651 m)   Wt (!) 160 kg (353 lb)   LMP 12/17/2019   SpO2 98%   BMI 58 74 kg/m²     Physical Exam  Constitutional:       General: She is not in acute distress  Appearance: She is well-developed and well-nourished  She is obese  HENT:      Right Ear: External ear normal       Left Ear: External ear normal       Nose: Nose normal       Mouth/Throat:      Mouth: Oropharynx is clear and moist       Pharynx: No oropharyngeal exudate  Eyes:      Extraocular Movements: EOM normal       Pupils: Pupils are equal, round, and reactive to light  Neck:      Thyroid: No thyromegaly  Vascular: No JVD  Cardiovascular:      Rate and Rhythm: Normal rate and regular rhythm  Pulses: Intact distal pulses  Heart sounds: Normal heart sounds  No murmur heard  No gallop  Pulmonary:      Effort: Pulmonary effort is normal  No respiratory distress  Breath sounds: Normal breath sounds  No wheezing or rales  Abdominal:      General: Bowel sounds are normal  There is no distension  Palpations: Abdomen is soft  There is no mass  Tenderness: There is no abdominal tenderness  Musculoskeletal:         General: No tenderness  Normal range of motion  Cervical back: Normal range of motion and neck supple  Right lower leg: Edema present  Left lower leg: Edema present  Lymphadenopathy:      Cervical: No cervical adenopathy  Skin:     Findings: No rash  Neurological:      General: No focal deficit present  Mental Status: She is alert and oriented to person, place, and time        Cranial Nerves: No cranial nerve deficit  Coordination: Coordination normal    Psychiatric:         Mood and Affect: Mood and affect and mood normal          Behavior: Behavior normal          Thought Content:  Thought content normal          Judgment: Judgment normal        Cameron Glez MD

## 2022-12-01 ENCOUNTER — ANESTHESIA EVENT (OUTPATIENT)
Dept: PERIOP | Facility: HOSPITAL | Age: 37
End: 2022-12-01

## 2022-12-05 ENCOUNTER — ANESTHESIA (OUTPATIENT)
Dept: PERIOP | Facility: HOSPITAL | Age: 37
End: 2022-12-05

## 2022-12-05 ENCOUNTER — HOSPITAL ENCOUNTER (OUTPATIENT)
Facility: HOSPITAL | Age: 37
Setting detail: OUTPATIENT SURGERY
Discharge: HOME | End: 2022-12-06
Attending: SURGERY | Admitting: SURGERY

## 2022-12-05 DIAGNOSIS — I10 ESSENTIAL HYPERTENSION: ICD-10-CM

## 2022-12-05 DIAGNOSIS — F41.8 MIXED ANXIETY DEPRESSIVE DISORDER: ICD-10-CM

## 2022-12-05 DIAGNOSIS — Z98.84 S/P BARIATRIC SURGERY: Primary | ICD-10-CM

## 2022-12-05 DIAGNOSIS — E06.3 HYPOTHYROIDISM DUE TO HASHIMOTO'S THYROIDITIS: ICD-10-CM

## 2022-12-05 DIAGNOSIS — E03.8 HYPOTHYROIDISM DUE TO HASHIMOTO'S THYROIDITIS: ICD-10-CM

## 2022-12-05 LAB
EXT PREGNANCY TEST URINE: NEGATIVE
EXT. CONTROL: NORMAL

## 2022-12-05 DEVICE — SEAMGUARD STPL REINF INTUITIVE SUREFORM 60 GREEN: Type: IMPLANTABLE DEVICE | Site: ABDOMEN | Status: FUNCTIONAL

## 2022-12-05 RX ORDER — TRIAMCINOLONE ACETONIDE 1 MG/G
CREAM TOPICAL
Status: DISCONTINUED | OUTPATIENT
Start: 2022-12-05 | End: 2022-12-06 | Stop reason: HOSPADM

## 2022-12-05 RX ORDER — ACETAMINOPHEN 160 MG/5ML
975 SUSPENSION, ORAL (FINAL DOSE FORM) ORAL EVERY 8 HOURS
Status: DISCONTINUED | OUTPATIENT
Start: 2022-12-05 | End: 2022-12-06 | Stop reason: HOSPADM

## 2022-12-05 RX ORDER — METRONIDAZOLE 500 MG/100ML
500 INJECTION, SOLUTION INTRAVENOUS ONCE
Status: COMPLETED | OUTPATIENT
Start: 2022-12-05 | End: 2022-12-05

## 2022-12-05 RX ORDER — ALPRAZOLAM 0.25 MG/1
0.25 TABLET ORAL 3 TIMES DAILY PRN
Status: DISCONTINUED | OUTPATIENT
Start: 2022-12-05 | End: 2022-12-06 | Stop reason: HOSPADM

## 2022-12-05 RX ORDER — ACETAMINOPHEN 325 MG/1
975 TABLET ORAL ONCE
Status: COMPLETED | OUTPATIENT
Start: 2022-12-05 | End: 2022-12-05

## 2022-12-05 RX ORDER — SCOLOPAMINE TRANSDERMAL SYSTEM 1 MG/1
1 PATCH, EXTENDED RELEASE TRANSDERMAL ONCE
Status: DISCONTINUED | OUTPATIENT
Start: 2022-12-05 | End: 2022-12-05

## 2022-12-05 RX ORDER — LEVOFLOXACIN 5 MG/ML
750 INJECTION, SOLUTION INTRAVENOUS ONCE
Status: COMPLETED | OUTPATIENT
Start: 2022-12-05 | End: 2022-12-05

## 2022-12-05 RX ORDER — EPHEDRINE SULFATE 50 MG/ML
INJECTION INTRAVENOUS AS NEEDED
Status: DISCONTINUED | OUTPATIENT
Start: 2022-12-05 | End: 2022-12-05

## 2022-12-05 RX ORDER — GABAPENTIN 300 MG/1
600 CAPSULE ORAL ONCE
Status: COMPLETED | OUTPATIENT
Start: 2022-12-05 | End: 2022-12-05

## 2022-12-05 RX ORDER — HYDROMORPHONE HCL/PF 1 MG/ML
0.5 SYRINGE (ML) INJECTION
Status: DISCONTINUED | OUTPATIENT
Start: 2022-12-05 | End: 2022-12-05 | Stop reason: HOSPADM

## 2022-12-05 RX ORDER — SODIUM CHLORIDE 9 MG/ML
INJECTION INTRAVENOUS AS NEEDED
Status: DISCONTINUED | OUTPATIENT
Start: 2022-12-05 | End: 2022-12-05 | Stop reason: HOSPADM

## 2022-12-05 RX ORDER — CELECOXIB 200 MG/1
200 CAPSULE ORAL ONCE
Status: COMPLETED | OUTPATIENT
Start: 2022-12-05 | End: 2022-12-05

## 2022-12-05 RX ORDER — VECURONIUM BROMIDE 1 MG/ML
INJECTION, POWDER, LYOPHILIZED, FOR SOLUTION INTRAVENOUS AS NEEDED
Status: DISCONTINUED | OUTPATIENT
Start: 2022-12-05 | End: 2022-12-05

## 2022-12-05 RX ORDER — ALBUTEROL SULFATE 90 UG/1
2 AEROSOL, METERED RESPIRATORY (INHALATION) EVERY 6 HOURS PRN
Status: DISCONTINUED | OUTPATIENT
Start: 2022-12-05 | End: 2022-12-06 | Stop reason: HOSPADM

## 2022-12-05 RX ORDER — METRONIDAZOLE 500 MG/100ML
500 INJECTION, SOLUTION INTRAVENOUS EVERY 8 HOURS
Status: COMPLETED | OUTPATIENT
Start: 2022-12-05 | End: 2022-12-06

## 2022-12-05 RX ORDER — PROMETHAZINE HYDROCHLORIDE 25 MG/ML
25 INJECTION, SOLUTION INTRAMUSCULAR; INTRAVENOUS EVERY 6 HOURS PRN
Status: DISCONTINUED | OUTPATIENT
Start: 2022-12-05 | End: 2022-12-06 | Stop reason: HOSPADM

## 2022-12-05 RX ORDER — NEOSTIGMINE METHYLSULFATE 1 MG/ML
INJECTION INTRAVENOUS AS NEEDED
Status: DISCONTINUED | OUTPATIENT
Start: 2022-12-05 | End: 2022-12-05

## 2022-12-05 RX ORDER — PROPOFOL 10 MG/ML
INJECTION, EMULSION INTRAVENOUS AS NEEDED
Status: DISCONTINUED | OUTPATIENT
Start: 2022-12-05 | End: 2022-12-05

## 2022-12-05 RX ORDER — FENTANYL CITRATE 50 UG/ML
INJECTION, SOLUTION INTRAMUSCULAR; INTRAVENOUS AS NEEDED
Status: DISCONTINUED | OUTPATIENT
Start: 2022-12-05 | End: 2022-12-05

## 2022-12-05 RX ORDER — MEPERIDINE HYDROCHLORIDE 25 MG/ML
12.5 INJECTION INTRAMUSCULAR; INTRAVENOUS; SUBCUTANEOUS
Status: DISCONTINUED | OUTPATIENT
Start: 2022-12-05 | End: 2022-12-05 | Stop reason: HOSPADM

## 2022-12-05 RX ORDER — DIPHENHYDRAMINE HCL 25 MG
25 TABLET ORAL
Status: DISCONTINUED | OUTPATIENT
Start: 2022-12-05 | End: 2022-12-06 | Stop reason: HOSPADM

## 2022-12-05 RX ORDER — HEPARIN SODIUM 5000 [USP'U]/ML
5000 INJECTION, SOLUTION INTRAVENOUS; SUBCUTANEOUS
Status: COMPLETED | OUTPATIENT
Start: 2022-12-05 | End: 2022-12-05

## 2022-12-05 RX ORDER — ACETAMINOPHEN 325 MG/1
975 TABLET ORAL EVERY 8 HOURS
Status: DISCONTINUED | OUTPATIENT
Start: 2022-12-05 | End: 2022-12-06 | Stop reason: HOSPADM

## 2022-12-05 RX ORDER — ONDANSETRON 2 MG/ML
INJECTION INTRAMUSCULAR; INTRAVENOUS AS NEEDED
Status: DISCONTINUED | OUTPATIENT
Start: 2022-12-05 | End: 2022-12-05

## 2022-12-05 RX ORDER — METOCLOPRAMIDE HYDROCHLORIDE 5 MG/ML
10 INJECTION INTRAMUSCULAR; INTRAVENOUS EVERY 6 HOURS PRN
Status: DISCONTINUED | OUTPATIENT
Start: 2022-12-05 | End: 2022-12-06 | Stop reason: HOSPADM

## 2022-12-05 RX ORDER — OXYCODONE HCL 5 MG/5 ML
10 SOLUTION, ORAL ORAL EVERY 4 HOURS PRN
Status: DISCONTINUED | OUTPATIENT
Start: 2022-12-05 | End: 2022-12-06 | Stop reason: HOSPADM

## 2022-12-05 RX ORDER — MORPHINE SULFATE 4 MG/ML
4 INJECTION, SOLUTION INTRAMUSCULAR; INTRAVENOUS EVERY 4 HOURS PRN
Status: DISCONTINUED | OUTPATIENT
Start: 2022-12-05 | End: 2022-12-06 | Stop reason: HOSPADM

## 2022-12-05 RX ORDER — DEXAMETHASONE SODIUM PHOSPHATE 10 MG/ML
INJECTION, SOLUTION INTRAMUSCULAR; INTRAVENOUS AS NEEDED
Status: DISCONTINUED | OUTPATIENT
Start: 2022-12-05 | End: 2022-12-05

## 2022-12-05 RX ORDER — FENTANYL CITRATE/PF 50 MCG/ML
25 SYRINGE (ML) INJECTION
Status: DISCONTINUED | OUTPATIENT
Start: 2022-12-05 | End: 2022-12-05 | Stop reason: HOSPADM

## 2022-12-05 RX ORDER — BUPIVACAINE HYDROCHLORIDE 5 MG/ML
INJECTION, SOLUTION EPIDURAL; INTRACAUDAL AS NEEDED
Status: DISCONTINUED | OUTPATIENT
Start: 2022-12-05 | End: 2022-12-05 | Stop reason: HOSPADM

## 2022-12-05 RX ORDER — OXYCODONE HCL 5 MG/5 ML
5 SOLUTION, ORAL ORAL EVERY 4 HOURS PRN
Status: DISCONTINUED | OUTPATIENT
Start: 2022-12-05 | End: 2022-12-06 | Stop reason: HOSPADM

## 2022-12-05 RX ORDER — LIDOCAINE HYDROCHLORIDE 20 MG/ML
INJECTION, SOLUTION EPIDURAL; INFILTRATION; INTRACAUDAL; PERINEURAL AS NEEDED
Status: DISCONTINUED | OUTPATIENT
Start: 2022-12-05 | End: 2022-12-05

## 2022-12-05 RX ORDER — BUPROPION HYDROCHLORIDE 75 MG/1
75 TABLET ORAL 2 TIMES DAILY
Status: DISCONTINUED | OUTPATIENT
Start: 2022-12-05 | End: 2022-12-06 | Stop reason: HOSPADM

## 2022-12-05 RX ORDER — MAGNESIUM HYDROXIDE 1200 MG/15ML
LIQUID ORAL AS NEEDED
Status: DISCONTINUED | OUTPATIENT
Start: 2022-12-05 | End: 2022-12-05 | Stop reason: HOSPADM

## 2022-12-05 RX ORDER — FAMOTIDINE 10 MG/ML
20 INJECTION, SOLUTION INTRAVENOUS EVERY 12 HOURS SCHEDULED
Status: DISCONTINUED | OUTPATIENT
Start: 2022-12-05 | End: 2022-12-06 | Stop reason: HOSPADM

## 2022-12-05 RX ORDER — SODIUM CHLORIDE, SODIUM LACTATE, POTASSIUM CHLORIDE, CALCIUM CHLORIDE 600; 310; 30; 20 MG/100ML; MG/100ML; MG/100ML; MG/100ML
125 INJECTION, SOLUTION INTRAVENOUS CONTINUOUS
Status: DISCONTINUED | OUTPATIENT
Start: 2022-12-05 | End: 2022-12-06 | Stop reason: HOSPADM

## 2022-12-05 RX ORDER — SODIUM CHLORIDE, SODIUM LACTATE, POTASSIUM CHLORIDE, CALCIUM CHLORIDE 600; 310; 30; 20 MG/100ML; MG/100ML; MG/100ML; MG/100ML
125 INJECTION, SOLUTION INTRAVENOUS CONTINUOUS
Status: DISCONTINUED | OUTPATIENT
Start: 2022-12-05 | End: 2022-12-05

## 2022-12-05 RX ORDER — ONDANSETRON 2 MG/ML
4 INJECTION INTRAMUSCULAR; INTRAVENOUS EVERY 6 HOURS PRN
Status: DISCONTINUED | OUTPATIENT
Start: 2022-12-05 | End: 2022-12-06 | Stop reason: HOSPADM

## 2022-12-05 RX ORDER — MIDAZOLAM HYDROCHLORIDE 2 MG/2ML
INJECTION, SOLUTION INTRAMUSCULAR; INTRAVENOUS AS NEEDED
Status: DISCONTINUED | OUTPATIENT
Start: 2022-12-05 | End: 2022-12-05

## 2022-12-05 RX ORDER — GLYCOPYRROLATE 0.2 MG/ML
INJECTION INTRAMUSCULAR; INTRAVENOUS AS NEEDED
Status: DISCONTINUED | OUTPATIENT
Start: 2022-12-05 | End: 2022-12-05

## 2022-12-05 RX ORDER — ONDANSETRON 2 MG/ML
4 INJECTION INTRAMUSCULAR; INTRAVENOUS ONCE AS NEEDED
Status: COMPLETED | OUTPATIENT
Start: 2022-12-05 | End: 2022-12-05

## 2022-12-05 RX ADMIN — LIDOCAINE HYDROCHLORIDE 100 MG: 20 INJECTION, SOLUTION EPIDURAL; INFILTRATION; INTRACAUDAL; PERINEURAL at 10:47

## 2022-12-05 RX ADMIN — FENTANYL CITRATE 25 MCG: 50 INJECTION INTRAMUSCULAR; INTRAVENOUS at 14:07

## 2022-12-05 RX ADMIN — METRONIDAZOLE 500 MG: 500 INJECTION, SOLUTION INTRAVENOUS at 19:34

## 2022-12-05 RX ADMIN — VECURONIUM BROMIDE 8 MG: 1 INJECTION, POWDER, LYOPHILIZED, FOR SOLUTION INTRAVENOUS at 10:47

## 2022-12-05 RX ADMIN — SCOPALAMINE 1 PATCH: 1 PATCH, EXTENDED RELEASE TRANSDERMAL at 08:21

## 2022-12-05 RX ADMIN — ACETAMINOPHEN 975 MG: 325 TABLET, FILM COATED ORAL at 08:20

## 2022-12-05 RX ADMIN — SODIUM CHLORIDE, SODIUM LACTATE, POTASSIUM CHLORIDE, AND CALCIUM CHLORIDE: 600; 310; 30; 20 INJECTION, SOLUTION INTRAVENOUS at 12:01

## 2022-12-05 RX ADMIN — GLYCOPYRROLATE 0.4 MG: 0.2 INJECTION, SOLUTION INTRAMUSCULAR; INTRAVENOUS at 12:52

## 2022-12-05 RX ADMIN — SODIUM CHLORIDE, SODIUM LACTATE, POTASSIUM CHLORIDE, AND CALCIUM CHLORIDE: 600; 310; 30; 20 INJECTION, SOLUTION INTRAVENOUS at 10:44

## 2022-12-05 RX ADMIN — EPHEDRINE SULFATE 5 MG: 50 INJECTION, SOLUTION INTRAVENOUS at 11:36

## 2022-12-05 RX ADMIN — ONDANSETRON 4 MG: 2 INJECTION INTRAMUSCULAR; INTRAVENOUS at 12:41

## 2022-12-05 RX ADMIN — CELECOXIB 200 MG: 200 CAPSULE ORAL at 08:20

## 2022-12-05 RX ADMIN — ONDANSETRON 4 MG: 2 INJECTION INTRAMUSCULAR; INTRAVENOUS at 13:25

## 2022-12-05 RX ADMIN — FENTANYL CITRATE 25 MCG: 50 INJECTION INTRAMUSCULAR; INTRAVENOUS at 13:26

## 2022-12-05 RX ADMIN — FAMOTIDINE 20 MG: 10 INJECTION, SOLUTION INTRAVENOUS at 22:46

## 2022-12-05 RX ADMIN — FENTANYL CITRATE 50 MCG: 50 INJECTION INTRAMUSCULAR; INTRAVENOUS at 12:39

## 2022-12-05 RX ADMIN — MIDAZOLAM 2 MG: 1 INJECTION INTRAMUSCULAR; INTRAVENOUS at 10:41

## 2022-12-05 RX ADMIN — GLYCOPYRROLATE 0.2 MG: 0.2 INJECTION, SOLUTION INTRAMUSCULAR; INTRAVENOUS at 11:06

## 2022-12-05 RX ADMIN — SODIUM CHLORIDE, SODIUM LACTATE, POTASSIUM CHLORIDE, AND CALCIUM CHLORIDE 125 ML/HR: 600; 310; 30; 20 INJECTION, SOLUTION INTRAVENOUS at 08:34

## 2022-12-05 RX ADMIN — ACETAMINOPHEN 975 MG: 325 SUSPENSION ORAL at 17:35

## 2022-12-05 RX ADMIN — HYDROMORPHONE HYDROCHLORIDE 0.5 MG: 1 INJECTION, SOLUTION INTRAMUSCULAR; INTRAVENOUS; SUBCUTANEOUS at 14:53

## 2022-12-05 RX ADMIN — GABAPENTIN 600 MG: 300 CAPSULE ORAL at 08:20

## 2022-12-05 RX ADMIN — DEXAMETHASONE SODIUM PHOSPHATE 10 MG: 10 INJECTION INTRAMUSCULAR; INTRAVENOUS at 10:55

## 2022-12-05 RX ADMIN — EPHEDRINE SULFATE 5 MG: 50 INJECTION, SOLUTION INTRAVENOUS at 11:33

## 2022-12-05 RX ADMIN — BUPROPION HYDROCHLORIDE 75 MG: 75 TABLET, FILM COATED ORAL at 22:46

## 2022-12-05 RX ADMIN — SODIUM CHLORIDE, SODIUM LACTATE, POTASSIUM CHLORIDE, AND CALCIUM CHLORIDE 125 ML/HR: .6; .31; .03; .02 INJECTION, SOLUTION INTRAVENOUS at 19:35

## 2022-12-05 RX ADMIN — LEVOFLOXACIN: 750 INJECTION, SOLUTION INTRAVENOUS at 10:41

## 2022-12-05 RX ADMIN — METRONIDAZOLE: 500 INJECTION, SOLUTION INTRAVENOUS at 11:00

## 2022-12-05 RX ADMIN — SODIUM CHLORIDE, SODIUM LACTATE, POTASSIUM CHLORIDE, AND CALCIUM CHLORIDE 125 ML/HR: .6; .31; .03; .02 INJECTION, SOLUTION INTRAVENOUS at 14:19

## 2022-12-05 RX ADMIN — EPHEDRINE SULFATE 10 MG: 50 INJECTION, SOLUTION INTRAVENOUS at 12:15

## 2022-12-05 RX ADMIN — BUPROPION HYDROCHLORIDE 75 MG: 75 TABLET, FILM COATED ORAL at 22:50

## 2022-12-05 RX ADMIN — FENTANYL CITRATE 50 MCG: 50 INJECTION INTRAMUSCULAR; INTRAVENOUS at 12:43

## 2022-12-05 RX ADMIN — FENTANYL CITRATE 100 MCG: 50 INJECTION INTRAMUSCULAR; INTRAVENOUS at 10:47

## 2022-12-05 RX ADMIN — HEPARIN SODIUM 5000 UNITS: 5000 INJECTION INTRAVENOUS; SUBCUTANEOUS at 09:32

## 2022-12-05 RX ADMIN — PROPOFOL 250 MG: 10 INJECTION, EMULSION INTRAVENOUS at 10:47

## 2022-12-05 RX ADMIN — NEOSTIGMINE METHYLSULFATE 3 MG: 1 INJECTION INTRAVENOUS at 12:52

## 2022-12-05 RX ADMIN — EPHEDRINE SULFATE 5 MG: 50 INJECTION, SOLUTION INTRAVENOUS at 11:11

## 2022-12-05 NOTE — INTERVAL H&P NOTE
H&P reviewed  After examining the patient I find no changes in the patients condition since the H&P had been written      Vitals:    12/05/22 0843   BP: 113/60   Pulse: 72   Resp: 18   Temp: (!) 97 2 °F (36 2 °C)   SpO2: 97%

## 2022-12-05 NOTE — ANESTHESIA PREPROCEDURE EVALUATION
Procedure:  BYPASS GASTRIC R-N-Y LAP W ROBOT & INTRAOPERATIVE EGD (Abdomen)    Relevant Problems   CARDIO   (+) Essential hypertension      ENDO   (+) Hypothyroidism      NEURO/PSYCH   (+) Mixed anxiety depressive disorder      PULMONARY   (+) ANA LAURA (obstructive sleep apnea)      Respiratory   (+) Sleep related hypoxia      Endocrine   (+) Hashimoto's thyroiditis        Physical Exam    Airway    Mallampati score: II  TM Distance: >3 FB  Neck ROM: full     Dental   No notable dental hx     Cardiovascular  Rhythm: regular, Rate: normal, Cardiovascular exam normal    Pulmonary  Pulmonary exam normal Breath sounds clear to auscultation,     Other Findings  Erythema left eye- pt states she got soap in it this morning       Anesthesia Plan  ASA Score- 3     Anesthesia Type- general with ASA Monitors  Additional Monitors:   Airway Plan: ETT  Plan Factors-    Chart reviewed  Existing labs reviewed  Patient summary reviewed  Patient is not a current smoker  Patient not instructed to abstain from smoking on day of procedure  Patient did not smoke on day of surgery  There is medical exclusion for perioperative obstructive sleep apnea risk education  Induction- intravenous  Postoperative Plan-     Informed Consent- Anesthetic plan and risks discussed with patient and spouse Janine Gray

## 2022-12-05 NOTE — OP NOTE
OPERATIVE REPORT  PATIENT NAME: Kierra Díaz    :  1985  MRN: [id number]  Pt Location: [facility] OR ROOM 08    SURGERY DATE: 2022    Surgeon(s) and Role:     * Nik Ortiz MD - Primary     * Emanuel Norton MD - Assisting    Preop Diagnosis:  Morbid obesity ([facility] ) [E66 01]  ANA LAURA on CPAP [G47 33, Z99 89]  Essential hypertension, benign [I10]  Polycystic ovaries [E28 2]  Hyperlipidemia, unspecified hyperlipidemia type [E78 5]    Post-Op Diagnosis Codes:     * Morbid obesity ([facility] ) [E66 01]     * ANA LAURA on CPAP [G47 33, Z99 89]     * Essential hypertension, benign [I10]     * Polycystic ovaries [E28 2]     * Hyperlipidemia, unspecified hyperlipidemia type [E78 5]    Procedure(s) (LRB):  BYPASS GASTRIC YUKO-EN-Y LAPAROSCOPIC ROBOT & INTRAOPERATIVE EGD (N/A)    Specimen(s):  * No specimens in log *    Estimated Blood Loss:   20 ml    Drains:  * No LDAs found *    Anesthesia Type:   General    Operative Indications: Morbid obesity ([facility] ) [E66 01]  ANA LAURA on CPAP [G47 33, Z99 89]  Essential hypertension, benign [I10]  Polycystic ovaries [E28 2]  Hyperlipidemia, unspecified hyperlipidemia type [E78 5]      Operative Findings:  Normal Findings    Complications:   None    Procedure and Technique:  Robotic Yuko-en-Y gastric bypass with intraop endoscopy   I was present for the entire procedure    Patient Disposition:  hemodynamically stable     No qualified resident was available  Assistant was necessary for instrument exchange and counter traction and assistance with stapling and intraop endoscopy    Indication:   Patient suffers from morbid obesity and associated co-morbidities  and failed to achieve any meaningful or sustainable weight loss and was therefore consented to undergo a laparoscopic Yuko en Y Gastric Bypass  Risks and benefits were explained to the patient, patient consented for the procedure  The patient was brought to the operating room and placed in a supine position   The patient received a dose of IV antibiotics and a dose of subcutaneous Heparin prior to the procedure  The patient was induced under general endotracheal anesthesia  The abdominal wall was prepped and draped under sterile conditions in the usual fashion  The procedure was started by obtaining access to the abdominal cavity using a Veress needle to the left side of the midline around 6 inches from the xiphoid the abdominal cavity was insufflated with CO2 to a pressure of 15 mmHg  After that, the abdomen was entered with an 8 mm trocar using an Optiview trocar under direct visualization  At that point, an 8 and 12 mm robotic trocars were placed on the right side of the abdominal wall, under direct visualization, and another 8 mm robotic trocar and 12 mm assistant trocar were placed on the left side of the abdominal wall, also under direct visualization  A TAP block was performed using 20 ml of Exparel mixed with saline and 0 5 % Marcaine for a total of 100 ml  The patient was then placed in a reverse Trendelenburg position  A Dontae retractor was placed through a small stab incision below the xiphoid and was used to retract the left lobe of the liver in a medial fashion, the robot was then docked and locked in place  An OG tube was placed to decompress the stomach and then removed immediately  The procedure was started by lifting the omentum in a cephalad fashion  The omentum was then split in half using the vessel sealer  The ligament of Treitz was identified and then the small bowel was transected with a 60 mm stapler using a white load  The mesentery of the small bowel was then transected using the vessel sealer,   After that, the distal small bowel was run for 100 cm in a way to obtain a 100 cm long Yuko limb  At that point, two enterotomies were made in the BP limb and the Yuko limb with hot scissors l, and the jejunojejunostomy was fashioned using a 60 mm stapler with a white load   After firing the stapler, the staple line was inspected and there was no evidence of bleeding and the staple line was well formed  The common enterotomy of the jejunojejunostomy was closed in two layers using 2-0 Vicryl running suture for the first layer and  2-0 V LOC in a running fashion for the second layer  The mesenteric defect of the small bowel was approximated using nonabsorbable suture in a running fashion  At that point, we turned our attention to the upper portion of the abdomen  The pars flaccida was opened and a gastric pouch was created with serial firings of the Sureform 60 mm intuitive  stapler with a green cartridge reinforced with Seamguard  After the formation of the gastric pouch, the staple lines of the pouch and the gastric remnant were inspected and appeared to be well formed and also appeared to be hemostatic  A new gastrojejunostomy anastomosis was then fashioned in an antecolic antegastric fashion in 2 layers  Outer layer was a running layer of 2-0 V lock suture and the inner  layer was a running 2-0 Vicryl suture  Upper endoscopy was then performed and showed no evidence of bubbles or bleeding at the suture line of the anastomosis or the staple line of the gastric pouch  The gastrojejunostomy was covered with an omental flap that was secured in place with an absorbable suture in a simple fashion  The [facility] liver retractor was removed  The 12 mm port was closed  with 1-0 Vicryl in a simple fashion  All the skin edges of the trocar sites were approximated with 4-0 Monocryl in a subcuticular inverted fashion  The patient was extubated and transferred to the PACU in stable condition          SIGNATURE: Truong Barragan MD  DATE: December 5, 2022  TIME: 12:48 PM

## 2022-12-05 NOTE — PLAN OF CARE
Problem: PAIN - ADULT  Goal: Verbalizes/displays adequate comfort level or baseline comfort level  Description: Interventions:  - Encourage patient to monitor pain and request assistance  - Assess pain using appropriate pain scale  - Administer analgesics based on type and severity of pain and evaluate response  - Implement non-pharmacological measures as appropriate and evaluate response  - Consider cultural and social influences on pain and pain management  - Notify physician/advanced practitioner if interventions unsuccessful or patient reports new pain  Outcome: Progressing     Problem: INFECTION - ADULT  Goal: Absence or prevention of progression during hospitalization  Description: INTERVENTIONS:  - Assess and monitor for signs and symptoms of infection  - Monitor lab/diagnostic results  - Monitor all insertion sites, i e  indwelling lines, tubes, and drains  - Monitor endotracheal if appropriate and nasal secretions for changes in amount and color  - [place] appropriate cooling/warming therapies per order  - Administer medications as ordered  - Instruct and encourage patient and family to use good hand hygiene technique  - Identify and instruct in appropriate isolation precautions for identified infection/condition  Outcome: Progressing     Problem: SAFETY ADULT  Goal: Patient will remain free of falls  Description: INTERVENTIONS:  - Educate patient/family on patient safety including physical limitations  - Instruct patient to call for assistance with activity   - Consult OT/PT to assist with strengthening/mobility   - Keep Call bell within reach  - Keep bed low and locked with side rails adjusted as appropriate  - Keep care items and personal belongings within reach  - Initiate and maintain comfort rounds  - Make Fall Risk Sign visible to staff  - Offer Toileting every 2 Hours, in advance of need  - Initiate/Maintain bed alarm  - Apply yellow socks and bracelet for high fall risk patients  - Consider moving patient to room near nurses station  Outcome: Progressing     Problem: DISCHARGE PLANNING  Goal: Discharge to home or other facility with appropriate resources  Description: INTERVENTIONS:  - Identify barriers to discharge w/patient and caregiver  - Arrange for needed discharge resources and transportation as appropriate  - Identify discharge learning needs (meds, wound care, etc )  - Arrange for interpretive services to assist at discharge as needed  - Refer to Case Management Department for coordinating discharge planning if the patient needs post-hospital services based on physician/advanced practitioner order or complex needs related to functional status, cognitive ability, or social support system  Outcome: Progressing     Problem: Knowledge Deficit  Goal: Patient/family/caregiver demonstrates understanding of disease process, treatment plan, medications, and discharge instructions  Description: Complete learning assessment and assess knowledge base    Interventions:  - Provide teaching at level of understanding  - Provide teaching via preferred learning methods  Outcome: Progressing     Problem: GENITOURINARY - ADULT  Goal: Absence of urinary retention  Description: INTERVENTIONS:  - Assess patient’s ability to void and empty bladder  - Monitor I/O  - Bladder scan as needed  - Discuss with physician/AP medications to alleviate retention as needed  - Discuss catheterization for long term situations as appropriate  Outcome: Progressing     Problem: SKIN/TISSUE INTEGRITY - ADULT  Goal: Incision(s), wounds(s) or drain site(s) healing without S/S of infection  Description: INTERVENTIONS  - Assess and document dressing, incision, wound bed, drain sites and surrounding tissue  - Provide patient and family education  - Perform skin care/dressing changes every shift  Outcome: Progressing

## 2022-12-06 VITALS
OXYGEN SATURATION: 99 % | SYSTOLIC BLOOD PRESSURE: 120 MMHG | DIASTOLIC BLOOD PRESSURE: 74 MMHG | RESPIRATION RATE: 14 BRPM | TEMPERATURE: 97.7 F | HEART RATE: 75 BPM | BODY MASS INDEX: 59.21 KG/M2 | WEIGHT: 293 LBS

## 2022-12-06 PROBLEM — D72.829 LEUKOCYTOSIS: Status: ACTIVE | Noted: 2022-12-06

## 2022-12-06 PROBLEM — R74.01 TRANSAMINITIS: Status: ACTIVE | Noted: 2022-12-06

## 2022-12-06 LAB
ALBUMIN SERPL BCP-MCNC: 3.1 G/DL (ref 3.5–5)
ALP SERPL-CCNC: 89 U/L (ref 46–116)
ALT SERPL W P-5'-P-CCNC: 83 U/L (ref 12–78)
ANION GAP SERPL CALCULATED.3IONS-SCNC: 7 MMOL/L (ref 4–13)
AST SERPL W P-5'-P-CCNC: 56 U/L (ref 5–45)
BILIRUB SERPL-MCNC: 0.4 MG/DL (ref 0.2–1)
BUN SERPL-MCNC: 10 MG/DL (ref 5–25)
CALCIUM ALBUM COR SERPL-MCNC: 9.6 MG/DL (ref 8.3–10.1)
CALCIUM SERPL-MCNC: 8.9 MG/DL (ref 8.3–10.1)
CHLORIDE SERPL-SCNC: 105 MMOL/L (ref 96–108)
CO2 SERPL-SCNC: 27 MMOL/L (ref 21–32)
CREAT SERPL-MCNC: 0.72 MG/DL (ref 0.6–1.3)
ERYTHROCYTE [DISTWIDTH] IN BLOOD BY AUTOMATED COUNT: 16 % (ref 11.6–15.1)
GFR SERPL CREATININE-BSD FRML MDRD: 108 ML/MIN/1.73SQ M
GLUCOSE SERPL-MCNC: 97 MG/DL (ref 65–140)
HCT VFR BLD AUTO: 37.3 % (ref 34.8–46.1)
HGB BLD-MCNC: 11.4 G/DL (ref 11.5–15.4)
MCH RBC QN AUTO: 26.1 PG (ref 26.8–34.3)
MCHC RBC AUTO-ENTMCNC: 30.6 G/DL (ref 31.4–37.4)
MCV RBC AUTO: 85 FL (ref 82–98)
PLATELET # BLD AUTO: 218 THOUSANDS/UL (ref 149–390)
PMV BLD AUTO: 11.5 FL (ref 8.9–12.7)
POTASSIUM SERPL-SCNC: 4.2 MMOL/L (ref 3.5–5.3)
PROT SERPL-MCNC: 7.5 G/DL (ref 6.4–8.4)
RBC # BLD AUTO: 4.37 MILLION/UL (ref 3.81–5.12)
SODIUM SERPL-SCNC: 139 MMOL/L (ref 135–147)
WBC # BLD AUTO: 13.65 THOUSAND/UL (ref 4.31–10.16)

## 2022-12-06 RX ORDER — BUPROPION HYDROCHLORIDE 75 MG/1
75 TABLET ORAL 2 TIMES DAILY
Qty: 60 TABLET | Refills: 0 | Status: SHIPPED | OUTPATIENT
Start: 2022-12-06 | End: 2023-01-05

## 2022-12-06 RX ORDER — LOSARTAN POTASSIUM 50 MG/1
50 TABLET ORAL DAILY
Qty: 30 TABLET | Refills: 0 | Status: SHIPPED | OUTPATIENT
Start: 2022-12-06 | End: 2023-01-05

## 2022-12-06 RX ORDER — LEVOTHYROXINE SODIUM 0.03 MG/1
25 TABLET ORAL DAILY
Qty: 30 TABLET | Refills: 0 | Status: SHIPPED | OUTPATIENT
Start: 2022-12-06 | End: 2023-01-05

## 2022-12-06 RX ADMIN — BUPROPION HYDROCHLORIDE 75 MG: 75 TABLET, FILM COATED ORAL at 08:33

## 2022-12-06 RX ADMIN — METRONIDAZOLE 500 MG: 500 INJECTION, SOLUTION INTRAVENOUS at 03:19

## 2022-12-06 RX ADMIN — ACETAMINOPHEN 975 MG: 325 TABLET, FILM COATED ORAL at 08:36

## 2022-12-06 RX ADMIN — FAMOTIDINE 20 MG: 10 INJECTION, SOLUTION INTRAVENOUS at 08:33

## 2022-12-06 RX ADMIN — SODIUM CHLORIDE, SODIUM LACTATE, POTASSIUM CHLORIDE, AND CALCIUM CHLORIDE 125 ML/HR: .6; .31; .03; .02 INJECTION, SOLUTION INTRAVENOUS at 04:58

## 2022-12-06 RX ADMIN — ACETAMINOPHEN 975 MG: 325 TABLET, FILM COATED ORAL at 03:19

## 2022-12-06 RX ADMIN — LEVOTHYROXINE SODIUM 225 MCG: 125 TABLET ORAL at 04:57

## 2022-12-06 NOTE — ASSESSMENT & PLAN NOTE
· Postop day 1 robotic assisted laparoscopic RYGB  · Management by bariatric surgery  · Diet advance as tolerated  · Pain management  · Ambulation spine DVT prophylaxis

## 2022-12-06 NOTE — DISCHARGE SUMMARY
Discharge Summary - Mason Burroughs 40 y o  female MRN: [id number]    Unit/Bed#: E5 -01 Encounter: [id number]      Pre-Operative Diagnosis: Pre-Op Diagnosis Codes:     * Morbid obesity ([address] ) [E66 01]     * ANA LAURA on CPAP [G47 33, Z99 89]     * Essential hypertension, benign [I10]     * Polycystic ovaries [E28 2]     * Hyperlipidemia, unspecified hyperlipidemia type [E78 5]    Post-Operative Diagnosis: Post-Op Diagnosis Codes:     * Morbid obesity ([address] ) [E66 01]     * ANA LAURA on CPAP [G47 33, Z99 89]     * Essential hypertension, benign [I10]     * Polycystic ovaries [E28 2]     * Hyperlipidemia, unspecified hyperlipidemia type [E78 5]    Procedures Performed:  Procedure(s):  BYPASS GASTRIC ELKIN-EN-Y LAPAROSCOPIC ROBOT & INTRAOPERATIVE EGD    Surgeon: Manan Ovalle MD    See H & P for full details of admission and Operative Note for full details of operations performed  Patient tolerated surgery well without complications  In the morning postoperative Day 1, the patient had mild nausea and abdominal pain  Tolerated a clear liquid diet without vomiting  Able to ambulate and voiding independently  Patient was deemed ready for discharge home  Patient was seen and examined prior to discharge  Provisions for Follow-Up Care:  See After Visit Summary/Discharge Instructions for information related to follow-up care and home orders  Disposition: Home, in stable condition  Planned Readmission: No    Discharge Medications:  See After Visit Summary/Discharge Instructions for reconciled discharge medications provided to patient and family  Post Operative instructions: Reviewed with patient and/or family      Signature:   Teresa Tobias MD  Date: 12/6/2022 Time: 9:53 AM

## 2022-12-06 NOTE — ANESTHESIA POSTPROCEDURE EVALUATION
Post-Op Assessment Note    CV Status:  Stable    Pain management: adequate     Mental Status:  Alert and awake   Hydration Status:  Euvolemic   PONV Controlled:  Controlled   Airway Patency:  Patent      Post Op Vitals Reviewed: Yes            No notable events documented      BP      Temp     Pulse     Resp      SpO2      /75   Pulse 85   Temp (!) 97 1 °F (36 2 °C)   Resp 16   Wt (!) 161 kg (355 lb 13 2 oz)   LMP 12/17/2019   SpO2 94%   BMI 59 21 kg/m²

## 2022-12-06 NOTE — PLAN OF CARE
Problem: PAIN - ADULT  Goal: Verbalizes/displays adequate comfort level or baseline comfort level  Description: Interventions:  - Encourage patient to monitor pain and request assistance  - Assess pain using appropriate pain scale  - Administer analgesics based on type and severity of pain and evaluate response  - Implement non-pharmacological measures as appropriate and evaluate response  - Consider cultural and social influences on pain and pain management  - Notify physician/advanced practitioner if interventions unsuccessful or patient reports new pain  Outcome: Progressing     Problem: INFECTION - ADULT  Goal: Absence or prevention of progression during hospitalization  Description: INTERVENTIONS:  - Assess and monitor for signs and symptoms of infection  - Monitor lab/diagnostic results  - Monitor all insertion sites, i e  indwelling lines, tubes, and drains  - Monitor endotracheal if appropriate and nasal secretions for changes in amount and color  - [place] appropriate cooling/warming therapies per order  - Administer medications as ordered  - Instruct and encourage patient and family to use good hand hygiene technique  - Identify and instruct in appropriate isolation precautions for identified infection/condition  Outcome: Progressing     Problem: SAFETY ADULT  Goal: Patient will remain free of falls  Description: INTERVENTIONS:  - Educate patient/family on patient safety including physical limitations  - Instruct patient to call for assistance with activity   - Consult OT/PT to assist with strengthening/mobility   - Keep Call bell within reach  - Keep bed low and locked with side rails adjusted as appropriate  - Keep care items and personal belongings within reach  - Initiate and maintain comfort rounds  - Make Fall Risk Sign visible to staff  - Offer Toileting every 2 Hours, in advance of need  - Initiate/Maintain bed alarm  - Apply yellow socks and bracelet for high fall risk patients  - Consider moving patient to room near nurses station  Outcome: Progressing     Problem: DISCHARGE PLANNING  Goal: Discharge to home or other facility with appropriate resources  Description: INTERVENTIONS:  - Identify barriers to discharge w/patient and caregiver  - Arrange for needed discharge resources and transportation as appropriate  - Identify discharge learning needs (meds, wound care, etc )  - Arrange for interpretive services to assist at discharge as needed  - Refer to Case Management Department for coordinating discharge planning if the patient needs post-hospital services based on physician/advanced practitioner order or complex needs related to functional status, cognitive ability, or social support system  Outcome: Progressing     Problem: Knowledge Deficit  Goal: Patient/family/caregiver demonstrates understanding of disease process, treatment plan, medications, and discharge instructions  Description: Complete learning assessment and assess knowledge base    Interventions:  - Provide teaching at level of understanding  - Provide teaching via preferred learning methods  Outcome: Progressing     Problem: RESPIRATORY - ADULT  Goal: Achieves optimal ventilation and oxygenation  Description: INTERVENTIONS:  - Assess for changes in respiratory status  - Assess for changes in mentation and behavior  - Position to facilitate oxygenation and minimize respiratory effort  - Oxygen administered by appropriate delivery if ordered  - Initiate smoking cessation education as indicated  - Encourage broncho-pulmonary hygiene including cough, deep breathe, Incentive Spirometry  - Assess the need for suctioning and aspirate as needed  - Assess and instruct to report SOB or any respiratory difficulty  - Respiratory Therapy support as indicated  Outcome: Progressing     Problem: GENITOURINARY - ADULT  Goal: Absence of urinary retention  Description: INTERVENTIONS:  - Assess patient’s ability to void and empty bladder  - Monitor I/O  - Bladder scan as needed  - Discuss with physician/AP medications to alleviate retention as needed  - Discuss catheterization for long term situations as appropriate  Outcome: Progressing     Problem: SKIN/TISSUE INTEGRITY - ADULT  Goal: Incision(s), wounds(s) or drain site(s) healing without S/S of infection  Description: INTERVENTIONS  - Assess and document dressing, incision, wound bed, drain sites and surrounding tissue  - Provide patient and family education  - Perform skin care/dressing changes every shift  Outcome: Progressing

## 2022-12-06 NOTE — CONSULTS
[address] 1985, 40 y o  female MRN: [id number]  Unit/Bed#: E5 -01 Encounter: [id number]  Primary Care Provider: Pasquale Ramirez MD   Date and time admitted to hospital: 12/5/2022  7:19 AM    Inpatient consult to Internal Medicine  Consult performed by: Sumaya Gutierrez MD  Consult ordered by: Kayleigh Ring MD          * BMI 50 0-59 9, adult [facility])  Assessment & Plan  · Postop day 1 robotic assisted laparoscopic RYGB  · Management by bariatric surgery  · Diet advance as tolerated  · Pain management  · Ambulation spine DVT prophylaxis    Essential hypertension  Assessment & Plan  · Currently on losartan 100 mg daily and Lasix 40 mg daily  Lasix is mostly for lymphedema  · Decrease losartan to 50 mg daily  · Advised the patient to keep a blood pressure log and follow-up with primary care doctor for further management  · Hold Lasix on dc      Leukocytosis  Assessment & Plan  · WBC of 13, could be reactive to the procedure  · Receiving Levaquin and metronidazole for bariatric surgery    Transaminitis  Assessment & Plan  · Mild transaminitis, could be secondary to the procedure, fatty liver  · Recent ultrasound showed hepatomegaly with fatty infiltration  · Commend repeating CMP in 2 weeks as outpatient    PCOS (polycystic ovarian syndrome)  Assessment & Plan  · Continue to follow-up with GYN    Mixed anxiety depressive disorder  Assessment & Plan  · Continue Wellbutrin 75 mg twice daily  · Continue Xanax as needed  · Continue Celexa 20 mg daily    Hashimoto's thyroiditis  Assessment & Plan  · Continue levothyroxine 225 MCG daily      VTE Prophylaxis: VTE Score: 5 High Risk (Score >/= 5) - Pharmacological DVT Prophylaxis Contraindicated  Sequential Compression Devices Ordered      Recommendations for Discharge:  · Decrease losartan to 50 mg daily  · Hold Lasix on discharge  · Keep a blood pressure log and follow-up with PCP in a few days for medication adjustments if needed    Counseling / Coordination of Care Time: 45 minutes Greater than 50% of total time spent on patient counseling and coordination of care  Collaboration of Care: Were Recommendations Directly Discussed with Primary Treatment Team? No    History of Present Illness:  Vanessa Ramirez is a 40 y o  female who is originally admitted to the reaction service due to basic  We are consulted for comanagement  Patient has a past medical history of hypothyroidism, hypertension, anxiety/depression, obesity  Postop day 1 from robotic assisted laparoscopic RY GB  Patient is a state of hypertension on losartan 100 mg daily  Lasix 40 mg daily for lymphedema  For anxiety/depression on Wellbutrin 100 mg daily, Prozac 20 mg daily and Xanax as needed  Patient is feeling well, ambulating in the hallway  Tolerating diet well    Review of Systems:  Review of Systems   Constitutional: Negative for activity change and fatigue  HENT: Negative  Eyes: Negative  Respiratory: Negative for cough, chest tightness, shortness of breath and wheezing  Cardiovascular: Negative for chest pain, palpitations and leg swelling  Gastrointestinal: Positive for abdominal pain  Negative for diarrhea, nausea and vomiting  Endocrine: Negative  Genitourinary: Negative for difficulty urinating  Musculoskeletal: Negative  Skin: Negative  Neurological: Negative for dizziness and light-headedness  Hematological: Negative  Psychiatric/Behavioral: Negative          Past Medical and Surgical History:   Past Medical History:   Diagnosis Date   • Anxiety    • Bruises easily    • Cancer ([facility] [address] )    • CPAP (continuous positive airway pressure) dependence    • Depression    • Disease of thyroid gland    • DELVALLE (dyspnea on exertion)    • 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   • Endometriosis    • Follicular thyroid cancer ([facility] [address] )    • Gastroenteritis    • Hashimoto's disease    • Hepatic steatosis    • Hyperlipidemia    • Hypertension    • Hypothyroid    • IBS (irritable bowel syndrome)    • Kidney lesion, native, left    • Kidney stone    • Lymphedema     left leg   • Obesity    • Palpitation    • Pancreatitis    • Pneumonia    • Polycystic ovarian disease    • Polyuria    • RUQ abdominal pain 11/1/2021   • Sleep apnea    • Thyroid nodule    • Thyromegaly    • Tinea corporis    • Tinea pedis        Past Surgical History:   Procedure Laterality Date   • ADENOIDECTOMY     • DILATION AND CURETTAGE OF UTERUS  2020   • HYSTERECTOMY  02/2020    left ovaries   • THYROIDECTOMY, PARTIAL Left 06/2019    benign   • TONSILLECTOMY     • US GUIDED THYROID BIOPSY  03/01/2019       Meds/Allergies:  all medications and allergies reviewed    Allergies:    Allergies   Allergen Reactions   • Dust Mite Extract Other (See Comments)     Sinus inflammation   • Latex Blisters   • Molds & Smuts Other (See Comments)     Sinus inflammation   • Penicillins Rash     Skin rash and sheds   • Jorge Luis Grass Pollen Allergen Other (See Comments)     Sinus inflammation       Social History:  Marital Status: /Civil Union  Substance Use History:   Social History     Substance and Sexual Activity   Alcohol Use Not Currently     Social History     Tobacco Use   Smoking Status Never   Smokeless Tobacco Never     Social History     Substance and Sexual Activity   Drug Use Never       Family History:  Family History   Problem Relation Age of Onset   • Hyperlipidemia Mother    • Restless legs syndrome Mother    • Sleep apnea Father    • Hyperlipidemia Father    • Hypertension Father    • Hypertension Maternal Grandmother    • Restless legs syndrome Maternal Grandmother    • Ovarian cancer Maternal Grandmother    • Cancer Paternal Grandmother        Physical Exam:   Vitals:   Blood Pressure: 120/74 (12/06/22 0751)  Pulse: 75 (12/06/22 0751)  Temperature: 97 7 °F (36 5 °C) (12/06/22 0751)  Temp Source: Oral (12/06/22 0751)  Respirations: 14 (12/06/22 0751)  Weight - Scale: (!) 161 kg (355 lb 13 2 oz) (12/06/22 0600)  SpO2: 99 % (12/06/22 0751)    Physical Exam  Constitutional:       General: She is not in acute distress  Appearance: She is obese  HENT:      Head: Atraumatic  Cardiovascular:      Rate and Rhythm: Normal rate and regular rhythm  Heart sounds: No murmur heard  No friction rub  No gallop  Pulmonary:      Effort: Pulmonary effort is normal  No respiratory distress  Breath sounds: Normal breath sounds  No wheezing  Abdominal:      General: Bowel sounds are normal  There is no distension  Palpations: Abdomen is soft  Tenderness: There is no abdominal tenderness  Musculoskeletal:         General: No swelling  Cervical back: Neck supple  Skin:     General: Skin is warm and dry  Neurological:      General: No focal deficit present  Mental Status: She is alert  Psychiatric:         Mood and Affect: Mood normal          Additional Data:   Lab Results:    Results from last 7 days   Lab Units 12/06/22  0501   WBC Thousand/uL 13 65*   HEMOGLOBIN g/dL 11 4*   HEMATOCRIT % 37 3   PLATELETS Thousands/uL 218     Results from last 7 days   Lab Units 12/06/22  0501   SODIUM mmol/L 139   POTASSIUM mmol/L 4 2   CHLORIDE mmol/L 105   CO2 mmol/L 27   BUN mg/dL 10   CREATININE mg/dL 0 72   ANION GAP mmol/L 7   CALCIUM mg/dL 8 9   ALBUMIN g/dL 3 1*   TOTAL BILIRUBIN mg/dL 0 40   ALK PHOS U/L 89   ALT U/L 83*   AST U/L 56*   GLUCOSE RANDOM mg/dL 97             No results found for: HGBA1C            Imaging: Reviewed radiology reports from this admission including: no images done  No orders to display       EKG, Pathology, and Other Studies Reviewed on Admission:   · EKG: No EKG obtained  ** Please Note: This note may have been constructed using a voice recognition system   **

## 2022-12-06 NOTE — ASSESSMENT & PLAN NOTE
· Mild transaminitis, could be secondary to the procedure, fatty liver  · Recent ultrasound showed hepatomegaly with fatty infiltration  · Commend repeating CMP in 2 weeks as outpatient

## 2022-12-06 NOTE — PLAN OF CARE
Problem: PAIN - ADULT  Goal: Verbalizes/displays adequate comfort level or baseline comfort level  Description: Interventions:  - Encourage patient to monitor pain and request assistance  - Assess pain using appropriate pain scale  - Administer analgesics based on type and severity of pain and evaluate response  - Implement non-pharmacological measures as appropriate and evaluate response  - Consider cultural and social influences on pain and pain management  - Notify physician/advanced practitioner if interventions unsuccessful or patient reports new pain  Outcome: Progressing     Problem: INFECTION - ADULT  Goal: Absence or prevention of progression during hospitalization  Description: INTERVENTIONS:  - Assess and monitor for signs and symptoms of infection  - Monitor lab/diagnostic results  - Monitor all insertion sites, i e  indwelling lines, tubes, and drains  - Monitor endotracheal if appropriate and nasal secretions for changes in amount and color  - [place] appropriate cooling/warming therapies per order  - Administer medications as ordered  - Instruct and encourage patient and family to use good hand hygiene technique  - Identify and instruct in appropriate isolation precautions for identified infection/condition  Outcome: Progressing     Problem: SAFETY ADULT  Goal: Patient will remain free of falls  Description: INTERVENTIONS:  - Educate patient/family on patient safety including physical limitations  - Instruct patient to call for assistance with activity   - Consult OT/PT to assist with strengthening/mobility   - Keep Call bell within reach  - Keep bed low and locked with side rails adjusted as appropriate  - Keep care items and personal belongings within reach  - Initiate and maintain comfort rounds  - Make Fall Risk Sign visible to staff  - Offer Toileting every 2 Hours, in advance of need  - Initiate/Maintain bed alarm  - Apply yellow socks and bracelet for high fall risk patients  - Consider moving patient to room near nurses station  Outcome: Progressing     Problem: DISCHARGE PLANNING  Goal: Discharge to home or other facility with appropriate resources  Description: INTERVENTIONS:  - Identify barriers to discharge w/patient and caregiver  - Arrange for needed discharge resources and transportation as appropriate  - Identify discharge learning needs (meds, wound care, etc )  - Arrange for interpretive services to assist at discharge as needed  - Refer to Case Management Department for coordinating discharge planning if the patient needs post-hospital services based on physician/advanced practitioner order or complex needs related to functional status, cognitive ability, or social support system  Outcome: Progressing     Problem: Knowledge Deficit  Goal: Patient/family/caregiver demonstrates understanding of disease process, treatment plan, medications, and discharge instructions  Description: Complete learning assessment and assess knowledge base    Interventions:  - Provide teaching at level of understanding  - Provide teaching via preferred learning methods  Outcome: Progressing     Problem: RESPIRATORY - ADULT  Goal: Achieves optimal ventilation and oxygenation  Description: INTERVENTIONS:  - Assess for changes in respiratory status  - Assess for changes in mentation and behavior  - Position to facilitate oxygenation and minimize respiratory effort  - Oxygen administered by appropriate delivery if ordered  - Initiate smoking cessation education as indicated  - Encourage broncho-pulmonary hygiene including cough, deep breathe, Incentive Spirometry  - Assess the need for suctioning and aspirate as needed  - Assess and instruct to report SOB or any respiratory difficulty  - Respiratory Therapy support as indicated  Outcome: Progressing     Problem: GASTROINTESTINAL - ADULT  Goal: Minimal or absence of nausea and/or vomiting  Description: INTERVENTIONS:  - Administer IV fluids if ordered to ensure adequate hydration  - Maintain NPO status until nausea and vomiting are resolved  - Nasogastric tube if ordered  - Administer ordered antiemetic medications as needed  - Provide nonpharmacologic comfort measures as appropriate  - Advance diet as tolerated, if ordered  - Consider nutrition services referral to assist patient with adequate nutrition and appropriate food choices  Outcome: Progressing  Goal: Maintains or returns to baseline bowel function  Description: INTERVENTIONS:  - Assess bowel function  - Encourage oral fluids to ensure adequate hydration  - Administer IV fluids if ordered to ensure adequate hydration  - Administer ordered medications as needed  - Encourage mobilization and activity  - Consider nutritional services referral to assist patient with adequate nutrition and appropriate food choices  Outcome: Progressing     Problem: GENITOURINARY - ADULT  Goal: Absence of urinary retention  Description: INTERVENTIONS:  - Assess patient’s ability to void and empty bladder  - Monitor I/O  - Bladder scan as needed  - Discuss with physician/AP medications to alleviate retention as needed  - Discuss catheterization for long term situations as appropriate  Outcome: Progressing     Problem: SKIN/TISSUE INTEGRITY - ADULT  Goal: Incision(s), wounds(s) or drain site(s) healing without S/S of infection  Description: INTERVENTIONS  - Assess and document dressing, incision, wound bed, drain sites and surrounding tissue  - Provide patient and family education  - Perform skin care/dressing changes every shift  Outcome: Progressing

## 2022-12-06 NOTE — PROGRESS NOTES
Progress Note - Bariatric Surgery   Emy Archer 40 y o  female MRN: [id number]  Unit/Bed#: E5 -01 Encounter: [id number]      Subjective/Objective     Subjective:  Patient with BMI 50 0-59 9, adult ([address] ) now 1 Day Post-Op s/p robotic assisted laparoscopic RYGB  Patient denies fevers, chills, sweats, SOB, CP, calf pain  Pain adequately controlled on oral pain medication  Ambulating without assistance, voiding well, and using incentive spirometer  Patient tolerating liquid diet without nausea or vomiting today  Vital signs stable  CBC today shows   Lab Results   Component Value Date    HCT 37 3 12/06/2022      Lab Results   Component Value Date    HGB 11 4 (L) 12/06/2022     BMP obtained today   Lab Results   Component Value Date    CREATININE 0 79 07/28/2022      Lab Results   Component Value Date    K 4 4 07/28/2022     on CPAP  SLIM Consulted yesterday for med management  Objective:    /75   Pulse 85   Temp (!) 97 1 °F (36 2 °C)   Resp 16   Wt (!) 161 kg (355 lb 13 2 oz)   LMP 12/17/2019   SpO2 94%   BMI 59 21 kg/m²       Intake/Output Summary (Last 24 hours) at 12/6/2022 0742  Last data filed at 12/5/2022 1650  Gross per 24 hour   Intake 3650 ml   Output 100 ml   Net 3550 ml       Invasive Devices     Peripheral Intravenous Line  Duration           Peripheral IV 12/05/22 Dorsal (posterior); Right Hand <1 day                ROS: 10-point system completed  All negative except see HPI      Physical Exam    General Appearance:    Alert, cooperative, no distress, appears stated age   Head:    Normocephalic, without obvious abnormality, atraumatic   Lungs:     Respirations unlabored   Heart:    Regular rate and rhythm   Abdomen:     Soft, appropriate tenderness, no masses, no organomegaly, non-distended   Extremities:   Extremities normal, atraumatic, no cyanosis or edema   Neurologic:  Incision:  Psych:   Normal strength and sensation    Clean, dry, and intact, no signs of bleeding Normal mood and affect       Lab, Imaging and other studies:  CBC:   Lab Results   Component Value Date    WBC 13 65 (H) 12/06/2022    HGB 11 4 (L) 12/06/2022    HCT 37 3 12/06/2022    MCV 85 12/06/2022     12/06/2022    MCH 26 1 (L) 12/06/2022    MCHC 30 6 (L) 12/06/2022    RDW 16 0 (H) 12/06/2022    MPV 11 5 12/06/2022   , CMP: No results found for: SODIUM, K, CL, CO2, ANIONGAP, BUN, CREATININE, GLUCOSE, CALCIUM, AST, ALT, ALKPHOS, PROT, BILITOT, EGFR   No results found  VTE Mechanical Prophylaxis: sequential compression device    Assessment/Plan  Patient is a 40 y o  female with BMI 50 0-59 9, adult ([address] ) 1 Day Post-Op s/p robotic assisted laparoscopic RYGB with stable course  Patient afebrile and hemodynamically stable  - Encourage PO fluids   - Recommend ambulation, use of SCDs when not ambulating, and incentive spirometry  - Complete antibiotic course  - SLIM consult pending  - Plan to D/C patient home today pending anticipated progression    Plan of care was discussed with patient    Care plan discussed with Dr Jasbir Del Valle MD  Bariatric Surgery Fellow   12/6/2022  7:42 AM

## 2022-12-06 NOTE — ASSESSMENT & PLAN NOTE
· Currently on losartan 100 mg daily and Lasix 40 mg daily   Lasix is mostly for lymphedema  · Decrease losartan to 50 mg daily  · Advised the patient to keep a blood pressure log and follow-up with primary care doctor for further management  · Hold Lasix on dc

## 2022-12-06 NOTE — DISCHARGE INSTRUCTIONS
Bariatric/Weight Loss Surgery  Hospital Discharge Instructions  ACTIVITY:  Progress as feels comfortable - a good rule is:  if you are doing something and it begins to hurt, stop doing the activity  Walk every hour while at home  You may walk stairs if you do so slowly  You may shower 48 hours after surgery  Do not scrub incision sites  Blot gently with clean towel to dry incisions  (see #4 below)   Use your incentive spirometer 10 times per hour while awake for 1 week after surgery  Do NOT drive for 48 hours after surgery  No driving 24 hours after taking certain prescription pain medications  Examples of such medication are Percocet, Darvocet, Oxycodone, Tylenol #3, and Tylenol with Codeine  DIET  Stay on a liquid diet for 7 days after your surgery date, sipping slowly  Refer to your manual for examples of choices  Remember to keep your liquids sugar free or low calorie  You may have protein drinks  Make sure to drink 48 to 64 ounces per day of fluids  You may advance to a pureed diet one week after surgery as instructed by your diet progression pamphlet  Once you get approval from your surgeon at your first post operative visit, you may advance to the soft diet and remain on soft diet for 8 weeks unless otherwise instructed  MEDICATIONS:  The abdominal nerve block will wear off during the first 1-2 days that you are home, and you may become sore (especially over incision site/sites where abdominal wall is sutured)  This may create a pulling sensation, especially while moving around, and will fade over time  Continue to take your Tylenol and your pain medication as instructed  Start vitamins and minerals one week after surgery or when you start stage 3/puree diet  Anti-acid Medication as per prescription  Other medications as indicated on the Physician Patient Discharge Instructions form given to you at the time of discharge    Make sure that you are splitting your pill or tablet medications in halves or fourths or even crushing them before you take them  Capsules should be opened and mixed with water or jello  You need to do this for at least 4 weeks after surgery  Eventually you will be able to take your medications the regular way as they were prescribed  You will need to consult with your Family Doctor in regards to all your prescribed medication, particularly those for blood pressure and diabetes  As you lose weight, medical conditions may change, requiring an alteration or elimination of the drug dose  Monitor blood pressure closely and call PCP with any concerns  Sleeve Gastrectomy patients ONLY:  Complete full course of lovenox injections! DO NOT TAKE BIRTH CONTROL(BC) MEDICATIONS, INSERT BC VAGINAL RINGS, OR PLACE IUD OR ANY OTHER BC METHODS UNTIL 31 DAYS FROM DAY OF DISCHARGE FROM HOSPITAL  THIS PLACES YOU AT HIGH RISK FOR A POTENTIALLY LIFE THREATENING BLOOD CLOT  Remember to always use barrier methods for birth control and speak to your GYN about using two forms of birth control to start 31 days after surgery  It is very important to avoid pregnancy until at least 18-24 months after surgery  INCISION CARE  You may shower and get incisions wet 2 days after surgery  No soaking tub baths or swimming for 30 days after surgery  Keep abdominal area and incisions clean  Use soap and water to create a good lather and rinse off  Do not scrub incisions  If you have a drain, empty the drain as the nurses instructed  FOLLOW-UP APPOINTMENT should be made for one week after discharge  Call surgeon’s office at [phone] to schedule an appointment      CALL YOUR DOCTOR FOR:  pain not controlled by pain medications, a temperature greater than 101 5° F, any increase or change in drainage or redness from any incision, any vomiting or inability to keep liquids down, shortness of breath, shoulder pain, or bleeding

## 2022-12-06 NOTE — ASSESSMENT & PLAN NOTE
· WBC of 13, could be reactive to the procedure  · Receiving Levaquin and metronidazole for bariatric surgery

## 2022-12-07 ENCOUNTER — NURSE TRIAGE (OUTPATIENT)
Dept: OTHER | Facility: OTHER | Age: 37
End: 2022-12-07

## 2022-12-07 ENCOUNTER — TELEPHONE (OUTPATIENT)
Dept: MEDSURG UNIT | Facility: HOSPITAL | Age: 37
End: 2022-12-07

## 2022-12-07 NOTE — TELEPHONE ENCOUNTER
Post op follow up phone call completed  Pt is sipping liquids  Using IS as instructed, reinforced importance of using IS to help prevent pneumonia  Ambulating about home without difficulty  Pain controlled with analgesia  Reaffirmed examples of liquid diet over the next week  Started miralax, passing gas, no BM yet  Pt stated understanding about discharge instructions and medication adjustments  Follow up appt with surgeon scheduled for next week  Instructed to call with any additional questions or concerns

## 2022-12-08 NOTE — TELEPHONE ENCOUNTER
Regarding: post-op/ derma-bond/ incision is opening  ----- Message from Elidia Lee sent at 12/7/2022  8:01 PM EST -----  Pt called, " I had a gastro bypass surg on Monday and the drmo bond on my one of my incisions is not closing the wound  The wound is opening and is leaking clear fluid and blood  ""

## 2022-12-08 NOTE — TELEPHONE ENCOUNTER
Reason for Disposition  • [1] Caller has URGENT question AND [2] triager unable to answer question    Answer Assessment - Initial Assessment Questions  1  SYMPTOM: "What's the main symptom you're concerned about?" (e g , redness, pain, drainage)      Dermabond is opening  2  ONSET: "When did inscision  start?"      Today  3  SURGERY: "What surgery was performed?"      Gastric bypass  4  DATE of SURGERY: "When was surgery performed?"       12/5/2022  5  INCISION SITE: "Where is the incision located?"       Open about one inch  6  REDNESS: "Is there any redness at the incision site?" If yes, ask: "How wide across is the redness?" (Inches, centimeters)      no  7  PAIN: "Is there any pain?" If Yes, ask: "How bad is it?"  (Scale 1-10; or mild, moderate, severe)      manageable with tylenol  8  BLEEDING: "Is there any bleeding?" If Yes, ask: "How much?" and "Where?"      Blood mixed in with the clear fluid a small amount  9  DRAINAGE: "Is there any drainage from the incision site?" If yes, ask: "What color and how much?" (e g , red, cloudy, pus; drops, teaspoon)      Serous   10  FEVER: "Do you have a fever?" If Yes, ask: "What is your temperature, how was it measured, and when did it start?"        none  11  OTHER SYMPTOMS: "Do you have any other symptoms?" (e g , shaking chills, weakness, rash elsewhere on body)        The top level of the skin is open   You can see the dermabond but it came off      Protocols used: POST-OP INCISION SYMPTOMS AND QUESTIONS-ADULT-AH

## 2022-12-12 ENCOUNTER — NURSE TRIAGE (OUTPATIENT)
Dept: OTHER | Facility: OTHER | Age: 37
End: 2022-12-12

## 2022-12-13 NOTE — TELEPHONE ENCOUNTER
Reason for Disposition  • [1] Caller has URGENT question AND [2] triager unable to answer question    Answer Assessment - Initial Assessment Questions  1  SYMPTOM: "What's the main symptom you're concerned about?" (e g , redness, pain, drainage)      One incision opened; incision is where abdomen starts to hang down; is pulled per patient  2  ONSET: "When did symptoms start?"      Patient noticed it today; isn't painful  3  SURGERY: "What surgery was performed?"      Gastric bypass  4  DATE of SURGERY: "When was surgery performed?"       12/5  5  INCISION SITE: "Where is the incision located?"       Abdomen  6  REDNESS: "Is there any redness at the incision site?" If yes, ask: "How wide across is the redness?" (Inches, centimeters)       Denies  7  PAIN: "Is there any pain?" If Yes, ask: "How bad is it?"  (Scale 1-10; or mild, moderate, severe)      Denies, unless touched then it is sore  8  BLEEDING: "Is there any bleeding?" If Yes, ask: "How much?" and "Where?"      Small amt bleeding; did bleed through band aid a little bit  9  DRAINAGE: "Is there any drainage from the incision site?" If yes, ask: "What color and how much?" (e g , red, cloudy, pus; drops, teaspoon)      Clear   10  FEVER: "Do you have a fever?" If Yes, ask: "What is your temperature, how was it measured, and when did it start?"        Denies fever  11  OTHER SYMPTOMS: "Do you have any other symptoms?" (e g , shaking chills, weakness, rash elsewhere on body)        Denies  Was told by office to keep a bandaid on it      Protocols used: POST-OP INCISION SYMPTOMS AND QUESTIONS-ADULT-AH

## 2022-12-13 NOTE — TELEPHONE ENCOUNTER
TC on call provider patient's symptoms and concerns  On call reviewed photo; stated it shows no s/s infection and only can keep it clean and covered and it will heal on its own  Advised to call back if anything changes or worsens and office will see her at scheduled post-op appt  Advised ok to shower  Spoke with patient and provided with recommendations; verbalized understanding  Asked patient to call back with further questions or worsening symptoms

## 2022-12-13 NOTE — TELEPHONE ENCOUNTER
Regarding: post-op/ insicion  ----- Message from Desmond Sims sent at 12/12/2022  6:32 PM EST -----  Pt itz, " I had gastric bypass surgery and the derma bonding on one of my incision was not bonding well  I was recommended to have a band aid on it   Today, I realized that the incision is more open

## 2022-12-15 ENCOUNTER — OFFICE VISIT (OUTPATIENT)
Dept: BARIATRICS | Facility: CLINIC | Age: 37
End: 2022-12-15

## 2022-12-15 VITALS
SYSTOLIC BLOOD PRESSURE: 116 MMHG | BODY MASS INDEX: 48.82 KG/M2 | HEART RATE: 84 BPM | TEMPERATURE: 97.5 F | WEIGHT: 293 LBS | HEIGHT: 65 IN | DIASTOLIC BLOOD PRESSURE: 78 MMHG

## 2022-12-15 DIAGNOSIS — Z98.84 BARIATRIC SURGERY STATUS: Primary | ICD-10-CM

## 2022-12-15 NOTE — PROGRESS NOTES
Weight Management Nutrition Class     Diagnosis: Morbid Obesity    Bariatric Surgeon: Dr Moody Jordan    Surgery: Gastric Bypass Laparoscopic    Class: first post op note    Topics discussed today include:     fluid goals post op, protein goals post op, constipation, chew food well, exercise, diet progression, hypoglycemia, dumping syndrome, protein supplems, vitamin/mineral supplements, calcium supplements, additional vitamin B12, iron supplements and fat soluble vitamins     Patient was able to verbalize basic diet (protein, fluid, vitamin and mineral) recommendations and possible nutrition-related complications   Yes

## 2022-12-15 NOTE — PROGRESS NOTES
POST OP FOLLOW UP VISIT - BARIATRIC SURGERY  Vj Diaz 40 y o  female MRN: [id number]  Unit/Bed#:  Encounter: [id number]      HPI:  Vj Diaz is a 40 y o  female status post robotic assisted laparoscopic RYGB performed by Dr John Lea on 12/5/22 returning to office for first post op visit since surgery  Tolerating diet  Denies nausea and vomiting  Taking multivitamins and PPI daily        Review of Systems  Denies chest pain, SOB, N/V/D, headache, blurred vision    Historical Information   Past Medical History:   Diagnosis Date   • Anxiety    • Bruises easily    • Cancer (HCC)    • CPAP (continuous positive airway pressure) dependence    • Depression    • Disease of thyroid gland    • DELVALLE (dyspnea on exertion)    • 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   • Endometriosis    • Follicular thyroid cancer ([facility] [address] )    • Gastroenteritis    • Hashimoto's disease    • Hepatic steatosis    • Hyperlipidemia    • Hypertension    • Hypothyroid    • IBS (irritable bowel syndrome)    • Kidney lesion, native, left    • Kidney stone    • Lymphedema     left leg   • Obesity    • Palpitation    • Pancreatitis    • Pneumonia    • Polycystic ovarian disease    • Polyuria    • RUQ abdominal pain 11/1/2021   • Sleep apnea    • Thyroid nodule    • Thyromegaly    • Tinea corporis    • Tinea pedis      Past Surgical History:   Procedure Laterality Date   • ADENOIDECTOMY     • DILATION AND CURETTAGE OF UTERUS  2020   • HYSTERECTOMY  02/2020    left ovaries   • WI LAP GASTRIC BYPASS/ELKIN-EN-Y N/A 12/5/2022    Procedure: BYPASS GASTRIC ELKIN-EN-Y LAPAROSCOPIC ROBOT & INTRAOPERATIVE EGD;  Surgeon: Gagandeep Bran MD;  Location: [facility];  Service: Bariatrics   • THYROIDECTOMY, PARTIAL Left 06/2019    benign   • TONSILLECTOMY     • US GUIDED THYROID BIOPSY  03/01/2019     Social History   Social History     Substance and Sexual Activity   Alcohol Use Not Currently     Social History     Substance and Sexual Activity   Drug Use Never     Social History     Tobacco Use   Smoking Status Never   Smokeless Tobacco Never     Family History:   Family History   Problem Relation Age of Onset   • Hyperlipidemia Mother    • Restless legs syndrome Mother    • Sleep apnea Father    • Hyperlipidemia Father    • Hypertension Father    • Hypertension Maternal Grandmother    • Restless legs syndrome Maternal Grandmother    • Ovarian cancer Maternal Grandmother    • Cancer Paternal Grandmother        Meds/Allergies   PTA meds:   Cannot display prior to admission medications because the patient has not been admitted in this contact  Allergies   Allergen Reactions   • Dust Mite Extract Other (See Comments)     Sinus inflammation   • Latex Blisters   • Molds & Smuts Other (See Comments)     Sinus inflammation   • Penicillins Rash     Skin rash and sheds   • Jorge Luis Grass Pollen Allergen Other (See Comments)     Sinus inflammation       Objective       Current Vitals:   Blood Pressure: 116/78 (12/15/22 0850)  Pulse: 84 (12/15/22 0850)  Temperature: 97 5 °F (36 4 °C) (12/15/22 0850)  Temp Source: Tympanic (12/15/22 0850)  Height: 5' 5" (165 1 cm) (12/15/22 0850)  Weight - Scale: (!) 150 kg (331 lb 8 oz) (12/15/22 0850)      Invasive Devices     None                 Physical Exam  Awake, alert, oriented, no acute distress  Normal work of breathing  Regular rate  Abd soft, appropriately tender, incisions clean, dry and intact, left lateral incision glue broke down and skin slightly , covered w/ dry gauze  Moves all extremities  Normal strength and ROM  No edema  Appropriate affect      Assessment/PLAN:    40 y o  female status post robotic laparoscopic RYGB done on 12/5/22 by Dr Lashawn Medley, doing well post op  No major issues and healing well  Increase physical activity slowly as tolerated and instructed  Advance diet as instructed by our dietitians today and as indicated in the binder    Continue PPI    Follow up in one month as scheduled            Laith Oleary MD  Bariatric Surgery Fellow   12/15/2022  9:12 AM

## 2022-12-27 DIAGNOSIS — F41.8 MIXED ANXIETY DEPRESSIVE DISORDER: ICD-10-CM

## 2022-12-27 DIAGNOSIS — E06.3 HYPOTHYROIDISM DUE TO HASHIMOTO'S THYROIDITIS: ICD-10-CM

## 2022-12-27 DIAGNOSIS — E03.8 HYPOTHYROIDISM DUE TO HASHIMOTO'S THYROIDITIS: ICD-10-CM

## 2022-12-27 DIAGNOSIS — I10 ESSENTIAL HYPERTENSION: ICD-10-CM

## 2022-12-27 RX ORDER — LEVOTHYROXINE SODIUM 0.03 MG/1
25 TABLET ORAL DAILY
Qty: 30 TABLET | Refills: 0 | Status: SHIPPED | OUTPATIENT
Start: 2022-12-27 | End: 2023-01-26

## 2022-12-27 RX ORDER — CITALOPRAM 20 MG/1
20 TABLET ORAL DAILY
Qty: 30 TABLET | Refills: 0 | Status: SHIPPED | OUTPATIENT
Start: 2022-12-27

## 2022-12-27 RX ORDER — LOSARTAN POTASSIUM 50 MG/1
50 TABLET ORAL DAILY
Qty: 30 TABLET | Refills: 0 | Status: SHIPPED | OUTPATIENT
Start: 2022-12-27 | End: 2023-01-26

## 2022-12-27 RX ORDER — BUPROPION HYDROCHLORIDE 75 MG/1
75 TABLET ORAL 2 TIMES DAILY
Qty: 60 TABLET | Refills: 0 | Status: SHIPPED | OUTPATIENT
Start: 2022-12-27 | End: 2023-01-26

## 2022-12-27 RX ORDER — LEVOTHYROXINE SODIUM 0.2 MG/1
200 TABLET ORAL DAILY
Qty: 30 TABLET | Refills: 5 | Status: SHIPPED | OUTPATIENT
Start: 2022-12-27

## 2023-01-17 ENCOUNTER — CLINICAL SUPPORT (OUTPATIENT)
Dept: BARIATRICS | Facility: CLINIC | Age: 38
End: 2023-01-17

## 2023-01-17 DIAGNOSIS — Z98.84 BARIATRIC SURGERY STATUS: Primary | ICD-10-CM

## 2023-01-17 NOTE — PROGRESS NOTES
i  Name was verified by patient stating name? yes  ii   verified by patient stating? yes  iii  Verification of patient location yes  iv  Verified patient is in state in which I hold an active license? yes  v  Verified the patient is alone? yes  vi  I would like to verify that you were offered a live visit but are now consenting to this telephone visit? yes  vii  This visit is free yes  Weight Management Nutrition Class     Diagnosis: Morbid Obesity    Bariatric Surgeon: Dr Jass Resendez    Surgery: Gastric Bypass Laparoscopic    Class: 5 week post op     Topics discussed today include:     fluid goals post op, protein goals post op, constipation, chew food well, exercise, avoidance of alcohol, PPI use, diet progression, hypoglycemia, dumping syndrome, protein supplems, vitamin/mineral supplements, calcium supplements, additional vitamin B12, iron supplements and fat soluble vitamins     Patient was able to verbalize basic diet (protein, fluid, vitamin and mineral) recommendations and possible nutrition-related complications   Yes

## 2023-01-27 DIAGNOSIS — I10 ESSENTIAL HYPERTENSION: ICD-10-CM

## 2023-01-30 ENCOUNTER — OFFICE VISIT (OUTPATIENT)
Dept: ENDOCRINOLOGY | Facility: CLINIC | Age: 38
End: 2023-01-30

## 2023-01-30 VITALS
DIASTOLIC BLOOD PRESSURE: 70 MMHG | HEART RATE: 83 BPM | BODY MASS INDEX: 48.82 KG/M2 | HEIGHT: 65 IN | WEIGHT: 293 LBS | SYSTOLIC BLOOD PRESSURE: 110 MMHG

## 2023-01-30 DIAGNOSIS — G47.33 OSA (OBSTRUCTIVE SLEEP APNEA): ICD-10-CM

## 2023-01-30 DIAGNOSIS — E55.9 VITAMIN D DEFICIENCY: ICD-10-CM

## 2023-01-30 DIAGNOSIS — E06.3 HYPOTHYROIDISM DUE TO HASHIMOTO'S THYROIDITIS: Primary | ICD-10-CM

## 2023-01-30 DIAGNOSIS — E03.8 HYPOTHYROIDISM DUE TO HASHIMOTO'S THYROIDITIS: Primary | ICD-10-CM

## 2023-01-30 RX ORDER — LOSARTAN POTASSIUM 50 MG/1
TABLET ORAL
Qty: 30 TABLET | Refills: 0 | Status: SHIPPED | OUTPATIENT
Start: 2023-01-30

## 2023-01-30 RX ORDER — LEVOTHYROXINE SODIUM 0.03 MG/1
25 TABLET ORAL DAILY
Qty: 30 TABLET | Refills: 0 | Status: SHIPPED | OUTPATIENT
Start: 2023-01-30 | End: 2023-03-01

## 2023-01-30 NOTE — PROGRESS NOTES
Established Patient Progress Note      Chief Complaint   Patient presents with   • Hypothyroidism        History of Present Illness:   Ronak Duran is a 40 y o  female with hx of thyroid nodule, vitamin D deficiency, iron deficiency, sleep apnea, obesity and hypothyroidism due to Hashimoto's thyroiditis here for follow-up  For hypothyroidism, she was diagnosed about 3 years ago  She is currently taking levothyroxine 225 mcg daily  She is taking this regularly and properly  She does have cold intolerance that has increased since gastric bypass  For thyroid nodule, she had left side partial thyroidectomy in 2019  Pathology showed  follicular adenoma, oncocytic 3cm  She is complaining of some trouble swallowing and hoarse voice  For obesity, she had gastric bypass in December  She has lost about 50 pounds since  She is having some occasional constipation and nausea, but overall feels well since her surgery  For sleep apea, she uses CPAP nightly  She has been more fatigue recently  She has been sleeping in recliner some nights and feels more fatigue those mornings due to CPAP not working properly when she sleeps in the chair  For iron deficiency, she is not currently taking iron supplements  Was causing her to have GI upset  For Vitamin D deficiency, she is not currently taking supplementation however she is taking multivitamin 4 times daily  She is complaining today of increased blurry vision and increased joints pains in b/l knees and elbows  She has had rheumatology work up in the past that was negative for lupus       Patient Active Problem List   Diagnosis   • Hashimoto's thyroiditis   • Hypothyroidism   • BMI 50 0-59 9, adult ([facility] [address] )   • Arthralgia   • Eczema   • Lymph edema   • Mixed anxiety depressive disorder   • Essential hypertension   • ANA LAURA (obstructive sleep apnea)   • Sleep related hypoxia   • PCOS (polycystic ovarian syndrome)   • COVID-19   • Pre-op examination   • Transaminitis   • Leukocytosis   • Vitamin D deficiency      Past Medical History:   Diagnosis Date   • Anxiety    • Bruises easily    • Cancer (HCC)    • CPAP (continuous positive airway pressure) dependence    • Depression    • Disease of thyroid gland    • DELVALLE (dyspnea on exertion)    • 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   • Endometriosis    • Follicular thyroid cancer ([facility] [address] )    • Gastroenteritis    • Hashimoto's disease    • Hepatic steatosis    • Hyperlipidemia    • Hypertension    • Hypothyroid    • IBS (irritable bowel syndrome)    • Kidney lesion, native, left    • Kidney stone    • Lymphedema     left leg   • Obesity    • Palpitation    • Pancreatitis    • Pneumonia    • Polycystic ovarian disease    • Polyuria    • RUQ abdominal pain 11/1/2021   • Sleep apnea    • Thyroid nodule    • Thyromegaly    • Tinea corporis    • Tinea pedis       Past Surgical History:   Procedure Laterality Date   • ADENOIDECTOMY     • DILATION AND CURETTAGE OF UTERUS  2020   • HYSTERECTOMY  02/2020    left ovaries   • MO LAPS GSTR RSTCV PX W/BYP ELKIN-EN-Y LIMB <150 CM N/A 12/5/2022    Procedure: BYPASS GASTRIC ELKIN-EN-Y LAPAROSCOPIC ROBOT & INTRAOPERATIVE EGD;  Surgeon: Amy Renee MD;  Location: [facility];  Service: Bariatrics   • THYROIDECTOMY, PARTIAL Left 06/2019    benign   • TONSILLECTOMY     • US GUIDED THYROID BIOPSY  03/01/2019      Family History   Problem Relation Age of Onset   • Hyperlipidemia Mother    • Restless legs syndrome Mother    • Sleep apnea Father    • Hyperlipidemia Father    • Hypertension Father    • Hypertension Maternal Grandmother    • Restless legs syndrome Maternal Grandmother    • Ovarian cancer Maternal Grandmother    • Cancer Paternal Grandmother      Social History     Tobacco Use   • Smoking status: Never   • Smokeless tobacco: Never   Substance Use Topics   • Alcohol use: Not Currently     Allergies   Allergen Reactions   • Dust Mite Extract Other (See Comments) Sinus inflammation   • Latex Blisters   • Molds & Smuts Other (See Comments)     Sinus inflammation   • Penicillins Rash     Skin rash and sheds   • Jorge Luis Grass Pollen Allergen Other (See Comments)     Sinus inflammation         Current Outpatient Medications:   •  albuterol (Ventolin HFA) 90 mcg/act inhaler, Inhale 2 puffs every 6 (six) hours as needed for wheezing or shortness of breath, Disp: 18 g, Rfl: 0  •  ALPRAZolam (XANAX) 0 25 mg tablet, Take 1 tablet (0 25 mg total) by mouth 3 (three) times a day as needed for anxiety, Disp: 60 tablet, Rfl: 0  •  buPROPion (WELLBUTRIN) 75 mg tablet, Take 1 tablet (75 mg total) by mouth 2 (two) times a day, Disp: 60 tablet, Rfl: 0  •  Calcium Carbonate (CALCIUM 500 PO), Take 500 mg by mouth 3 (three) times a day, Disp: , Rfl:   •  citalopram (CeleXA) 20 mg tablet, Take 1 tablet (20 mg total) by mouth daily, Disp: 30 tablet, Rfl: 0  •  fluticasone (FLONASE) 50 mcg/act nasal spray, 2 sprays into each nostril daily, Disp: 16 g, Rfl: 0  •  levothyroxine 200 mcg tablet, Take 1 tablet (200 mcg total) by mouth daily, Disp: 30 tablet, Rfl: 5  •  levothyroxine 25 mcg tablet, Take 1 tablet (25 mcg total) by mouth daily In addition to 200mcg daily for total of 225mcg daily  , Disp: 30 tablet, Rfl: 0  •  loratadine (CLARITIN) 10 mg tablet, Take 10 mg by mouth if needed, Disp: , Rfl:   •  losartan (COZAAR) 50 mg tablet, TAKE ONE TABLET BY MOUTH EVERY DAY, Disp: 30 tablet, Rfl: 0  •  Multiple Vitamins-Minerals (Bariatric Fusion) CHEW, Chew 4 tablets daily, Disp: , Rfl:   •  omeprazole (PriLOSEC) 20 mg delayed release capsule, Take 1 capsule (20 mg total) by mouth daily Do not start before December 6, 2022 , Disp: 30 capsule, Rfl: 3  •  triamcinolone (KENALOG) 0 1 % cream, APPLY TOPICALLY DAILY AT BEDTIME AS NEEDED FOR RASH, Disp: 80 g, Rfl: 1    Review of Systems   Constitutional: Positive for fatigue and unexpected weight change (about 50 pounds since surgery )   Negative for activity change, appetite change, chills, diaphoresis and fever  Eyes: Positive for visual disturbance (worsening vision)  Respiratory: Negative for chest tightness and shortness of breath  Cardiovascular: Negative for chest pain, palpitations and leg swelling  Gastrointestinal: Positive for constipation (since bypass) and nausea (intermittent)  Negative for abdominal pain, diarrhea and vomiting  Endocrine: Positive for cold intolerance  Negative for heat intolerance  Musculoskeletal:        Joint pains - b/l knees and elbows   Neurological: Positive for headaches  Negative for dizziness, tremors, weakness and light-headedness  All other systems reviewed and are negative  Physical Exam:  Body mass index is 53 65 kg/m²  /70 (BP Location: Left arm, Patient Position: Sitting, Cuff Size: Large)   Pulse 83   Ht 5' 5" (1 651 m)   Wt (!) 146 kg (322 lb 6 4 oz)   LMP 12/17/2019   BMI 53 65 kg/m²    Wt Readings from Last 3 Encounters:   01/30/23 (!) 146 kg (322 lb 6 4 oz)   12/15/22 (!) 150 kg (331 lb 8 oz)   12/06/22 (!) 161 kg (355 lb 13 2 oz)       Physical Exam  Vitals reviewed  Constitutional:       Appearance: Normal appearance  She is obese  Neck:      Comments: Left side of thyroid absent  Cardiovascular:      Rate and Rhythm: Normal rate and regular rhythm  Pulses: Normal pulses  Heart sounds: Normal heart sounds  No murmur heard  Pulmonary:      Effort: Pulmonary effort is normal  No respiratory distress  Breath sounds: Normal breath sounds  No wheezing, rhonchi or rales  Neurological:      Mental Status: She is alert and oriented to person, place, and time  Psychiatric:         Mood and Affect: Mood normal          Behavior: Behavior normal          Thought Content:  Thought content normal          Judgment: Judgment normal        Labs:   Lab Results   Component Value Date    CREATININE 0 72 12/06/2022    CREATININE 0 79 07/28/2022    CREATININE 0 98 03/15/2022    BUN 10 12/06/2022    K 4 2 12/06/2022     12/06/2022    CO2 27 12/06/2022     eGFR   Date Value Ref Range Status   12/06/2022 108 ml/min/1 73sq m Final     Lab Results   Component Value Date    HDL 56 07/28/2022    TRIG 91 07/28/2022     Lab Results   Component Value Date    ALT 83 (H) 12/06/2022    AST 56 (H) 12/06/2022    ALKPHOS 89 12/06/2022     Lab Results   Component Value Date    XKL8NFCIJNLU 1 600 11/25/2022    XPR6MSDNCBRU 6 540 (H) 10/03/2022    [id number] 18 200 (H) 07/28/2022     Lab Results   Component Value Date    FREET4 1 34 11/25/2022       Impression & Plan:    Problem List Items Addressed This Visit        Endocrine    Hypothyroidism - Primary     Most recent thyroid function tests were within normal range  She has not had thyroid levels checked since bariatric surgery and has had 50 pound weight loss since  Continue current dose of levothyroxine for now and will repeat thyroid function testing  Relevant Medications    levothyroxine 25 mcg tablet    Other Relevant Orders    T4, free    TSH, 3rd generation    US thyroid       Respiratory    ANA LAURA (obstructive sleep apnea)     Continue with CPAP nightly  Other    Vitamin D deficiency     Will repeat vitamin d level  Relevant Orders    Vitamin D 25 hydroxy       Orders Placed This Encounter   Procedures   • US thyroid     Standing Status:   Future     Standing Expiration Date:   1/30/2027     Scheduling Instructions:      No prep required  Please bring your insurance cards, a form of photo ID and a list of your medications with you  Arrive 15 minutes prior to your appointment time in order to register  To schedule this appointment, please contact Central Scheduling at [phone]  • T4, free     Standing Status:   Future     Standing Expiration Date:   1/30/2024   • TSH, 3rd generation     This is a patient instruction: This test is non-fasting  Please drink two glasses of water morning of bloodwork  Standing Status:   Future     Standing Expiration Date:   1/30/2024   • Vitamin D 25 hydroxy       There are no Patient Instructions on file for this visit  Discussed with the patient and all questioned fully answered  She will call me if any problems arise      DAVID Dupont

## 2023-01-30 NOTE — ASSESSMENT & PLAN NOTE
Most recent thyroid function tests were within normal range  She has not had thyroid levels checked since bariatric surgery and has had 50 pound weight loss since  Continue current dose of levothyroxine for now and will repeat thyroid function testing

## 2023-02-05 DIAGNOSIS — F41.8 MIXED ANXIETY DEPRESSIVE DISORDER: ICD-10-CM

## 2023-02-06 RX ORDER — CITALOPRAM 20 MG/1
20 TABLET ORAL DAILY
Qty: 30 TABLET | Refills: 0 | Status: SHIPPED | OUTPATIENT
Start: 2023-02-06

## 2023-02-06 RX ORDER — BUPROPION HYDROCHLORIDE 75 MG/1
75 TABLET ORAL 2 TIMES DAILY
Qty: 60 TABLET | Refills: 0 | Status: SHIPPED | OUTPATIENT
Start: 2023-02-06 | End: 2023-03-08

## 2023-02-06 RX ORDER — CITALOPRAM 20 MG/1
TABLET ORAL
Qty: 30 TABLET | Refills: 0 | Status: SHIPPED | OUTPATIENT
Start: 2023-02-06

## 2023-03-02 DIAGNOSIS — F41.8 MIXED ANXIETY DEPRESSIVE DISORDER: ICD-10-CM

## 2023-03-02 DIAGNOSIS — E06.3 HYPOTHYROIDISM DUE TO HASHIMOTO'S THYROIDITIS: ICD-10-CM

## 2023-03-02 DIAGNOSIS — E03.8 HYPOTHYROIDISM DUE TO HASHIMOTO'S THYROIDITIS: ICD-10-CM

## 2023-03-02 RX ORDER — LEVOTHYROXINE SODIUM 0.03 MG/1
TABLET ORAL
Qty: 30 TABLET | Refills: 3 | Status: SHIPPED | OUTPATIENT
Start: 2023-03-02

## 2023-03-03 RX ORDER — CITALOPRAM 20 MG/1
TABLET ORAL
Qty: 30 TABLET | Refills: 0 | Status: SHIPPED | OUTPATIENT
Start: 2023-03-03

## 2023-03-17 ENCOUNTER — OFFICE VISIT (OUTPATIENT)
Dept: BARIATRICS | Facility: CLINIC | Age: 38
End: 2023-03-17

## 2023-03-17 VITALS
TEMPERATURE: 97.5 F | SYSTOLIC BLOOD PRESSURE: 108 MMHG | DIASTOLIC BLOOD PRESSURE: 74 MMHG | HEART RATE: 78 BPM | WEIGHT: 293 LBS | HEIGHT: 65 IN | BODY MASS INDEX: 48.82 KG/M2

## 2023-03-17 DIAGNOSIS — Z48.815 ENCOUNTER FOR SURGICAL AFTERCARE FOLLOWING SURGERY OF DIGESTIVE SYSTEM: Primary | ICD-10-CM

## 2023-03-17 DIAGNOSIS — I89.0 LYMPH EDEMA: ICD-10-CM

## 2023-03-17 DIAGNOSIS — E66.01 MORBID (SEVERE) OBESITY DUE TO EXCESS CALORIES (HCC): ICD-10-CM

## 2023-03-17 DIAGNOSIS — G47.33 OSA (OBSTRUCTIVE SLEEP APNEA): ICD-10-CM

## 2023-03-17 DIAGNOSIS — K91.2 POSTSURGICAL MALABSORPTION: ICD-10-CM

## 2023-03-17 DIAGNOSIS — Z98.84 BARIATRIC SURGERY STATUS: ICD-10-CM

## 2023-03-17 RX ORDER — OMEPRAZOLE 20 MG/1
20 CAPSULE, DELAYED RELEASE ORAL DAILY
Qty: 30 CAPSULE | Refills: 3 | Status: SHIPPED | OUTPATIENT
Start: 2023-03-17

## 2023-03-17 RX ORDER — LOSARTAN POTASSIUM 100 MG/1
100 TABLET ORAL DAILY
COMMUNITY
Start: 2023-01-29

## 2023-03-17 NOTE — PROGRESS NOTES
Assessment/Plan:     Patient ID: Zhanna Armenta is a 40 y o  female  Bariatric Surgery Status    -s/p Yuko-En-Y Gastric Bypass with Dr Umang Hubbard on 12/05/2022  Presents to the office today for 2nd post op visit  Overall doing well, tolerating a regular diet  Denies having any abdominal pain, N/V/D/C, regurgitation, reflux or dysphagia  Taking her MVI and PPI daily  PLAN:     - continue with PPI for now  - Routine follow up in 3 months for 3rd post op visit  - Continue with healthy lifestyle, adequate protein intake of 60 gm, fluid intake of at least 64 oz  - Continue with MVI daily  - Activity as tolerated  - Labs ordered and will adjust accordingly if any deficiency  - Follow up with RD and SW as needed  HTN - Bp in office within limits, slightly on lower end  Takes losartan 100 mg PO QD  She doesn't check her BP at home  Advised to monitor closely due to lower BP  May need dose adjustment  Follow up with PCP as scheduled/needed  ANA LAURA - intermittently using her CPAP machine due to increase pressure and loose mask  Advised to continence with use and f/u with sleep medicine as she may need a setting adjustment  Lymph edema - of left leg  Gets rashes and chaffing of skin of this region  Denies any current rash at this time  She is using powder to prevent worsening at this time  · Continued/Maintain healthy weight loss with good nutrition intakes  · Adequate hydration with at least 64oz  fluid intake  · Follow diet as discussed  · Follow vitamin and mineral recommendations as reviewed with you  · Exercise as tolerated  · Colonoscopy referral made: not of age range  · Mammogram - Not of age range    · Follow-up in 3 months for 3rd post op visit  We kindly ask that your arrive 15 minutes before your scheduled appointment time with your provider to allow our staff to room you, get your vital signs and update your chart  · Get lab work done prior to annual visit   Please call the office if you need a script  It is recommended to check with your insurance BEFORE getting labs done to make sure they are covered by your policy  · Call our office if you have any problems with abdominal pain especially associated with fever, chills, nausea, vomiting or any other concerns  · All  Post-bariatric surgery patients should be aware that very small quantities of any alcohol can cause impairment and it is very possible not to feel the effect  The effect can be in the system for several hours  It is also a stomach irritant  · It is advised to AVOID alcohol, Nonsteroidal antiinflammatory drugs (NSAIDS) and nicotine of all forms   Any of these can cause stomach irritation/pain  · Discussed the effects of alcohol on a bariatric patient and the increased impairment risk  · Keep up the good work! Postsurgical Malabsorption   -At risk for malabsorption of vitamins/minerals secondary to malabsorption and restriction of intake from bariatric surgery  -Currently taking adequate postop bariatric surgery vitamin supplementation  -Next set of bariatric labs ordered for approximately 2 weeks  -Patient received education about the importance of adhering to a lifelong supplementation regimen to avoid vitamin/mineral deficiencies      Diagnoses and all orders for this visit:    Encounter for surgical aftercare following surgery of digestive system  -     Zinc; Future  -     Vitamin D 25 hydroxy; Future  -     Vitamin B12; Future  -     Vitamin B1, whole blood; Future  -     Vitamin A; Future  -     PTH, intact; Future  -     CBC and Platelet; Future  -     Comprehensive metabolic panel; Future  -     Ferritin; Future  -     Folate; Future  -     Iron Saturation %; Future    Bariatric surgery status  -     Zinc; Future  -     Vitamin D 25 hydroxy; Future  -     Vitamin B12; Future  -     Vitamin B1, whole blood; Future  -     Vitamin A; Future  -     PTH, intact;  Future  -     CBC and Platelet; Future  -     Comprehensive metabolic panel; Future  -     Ferritin; Future  -     Folate; Future  -     Iron Saturation %; Future    Postsurgical malabsorption  -     Zinc; Future  -     Vitamin D 25 hydroxy; Future  -     Vitamin B12; Future  -     Vitamin B1, whole blood; Future  -     Vitamin A; Future  -     PTH, intact; Future  -     CBC and Platelet; Future  -     Comprehensive metabolic panel; Future  -     Ferritin; Future  -     Folate; Future  -     Iron Saturation %; Future    ANA LAURA (obstructive sleep apnea)  -     Zinc; Future  -     Vitamin D 25 hydroxy; Future  -     Vitamin B12; Future  -     Vitamin B1, whole blood; Future  -     Vitamin A; Future  -     PTH, intact; Future  -     CBC and Platelet; Future  -     Comprehensive metabolic panel; Future  -     Ferritin; Future  -     Folate; Future  -     Iron Saturation %; Future    Morbid (severe) obesity due to excess calories (HCC)  -     Zinc; Future  -     Vitamin D 25 hydroxy; Future  -     Vitamin B12; Future  -     Vitamin B1, whole blood; Future  -     Vitamin A; Future  -     PTH, intact; Future  -     CBC and Platelet; Future  -     Comprehensive metabolic panel; Future  -     Ferritin; Future  -     Folate; Future  -     Iron Saturation %; Future  -     omeprazole (PriLOSEC) 20 mg delayed release capsule; Take 1 capsule (20 mg total) by mouth daily    Other orders  -     losartan (COZAAR) 100 MG tablet; Take 100 mg by mouth daily         Subjective:      Patient ID: Debby Cordero is a 40 y o  female  -s/p Yuok-En-Y Gastric Bypass with Dr Bennie Bennett on 12/05/2022  Presents to the office today for 2nd post op visit  Overall doing well, tolerating a regular diet  Denies having any abdominal pain, N/V/D/C, regurgitation, reflux or dysphagia  Taking her MVI and PPI daily      Initial: 378 lbs  Current: 308 5 lbs  EWL: (Weight loss is ahead of schedule at this post surgical period )  Gerald: current   Current BMI is Body mass index is 51 34 kg/m²     · Tolerating a regular diet-yes  · Eating at least 60 grams of protein per day-yes  · Following 30/60 minute rule with liquids-yes  · Drinking at least 64 ounces of fluid per day-yes  · Drinking carbonated beverages-no  · Sufficient exercise-no - trying to make her become more active  · Using NSAIDs regularly-no  · Using nicotine-no  · Using alcohol-no  · Supplements: Multivitamins and Calcium  (fusions) + iron (65mg vitron) - once a day)    · EWL is 31%, which places the patient ahead of schedule for expected post surgical weight loss at this time  The following portions of the patient's history were reviewed and updated as appropriate: allergies, current medications, past family history, past medical history, past social history, past surgical history and problem list     Review of Systems   Constitutional: Negative  Respiratory: Negative  Cardiovascular: Negative  Gastrointestinal: Positive for constipation (taking miralax as needed)  Denies heartburn     Musculoskeletal: Positive for arthralgias (shoulder)  Neurological: Positive for headaches  Psychiatric/Behavioral: Negative  Objective:    /74   Pulse 78   Temp 97 5 °F (36 4 °C) (Tympanic)   Ht 5' 5" (1 651 m)   Wt (!) 140 kg (308 lb 8 oz)   LMP 12/17/2019   BMI 51 34 kg/m²      Physical Exam  Vitals and nursing note reviewed  Constitutional:       Appearance: Normal appearance  She is obese  Cardiovascular:      Rate and Rhythm: Normal rate and regular rhythm  Pulses: Normal pulses  Heart sounds: Normal heart sounds  Pulmonary:      Effort: Pulmonary effort is normal       Breath sounds: Normal breath sounds  Abdominal:      General: Bowel sounds are normal       Palpations: Abdomen is soft  Tenderness: There is no abdominal tenderness  Comments: All incisions healed well without signs of infection     Musculoskeletal:         General: Normal range of motion     Skin: General: Skin is warm and dry  Neurological:      General: No focal deficit present  Mental Status: She is alert and oriented to person, place, and time  Psychiatric:         Mood and Affect: Mood normal          Behavior: Behavior normal          Thought Content:  Thought content normal          Judgment: Judgment normal

## 2023-03-29 DIAGNOSIS — F41.8 MIXED ANXIETY DEPRESSIVE DISORDER: ICD-10-CM

## 2023-03-30 RX ORDER — CITALOPRAM 20 MG/1
20 TABLET ORAL DAILY
Qty: 30 TABLET | Refills: 0 | Status: SHIPPED | OUTPATIENT
Start: 2023-03-30

## 2023-04-05 DIAGNOSIS — E03.8 HYPOTHYROIDISM DUE TO HASHIMOTO'S THYROIDITIS: ICD-10-CM

## 2023-04-05 DIAGNOSIS — E06.3 HYPOTHYROIDISM DUE TO HASHIMOTO'S THYROIDITIS: ICD-10-CM

## 2023-04-05 DIAGNOSIS — I10 ESSENTIAL HYPERTENSION: ICD-10-CM

## 2023-04-05 DIAGNOSIS — F41.8 MIXED ANXIETY DEPRESSIVE DISORDER: ICD-10-CM

## 2023-04-05 RX ORDER — BUPROPION HYDROCHLORIDE 75 MG/1
75 TABLET ORAL 2 TIMES DAILY
Qty: 60 TABLET | Refills: 0 | Status: SHIPPED | OUTPATIENT
Start: 2023-04-05 | End: 2023-05-05

## 2023-04-05 RX ORDER — LEVOTHYROXINE SODIUM 0.03 MG/1
25 TABLET ORAL DAILY
Qty: 90 TABLET | Refills: 0 | Status: SHIPPED | OUTPATIENT
Start: 2023-04-05

## 2023-04-05 RX ORDER — LOSARTAN POTASSIUM 50 MG/1
50 TABLET ORAL DAILY
Qty: 30 TABLET | Refills: 0 | Status: SHIPPED | OUTPATIENT
Start: 2023-04-05

## 2023-04-05 NOTE — TELEPHONE ENCOUNTER
Requested medication(s) are due for refill today: Yes  Patient has already received a courtesy refill: No  Other reason request has been forwarded to provider: Not able to approve prescription, Per [facility] duplicate order

## 2023-05-02 DIAGNOSIS — F41.8 MIXED ANXIETY DEPRESSIVE DISORDER: ICD-10-CM

## 2023-05-02 DIAGNOSIS — I10 ESSENTIAL HYPERTENSION: ICD-10-CM

## 2023-05-03 RX ORDER — BUPROPION HYDROCHLORIDE 75 MG/1
75 TABLET ORAL 2 TIMES DAILY
Qty: 60 TABLET | Refills: 0 | Status: SHIPPED | OUTPATIENT
Start: 2023-05-03

## 2023-05-03 RX ORDER — LOSARTAN POTASSIUM 50 MG/1
50 TABLET ORAL DAILY
Qty: 30 TABLET | Refills: 0 | Status: SHIPPED | OUTPATIENT
Start: 2023-05-03

## 2023-05-03 RX ORDER — CITALOPRAM 20 MG/1
20 TABLET ORAL DAILY
Qty: 30 TABLET | Refills: 0 | Status: SHIPPED | OUTPATIENT
Start: 2023-05-03

## 2023-06-19 ENCOUNTER — OFFICE VISIT (OUTPATIENT)
Dept: BARIATRICS | Facility: CLINIC | Age: 38
End: 2023-06-19
Payer: COMMERCIAL

## 2023-06-19 VITALS
HEIGHT: 65 IN | DIASTOLIC BLOOD PRESSURE: 76 MMHG | TEMPERATURE: 96.9 F | HEART RATE: 70 BPM | SYSTOLIC BLOOD PRESSURE: 120 MMHG | WEIGHT: 293 LBS | BODY MASS INDEX: 48.82 KG/M2

## 2023-06-19 DIAGNOSIS — E66.01 MORBID (SEVERE) OBESITY DUE TO EXCESS CALORIES (HCC): ICD-10-CM

## 2023-06-19 DIAGNOSIS — L98.7 EXCESSIVE AND REDUNDANT SKIN AND SUBCUTANEOUS TISSUE: ICD-10-CM

## 2023-06-19 DIAGNOSIS — Z48.815 ENCOUNTER FOR SURGICAL AFTERCARE FOLLOWING SURGERY OF DIGESTIVE SYSTEM: Primary | ICD-10-CM

## 2023-06-19 DIAGNOSIS — Z98.84 BARIATRIC SURGERY STATUS: ICD-10-CM

## 2023-06-19 DIAGNOSIS — K91.2 POSTSURGICAL MALABSORPTION: ICD-10-CM

## 2023-06-19 PROCEDURE — 99214 OFFICE O/P EST MOD 30 MIN: CPT | Performed by: NURSE PRACTITIONER

## 2023-06-19 NOTE — PROGRESS NOTES
Assessment/Plan:     Patient ID: Louise Leblanc is a 40 y o  female  Bariatric Surgery Status    -s/p Yuko-En-Y Gastric Bypass with Dr Samuel Yeager on 12/05/2022  Presents to the office today for 3rd post op visit  Overall doing fair, she reports having an episode of depression hence she was overeating  She was able to reach juan weight of 290 lbs; and has gained weight since this episode  Increased her therapy sessions so now has improved  She is working on healthy eating habits again  Denies having any abdominal pain, N/V/D/C, regurgitation, reflux or dysphagia  PLAN:     - Follow up with RD for post op support  Recommended  support however patient reports her therapist specializes in eating disorder    - Recommended to taper off omeprazole over the next 6 weeks  - Routine follow up in 6 months for annual visit  - Continue with healthy lifestyle, adequate protein intake of 60 gm, fluid intake of at least 64 oz  - Continue with MVI daily  - Activity as tolerated  - Labs ordered at last visit - encouraged to get done and will adjust accordingly if any deficiency  - Follow up with RD and SW as needed  Excessive skin - reports having excessive skin of abdominal folds, bilateral arms and thighs  Skin is heavy causing pain and inhibiting exercise  She has been trying to work on strength training to help tone  Has associated fungal rashes - uses cream to help with this  Interested in surgical removal of skin when she is eligible  - discussed she should reach goal weight or plateau weight before considering skin removal    - continue with compression as much as possible, antifungal creams  Supportive care for now  · Continued/Maintain healthy weight loss with good nutrition intakes  · Adequate hydration with at least 64oz  fluid intake  · Follow diet as discussed  · Follow vitamin and mineral recommendations as reviewed with you  · Exercise as tolerated      · Colonoscopy referral made: N/A  · Mammogram - N/A    · Follow-up in 6 months for annual visit  We kindly ask that your arrive 15 minutes before your scheduled appointment time with your provider to allow our staff to room you, get your vital signs and update your chart  · Get lab work done prior to annual visit  Please call the office if you need a script  It is recommended to check with your insurance BEFORE getting labs done to make sure they are covered by your policy  · Call our office if you have any problems with abdominal pain especially associated with fever, chills, nausea, vomiting or any other concerns  · All  Post-bariatric surgery patients should be aware that very small quantities of any alcohol can cause impairment and it is very possible not to feel the effect  The effect can be in the system for several hours  It is also a stomach irritant  · It is advised to AVOID alcohol, Nonsteroidal antiinflammatory drugs (NSAIDS) and nicotine of all forms   Any of these can cause stomach irritation/pain  · Discussed the effects of alcohol on a bariatric patient and the increased impairment risk  · Keep up the good work! Postsurgical Malabsorption   -At risk for malabsorption of vitamins/minerals secondary to malabsorption and restriction of intake from bariatric surgery  - Currently taking adequate postop bariatric surgery vitamin supplementation  -Last set of bariatric labs ordered last visit  - has not been done   Encouraged to get done    -Patient received education about the importance of adhering to a lifelong supplementation regimen to avoid vitamin/mineral deficiencies      Diagnoses and all orders for this visit:    Encounter for surgical aftercare following surgery of digestive system    Bariatric surgery status    Postsurgical malabsorption    Morbid (severe) obesity due to excess calories (HCC)    Excessive and redundant skin and subcutaneous tissue         Subjective:      Patient ID: "Regine Flowers is a 40 y o  female  -s/p Yuko-En-Y Gastric Bypass with Dr Berkley Coles on 12/05/2022  Presents to the office today for 3rd post op visit  Overall doing fair, she reports having an episode of depression hence she was overeating  She was able to reach juan weight of 290 lbs; and has gained weight since this episode  Increased her therapy sessions so now has improved  She is working on healthy eating habits again  Denies having any abdominal pain, N/V/D/C, regurgitation, reflux or dysphagia  Initial: 378 lbs   Current: 304 lbs  EWL: (Weight loss is ahead of schedule at this post surgical period )  Juan: 290 lbs  Current BMI is Body mass index is 50 59 kg/m²  · Tolerating a regular diet-yes  · Eating at least 60 grams of protein per day-yes  · Following 30/60 minute rule with liquids-yes  · Drinking at least 64 ounces of fluid per day-yes  · Drinking carbonated beverages-no  · Sufficient exercise-yes - cardio and strength training  · Using NSAIDs regularly-no  · Using nicotine-no  · Using alcohol-no  · Supplements: Multivitamins, Iron and Calcium     · EWL is 33%, which places the patient ahead of schedule for expected post surgical weight loss at this time  The following portions of the patient's history were reviewed and updated as appropriate: allergies, current medications, past family history, past medical history, past social history, past surgical history and problem list     Review of Systems   Constitutional: Positive for unexpected weight change  Respiratory: Negative  Cardiovascular: Negative  Gastrointestinal: Positive for constipation (occasionally - takes miralax)  Musculoskeletal: Positive for back pain  Skin: Positive for rash  Neurological: Negative  Psychiatric/Behavioral: Positive for dysphoric mood (but has improved )           Objective:    /76   Pulse 70   Temp (!) 96 9 °F (36 1 °C) (Tympanic)   Ht 5' 5\" (1 651 m)   Wt (!) 138 kg (304 lb)  " LMP 12/17/2019   BMI 50 59 kg/m²      Physical Exam  Vitals and nursing note reviewed  Constitutional:       Appearance: Normal appearance  She is obese  Cardiovascular:      Rate and Rhythm: Normal rate and regular rhythm  Pulses: Normal pulses  Heart sounds: Normal heart sounds  Pulmonary:      Effort: Pulmonary effort is normal       Breath sounds: Normal breath sounds  Abdominal:      General: Bowel sounds are normal       Palpations: Abdomen is soft  Tenderness: There is no abdominal tenderness  Musculoskeletal:         General: Normal range of motion  Skin:     General: Skin is warm and dry  Neurological:      General: No focal deficit present  Mental Status: She is alert and oriented to person, place, and time  Psychiatric:         Mood and Affect: Mood normal          Behavior: Behavior normal          Thought Content:  Thought content normal          Judgment: Judgment normal

## 2023-06-20 ENCOUNTER — OFFICE VISIT (OUTPATIENT)
Dept: FAMILY MEDICINE CLINIC | Facility: CLINIC | Age: 38
End: 2023-06-20
Payer: COMMERCIAL

## 2023-06-20 VITALS
RESPIRATION RATE: 16 BRPM | TEMPERATURE: 98 F | HEIGHT: 65 IN | SYSTOLIC BLOOD PRESSURE: 124 MMHG | OXYGEN SATURATION: 97 % | DIASTOLIC BLOOD PRESSURE: 80 MMHG | WEIGHT: 293 LBS | BODY MASS INDEX: 48.82 KG/M2 | HEART RATE: 79 BPM

## 2023-06-20 DIAGNOSIS — R60.1 GENERALIZED EDEMA: Primary | ICD-10-CM

## 2023-06-20 DIAGNOSIS — F41.8 MIXED ANXIETY DEPRESSIVE DISORDER: ICD-10-CM

## 2023-06-20 DIAGNOSIS — I10 ESSENTIAL HYPERTENSION: ICD-10-CM

## 2023-06-20 DIAGNOSIS — E06.3 HYPOTHYROIDISM DUE TO HASHIMOTO'S THYROIDITIS: ICD-10-CM

## 2023-06-20 DIAGNOSIS — E66.01 MORBID (SEVERE) OBESITY DUE TO EXCESS CALORIES (HCC): ICD-10-CM

## 2023-06-20 DIAGNOSIS — E03.8 HYPOTHYROIDISM DUE TO HASHIMOTO'S THYROIDITIS: ICD-10-CM

## 2023-06-20 PROBLEM — Z01.818 PRE-OP EXAMINATION: Status: RESOLVED | Noted: 2022-11-29 | Resolved: 2023-06-20

## 2023-06-20 PROBLEM — B37.2 MONILIAL RASH: Status: ACTIVE | Noted: 2023-06-20

## 2023-06-20 PROBLEM — U07.1 COVID-19: Status: RESOLVED | Noted: 2022-10-10 | Resolved: 2023-06-20

## 2023-06-20 PROCEDURE — 99214 OFFICE O/P EST MOD 30 MIN: CPT | Performed by: INTERNAL MEDICINE

## 2023-06-20 RX ORDER — FUROSEMIDE 20 MG/1
20 TABLET ORAL DAILY PRN
Qty: 30 TABLET | Refills: 0 | Status: SHIPPED | OUTPATIENT
Start: 2023-06-20 | End: 2023-07-20

## 2023-06-20 RX ORDER — LEVOTHYROXINE SODIUM 0.2 MG/1
200 TABLET ORAL DAILY
Qty: 90 TABLET | Refills: 1 | Status: SHIPPED | OUTPATIENT
Start: 2023-06-20

## 2023-06-20 RX ORDER — OMEPRAZOLE 20 MG/1
20 CAPSULE, DELAYED RELEASE ORAL EVERY OTHER DAY
Qty: 30 CAPSULE | Refills: 3 | Status: SHIPPED | OUTPATIENT
Start: 2023-06-20

## 2023-06-20 RX ORDER — LOSARTAN POTASSIUM 50 MG/1
50 TABLET ORAL DAILY
Qty: 90 TABLET | Refills: 1 | Status: SHIPPED | OUTPATIENT
Start: 2023-06-20

## 2023-06-20 RX ORDER — CITALOPRAM 20 MG/1
20 TABLET ORAL DAILY
Qty: 90 TABLET | Refills: 1 | Status: SHIPPED | OUTPATIENT
Start: 2023-06-20

## 2023-06-20 NOTE — PROGRESS NOTES
Name: Gila Payne      : 1985      MRN: [id number]  Encounter Provider: Trista Alexandre MD  Encounter Date: 2023   Encounter department: [address] [place] [address]     1  Generalized edema  -     furosemide (LASIX) 20 mg tablet; Take 1 tablet (20 mg total) by mouth daily as needed (edema)    2  Mixed anxiety depressive disorder  Assessment & Plan:  She remains on Celexa and is going to counseling    Orders:  -     citalopram (CeleXA) 20 mg tablet; Take 1 tablet (20 mg total) by mouth daily    3  Essential hypertension  Assessment & Plan:  Her blood pressure remains under good control on losartan    Orders:  -     losartan (COZAAR) 50 mg tablet; Take 1 tablet (50 mg total) by mouth daily    4  Hypothyroidism due to Hashimoto's thyroiditis  Assessment & Plan:  Patient is well overdue for her blood work that she had ordered but did not go for    Orders:  -     levothyroxine 200 mcg tablet; Take 1 tablet (200 mcg total) by mouth daily    5  Morbid (severe) obesity due to excess calories (HCC)  -     omeprazole (PriLOSEC) 20 mg delayed release capsule; Take 1 capsule (20 mg total) by mouth every other day         Subjective      Despite seeing bariatrics she has not lost any significant weight in recent months  However she does feel relatively well  And continues on the same medications  Biggest complaint is some mild lymphedema from her weight and takes an occasional Lasix for it  Review of Systems   Constitutional: Positive for fatigue  Negative for chills and fever  HENT: Negative for ear pain and sore throat  Eyes: Negative for pain and visual disturbance  Respiratory: Positive for shortness of breath  Negative for cough  Cardiovascular: Positive for leg swelling  Negative for chest pain and palpitations  Gastrointestinal: Negative for abdominal pain and vomiting  Genitourinary: Negative for dysuria and hematuria     Musculoskeletal: Negative for arthralgias "and back pain  Skin: Negative for color change and rash  Neurological: Negative for seizures and syncope  Psychiatric/Behavioral: Negative for dysphoric mood  The patient is nervous/anxious  All other systems reviewed and are negative        Current Outpatient Medications on File Prior to Visit   Medication Sig   • albuterol (Ventolin HFA) 90 mcg/act inhaler Inhale 2 puffs every 6 (six) hours as needed for wheezing or shortness of breath   • ALPRAZolam (XANAX) 0 25 mg tablet Take 1 tablet (0 25 mg total) by mouth 3 (three) times a day as needed for anxiety   • buPROPion (WELLBUTRIN) 75 mg tablet Take 1 tablet (75 mg total) by mouth 2 (two) times a day   • Calcium Carbonate (CALCIUM 500 PO) Take 500 mg by mouth 3 (three) times a day   • fluticasone (FLONASE) 50 mcg/act nasal spray 2 sprays into each nostril daily   • levothyroxine 25 mcg tablet Take 1 tablet (25 mcg total) by mouth daily   • loratadine (CLARITIN) 10 mg tablet Take 10 mg by mouth if needed   • Multiple Vitamins-Minerals (Bariatric Fusion) CHEW Chew 4 tablets daily   • triamcinolone (KENALOG) 0 1 % cream APPLY TOPICALLY DAILY AT BEDTIME AS NEEDED FOR RASH   • [DISCONTINUED] citalopram (CeleXA) 20 mg tablet Take 1 tablet (20 mg total) by mouth daily   • [DISCONTINUED] levothyroxine 200 mcg tablet Take 1 tablet (200 mcg total) by mouth daily   • [DISCONTINUED] losartan (COZAAR) 50 mg tablet Take 1 tablet (50 mg total) by mouth daily   • [DISCONTINUED] omeprazole (PriLOSEC) 20 mg delayed release capsule Take 1 capsule (20 mg total) by mouth daily   • [DISCONTINUED] citalopram (CeleXA) 20 mg tablet Take 1 tablet (20 mg total) by mouth daily (Patient not taking: Reported on 6/20/2023)   • [DISCONTINUED] losartan (COZAAR) 100 MG tablet Take 100 mg by mouth daily       Objective     /80 (BP Location: Right arm, Patient Position: Sitting, Cuff Size: Large)   Pulse 79   Temp 98 °F (36 7 °C) (Temporal)   Resp 16   Ht 5' 5\" (1 651 m)   Wt (!) " 137 kg (302 lb)   LMP 12/17/2019   SpO2 97%   BMI 50 26 kg/m²     Physical Exam  Constitutional:       General: She is not in acute distress  Appearance: She is well-developed  She is obese  HENT:      Right Ear: External ear normal       Left Ear: External ear normal       Nose: Nose normal       Mouth/Throat:      Pharynx: No oropharyngeal exudate  Eyes:      Pupils: Pupils are equal, round, and reactive to light  Neck:      Thyroid: No thyromegaly  Vascular: No JVD  Cardiovascular:      Rate and Rhythm: Normal rate and regular rhythm  Heart sounds: Normal heart sounds  No murmur heard  No gallop  Pulmonary:      Effort: Pulmonary effort is normal  No respiratory distress  Breath sounds: Normal breath sounds  No wheezing or rales  Abdominal:      General: Bowel sounds are normal  There is no distension  Palpations: Abdomen is soft  There is no mass  Tenderness: There is no abdominal tenderness  Musculoskeletal:         General: No tenderness  Normal range of motion  Cervical back: Normal range of motion and neck supple  Right lower leg: Edema present  Left lower leg: Edema present  Comments: Trace edema bilaterally   Lymphadenopathy:      Cervical: No cervical adenopathy  Skin:     Findings: No rash  Neurological:      General: No focal deficit present  Mental Status: She is alert and oriented to person, place, and time  Cranial Nerves: No cranial nerve deficit  Coordination: Coordination normal    Psychiatric:         Mood and Affect: Mood normal          Behavior: Behavior normal          Thought Content:  Thought content normal          Judgment: Judgment normal        Dolly Roe MD

## 2023-06-20 NOTE — ASSESSMENT & PLAN NOTE
Despite having bariatric surgery she continues to have difficulty with her weight follows up with bariatrics

## 2023-06-26 DIAGNOSIS — E06.3 HYPOTHYROIDISM DUE TO HASHIMOTO'S THYROIDITIS: ICD-10-CM

## 2023-06-26 DIAGNOSIS — E03.8 HYPOTHYROIDISM DUE TO HASHIMOTO'S THYROIDITIS: ICD-10-CM

## 2023-06-26 RX ORDER — LEVOTHYROXINE SODIUM 0.03 MG/1
TABLET ORAL
Qty: 90 TABLET | Refills: 0 | Status: SHIPPED | OUTPATIENT
Start: 2023-06-26

## 2023-06-29 DIAGNOSIS — F41.8 MIXED ANXIETY DEPRESSIVE DISORDER: ICD-10-CM

## 2023-06-29 RX ORDER — BUPROPION HYDROCHLORIDE 75 MG/1
TABLET ORAL
Qty: 60 TABLET | Refills: 0 | Status: SHIPPED | OUTPATIENT
Start: 2023-06-29

## 2023-07-18 DIAGNOSIS — J20.9 ACUTE BRONCHITIS, UNSPECIFIED ORGANISM: ICD-10-CM

## 2023-07-18 DIAGNOSIS — E66.01 MORBID (SEVERE) OBESITY DUE TO EXCESS CALORIES (HCC): ICD-10-CM

## 2023-07-18 DIAGNOSIS — F41.8 MIXED ANXIETY DEPRESSIVE DISORDER: ICD-10-CM

## 2023-07-19 RX ORDER — ALBUTEROL SULFATE 90 UG/1
2 AEROSOL, METERED RESPIRATORY (INHALATION) EVERY 6 HOURS PRN
Qty: 18 G | Refills: 0 | Status: SHIPPED | OUTPATIENT
Start: 2023-07-19

## 2023-07-19 RX ORDER — OMEPRAZOLE 20 MG/1
20 CAPSULE, DELAYED RELEASE ORAL EVERY OTHER DAY
Qty: 30 CAPSULE | Refills: 0 | Status: SHIPPED | OUTPATIENT
Start: 2023-07-19

## 2023-07-19 RX ORDER — CITALOPRAM 20 MG/1
20 TABLET ORAL DAILY
Qty: 90 TABLET | Refills: 0 | Status: SHIPPED | OUTPATIENT
Start: 2023-07-19

## 2023-07-26 DIAGNOSIS — F41.8 MIXED ANXIETY DEPRESSIVE DISORDER: ICD-10-CM

## 2023-07-26 RX ORDER — BUPROPION HYDROCHLORIDE 75 MG/1
TABLET ORAL
Qty: 60 TABLET | Refills: 5 | Status: SHIPPED | OUTPATIENT
Start: 2023-07-26 | End: 2023-08-11 | Stop reason: SDUPTHER

## 2023-07-30 ENCOUNTER — APPOINTMENT (OUTPATIENT)
Dept: LAB | Facility: CLINIC | Age: 38
End: 2023-07-30
Payer: COMMERCIAL

## 2023-07-30 DIAGNOSIS — Z98.84 BARIATRIC SURGERY STATUS: ICD-10-CM

## 2023-07-30 DIAGNOSIS — E03.8 HYPOTHYROIDISM DUE TO HASHIMOTO'S THYROIDITIS: ICD-10-CM

## 2023-07-30 DIAGNOSIS — Z48.815 ENCOUNTER FOR SURGICAL AFTERCARE FOLLOWING SURGERY OF DIGESTIVE SYSTEM: ICD-10-CM

## 2023-07-30 DIAGNOSIS — K91.2 POSTSURGICAL MALABSORPTION: ICD-10-CM

## 2023-07-30 DIAGNOSIS — E06.3 HYPOTHYROIDISM DUE TO HASHIMOTO'S THYROIDITIS: ICD-10-CM

## 2023-07-30 DIAGNOSIS — E66.01 MORBID (SEVERE) OBESITY DUE TO EXCESS CALORIES (HCC): ICD-10-CM

## 2023-07-30 DIAGNOSIS — G47.33 OSA (OBSTRUCTIVE SLEEP APNEA): ICD-10-CM

## 2023-07-30 LAB
25(OH)D3 SERPL-MCNC: 28.5 NG/ML (ref 30–100)
ALBUMIN SERPL BCP-MCNC: 3.9 G/DL (ref 3.5–5)
ALP SERPL-CCNC: 114 U/L (ref 34–104)
ALT SERPL W P-5'-P-CCNC: 33 U/L (ref 7–52)
ANION GAP SERPL CALCULATED.3IONS-SCNC: 5 MMOL/L
AST SERPL W P-5'-P-CCNC: 26 U/L (ref 13–39)
BILIRUB SERPL-MCNC: 0.48 MG/DL (ref 0.2–1)
BUN SERPL-MCNC: 18 MG/DL (ref 5–25)
CALCIUM SERPL-MCNC: 9.1 MG/DL (ref 8.4–10.2)
CHLORIDE SERPL-SCNC: 105 MMOL/L (ref 96–108)
CO2 SERPL-SCNC: 28 MMOL/L (ref 21–32)
CREAT SERPL-MCNC: 0.71 MG/DL (ref 0.6–1.3)
ERYTHROCYTE [DISTWIDTH] IN BLOOD BY AUTOMATED COUNT: 14.7 % (ref 11.6–15.1)
FERRITIN SERPL-MCNC: 39 NG/ML (ref 11–307)
FOLATE SERPL-MCNC: >22.3 NG/ML
GFR SERPL CREATININE-BSD FRML MDRD: 109 ML/MIN/1.73SQ M
GLUCOSE P FAST SERPL-MCNC: 76 MG/DL (ref 65–99)
HCT VFR BLD AUTO: 38 % (ref 34.8–46.1)
HGB BLD-MCNC: 11.6 G/DL (ref 11.5–15.4)
IRON SATN MFR SERPL: 11 % (ref 15–50)
IRON SERPL-MCNC: 40 UG/DL (ref 50–170)
MCH RBC QN AUTO: 27.4 PG (ref 26.8–34.3)
MCHC RBC AUTO-ENTMCNC: 30.5 G/DL (ref 31.4–37.4)
MCV RBC AUTO: 90 FL (ref 82–98)
PLATELET # BLD AUTO: 264 THOUSANDS/UL (ref 149–390)
PMV BLD AUTO: 11.7 FL (ref 8.9–12.7)
POTASSIUM SERPL-SCNC: 4 MMOL/L (ref 3.5–5.3)
PROT SERPL-MCNC: 7.4 G/DL (ref 6.4–8.4)
PTH-INTACT SERPL-MCNC: 75.9 PG/ML (ref 12–88)
RBC # BLD AUTO: 4.24 MILLION/UL (ref 3.81–5.12)
SODIUM SERPL-SCNC: 138 MMOL/L (ref 135–147)
T4 FREE SERPL-MCNC: 0.66 NG/DL (ref 0.61–1.12)
TIBC SERPL-MCNC: 367 UG/DL (ref 250–450)
TSH SERPL DL<=0.05 MIU/L-ACNC: 9.73 UIU/ML (ref 0.45–4.5)
VIT B12 SERPL-MCNC: 501 PG/ML (ref 180–914)
WBC # BLD AUTO: 9.62 THOUSAND/UL (ref 4.31–10.16)

## 2023-07-30 PROCEDURE — 83550 IRON BINDING TEST: CPT

## 2023-07-30 PROCEDURE — 82607 VITAMIN B-12: CPT

## 2023-07-30 PROCEDURE — 84630 ASSAY OF ZINC: CPT

## 2023-07-30 PROCEDURE — 84425 ASSAY OF VITAMIN B-1: CPT

## 2023-07-30 PROCEDURE — 83970 ASSAY OF PARATHORMONE: CPT

## 2023-07-30 PROCEDURE — 82746 ASSAY OF FOLIC ACID SERUM: CPT

## 2023-07-30 PROCEDURE — 84443 ASSAY THYROID STIM HORMONE: CPT

## 2023-07-30 PROCEDURE — 84590 ASSAY OF VITAMIN A: CPT

## 2023-07-30 PROCEDURE — 80053 COMPREHEN METABOLIC PANEL: CPT

## 2023-07-30 PROCEDURE — 84439 ASSAY OF FREE THYROXINE: CPT

## 2023-07-30 PROCEDURE — 82728 ASSAY OF FERRITIN: CPT

## 2023-07-30 PROCEDURE — 85027 COMPLETE CBC AUTOMATED: CPT

## 2023-07-30 PROCEDURE — 83540 ASSAY OF IRON: CPT

## 2023-08-02 LAB — VIT B1 BLD-SCNC: 128.9 NMOL/L (ref 66.5–200)

## 2023-08-03 LAB — ZINC SERPL-MCNC: 83 UG/DL (ref 44–115)

## 2023-08-04 LAB — VIT A SERPL-MCNC: 48.2 UG/DL (ref 18.9–57.3)

## 2023-08-08 ENCOUNTER — OFFICE VISIT (OUTPATIENT)
Dept: ENDOCRINOLOGY | Facility: CLINIC | Age: 38
End: 2023-08-08
Payer: COMMERCIAL

## 2023-08-08 VITALS
WEIGHT: 293 LBS | DIASTOLIC BLOOD PRESSURE: 86 MMHG | SYSTOLIC BLOOD PRESSURE: 120 MMHG | HEART RATE: 68 BPM | BODY MASS INDEX: 48.82 KG/M2 | HEIGHT: 65 IN

## 2023-08-08 DIAGNOSIS — E61.1 IRON DEFICIENCY: ICD-10-CM

## 2023-08-08 DIAGNOSIS — E06.3 HYPOTHYROIDISM DUE TO HASHIMOTO'S THYROIDITIS: Primary | ICD-10-CM

## 2023-08-08 DIAGNOSIS — E03.8 HYPOTHYROIDISM DUE TO HASHIMOTO'S THYROIDITIS: Primary | ICD-10-CM

## 2023-08-08 DIAGNOSIS — Z98.84 BARIATRIC SURGERY STATUS: Primary | ICD-10-CM

## 2023-08-08 DIAGNOSIS — E55.9 VITAMIN D DEFICIENCY: ICD-10-CM

## 2023-08-08 DIAGNOSIS — G47.33 OSA (OBSTRUCTIVE SLEEP APNEA): ICD-10-CM

## 2023-08-08 PROCEDURE — 99214 OFFICE O/P EST MOD 30 MIN: CPT | Performed by: INTERNAL MEDICINE

## 2023-08-08 RX ORDER — LEVOTHYROXINE SODIUM 0.2 MG/1
TABLET ORAL
Qty: 90 TABLET | Refills: 1 | Status: SHIPPED | OUTPATIENT
Start: 2023-08-08

## 2023-08-08 NOTE — PROGRESS NOTES
Migdalia Esquivel 40 y.o. female MRN: [id number]    Encounter: [id number]      Assessment/Plan     Assessment: This is a 40y.o.-year-old female with hypothyroidism, obstructive sleep apnea, obesity, vitamin D deficiency and iron deficiency. Plan:  1. Hypothyroidism with elevated TSH-increase 200 mcg of levothyroxine to 300 mcg on the weekends. Continue 225 mcg from Monday through Friday. Check TSH in 6 weeks. I suspect there is malabsorption from Yuko-en-Y gastric bypass of the medication causing the elevated TSH. 2.  Obstructive sleep apnea-she is going to schedule an appointment with her sleep medicine physician. 3.  Obesity is improving/stable. 4.  Vitamin D deficiency-I have asked her to double the dose of her vitamin D supplementation since her level is still below 30.    5.  Iron deficiency-based on ferritin, she continues to have iron deficiency. She is going to discuss treatment of this with her bariatric team.    6.  Thyroid nodule-discuss ultrasound of the thyroid at the next visit. CC:   Hypothyroidism    History of Present Illness     HPI:  28-year-old woman with hypothyroidism presents for follow-up. She is currently on 225 mcg of levothyroxine per day. She does admit to significant hair loss and cold intolerance but denies any other symptoms of hypo or hyperthyroidism. Review of Systems   Constitutional: Negative for chills and fever. Respiratory: Negative for shortness of breath. Cardiovascular: Negative for chest pain. Gastrointestinal: Negative for constipation, diarrhea, nausea and vomiting. Endocrine: Positive for cold intolerance. Negative for heat intolerance. All other systems reviewed and are negative.       Historical Information   Past Medical History:   Diagnosis Date   • Anxiety    • Bruises easily    • Cancer ([address])    • COVID-19 10/10/2022   • CPAP (continuous positive airway pressure) dependence    • Depression    • Disease of thyroid gland    • DELVALLE (dyspnea on exertion)    • 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   • Endometriosis    • Follicular thyroid cancer ([address])    • Gastroenteritis    • Hashimoto's disease    • Hepatic steatosis    • Hyperlipidemia    • Hypertension    • Hypothyroid    • IBS (irritable bowel syndrome)    • Kidney lesion, native, left    • Kidney stone    • Lymphedema     left leg   • Obesity    • Palpitation    • Pancreatitis    • Pneumonia    • Polycystic ovarian disease    • Polyuria    • RUQ abdominal pain 11/1/2021   • Sleep apnea    • Thyroid nodule    • Thyromegaly    • Tinea corporis    • Tinea pedis      Past Surgical History:   Procedure Laterality Date   • ADENOIDECTOMY     • DILATION AND CURETTAGE OF UTERUS  2020   • HYSTERECTOMY  02/2020    left ovaries   • TN LAPS GSTR RSTCV PX W/BYP ELKIN-EN-Y LIMB <150 CM N/A 12/5/2022    Procedure: BYPASS GASTRIC ELKIN-EN-Y LAPAROSCOPIC ROBOT & INTRAOPERATIVE EGD;  Surgeon: Diane Batista MD;  Location: [facility];  Service: Bariatrics   • THYROIDECTOMY, PARTIAL Left 06/2019    benign   • TONSILLECTOMY     • US GUIDED THYROID BIOPSY  03/01/2019     Social History   Social History     Substance and Sexual Activity   Alcohol Use Not Currently     Social History     Substance and Sexual Activity   Drug Use Never     Social History     Tobacco Use   Smoking Status Never   Smokeless Tobacco Never     Family History:   Family History   Problem Relation Age of Onset   • Hyperlipidemia Mother    • Restless legs syndrome Mother    • Sleep apnea Father    • Hyperlipidemia Father    • Hypertension Father    • Hypertension Maternal Grandmother    • Restless legs syndrome Maternal Grandmother    • Ovarian cancer Maternal Grandmother    • Cancer Paternal Grandmother        Meds/Allergies   Current Outpatient Medications   Medication Sig Dispense Refill   • albuterol (Ventolin HFA) 90 mcg/act inhaler Inhale 2 puffs every 6 (six) hours as needed for wheezing or shortness of breath 18 g 0 • ALPRAZolam (XANAX) 0.25 mg tablet Take 1 tablet (0.25 mg total) by mouth 3 (three) times a day as needed for anxiety 60 tablet 0   • buPROPion (WELLBUTRIN) 75 mg tablet TAKE ONE TABLET BY MOUTH TWICE A DAY 60 tablet 5   • Calcium Carbonate (CALCIUM 500 PO) Take 500 mg by mouth 3 (three) times a day     • citalopram (CeleXA) 20 mg tablet Take 1 tablet (20 mg total) by mouth daily 90 tablet 0   • fluticasone (FLONASE) 50 mcg/act nasal spray 2 sprays into each nostril daily 16 g 0   • furosemide (LASIX) 20 mg tablet Take 1 tablet (20 mg total) by mouth daily as needed (edema) 30 tablet 0   • levothyroxine 200 mcg tablet Take 1 tablet (200 mcg total) by mouth daily 90 tablet 1   • levothyroxine 25 mcg tablet TAKE ONE TABLET BY MOUTH EVERY DAY 90 tablet 0   • loratadine (CLARITIN) 10 mg tablet Take 10 mg by mouth if needed     • losartan (COZAAR) 50 mg tablet Take 1 tablet (50 mg total) by mouth daily 90 tablet 1   • Multiple Vitamins-Minerals (Bariatric Fusion) CHEW Chew 4 tablets daily     • omeprazole (PriLOSEC) 20 mg delayed release capsule Take 1 capsule (20 mg total) by mouth every other day 30 capsule 0   • triamcinolone (KENALOG) 0.1 % cream APPLY TOPICALLY DAILY AT BEDTIME AS NEEDED FOR RASH 80 g 1     No current facility-administered medications for this visit. Allergies   Allergen Reactions   • Dust Mite Extract Other (See Comments)     Sinus inflammation   • Latex Blisters   • Molds & Smuts Other (See Comments)     Sinus inflammation   • Penicillins Rash     Skin rash and sheds   • Jorge Luis Grass Pollen Allergen Other (See Comments)     Sinus inflammation       Objective   Vitals: Blood pressure 120/86, pulse 68, height 5' 5" (1.651 m), weight (!) 139 kg (305 lb 9.6 oz), last menstrual period 12/17/2019, not currently breastfeeding. Physical Exam  Vitals reviewed. Constitutional:       General: She is not in acute distress. Appearance: She is well-developed. She is not diaphoretic.    HENT: Head: Normocephalic and atraumatic. Mouth/Throat:      Pharynx: No oropharyngeal exudate. Eyes:      General: Lids are normal. No scleral icterus. Right eye: No discharge. Left eye: No discharge. Conjunctiva/sclera: Conjunctivae normal.   Neck:      Thyroid: No thyromegaly. Cardiovascular:      Rate and Rhythm: Normal rate and regular rhythm. Heart sounds: Normal heart sounds. No murmur heard. No friction rub. No gallop. Pulmonary:      Effort: Pulmonary effort is normal. No respiratory distress. Breath sounds: Normal breath sounds. No wheezing. Abdominal:      General: Bowel sounds are normal. There is no distension. Palpations: Abdomen is soft. Tenderness: There is no abdominal tenderness. Musculoskeletal:         General: No tenderness or deformity. Normal range of motion. Cervical back: Neck supple. Lymphadenopathy:      Head:      Right side of head: No occipital adenopathy. Left side of head: No occipital adenopathy. Upper Body:      Right upper body: No supraclavicular adenopathy. Left upper body: No supraclavicular adenopathy. Skin:     General: Skin is warm. Findings: No erythema or rash. Neurological:      Mental Status: She is alert and oriented to person, place, and time. Cranial Nerves: No cranial nerve deficit. Psychiatric:         Mood and Affect: Mood normal.         Behavior: Behavior normal.         The history was obtained from the review of the chart, patient.     Lab Results:   Lab Results   Component Value Date/Time    TSH 3RD GENERATON 9.726 (H) 07/30/2023 08:13 AM    TSH 3RD GENERATON 1.600 11/25/2022 07:27 AM    TSH 3RD GENERATON 6.540 (H) 10/03/2022 07:30 AM    Free T4 0.66 07/30/2023 08:13 AM    Free T4 1.34 11/25/2022 07:27 AM    Free T4 0.98 10/03/2022 07:30 AM    Free T4 1.02 10/03/2022 07:30 AM       Imaging Studies:   Results for orders placed during the hospital encounter of 02/15/19    US thyroid    Impression  Heterogeneous thyroid likely secondary to autoimmune thyroiditis with left upper pole nodule for which biopsy is recommended. Left upper pole nodule measuring 2.0 x 2.4 x 3.0 cm (Criteria: TR 4. FNA >2.5 cm)    Reference: ACR Thyroid Imaging, Reporting and Data System (TI-RADS): White Paper of the [employer]. J AM Apolinar Radiol 5252;52:619-246. (additional recommendations based on American Thyroid Association 2015 guidelines.)    The study was marked in EPIC for significant notification. Workstation performed: [id number]      I have personally reviewed pertinent reports. Portions of the record may have been created with voice recognition software. Occasional wrong word or "sound a like" substitutions may have occurred due to the inherent limitations of voice recognition software. Read the chart carefully and recognize, using context, where substitutions have occurred.

## 2023-08-11 DIAGNOSIS — F41.8 MIXED ANXIETY DEPRESSIVE DISORDER: ICD-10-CM

## 2023-08-11 RX ORDER — BUPROPION HYDROCHLORIDE 75 MG/1
75 TABLET ORAL 2 TIMES DAILY
Qty: 60 TABLET | Refills: 3 | Status: SHIPPED | OUTPATIENT
Start: 2023-08-11

## 2023-08-15 DIAGNOSIS — E66.01 MORBID (SEVERE) OBESITY DUE TO EXCESS CALORIES (HCC): ICD-10-CM

## 2023-08-15 RX ORDER — OMEPRAZOLE 20 MG/1
20 CAPSULE, DELAYED RELEASE ORAL EVERY OTHER DAY
Qty: 30 CAPSULE | Refills: 0 | Status: SHIPPED | OUTPATIENT
Start: 2023-08-15

## 2023-08-31 DIAGNOSIS — F41.8 MIXED ANXIETY DEPRESSIVE DISORDER: ICD-10-CM

## 2023-09-01 RX ORDER — CITALOPRAM 20 MG/1
20 TABLET ORAL DAILY
Qty: 90 TABLET | Refills: 1 | Status: SHIPPED | OUTPATIENT
Start: 2023-09-01

## 2023-09-02 DIAGNOSIS — I10 ESSENTIAL HYPERTENSION: ICD-10-CM

## 2023-09-05 RX ORDER — LOSARTAN POTASSIUM 50 MG/1
50 TABLET ORAL DAILY
Qty: 90 TABLET | Refills: 1 | Status: SHIPPED | OUTPATIENT
Start: 2023-09-05

## 2023-09-14 DIAGNOSIS — E66.01 MORBID (SEVERE) OBESITY DUE TO EXCESS CALORIES (HCC): ICD-10-CM

## 2023-09-14 RX ORDER — OMEPRAZOLE 20 MG/1
20 CAPSULE, DELAYED RELEASE ORAL EVERY OTHER DAY
Qty: 30 CAPSULE | Refills: 0 | Status: SHIPPED | OUTPATIENT
Start: 2023-09-14

## 2023-10-14 DIAGNOSIS — E66.01 MORBID (SEVERE) OBESITY DUE TO EXCESS CALORIES (HCC): ICD-10-CM

## 2023-10-16 RX ORDER — OMEPRAZOLE 20 MG/1
20 CAPSULE, DELAYED RELEASE ORAL EVERY OTHER DAY
Qty: 30 CAPSULE | Refills: 0 | Status: SHIPPED | OUTPATIENT
Start: 2023-10-16

## 2023-10-24 DIAGNOSIS — E03.8 HYPOTHYROIDISM DUE TO HASHIMOTO'S THYROIDITIS: ICD-10-CM

## 2023-10-24 DIAGNOSIS — E06.3 HYPOTHYROIDISM DUE TO HASHIMOTO'S THYROIDITIS: ICD-10-CM

## 2023-10-24 RX ORDER — LEVOTHYROXINE SODIUM 0.03 MG/1
TABLET ORAL
Qty: 90 TABLET | Refills: 0 | Status: SHIPPED | OUTPATIENT
Start: 2023-10-24

## 2023-11-13 DIAGNOSIS — E66.01 MORBID (SEVERE) OBESITY DUE TO EXCESS CALORIES (HCC): ICD-10-CM

## 2023-11-13 RX ORDER — OMEPRAZOLE 20 MG/1
20 CAPSULE, DELAYED RELEASE ORAL EVERY OTHER DAY
Qty: 30 CAPSULE | Refills: 0 | Status: SHIPPED | OUTPATIENT
Start: 2023-11-13

## 2023-12-13 ENCOUNTER — RA CDI HCC (OUTPATIENT)
Dept: OTHER | Facility: HOSPITAL | Age: 38
End: 2023-12-13

## 2023-12-13 NOTE — PROGRESS NOTES
[facility] [address] [facility] coding opportunities       Chart reviewed, no opportunity found: CHART REVIEWED, NO OPPORTUNITY FOUND        Patients Insurance        Commercial Insurance: [employer]

## 2023-12-18 ENCOUNTER — LAB (OUTPATIENT)
Dept: LAB | Facility: IMAGING CENTER | Age: 38
End: 2023-12-18
Payer: COMMERCIAL

## 2023-12-18 DIAGNOSIS — Z98.84 BARIATRIC SURGERY STATUS: ICD-10-CM

## 2023-12-18 DIAGNOSIS — E03.8 HYPOTHYROIDISM DUE TO HASHIMOTO'S THYROIDITIS: ICD-10-CM

## 2023-12-18 DIAGNOSIS — E61.1 IRON DEFICIENCY: ICD-10-CM

## 2023-12-18 DIAGNOSIS — E06.3 HYPOTHYROIDISM DUE TO HASHIMOTO'S THYROIDITIS: ICD-10-CM

## 2023-12-18 LAB
ERYTHROCYTE [DISTWIDTH] IN BLOOD BY AUTOMATED COUNT: 14 % (ref 11.6–15.1)
FERRITIN SERPL-MCNC: 36 NG/ML (ref 11–307)
HCT VFR BLD AUTO: 39 % (ref 34.8–46.1)
HGB BLD-MCNC: 12.4 G/DL (ref 11.5–15.4)
IRON SATN MFR SERPL: 18 % (ref 15–50)
IRON SERPL-MCNC: 64 UG/DL (ref 50–212)
MCH RBC QN AUTO: 28.5 PG (ref 26.8–34.3)
MCHC RBC AUTO-ENTMCNC: 31.8 G/DL (ref 31.4–37.4)
MCV RBC AUTO: 90 FL (ref 82–98)
PLATELET # BLD AUTO: 261 THOUSANDS/UL (ref 149–390)
PMV BLD AUTO: 12.3 FL (ref 8.9–12.7)
RBC # BLD AUTO: 4.35 MILLION/UL (ref 3.81–5.12)
TIBC SERPL-MCNC: 365 UG/DL (ref 250–450)
TSH SERPL DL<=0.05 MIU/L-ACNC: 0.29 UIU/ML (ref 0.45–4.5)
UIBC SERPL-MCNC: 301 UG/DL (ref 155–355)
WBC # BLD AUTO: 10.07 THOUSAND/UL (ref 4.31–10.16)

## 2023-12-18 PROCEDURE — 83540 ASSAY OF IRON: CPT

## 2023-12-18 PROCEDURE — 85027 COMPLETE CBC AUTOMATED: CPT

## 2023-12-18 PROCEDURE — 83550 IRON BINDING TEST: CPT

## 2023-12-18 PROCEDURE — 84443 ASSAY THYROID STIM HORMONE: CPT

## 2023-12-18 PROCEDURE — 36415 COLL VENOUS BLD VENIPUNCTURE: CPT

## 2023-12-18 PROCEDURE — 82728 ASSAY OF FERRITIN: CPT

## 2023-12-18 NOTE — RESULT ENCOUNTER NOTE
The TSH is now below normal.  This is most likely due to better absorption.  Would recommend decreasing the levothyroxine to 200 mcg/day.  Check TSH with reflex to T4 in 6 weeks.    Based on your ferritin level of less than 75, I believe you have iron deficiency.  Know you have a history of gastric bypass surgery, you may benefit from iron infusion.  I would recommend that you speak with your primary care physician about this.

## 2023-12-26 ENCOUNTER — HOSPITAL ENCOUNTER (INPATIENT)
Facility: HOSPITAL | Age: 38
LOS: 2 days | Discharge: HOME | DRG: 103 | End: 2023-12-29
Attending: EMERGENCY MEDICINE | Admitting: INTERNAL MEDICINE
Payer: COMMERCIAL

## 2023-12-26 ENCOUNTER — APPOINTMENT (EMERGENCY)
Dept: CT IMAGING | Facility: HOSPITAL | Age: 38
DRG: 103 | End: 2023-12-26
Payer: COMMERCIAL

## 2023-12-26 DIAGNOSIS — G93.2 IIH (IDIOPATHIC INTRACRANIAL HYPERTENSION): ICD-10-CM

## 2023-12-26 DIAGNOSIS — I63.81 LACUNAR STROKE (HCC): ICD-10-CM

## 2023-12-26 DIAGNOSIS — I10 ESSENTIAL HYPERTENSION: ICD-10-CM

## 2023-12-26 DIAGNOSIS — H47.10 PAPILLEDEMA: ICD-10-CM

## 2023-12-26 DIAGNOSIS — I63.9 STROKE (CEREBRUM) (HCC): ICD-10-CM

## 2023-12-26 DIAGNOSIS — R51.9 HEADACHE: Primary | ICD-10-CM

## 2023-12-26 DIAGNOSIS — Z86.73 CHRONIC ARTERIAL ISCHEMIC STROKE: ICD-10-CM

## 2023-12-26 LAB
ALBUMIN SERPL BCP-MCNC: 4 G/DL (ref 3.5–5)
ALP SERPL-CCNC: 105 U/L (ref 34–104)
ALT SERPL W P-5'-P-CCNC: 21 U/L (ref 7–52)
ANION GAP SERPL CALCULATED.3IONS-SCNC: 7 MMOL/L
AST SERPL W P-5'-P-CCNC: 22 U/L (ref 13–39)
BASOPHILS # BLD AUTO: 0.07 THOUSANDS/ÂΜL (ref 0–0.1)
BASOPHILS NFR BLD AUTO: 1 % (ref 0–1)
BILIRUB SERPL-MCNC: 0.31 MG/DL (ref 0.2–1)
BUN SERPL-MCNC: 18 MG/DL (ref 5–25)
CALCIUM SERPL-MCNC: 9.4 MG/DL (ref 8.4–10.2)
CHLORIDE SERPL-SCNC: 106 MMOL/L (ref 96–108)
CO2 SERPL-SCNC: 24 MMOL/L (ref 21–32)
CREAT SERPL-MCNC: 0.8 MG/DL (ref 0.6–1.3)
EOSINOPHIL # BLD AUTO: 0.23 THOUSAND/ÂΜL (ref 0–0.61)
EOSINOPHIL NFR BLD AUTO: 2 % (ref 0–6)
ERYTHROCYTE [DISTWIDTH] IN BLOOD BY AUTOMATED COUNT: 14 % (ref 11.6–15.1)
GFR SERPL CREATININE-BSD FRML MDRD: 94 ML/MIN/1.73SQ M
GLUCOSE SERPL-MCNC: 78 MG/DL (ref 65–140)
HCT VFR BLD AUTO: 40.8 % (ref 34.8–46.1)
HGB BLD-MCNC: 12.6 G/DL (ref 11.5–15.4)
IMM GRANULOCYTES # BLD AUTO: 0.04 THOUSAND/UL (ref 0–0.2)
IMM GRANULOCYTES NFR BLD AUTO: 0 % (ref 0–2)
LYMPHOCYTES # BLD AUTO: 3.35 THOUSANDS/ÂΜL (ref 0.6–4.47)
LYMPHOCYTES NFR BLD AUTO: 26 % (ref 14–44)
MCH RBC QN AUTO: 27.9 PG (ref 26.8–34.3)
MCHC RBC AUTO-ENTMCNC: 30.9 G/DL (ref 31.4–37.4)
MCV RBC AUTO: 91 FL (ref 82–98)
MONOCYTES # BLD AUTO: 0.94 THOUSAND/ÂΜL (ref 0.17–1.22)
MONOCYTES NFR BLD AUTO: 7 % (ref 4–12)
NEUTROPHILS # BLD AUTO: 8.34 THOUSANDS/ÂΜL (ref 1.85–7.62)
NEUTS SEG NFR BLD AUTO: 64 % (ref 43–75)
NRBC BLD AUTO-RTO: 0 /100 WBCS
PLATELET # BLD AUTO: 275 THOUSANDS/UL (ref 149–390)
PMV BLD AUTO: 11.3 FL (ref 8.9–12.7)
POTASSIUM SERPL-SCNC: 4.4 MMOL/L (ref 3.5–5.3)
PROT SERPL-MCNC: 7.7 G/DL (ref 6.4–8.4)
RBC # BLD AUTO: 4.51 MILLION/UL (ref 3.81–5.12)
SODIUM SERPL-SCNC: 137 MMOL/L (ref 135–147)
TSH SERPL DL<=0.05 MIU/L-ACNC: 0.62 UIU/ML (ref 0.45–4.5)
WBC # BLD AUTO: 12.97 THOUSAND/UL (ref 4.31–10.16)

## 2023-12-26 PROCEDURE — 85025 COMPLETE CBC W/AUTO DIFF WBC: CPT

## 2023-12-26 PROCEDURE — 99284 EMERGENCY DEPT VISIT MOD MDM: CPT

## 2023-12-26 PROCEDURE — 70496 CT ANGIOGRAPHY HEAD: CPT

## 2023-12-26 PROCEDURE — 80053 COMPREHEN METABOLIC PANEL: CPT

## 2023-12-26 PROCEDURE — 99285 EMERGENCY DEPT VISIT HI MDM: CPT | Performed by: EMERGENCY MEDICINE

## 2023-12-26 PROCEDURE — 84443 ASSAY THYROID STIM HORMONE: CPT

## 2023-12-26 PROCEDURE — G1004 CDSM NDSC: HCPCS

## 2023-12-26 PROCEDURE — 36415 COLL VENOUS BLD VENIPUNCTURE: CPT

## 2023-12-26 PROCEDURE — 93005 ELECTROCARDIOGRAM TRACING: CPT

## 2023-12-26 PROCEDURE — 62272 THER SPI PNXR DRG CSF: CPT | Performed by: EMERGENCY MEDICINE

## 2023-12-26 RX ADMIN — IOHEXOL 85 ML: 350 INJECTION, SOLUTION INTRAVENOUS at 23:07

## 2023-12-27 ENCOUNTER — APPOINTMENT (INPATIENT)
Dept: NON INVASIVE DIAGNOSTICS | Facility: HOSPITAL | Age: 38
DRG: 103 | End: 2023-12-27
Payer: COMMERCIAL

## 2023-12-27 ENCOUNTER — APPOINTMENT (INPATIENT)
Dept: MRI IMAGING | Facility: HOSPITAL | Age: 38
DRG: 103 | End: 2023-12-27
Payer: COMMERCIAL

## 2023-12-27 PROBLEM — G89.29 CHRONIC HEADACHES: Status: ACTIVE | Noted: 2023-12-27

## 2023-12-27 PROBLEM — G93.2 IIH (IDIOPATHIC INTRACRANIAL HYPERTENSION): Status: ACTIVE | Noted: 2023-12-27

## 2023-12-27 PROBLEM — R51.9 CHRONIC HEADACHES: Status: ACTIVE | Noted: 2023-12-27

## 2023-12-27 PROBLEM — I63.9 STROKE (CEREBRUM) (HCC): Status: ACTIVE | Noted: 2023-12-27

## 2023-12-27 PROBLEM — I63.81 LACUNAR STROKE (HCC): Status: ACTIVE | Noted: 2023-12-27

## 2023-12-27 PROBLEM — H47.10 PAPILLEDEMA: Status: ACTIVE | Noted: 2023-12-27

## 2023-12-27 LAB
AORTIC ROOT: 2.6 CM
APICAL FOUR CHAMBER EJECTION FRACTION: 57 %
ASCENDING AORTA: 2.9 CM
ATRIAL RATE: 77 BPM
CHOLEST SERPL-MCNC: 173 MG/DL
E WAVE DECELERATION TIME: 214 MS
E/A RATIO: 1.26
EST. AVERAGE GLUCOSE BLD GHB EST-MCNC: 114 MG/DL
FRACTIONAL SHORTENING: 30 (ref 28–44)
HBA1C MFR BLD: 5.6 %
HDLC SERPL-MCNC: 58 MG/DL
INR PPP: 0.99 (ref 0.84–1.19)
INTERVENTRICULAR SEPTUM IN DIASTOLE (PARASTERNAL SHORT AXIS VIEW): 0.9 CM
INTERVENTRICULAR SEPTUM: 0.9 CM (ref 0.6–1.1)
LAAS-AP2: 20.3 CM2
LAAS-AP4: 21.3 CM2
LDLC SERPL CALC-MCNC: 98 MG/DL (ref 0–100)
LEFT ATRIUM SIZE: 4.1 CM
LEFT ATRIUM VOLUME (MOD BIPLANE): 63 ML
LEFT ATRIUM VOLUME INDEX (MOD BIPLANE): 26.6 ML/M2
LEFT INTERNAL DIMENSION IN SYSTOLE: 3.3 CM (ref 2.1–4)
LEFT VENTRICULAR INTERNAL DIMENSION IN DIASTOLE: 4.7 CM (ref 3.5–6)
LEFT VENTRICULAR POSTERIOR WALL IN END DIASTOLE: 0.9 CM
LEFT VENTRICULAR STROKE VOLUME: 58 ML
LVSV (TEICH): 58 ML
MV E'TISSUE VEL-SEP: 11 CM/S
MV PEAK A VEL: 0.95 M/S
MV PEAK E VEL: 120 CM/S
MV STENOSIS PRESSURE HALF TIME: 62 MS
MV VALVE AREA P 1/2 METHOD: 3.55
P AXIS: 21 DEGREES
PR INTERVAL: 140 MS
PROTHROMBIN TIME: 13.7 SECONDS (ref 11.6–14.5)
QRS AXIS: 30 DEGREES
QRSD INTERVAL: 86 MS
QT INTERVAL: 370 MS
QTC INTERVAL: 418 MS
RIGHT ATRIAL 2D VOLUME: 30 ML
RIGHT ATRIUM AREA SYSTOLE A4C: 13.6 CM2
RIGHT VENTRICLE ID DIMENSION: 3.1 CM
SL CV LEFT ATRIUM LENGTH A2C: 6.1 CM
SL CV LV EF: 60
SL CV PED ECHO LEFT VENTRICLE DIASTOLIC VOLUME (MOD BIPLANE) 2D: 103 ML
SL CV PED ECHO LEFT VENTRICLE SYSTOLIC VOLUME (MOD BIPLANE) 2D: 44 ML
T WAVE AXIS: 45 DEGREES
TRICUSPID ANNULAR PLANE SYSTOLIC EXCURSION: 2.3 CM
TRIGL SERPL-MCNC: 84 MG/DL
VENTRICULAR RATE: 77 BPM

## 2023-12-27 PROCEDURE — 96374 THER/PROPH/DIAG INJ IV PUSH: CPT

## 2023-12-27 PROCEDURE — 93306 TTE W/DOPPLER COMPLETE: CPT

## 2023-12-27 PROCEDURE — 70543 MRI ORBT/FAC/NCK W/O &W/DYE: CPT

## 2023-12-27 PROCEDURE — 85598 HEXAGNAL PHOSPH PLTLT NEUTRL: CPT

## 2023-12-27 PROCEDURE — 85305 CLOT INHIBIT PROT S TOTAL: CPT

## 2023-12-27 PROCEDURE — A9585 GADOBUTROL INJECTION: HCPCS | Performed by: INTERNAL MEDICINE

## 2023-12-27 PROCEDURE — 93306 TTE W/DOPPLER COMPLETE: CPT | Performed by: INTERNAL MEDICINE

## 2023-12-27 PROCEDURE — 85610 PROTHROMBIN TIME: CPT

## 2023-12-27 PROCEDURE — 85732 THROMBOPLASTIN TIME PARTIAL: CPT

## 2023-12-27 PROCEDURE — 85670 THROMBIN TIME PLASMA: CPT

## 2023-12-27 PROCEDURE — 99245 OFF/OP CONSLTJ NEW/EST HI 55: CPT | Performed by: PSYCHIATRY & NEUROLOGY

## 2023-12-27 PROCEDURE — 009U3ZX DRAINAGE OF SPINAL CANAL, PERCUTANEOUS APPROACH, DIAGNOSTIC: ICD-10-PCS

## 2023-12-27 PROCEDURE — 99222 1ST HOSP IP/OBS MODERATE 55: CPT | Performed by: INTERNAL MEDICINE

## 2023-12-27 PROCEDURE — 85613 RUSSELL VIPER VENOM DILUTED: CPT

## 2023-12-27 PROCEDURE — 36415 COLL VENOUS BLD VENIPUNCTURE: CPT

## 2023-12-27 PROCEDURE — 70551 MRI BRAIN STEM W/O DYE: CPT

## 2023-12-27 PROCEDURE — 85303 CLOT INHIBIT PROT C ACTIVITY: CPT

## 2023-12-27 PROCEDURE — 83036 HEMOGLOBIN GLYCOSYLATED A1C: CPT

## 2023-12-27 PROCEDURE — 80061 LIPID PANEL: CPT

## 2023-12-27 PROCEDURE — 86147 CARDIOLIPIN ANTIBODY EA IG: CPT

## 2023-12-27 PROCEDURE — 85306 CLOT INHIBIT PROT S FREE: CPT

## 2023-12-27 PROCEDURE — 86146 BETA-2 GLYCOPROTEIN ANTIBODY: CPT

## 2023-12-27 PROCEDURE — 85300 ANTITHROMBIN III ACTIVITY: CPT

## 2023-12-27 PROCEDURE — 85705 THROMBOPLASTIN INHIBITION: CPT

## 2023-12-27 RX ORDER — LIDOCAINE HYDROCHLORIDE 10 MG/ML
10 INJECTION, SOLUTION EPIDURAL; INFILTRATION; INTRACAUDAL; PERINEURAL ONCE
Status: COMPLETED | OUTPATIENT
Start: 2023-12-27 | End: 2023-12-27

## 2023-12-27 RX ORDER — LEVOTHYROXINE SODIUM 0.03 MG/1
25 TABLET ORAL
Status: DISCONTINUED | OUTPATIENT
Start: 2023-12-27 | End: 2023-12-29 | Stop reason: HOSPADM

## 2023-12-27 RX ORDER — LORATADINE 10 MG/1
10 TABLET ORAL DAILY PRN
Status: DISCONTINUED | OUTPATIENT
Start: 2023-12-27 | End: 2023-12-29 | Stop reason: HOSPADM

## 2023-12-27 RX ORDER — LOSARTAN POTASSIUM 50 MG/1
50 TABLET ORAL DAILY
Status: DISCONTINUED | OUTPATIENT
Start: 2023-12-27 | End: 2023-12-27

## 2023-12-27 RX ORDER — BUPROPION HYDROCHLORIDE 75 MG/1
75 TABLET ORAL 2 TIMES DAILY
Status: DISCONTINUED | OUTPATIENT
Start: 2023-12-27 | End: 2023-12-29 | Stop reason: HOSPADM

## 2023-12-27 RX ORDER — CALCIUM CARBONATE 500(1250)
1 TABLET ORAL 2 TIMES DAILY WITH MEALS
Status: DISCONTINUED | OUTPATIENT
Start: 2023-12-27 | End: 2023-12-29 | Stop reason: HOSPADM

## 2023-12-27 RX ORDER — ASPIRIN 81 MG/1
81 TABLET, CHEWABLE ORAL DAILY
Status: DISCONTINUED | OUTPATIENT
Start: 2023-12-27 | End: 2023-12-27

## 2023-12-27 RX ORDER — ALBUTEROL SULFATE 90 UG/1
2 AEROSOL, METERED RESPIRATORY (INHALATION) EVERY 6 HOURS PRN
Status: DISCONTINUED | OUTPATIENT
Start: 2023-12-27 | End: 2023-12-29 | Stop reason: HOSPADM

## 2023-12-27 RX ORDER — PANTOPRAZOLE SODIUM 40 MG/1
40 TABLET, DELAYED RELEASE ORAL
Status: DISCONTINUED | OUTPATIENT
Start: 2023-12-27 | End: 2023-12-29 | Stop reason: HOSPADM

## 2023-12-27 RX ORDER — GADOBUTROL 604.72 MG/ML
13 INJECTION INTRAVENOUS
Status: COMPLETED | OUTPATIENT
Start: 2023-12-27 | End: 2023-12-27

## 2023-12-27 RX ORDER — LEVOTHYROXINE SODIUM 0.1 MG/1
200 TABLET ORAL
Status: DISCONTINUED | OUTPATIENT
Start: 2023-12-27 | End: 2023-12-29 | Stop reason: HOSPADM

## 2023-12-27 RX ORDER — MIDAZOLAM HYDROCHLORIDE 2 MG/2ML
0.5 INJECTION, SOLUTION INTRAMUSCULAR; INTRAVENOUS ONCE
Status: COMPLETED | OUTPATIENT
Start: 2023-12-27 | End: 2023-12-27

## 2023-12-27 RX ORDER — MAGNESIUM SULFATE HEPTAHYDRATE 40 MG/ML
2 INJECTION, SOLUTION INTRAVENOUS ONCE
Status: COMPLETED | OUTPATIENT
Start: 2023-12-27 | End: 2023-12-27

## 2023-12-27 RX ORDER — METOCLOPRAMIDE HYDROCHLORIDE 5 MG/ML
5 INJECTION INTRAMUSCULAR; INTRAVENOUS AS NEEDED
Status: DISCONTINUED | OUTPATIENT
Start: 2023-12-27 | End: 2023-12-29 | Stop reason: HOSPADM

## 2023-12-27 RX ORDER — ATORVASTATIN CALCIUM 40 MG/1
40 TABLET, FILM COATED ORAL EVERY EVENING
Status: DISCONTINUED | OUTPATIENT
Start: 2023-12-27 | End: 2023-12-29 | Stop reason: HOSPADM

## 2023-12-27 RX ORDER — FLUTICASONE PROPIONATE 50 MCG
2 SPRAY, SUSPENSION (ML) NASAL DAILY
Status: DISCONTINUED | OUTPATIENT
Start: 2023-12-27 | End: 2023-12-29 | Stop reason: HOSPADM

## 2023-12-27 RX ORDER — ENOXAPARIN SODIUM 100 MG/ML
60 INJECTION SUBCUTANEOUS EVERY 12 HOURS
Status: DISCONTINUED | OUTPATIENT
Start: 2023-12-27 | End: 2023-12-28

## 2023-12-27 RX ORDER — ALPRAZOLAM 0.25 MG/1
0.25 TABLET ORAL 3 TIMES DAILY PRN
Status: DISCONTINUED | OUTPATIENT
Start: 2023-12-27 | End: 2023-12-29 | Stop reason: HOSPADM

## 2023-12-27 RX ORDER — CITALOPRAM 20 MG/1
20 TABLET ORAL DAILY
Status: DISCONTINUED | OUTPATIENT
Start: 2023-12-27 | End: 2023-12-29 | Stop reason: HOSPADM

## 2023-12-27 RX ADMIN — MULTIPLE VITAMINS W/ MINERALS TAB 1 TABLET: TAB ORAL at 09:02

## 2023-12-27 RX ADMIN — LEVOTHYROXINE SODIUM 200 MCG: 25 TABLET ORAL at 06:43

## 2023-12-27 RX ADMIN — PANTOPRAZOLE SODIUM 40 MG: 40 TABLET, DELAYED RELEASE ORAL at 06:17

## 2023-12-27 RX ADMIN — GADOBUTROL 13 ML: 604.72 INJECTION INTRAVENOUS at 13:07

## 2023-12-27 RX ADMIN — CITALOPRAM HYDROBROMIDE 20 MG: 20 TABLET ORAL at 09:02

## 2023-12-27 RX ADMIN — Medication 1 TABLET: at 17:45

## 2023-12-27 RX ADMIN — BUPROPION HYDROCHLORIDE 75 MG: 75 TABLET, FILM COATED ORAL at 09:02

## 2023-12-27 RX ADMIN — MAGNESIUM SULFATE HEPTAHYDRATE 2 G: 40 INJECTION, SOLUTION INTRAVENOUS at 11:27

## 2023-12-27 RX ADMIN — ENOXAPARIN SODIUM 60 MG: 60 INJECTION SUBCUTANEOUS at 06:44

## 2023-12-27 RX ADMIN — LIDOCAINE HYDROCHLORIDE 10 ML: 10 INJECTION, SOLUTION EPIDURAL; INFILTRATION; INTRACAUDAL at 01:29

## 2023-12-27 RX ADMIN — ATORVASTATIN CALCIUM 40 MG: 40 TABLET, FILM COATED ORAL at 17:59

## 2023-12-27 RX ADMIN — BUPROPION HYDROCHLORIDE 75 MG: 75 TABLET, FILM COATED ORAL at 17:59

## 2023-12-27 RX ADMIN — MIDAZOLAM 0.5 MG: 1 INJECTION INTRAMUSCULAR; INTRAVENOUS at 00:12

## 2023-12-27 RX ADMIN — PERFLUTREN 0.6 ML/MIN: 6.52 INJECTION, SUSPENSION INTRAVENOUS at 15:30

## 2023-12-27 RX ADMIN — FLUTICASONE PROPIONATE 2 SPRAY: 50 SPRAY, METERED NASAL at 09:02

## 2023-12-27 RX ADMIN — Medication 1 TABLET: at 06:44

## 2023-12-27 RX ADMIN — LIDOCAINE HYDROCHLORIDE 10 ML: 10 INJECTION, SOLUTION EPIDURAL; INFILTRATION; INTRACAUDAL at 01:28

## 2023-12-27 RX ADMIN — LEVOTHYROXINE SODIUM 25 MCG: 25 TABLET ORAL at 06:44

## 2023-12-27 NOTE — PROGRESS NOTES
[facility]  Progress Note  Name: Allison Gamboa I  MRN: [id number]  Unit/Bed#: ED-01 I Date of Admission: 12/26/2023   Date of Service: 12/27/2023 I Hospital Day: 0    Assessment/Plan   * Papilledema  Assessment & Plan  Found incidentally during optometrist visit  Concern for idiopathic intracranial hypertension versus cerebral venous thrombosis  Recommended ED evaluation.  CT venous sampling showed chronic lacunar infarct in the right frontal periventricular white matter and associated microangiopathic changes  No masses noted.  Attempt for lumbar puncture at the ED was unsuccessful  Ddx idiopathic IH, Cerebral venous thrombosis, mass lesions rule out    Plan  Will admit under stroke pathway  IR lumbar puncture  Neurochecks  Scheduled Tylenol 975 mg twice daily      Lacunar Infarct (HCC)  Assessment & Plan  Incidental finding of CTchronic lacunar infarct in the right frontal periventricular white matter and adjacent microangiopathic chaNGE  Chronic headaches with papilledema and ophthalmic examination  No other focal deficits    PLAN  Stroke pathway  Appreciate neurology recommendation  Get MRI  Echo with bubble study  Telemetry  Normotension, normoglycemia              Chronic headaches  Assessment & Plan  See above in papilledema  Scheduled Tylenol 975 mg 3 times daily  Neurochecks every 2 hours    Leucocytosis  Assessment & Plan  Possibly reactive  Will monitor off antibiotics    Essential hypertension  Assessment & Plan  Home medications losartan 50 mg-continue    Lymph edema  Assessment & Plan  On as needed diuretics  Compression stockings  Looking PT at home    Hypothyroidism  Assessment & Plan  Continue levothyroxine to 225 mcg daily           VTE Pharmacologic Prophylaxis:   Moderate Risk (Score 3-4) - Pharmacological DVT Prophylaxis Ordered: enoxaparin (Lovenox).  Code Status: full v  Discussion with family:   updated.     Anticipated Length of Stay: Patient will be  admitted on an inpatient basis with an anticipated length of stay of greater than 2 midnights secondary to papilledema with lacunar stroke.    Chief Complaint:     History of Present Illness:  Allison Gamboa is a 38 y.o. female with a PMH of hypothyroidism/Hashimoto's, ANA LAURA, NICOLÁS cervix status post hysterectomy ,obesity status post Yuko-en-Y gastric bypass who presents with 6-month history of intermittent headaches.  Describes headaches  as pulsatile, sometimes squeezing,associated with blurred vision-mostly peripheral, floaters and diplopia.  Usually frontal , radiating to the temporal and occipital regions. Headache intensity rated 3 -10, no aggravating or relieving factors, patient denies headaches which are worse in the morning nor associated with nausea/ vomiting.  She reports headaches have been progressively worse and more frequent over the past months  Was seen by ophthalmology 5 days ago and noted bilateral papilledema.  They recommended ED evaluation given concern for possible intracranial hypertension  In the ED, she was hemodynamically stable with blood pressure 155/73 saturating normally on room air  No focal deficits documented  except for sixth nerve palsy  Lab work largely unremarkable except for leukocytosis on CBC 12 K  CT venogram showed chronic lacunar infarct in the right frontal periventricular white matter and adjacent microangiopathic change.  No mass change no hemorrhage and patent dural venous sinuses  Unsuccessful lumbar puncture attempted to ED  Admission under SLIM for suspected pseudotumor cerebri, and incidental finding of lacunar infarct under the stroke pathway        Review of Systems:  Review of Systems   Constitutional:  Negative for chills and fever.   HENT:  Negative for ear pain and sore throat.    Eyes:  Negative for pain and visual disturbance.   Respiratory:  Negative for cough and shortness of breath.    Cardiovascular:  Negative for chest pain and palpitations.    Gastrointestinal:  Negative for abdominal pain and vomiting.   Genitourinary:  Negative for dysuria and hematuria.   Musculoskeletal:  Negative for arthralgias and back pain.   Skin:  Negative for color change and rash.   Neurological:  Positive for headaches. Negative for dizziness, seizures and syncope.   Psychiatric/Behavioral:  Negative for agitation and behavioral problems.    All other systems reviewed and are negative.      Past Medical and Surgical History:   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    Endometriosis     Follicular thyroid cancer (HCC)     Gastroenteritis     Hashimoto's disease     Hepatic steatosis     Hyperlipidemia     Hypertension     Hypothyroid     IBS (irritable bowel syndrome)     Kidney lesion, native, left     Kidney stone     Lymphedema     left leg    Obesity     Palpitation     Pancreatitis     Pneumonia     Polycystic ovarian disease     Polyuria     RUQ abdominal pain 11/1/2021    Sleep apnea     Thyroid nodule     Thyromegaly     Tinea corporis     Tinea pedis        Past Surgical History:   Procedure Laterality Date    ADENOIDECTOMY      DILATION AND CURETTAGE OF UTERUS  2020    HYSTERECTOMY  02/2020    left ovaries    [place] LAPS GSTR RSTCV PX W/BYP ELKIN-EN-Y LIMB <150 CM N/A 12/5/2022    Procedure: BYPASS GASTRIC ELKIN-EN-Y LAPAROSCOPIC ROBOT & INTRAOPERATIVE EGD;  Surgeon: Graeme Pool MD;  Location: [facility];  Service: Bariatrics    THYROIDECTOMY, PARTIAL Left 06/2019    benign    TONSILLECTOMY      US GUIDED THYROID BIOPSY  03/01/2019       Meds/Allergies:  Prior to Admission medications    Medication Sig Start Date End Date Taking? Authorizing Provider   albuterol (Ventolin HFA) 90 mcg/act inhaler Inhale 2 puffs  every 6 (six) hours as needed for wheezing or shortness of breath 7/19/23   Elissa Hyatt MD   ALPRAZolam (XANAX) 0.25 mg tablet Take 1 tablet (0.25 mg total) by mouth 3 (three) times a day as needed for anxiety 8/17/21   Elissa Hyatt MD   buPROPion (WELLBUTRIN) 75 mg tablet Take 1 tablet (75 mg total) by mouth 2 (two) times a day 8/11/23   Elissa Hyatt MD   Calcium Carbonate (CALCIUM 500 PO) Take 500 mg by mouth 3 (three) times a day    Historical Provider, MD   citalopram (CeleXA) 20 mg tablet Take 1 tablet (20 mg total) by mouth daily 9/1/23   Elissa Hyatt MD   fluticasone (FLONASE) 50 mcg/act nasal spray 2 sprays into each nostril daily 4/4/19   Ion Ahumada PA-C   furosemide (LASIX) 20 mg tablet Take 1 tablet (20 mg total) by mouth daily as needed (edema) 6/20/23 8/8/23  Elissa Hyatt MD   levothyroxine 200 mcg tablet Take 1 tablet Monday-Friday and 1.5 tablets on Saturday and Sunday. 8/8/23   Debra Bourgeois MD   levothyroxine 25 mcg tablet TAKE ONE TABLET BY MOUTH EVERY DAY 10/24/23   DAVID Akhtar   loratadine (CLARITIN) 10 mg tablet Take 10 mg by mouth if needed    Historical Provider, MD   losartan (COZAAR) 50 mg tablet Take 1 tablet (50 mg total) by mouth daily 9/5/23   Elissa Hyatt MD   Multiple Vitamins-Minerals (Bariatric Fusion) CHEW Chew 4 tablets daily    Historical Provider, MD   omeprazole (PriLOSEC) 20 mg delayed release capsule TAKE ONE CAPSULE BY MOUTH EVERY OTHER DAY 11/13/23   Elissa Hyatt MD   triamcinolone (KENALOG) 0.1 % cream APPLY TOPICALLY DAILY AT BEDTIME AS NEEDED FOR RASH 7/19/21   Harrison Hussein, DO     I have reviewed home medications with patient personally.    Allergies:   Allergies   Allergen Reactions    Dust Mite Extract Other (See Comments)     Sinus inflammation    Latex Blisters    Molds & Smuts Other (See Comments)     Sinus inflammation    Penicillins Rash     Skin rash and sheds    Jorge Luis Grass Pollen Allergen Other (See Comments)     Sinus inflammation        Social History:  Marital Status: /Civil Union   Occupation:   Patient Pre-hospital Living Situation: Home  Patient Pre-hospital Level of Mobility: walks  Patient Pre-hospital Diet Restrictions: bariatric  Substance Use History:   Social History     Substance and Sexual Activity   Alcohol Use Not Currently     Social History     Tobacco Use   Smoking Status Never   Smokeless Tobacco Never     Social History     Substance and Sexual Activity   Drug Use Never       Family History:  Family History   Problem Relation Age of Onset    Hyperlipidemia Mother     Restless legs syndrome Mother     Sleep apnea Father     Hyperlipidemia Father     Hypertension Father     Hypertension Maternal Grandmother     Restless legs syndrome Maternal Grandmother     Ovarian cancer Maternal Grandmother     Cancer Paternal Grandmother        Physical Exam:     Vitals:   Blood Pressure: 101/51 (12/27/23 0530)  Pulse: 71 (12/27/23 0530)  Temperature: 98 °F (36.7 °C) (12/26/23 2026)  Temp Source: Oral (12/26/23 2026)  Respirations: 18 (12/27/23 0530)  SpO2: 93 % (12/27/23 0530)    Physical Exam  Vitals and nursing note reviewed.   Constitutional:       General: She is not in acute distress.     Appearance: She is well-developed. She is obese.   HENT:      Head: Normocephalic and atraumatic.   Eyes:      General: Visual field deficit present.      Conjunctiva/sclera: Conjunctivae normal.      Funduscopic exam:     Right eye: Papilledema present.         Left eye: Papilledema present.   Cardiovascular:      Rate and Rhythm: Normal rate and regular rhythm.      Heart sounds: No murmur heard.  Pulmonary:      Effort: Pulmonary effort is normal. No respiratory distress.      Breath sounds: Normal breath sounds.   Abdominal:      Palpations: Abdomen is soft.      Tenderness: There is no abdominal tenderness.   Musculoskeletal:         General: No swelling.      Cervical back: Neck supple.   Skin:     General: Skin is warm and dry.       Capillary Refill: Capillary refill takes less than 2 seconds.   Neurological:      Mental Status: She is alert.      GCS: GCS eye subscore is 4. GCS verbal subscore is 5. GCS motor subscore is 6.      Sensory: Sensation is intact.      Motor: Motor function is intact.   Psychiatric:         Mood and Affect: Mood normal.          Additional Data:     Lab Results:  Results from last 7 days   Lab Units 12/26/23  2208   WBC Thousand/uL 12.97*   HEMOGLOBIN g/dL 12.6   HEMATOCRIT % 40.8   PLATELETS Thousands/uL 275   NEUTROS PCT % 64   LYMPHS PCT % 26   MONOS PCT % 7   EOS PCT % 2     Results from last 7 days   Lab Units 12/26/23  2208   SODIUM mmol/L 137   POTASSIUM mmol/L 4.4   CHLORIDE mmol/L 106   CO2 mmol/L 24   BUN mg/dL 18   CREATININE mg/dL 0.80   ANION GAP mmol/L 7   CALCIUM mg/dL 9.4   ALBUMIN g/dL 4.0   TOTAL BILIRUBIN mg/dL 0.31   ALK PHOS U/L 105*   ALT U/L 21   AST U/L 22   GLUCOSE RANDOM mg/dL 78                       Lines/Drains:  Invasive Devices       Peripheral Intravenous Line  Duration             Peripheral IV 12/26/23 Left Antecubital <1 day                        Imaging: Reviewed radiology reports from this admission including: CT venous sampling  CT VENOGRAM BRAIN   Final Result by Deepak Stanford MD (12/27 0013)         1. Chronic lacunar infarct in the right frontal periventricular white matter and adjacent microangiopathic change. No intracranial hemorrhage or mass effect.   2. Patent dural venous sinuses.         Workstation performed: [id number]         MRI inpatient order    (Results Pending)       EKG and Other Studies Reviewed on Admission:   EKG:  N/A.    ** Please Note: This note has been constructed using a voice recognition system. **

## 2023-12-27 NOTE — ASSESSMENT & PLAN NOTE
Found incidentally during optometrist visit  Concern for idiopathic intracranial hypertension versus cerebral venous thrombosis  Recommended ED evaluation.  CT venous sampling showed chronic lacunar infarct in the right frontal periventricular white matter and associated microangiopathic changes  No masses noted.  Attempt for lumbar puncture at the ED was unsuccessful  Ddx idiopathic IH, Cerebral venous thrombosis, mass lesions rule out    Plan  Will admit under stroke pathway  IR lumbar puncture  Neurochecks  Scheduled Tylenol 975 mg twice daily

## 2023-12-27 NOTE — CONSULTS
Neurology Consult Note    Name: Allison Gamboa   Age & Sex: 38 y.o. 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 right handed female patient with dyslipidemia, obesity s/p gastric bypass (Dec 2022) evaluated for b/l papilledema, headaches since 6 months concerning for IIH.  Character of headaches is pulsatile and pressure-like.  She also has postural component to her headaches: Lying down makes it significantly worse.  Outpatient ophthalmology evaluation 5 days ago showed bilateral papilledema.n the ED, patient was afebrile.  BP range 100//73, 101/51 this morning.  Pulse 60s-70s.  CMP, CBC unremarkable except for leukocytosis (WBC 12).  TSH WNL.  LDL 98.  NCCTH showed chronic R frontal periventricular infarct.  CTV showed no evidence of CVST. An LP was attempted, but unsuccessful in the ED.      IMP:  At this point, pt's syx concerning for a secondary HA syndrome due to IIH in setting of classic history and exam findings. Patient meets all aspects of the Modified Dandy Criteria for IIH, except increased CSF pressure (which will be evaluated by LP). Etiology unclear as patient denies any new drug changes (no Madeline, OCP use, hormonal therapy use reported). CVST/aneurysm ruled out via negative vessel imaging. Will obtain LP to check for CSF opening pressure, r/o other causes of increased ICP and MR brain, orbits post Aaron sequences to check for optic sheath distension.     Plan:  MR brain + orbits scan + Aaron to check for optic sheath distension  LP with opening pressure and high volume tap if indicated (contacted IR- likely to happen 12/28 AM or afternoon)  - hold off all anti-thrombotics (including pharm DVT ppx) until after LP.   - Lovenox can be restarted 12 hrs after LP.   For headache management prior to LP: IV Mag Sulfate 2g, Reglan 5mg x1  Start PO Diamox 500mg BID  "tomorrow after LP   Low Na diet  HOB >30degrees  STAT CTH in case of neuro exam changes    Chronic R frontal stroke  Assessment & Plan  Noted on NCCTH, MR brain scan.   Chronic. No hemorrhagic conversion.  No fam hx of strokes. No hx of DVTs/blood clots/coagulopathies.        IMP: Chronic R frontal stroke in a patient with vascular risk factors. Etiology ESUS for now.      Plan:  For secondary stroke prevention- Clopidogrel 75 mg QD start 6 hrs after LP.   ASA not recommended due to hx of gastric bypass.  Cont atorvastatin 40mg QHS  BP goals: normotension  Check thrombosis panel  Telemetry  Cards referral for ziopatch outpatient  PT/OT/PMR  DVT ppx: SCDs only for now  F/u with vascular neurology AP/attending/ resident clinic in 6-8 weeks post discharge.       Recommendations for outpatient neurological follow up have yet to be determined.    Pending for discharge: LP     Subjective:    Reason for Consult / Principal Problem: IIH concern, chronic R frontal stroke.   Hx and PE limited by: none     HPI:   Ms.Kimberly Gamboa is a 38 y.o. right handed female with pertinent history of prior R periventricular infarct, hypothyroidism, ANA LAURA, obesity s/p gastric bypass (Dec 2022) who presented on 12/26 to [facility] ED with bilateral papilledema found during optometrist visit in setting of headaches for the past 6 months.    Patient describes the headaches as alternating pulsatile sensation and tight pressure sensation.   Located in the coronal region, radiating towards the occipital region.  Headache intensity varies (3-10), average. Patient does report that there is a postural component to her headaches- lying down makes it significantly worse. Sleeps in a recliner at home. Sitting/standing up relieves the pain somewhat. No significant weight loss. She reports difficulty with peripheral vision in both eyes and \"dimmer\" light perception. Unable to drive at night due to vision difficulties.     She was seen by ophthalmology 5 days " ago and noted b/l papilledema. She was recommended to get evaluated for IIH.   Never had such episodes in the past.  No antecedent infections, head/neck trauma, increased personal or professional stressors.  No recent medication changes including OCP use, Vit A products' use. She does take a bariatric multi-vitamin.   No fam hx of strokes. No hx of DVTs, blood clots or coagulopathies.   Denies smoking, alcohol use, recreational drug use.     In the ED, patient was afebrile.  BP range 100//73, 101/51 this morning.  Pulse 60s-70s.  CMP, CBC unremarkable except for leukocytosis (WBC 12).  TSH WNL.  LDL 98.  NCCTH showed chronic R frontal periventricular infarct.  CTV showed no evidence of CVST. An LP was attempted, but unsuccessful in the ED.     During the interview/exam, the patient endorses a 4/10 headache. No neck stiffness, new vision changes, motor weakness, sensory loss, difficulty talking or swallowing.    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    Endometriosis     Follicular thyroid cancer (HCC)     Gastroenteritis     Hashimoto's disease     Hepatic steatosis     Hyperlipidemia     Hypertension     Hypothyroid     IBS (irritable bowel syndrome)     Kidney lesion, native, left     Kidney stone     Lymphedema     left leg    Obesity     Palpitation     Pancreatitis     Pneumonia     Polycystic ovarian disease     Polyuria     RUQ abdominal pain 11/1/2021    Sleep apnea     Thyroid nodule     Thyromegaly     Tinea corporis     Tinea pedis      Past Surgical History:   Procedure Laterality Date     ADENOIDECTOMY      DILATION AND CURETTAGE OF UTERUS  2020    HYSTERECTOMY  02/2020    left ovaries    NY LAPS GSTR RSTCV PX W/BYP ELKIN-EN-Y LIMB <150 CM N/A 12/5/2022    Procedure: BYPASS GASTRIC ELKIN-EN-Y LAPAROSCOPIC ROBOT & INTRAOPERATIVE EGD;  Surgeon: Graeme Pool MD;  Location: [facility];  Service: Bariatrics    THYROIDECTOMY, PARTIAL Left 06/2019    benign    TONSILLECTOMY      US GUIDED THYROID BIOPSY  03/01/2019     Social History   Social History     Substance and Sexual Activity   Alcohol Use Not Currently     Social History     Substance and Sexual Activity   Drug Use Never     E-Cigarette/Vaping    E-Cigarette Use Never User      E-Cigarette/Vaping Substances    Nicotine No     THC No     CBD No     Flavoring No     Other No     Unknown No      Social History     Tobacco Use   Smoking Status Never   Smokeless Tobacco Never     Family History:   Family History   Problem Relation Age of Onset    Hyperlipidemia Mother     Restless legs syndrome Mother     Sleep apnea Father     Hyperlipidemia Father     Hypertension Father     Hypertension Maternal Grandmother     Restless legs syndrome Maternal Grandmother     Ovarian cancer Maternal Grandmother     Cancer Paternal Grandmother      Meds/Allergies   all current active meds have been reviewed, current meds:   Current Facility-Administered Medications   Medication Dose Route Frequency    albuterol (PROVENTIL HFA,VENTOLIN HFA) inhaler 2 puff  2 puff Inhalation Q6H PRN    ALPRAZolam (XANAX) tablet 0.25 mg  0.25 mg Oral TID PRN    atorvastatin (LIPITOR) tablet 40 mg  40 mg Oral QPM    buPROPion (WELLBUTRIN) tablet 75 mg  75 mg Oral BID    calcium carbonate (OYSTER SHELL,OSCAL) 500 mg tablet 1 tablet  1 tablet Oral BID With Meals    citalopram (CeleXA) tablet 20 mg  20 mg Oral Daily    enoxaparin (LOVENOX) subcutaneous injection 60 mg  60 mg Subcutaneous Q12H    fluticasone (FLONASE) 50 mcg/act nasal spray 2 spray  2 spray Nasal Daily    levothyroxine  tablet 200 mcg  200 mcg Oral Early Morning    levothyroxine tablet 25 mcg  25 mcg Oral Early Morning    loratadine (CLARITIN) tablet 10 mg  10 mg Oral Daily PRN    metoclopramide (REGLAN) injection 5 mg  5 mg Intravenous PRN    multivitamin-minerals (CENTRUM) tablet 1 tablet  1 tablet Oral Daily    pantoprazole (PROTONIX) EC tablet 40 mg  40 mg Oral Early Morning   , and PTA meds:   Prior to Admission Medications   Prescriptions Last Dose Informant Patient Reported? Taking?   ALPRAZolam (XANAX) 0.25 mg tablet 2023 Self No Yes   Sig: Take 1 tablet (0.25 mg total) by mouth 3 (three) times a day as needed for anxiety   Calcium Carbonate (CALCIUM 500 PO) 2023 Self Yes Yes   Sig: Take 500 mg by mouth 3 (three) times a day   Multiple Vitamins-Minerals (Bariatric Fusion) CHEW 2023 Self Yes Yes   Sig: Chew 4 tablets daily   albuterol (Ventolin HFA) 90 mcg/act inhaler Past Month  No Yes   Sig: Inhale 2 puffs every 6 (six) hours as needed for wheezing or shortness of breath   buPROPion (WELLBUTRIN) 75 mg tablet 2023  No Yes   Sig: Take 1 tablet (75 mg total) by mouth 2 (two) times a day   citalopram (CeleXA) 20 mg tablet 2023  No Yes   Sig: Take 1 tablet (20 mg total) by mouth daily   fluticasone (FLONASE) 50 mcg/act nasal spray Past Month Self No Yes   Si sprays into each nostril daily   furosemide (LASIX) 20 mg tablet   No No   Sig: Take 1 tablet (20 mg total) by mouth daily as needed (edema)   levothyroxine 200 mcg tablet 2023  No Yes   Sig: Take 1 tablet Monday-Friday and 1.5 tablets on Saturday and .   levothyroxine 25 mcg tablet 2023  No Yes   Sig: TAKE ONE TABLET BY MOUTH EVERY DAY   loratadine (CLARITIN) 10 mg tablet Past Month Self Yes Yes   Sig: Take 10 mg by mouth if needed   losartan (COZAAR) 50 mg tablet 2023  No Yes   Sig: Take 1 tablet (50 mg total) by mouth daily   omeprazole (PriLOSEC) 20 mg delayed release capsule 2023  No Yes   Sig: TAKE ONE  "CAPSULE BY MOUTH EVERY OTHER DAY   triamcinolone (KENALOG) 0.1 % cream More than a month Self No No   Sig: APPLY TOPICALLY DAILY AT BEDTIME AS NEEDED FOR RASH      Facility-Administered Medications: None     Allergies   Allergen Reactions    Dust Mite Extract Other (See Comments)     Sinus inflammation    Latex Blisters    Molds & Smuts Other (See Comments)     Sinus inflammation    Penicillins Rash     Skin rash and sheds    Jorge Luis Grass Pollen Allergen Other (See Comments)     Sinus inflammation     Review of previous medical records was completed.    Review of Systems see HPI     Objective:     Patient ID: Allison Gamboa is a 38 y.o. female.    Vitals:   Vitals:    23 1100 23 1145 23 1200 23 1500   BP:  123/59 132/61 132/61   BP Location:       Pulse:  73 74 74   Resp:  18 18    Temp:       TempSrc:       SpO2: 96% 96% 94% 98%   Weight:    (!) 138 kg (305 lb)   Height:    5' 5\" (1.651 m)      Body mass index is 50.75 kg/m².     Intake/Output Summary (Last 24 hours) at 2023 1633  Last data filed at 2023 1327  Gross per 24 hour   Intake 50 ml   Output --   Net 50 ml       Temperature:   Temp (24hrs), Av °F (36.7 °C), Min:98 °F (36.7 °C), Max:98 °F (36.7 °C)    Temperature: 98 °F (36.7 °C)    Invasive Devices:   Invasive Devices       Peripheral Intravenous Line  Duration             Peripheral IV 23 Left Antecubital <1 day                  Physical Exam   Vitals reviewed  General Examination: No distress, cooperative. Obese.     Pulm: Normal effort.  Cardio: S1, S2 noted. Regular pulse.     Neurologic Exam   NCAT. NAD. Normal neck flexion and ROM.  AAOx3. Speech fluent without errors. Normal naming and repetition (including low frequency objects). Follows cross-body commands.  Pupils OS- 4mm-->2mm, OD-5mm->3mm, briskly reactive. VFF. EOMI. No nystagmus.   B/l papilledema noted on undilated fundoscopic exam.   Face symmetric. No dysarthria. Uvula midline. Tongue " midline.  Normal tone and bulk throughout.  Muscle strength testing by the MRC scale:   Right  Left  Site  Right  Left  Site    5 5 S Ab: Shoulder Abductors  5  5  HF: Hip Flexors    5 5 EF: Elbow Flexors  5  5 KF: Knee Flexors    5  5  EE: Elbow Extensors  5  5  KE: Knee Extensors    5  5  WE: Wrist Extensors  5  5 DR: Dorsi Flexors    5  5 FF: Finger Flexors  5  5 PF: Plantar Flexors    No drift or orbiting. No abnormal movements.   Lymphedema in LLE noted.   Symmetric LT sensation t/o. No extinction to DSS.  Reflexes:    RJ BJ TJ KJ   Right 2+ 2+ 2+ 2+   Left 2+ 2+ 2+ 2+   Intact FNF b/l.     Labs: I have personally reviewed pertinent reports.    Results from last 7 days   Lab Units 12/26/23  2208   WBC Thousand/uL 12.97*   HEMOGLOBIN g/dL 12.6   HEMATOCRIT % 40.8   PLATELETS Thousands/uL 275   NEUTROS PCT % 64   MONOS PCT % 7   EOS PCT % 2      Results from last 7 days   Lab Units 12/26/23  2208   POTASSIUM mmol/L 4.4   CHLORIDE mmol/L 106   CO2 mmol/L 24   BUN mg/dL 18   CREATININE mg/dL 0.80   CALCIUM mg/dL 9.4   ALK PHOS U/L 105*   ALT U/L 21   AST U/L 22              Results from last 7 days   Lab Units 12/27/23  0859   INR  0.99               Imaging and diagnostic studies:    Radiology Results: I have personally reviewed pertinent reports.   and I have personally reviewed pertinent films in PACS  MRI orbits wo and w contrast   Final Result by Jose Reeves MD (12/27 3919)      Exam is degraded by motion artifact.      -Probable left-sided papilledema, tortuosity of the optic nerves and slight prominence of the optic nerve sheaths. In addition there is partially empty sella and narrowing of the transverse dural venous sinuses. Findings are suggestive of idiopathic    intracranial hypertension. Correlate with lumbar puncture.      -Redemonstrated T2 signal abnormality within the white matter adjacent to the frontal horn of the right lateral ventricle and extending into the external capsule as seen on same day  MRI of the brain. No associated enhancement. Redemonstrated small    adjacent developmental venous anomaly within the lentiform nucleus. Findings may represent chronic microangiopathic changes. Follow-up MRI of the brain with and without contrast in 6 months is recommended to ensure stability.         Workstation performed: [id number]         MRI brain wo contrast   Final Result by Ifeanyi Lance DO (12/27 0823)      Hyperintense signal on T2 and FLAIR imaging within the right frontal lobe immediately adjacent to the frontal horn of the lateral ventricle and tracking posteriorly and inferiorly into the anterior aspect of the external capsule may represent chronic    microangiopathic change. There is a small adjacent developmental venous anomaly identified within the anterior aspect of the right lentiform nucleus. 6-month follow-up examination recommended with contrast to assess change over time and ensure stability.      No old lacunar infarcts identified.      Workstation performed: [id number]         CT VENOGRAM BRAIN   Final Result by Deepak Stanford MD (12/27 0013)         1. Chronic lacunar infarct in the right frontal periventricular white matter and adjacent microangiopathic change. No intracranial hemorrhage or mass effect.   2. Patent dural venous sinuses.         Workstation performed: [id number]         IR lumbar puncture    (Results Pending)       Other Diagnostic Testing: I have personally reviewed pertinent reports.      Active medications:  Current Facility-Administered Medications   Medication Dose Route Frequency    albuterol (PROVENTIL HFA,VENTOLIN HFA) inhaler 2 puff  2 puff Inhalation Q6H PRN    ALPRAZolam (XANAX) tablet 0.25 mg  0.25 mg Oral TID PRN    atorvastatin (LIPITOR) tablet 40 mg  40 mg Oral QPM    buPROPion (WELLBUTRIN) tablet 75 mg  75 mg Oral BID    calcium carbonate (OYSTER SHELL,OSCAL) 500 mg tablet 1 tablet  1 tablet Oral BID With Meals    citalopram (CeleXA)  tablet 20 mg  20 mg Oral Daily    enoxaparin (LOVENOX) subcutaneous injection 60 mg  60 mg Subcutaneous Q12H    fluticasone (FLONASE) 50 mcg/act nasal spray 2 spray  2 spray Nasal Daily    levothyroxine tablet 200 mcg  200 mcg Oral Early Morning    levothyroxine tablet 25 mcg  25 mcg Oral Early Morning    loratadine (CLARITIN) tablet 10 mg  10 mg Oral Daily PRN    metoclopramide (REGLAN) injection 5 mg  5 mg Intravenous PRN    multivitamin-minerals (CENTRUM) tablet 1 tablet  1 tablet Oral Daily    pantoprazole (PROTONIX) EC tablet 40 mg  40 mg Oral Early Morning       Prior to Admission medications    Medication Sig Start Date End Date Taking? Authorizing Provider   albuterol (Ventolin HFA) 90 mcg/act inhaler Inhale 2 puffs every 6 (six) hours as needed for wheezing or shortness of breath 7/19/23  Yes Elissa Hyatt MD   ALPRAZolam (XANAX) 0.25 mg tablet Take 1 tablet (0.25 mg total) by mouth 3 (three) times a day as needed for anxiety 8/17/21  Yes Elissa Hyatt MD   buPROPion (WELLBUTRIN) 75 mg tablet Take 1 tablet (75 mg total) by mouth 2 (two) times a day 8/11/23  Yes Elissa Hyatt MD   Calcium Carbonate (CALCIUM 500 PO) Take 500 mg by mouth 3 (three) times a day   Yes Historical Provider, MD   citalopram (CeleXA) 20 mg tablet Take 1 tablet (20 mg total) by mouth daily 9/1/23  Yes Elissa Hyatt MD   fluticasone (FLONASE) 50 mcg/act nasal spray 2 sprays into each nostril daily 4/4/19  Yes Ion Ahumada PA-C   levothyroxine 200 mcg tablet Take 1 tablet Monday-Friday and 1.5 tablets on Saturday and Sunday. 8/8/23  Yes Debra Bourgeois MD   levothyroxine 25 mcg tablet TAKE ONE TABLET BY MOUTH EVERY DAY 10/24/23  Yes DAVID Akhtar   loratadine (CLARITIN) 10 mg tablet Take 10 mg by mouth if needed   Yes Historical Provider, MD   losartan (COZAAR) 50 mg tablet Take 1 tablet (50 mg total) by mouth daily 9/5/23  Yes Elissa Hyatt MD   Multiple Vitamins-Minerals (Bariatric Fusion) CHEW Chew 4 tablets daily   Yes  Historical Provider, MD   omeprazole (PriLOSEC) 20 mg delayed release capsule TAKE ONE CAPSULE BY MOUTH EVERY OTHER DAY 11/13/23  Yes Elissa Hyatt MD   furosemide (LASIX) 20 mg tablet Take 1 tablet (20 mg total) by mouth daily as needed (edema) 6/20/23 8/8/23  Elissa Hyatt MD   triamcinolone (KENALOG) 0.1 % cream APPLY TOPICALLY DAILY AT BEDTIME AS NEEDED FOR RASH 7/19/21   Harrison Hussein DO         Code status and advanced directives:  Code Status: Level 1 - Full Code      VTE Pharmacologic Prophylaxis: Enoxaparin (Lovenox)  VTE Mechanical Prophylaxis: sequential compression device    ==  SWATHI Swanson  PGY-3, Neurology

## 2023-12-27 NOTE — ED NOTES
Report given to Inna Chavez for [address] [place] [address]     Wen Horan RN  12/27/23 [id number]

## 2023-12-27 NOTE — ASSESSMENT & PLAN NOTE
Noted on [facility], MR brain scan.   Chronic. No hemorrhagic conversion.  No fam hx of strokes. No hx of DVTs/blood clots/coagulopathies.  LDL 98, A1c 5.6  TTE shows EF 60%, normal bi-atrial size.         IMP: Chronic R frontal stroke in a patient with vascular risk factors. Etiology ESUS for now.     Plan:  For secondary stroke prevention- Clopidogrel 75 mg QD start 6 hrs after LP.   ASA not recommended due to hx of gastric bypass.  Cont atorvastatin 40mg QHS  BP goals: normotension  F/u thrombosis panel  Telemetry  Cards referral for ziopatch outpatient  PT/OT/PMR  DVT ppx: SCDs only for now  F/u with vascular neurology AP/attending/ resident clinic in 6-8 weeks post discharge.

## 2023-12-27 NOTE — ED PROVIDER NOTES
History  Chief Complaint   Patient presents with    Headache    Dizziness     Pt reports headache, dizziness, back pain. Pt reports opthomologist sent in for eval for papilledema of both eyes. Pt reports double and blurry vision intermittently.     Patient is a 38-year-old male with history of bariatric surgery that presents to the emergency department with greater than 6-month history of intermittent headaches, blurred vision, visual field deficits, floaters, and change in color vision.  She reports that the symptoms come and go and are worse in the morning.  Her headache has been progressively worsening over the last several months.  She was seen by ophthalmologist 5 days ago who noted bilateral papilledema and recommended that she present to the emergency department.  She has had intermittent nausea with this, however has only had 1 episode of vomiting which was several weeks ago and she thinks was more likely related to her history of bariatric surgery.  She denies chest pain, shortness of breath, abdominal pain, fevers, history of IV drug use and reports that she otherwise feels in her usual state of health. Denies recent travel, recent surgery, history of DVT or PE, hemoptysis, or exogenous estrogen use.          Prior to Admission Medications   Prescriptions Last Dose Informant Patient Reported? Taking?   ALPRAZolam (XANAX) 0.25 mg tablet 12/26/2023 Self No Yes   Sig: Take 1 tablet (0.25 mg total) by mouth 3 (three) times a day as needed for anxiety   Calcium Carbonate (CALCIUM 500 PO) 12/26/2023 Self Yes Yes   Sig: Take 500 mg by mouth 3 (three) times a day   Multiple Vitamins-Minerals (Bariatric Fusion) CHEW 12/26/2023 Self Yes Yes   Sig: Chew 4 tablets daily   albuterol (Ventolin HFA) 90 mcg/act inhaler Past Month  No Yes   Sig: Inhale 2 puffs every 6 (six) hours as needed for wheezing or shortness of breath   buPROPion (WELLBUTRIN) 75 mg tablet 12/26/2023  No Yes   Sig: Take 1 tablet (75 mg total) by  mouth 2 (two) times a day   citalopram (CeleXA) 20 mg tablet 2023  No Yes   Sig: Take 1 tablet (20 mg total) by mouth daily   fluticasone (FLONASE) 50 mcg/act nasal spray Past Month Self No Yes   Si sprays into each nostril daily   furosemide (LASIX) 20 mg tablet   No No   Sig: Take 1 tablet (20 mg total) by mouth daily as needed (edema)   levothyroxine 200 mcg tablet 2023  No Yes   Sig: Take 1 tablet Monday-Friday and 1.5 tablets on Saturday and .   levothyroxine 25 mcg tablet 2023  No Yes   Sig: TAKE ONE TABLET BY MOUTH EVERY DAY   loratadine (CLARITIN) 10 mg tablet Past Month Self Yes Yes   Sig: Take 10 mg by mouth if needed   losartan (COZAAR) 50 mg tablet 2023  No Yes   Sig: Take 1 tablet (50 mg total) by mouth daily   omeprazole (PriLOSEC) 20 mg delayed release capsule 2023  No Yes   Sig: TAKE ONE CAPSULE BY MOUTH EVERY OTHER DAY   triamcinolone (KENALOG) 0.1 % cream More than a month Self No No   Sig: APPLY TOPICALLY DAILY AT BEDTIME AS NEEDED FOR RASH      Facility-Administered Medications: None       Past Medical History:   Diagnosis Date    Anxiety     Bruises easily     Cancer (HCC)     COVID-19 10/10/2022    CPAP (continuous positive airway pressure) dependence     Depression     Disease of thyroid gland     DELVALLE (dyspnea on exertion)     EIN (endometrial intraepithelial neoplasia) 2019    Endometriosis     Follicular thyroid cancer (HCC)     Gastroenteritis     Hashimoto's disease     Hepatic steatosis     Hyperlipidemia     Hypertension     Hypothyroid     IBS (irritable bowel syndrome)     Kidney lesion, native, left     Kidney stone     Lymphedema     left leg    Obesity     Palpitation     Pancreatitis     Pneumonia     Polycystic ovarian disease     Polyuria     RUQ abdominal pain 2021    Sleep apnea     Thyroid nodule     Thyromegaly     Tinea corporis     Tinea pedis        Past Surgical History:   Procedure Laterality Date    ADENOIDECTOMY       DILATION AND CURETTAGE OF UTERUS  2020    HYSTERECTOMY  02/2020    left ovaries    GA LAPS GSTR RSTCV PX W/BYP ELKIN-EN-Y LIMB <150 CM N/A 12/5/2022    Procedure: BYPASS GASTRIC ELKIN-EN-Y LAPAROSCOPIC ROBOT & INTRAOPERATIVE EGD;  Surgeon: Graeme Pool MD;  Location: [facility];  Service: Bariatrics    THYROIDECTOMY, PARTIAL Left 06/2019    benign    TONSILLECTOMY      US GUIDED THYROID BIOPSY  03/01/2019       Family History   Problem Relation Age of Onset    Hyperlipidemia Mother     Restless legs syndrome Mother     Sleep apnea Father     Hyperlipidemia Father     Hypertension Father     Hypertension Maternal Grandmother     Restless legs syndrome Maternal Grandmother     Ovarian cancer Maternal Grandmother     Cancer Paternal Grandmother      I have reviewed and agree with the history as documented.    E-Cigarette/Vaping    E-Cigarette Use Never User      E-Cigarette/Vaping Substances    Nicotine No     THC No     CBD No     Flavoring No     Other No     Unknown No      Social History     Tobacco Use    Smoking status: Never    Smokeless tobacco: Never   Vaping Use    Vaping status: Never Used   Substance Use Topics    Alcohol use: Not Currently    Drug use: Never        Review of Systems   Constitutional:  Negative for chills and fever.   HENT:  Positive for tinnitus (intermittent). Negative for congestion, ear pain and sore throat.    Eyes:  Positive for visual disturbance (intermittent, peripheral vision, floaters, color change). Negative for photophobia and pain.   Respiratory:  Negative for cough and shortness of breath.    Cardiovascular:  Negative for chest pain and palpitations.   Gastrointestinal:  Positive for nausea (intermittent) and vomiting (once - weeks ago). Negative for abdominal pain, constipation and diarrhea.   Genitourinary:  Negative for dysuria and hematuria.   Musculoskeletal:  Positive for back pain (when headahces are bad). Negative for arthralgias.   Skin:  Negative for color change  and rash.   Neurological:  Positive for light-headedness (when headache is bad) and headaches. Negative for dizziness, seizures, syncope, speech difficulty and numbness.   All other systems reviewed and are negative.      Physical Exam  ED Triage Vitals [12/26/23 2026]   Temperature Pulse Respirations Blood Pressure SpO2   98 °F (36.7 °C) 77 18 155/73 98 %      Temp Source Heart Rate Source Patient Position - Orthostatic VS BP Location FiO2 (%)   Oral Monitor Sitting Right arm --      Pain Score       --             Orthostatic Vital Signs  Vitals:    12/27/23 0900 12/27/23 1000 12/27/23 1145 12/27/23 1200   BP: 109/57 105/59 123/59 132/61   Pulse: 68 71 73 74   Patient Position - Orthostatic VS:           Physical Exam  Vitals and nursing note reviewed.   Constitutional:       General: She is not in acute distress.     Appearance: She is well-developed. She is obese. She is not ill-appearing.   HENT:      Head: Normocephalic and atraumatic.      Right Ear: External ear normal.      Left Ear: External ear normal.      Mouth/Throat:      Pharynx: Oropharynx is clear. No oropharyngeal exudate or posterior oropharyngeal erythema.   Eyes:      General:         Right eye: No discharge.         Left eye: No discharge.      Extraocular Movements: Extraocular movements intact.      Conjunctiva/sclera: Conjunctivae normal.      Funduscopic exam:     Right eye: Papilledema present.         Left eye: Papilledema present.   Cardiovascular:      Rate and Rhythm: Normal rate and regular rhythm.      Pulses: Normal pulses.      Heart sounds: Normal heart sounds. No murmur heard.  Pulmonary:      Effort: Pulmonary effort is normal. No respiratory distress.      Breath sounds: Normal breath sounds. No wheezing, rhonchi or rales.   Abdominal:      General: Abdomen is flat.      Palpations: Abdomen is soft.      Tenderness: There is no abdominal tenderness. There is no guarding or rebound.   Musculoskeletal:         General: No  swelling or deformity. Normal range of motion.      Cervical back: Neck supple. No tenderness.   Skin:     General: Skin is warm and dry.      Capillary Refill: Capillary refill takes less than 2 seconds.   Neurological:      Mental Status: She is alert and oriented to person, place, and time.      Cranial Nerves: No cranial nerve deficit.      Sensory: No sensory deficit.      Motor: No weakness.      Coordination: Coordination normal.      Gait: Gait normal.         ED Medications  Medications   fluticasone (FLONASE) 50 mcg/act nasal spray 2 spray (2 sprays Nasal Given 12/27/23 0902)   loratadine (CLARITIN) tablet 10 mg (has no administration in time range)   ALPRAZolam (XANAX) tablet 0.25 mg (has no administration in time range)   albuterol (PROVENTIL HFA,VENTOLIN HFA) inhaler 2 puff (has no administration in time range)   levothyroxine tablet 200 mcg (200 mcg Oral Given 12/27/23 0643)   buPROPion (WELLBUTRIN) tablet 75 mg (75 mg Oral Given 12/27/23 0902)   citalopram (CeleXA) tablet 20 mg (20 mg Oral Given 12/27/23 0902)   levothyroxine tablet 25 mcg (25 mcg Oral Given 12/27/23 0644)   pantoprazole (PROTONIX) EC tablet 40 mg (40 mg Oral Given 12/27/23 0617)   multivitamin-minerals (CENTRUM) tablet 1 tablet (1 tablet Oral Given 12/27/23 0902)   calcium carbonate (OYSTER SHELL,OSCAL) 500 mg tablet 1 tablet (1 tablet Oral Given 12/27/23 0644)   atorvastatin (LIPITOR) tablet 40 mg (has no administration in time range)   enoxaparin (LOVENOX) subcutaneous injection 60 mg (60 mg Subcutaneous Given 12/27/23 0644)   metoclopramide (REGLAN) injection 5 mg (has no administration in time range)   iohexol (OMNIPAQUE) 350 MG/ML injection (MULTI-DOSE) 85 mL (85 mL Intravenous Given 12/26/23 2307)   midazolam (VERSED) injection 0.5 mg (0.5 mg Intravenous Given 12/27/23 0012)   lidocaine (PF) (XYLOCAINE-MPF) 1 % injection 10 mL (10 mL Infiltration Given 12/27/23 0128)   lidocaine (PF) (XYLOCAINE-MPF) 1 % injection 10 mL (10 mL  Infiltration Given 12/27/23 0129)   magnesium sulfate 2 g/50 mL IVPB (premix) 2 g (2 g Intravenous New Bag 12/27/23 1127)   Gadobutrol injection (SINGLE-DOSE) SOLN 13 mL (13 mL Intravenous Given 12/27/23 1307)       Diagnostic Studies  Results Reviewed       Procedure Component Value Units Date/Time    Hemoglobin A1c w/EAG Estimation [[id number]] Collected: 12/27/23 0622    Lab Status: Final result Specimen: Blood from Arm, Left Updated: 12/27/23 1110     Hemoglobin A1C 5.6 %       mg/dl     Cardiolipin antibody [[id number]] Collected: 12/27/23 1035    Lab Status: In process Specimen: Blood from Hand, Right Updated: 12/27/23 1042    Beta-2 glycoprotein antibodies [[id number]] Collected: 12/27/23 1035    Lab Status: In process Specimen: Blood from Hand, Right Updated: 12/27/23 1042    Antithrombin III Activity [[id number]] Collected: 12/27/23 1035    Lab Status: In process Specimen: Blood from Hand, Right Updated: 12/27/23 1042    Protein S activity [[id number]] Collected: 12/27/23 1035    Lab Status: In process Specimen: Blood from Hand, Right Updated: 12/27/23 1042    Protein C activity [[id number]] Collected: 12/27/23 1035    Lab Status: In process Specimen: Blood from Hand, Right Updated: 12/27/23 1042    Protein S Antigen, Total & Free [[id number]] Collected: 12/27/23 1035    Lab Status: In process Specimen: Blood from Hand, Right Updated: 12/27/23 1042    Lupus anticoagulant [[id number]] Collected: 12/27/23 1035    Lab Status: In process Specimen: Blood from Hand, Right Updated: 12/27/23 1042    Protime-INR [[id number]]  (Normal) Collected: 12/27/23 0859    Lab Status: Final result Specimen: Blood from Arm, Left Updated: 12/27/23 0930     Protime 13.7 seconds      INR 0.99    Lipid Panel with Direct LDL reflex [[id number]]  (Normal) Collected: 12/27/23 0622    Lab Status: Final result Specimen: Blood from Arm, Left Updated: 12/27/23 0646     Cholesterol 173 mg/dL      Triglycerides 84 mg/dL      HDL, Direct 58  mg/dL      LDL Calculated 98 mg/dL     TSH, 3rd generation with Free T4 reflex [[id number]]  (Normal) Collected: 12/26/23 2208    Lab Status: Final result Specimen: Blood from Arm, Left Updated: 12/26/23 2250     TSH 3RD GENERATON 0.621 uIU/mL     Comprehensive metabolic panel [[id number]]  (Abnormal) Collected: 12/26/23 2208    Lab Status: Final result Specimen: Blood from Arm, Left Updated: 12/26/23 2238     Sodium 137 mmol/L      Potassium 4.4 mmol/L      Chloride 106 mmol/L      CO2 24 mmol/L      ANION GAP 7 mmol/L      BUN 18 mg/dL      Creatinine 0.80 mg/dL      Glucose 78 mg/dL      Calcium 9.4 mg/dL      AST 22 U/L      ALT 21 U/L      Alkaline Phosphatase 105 U/L      Total Protein 7.7 g/dL      Albumin 4.0 g/dL      Total Bilirubin 0.31 mg/dL      eGFR 94 ml/min/1.73sq m     Narrative:      [employer] Kid[employer] guidelines for Chronic Kidney Disease (CKD):     Stage 1 with normal or high GFR (GFR > 90 mL/min/1.73 square meters)    Stage 2 Mild CKD (GFR = 60-89 mL/min/1.73 square meters)    Stage 3A Moderate CKD (GFR = 45-59 mL/min/1.73 square meters)    Stage 3B Moderate CKD (GFR = 30-44 mL/min/1.73 square meters)    Stage 4 Severe CKD (GFR = 15-29 mL/min/1.73 square meters)    Stage 5 End Stage CKD (GFR <15 mL/min/1.73 square meters)  Note: GFR calculation is accurate only with a steady state creatinine    CBC and differential [[id number]]  (Abnormal) Collected: 12/26/23 2208    Lab Status: Final result Specimen: Blood from Arm, Left Updated: 12/26/23 2218     WBC 12.97 Thousand/uL      RBC 4.51 Million/uL      Hemoglobin 12.6 g/dL      Hematocrit 40.8 %      MCV 91 fL      MCH 27.9 pg      MCHC 30.9 g/dL      RDW 14.0 %      MPV 11.3 fL      Platelets 275 Thousands/uL      nRBC 0 /100 WBCs      Neutrophils Relative 64 %      Immat GRANS % 0 %      Lymphocytes Relative 26 %      Monocytes Relative 7 %      Eosinophils Relative 2 %      Basophils Relative 1 %      Neutrophils Absolute 8.34  Thousands/µL      Immature Grans Absolute 0.04 Thousand/uL      Lymphocytes Absolute 3.35 Thousands/µL      Monocytes Absolute 0.94 Thousand/µL      Eosinophils Absolute 0.23 Thousand/µL      Basophils Absolute 0.07 Thousands/µL                    MRI orbits wo and w contrast   Final Result by Jose Reeves MD (12/27 1046)      Exam is degraded by motion artifact.      -Probable left-sided papilledema, tortuosity of the optic nerves and slight prominence of the optic nerve sheaths. In addition there is partially empty sella and narrowing of the transverse dural venous sinuses. Findings are suggestive of idiopathic    intracranial hypertension. Correlate with lumbar puncture.      -Redemonstrated T2 signal abnormality within the white matter adjacent to the frontal horn of the right lateral ventricle and extending into the external capsule as seen on same day MRI of the brain. No associated enhancement. Redemonstrated small    adjacent developmental venous anomaly within the lentiform nucleus. Findings may represent chronic microangiopathic changes. Follow-up MRI of the brain with and without contrast in 6 months is recommended to ensure stability.         Workstation performed: [id number]         MRI brain wo contrast   Final Result by Ifeanyi Lance DO (12/27 8279)      Hyperintense signal on T2 and FLAIR imaging within the right frontal lobe immediately adjacent to the frontal horn of the lateral ventricle and tracking posteriorly and inferiorly into the anterior aspect of the external capsule may represent chronic    microangiopathic change. There is a small adjacent developmental venous anomaly identified within the anterior aspect of the right lentiform nucleus. 6-month follow-up examination recommended with contrast to assess change over time and ensure stability.      No old lacunar infarcts identified.      Workstation performed: [id number]         CT VENOGRAM BRAIN   Final Result by Deepak ARANDA  MD Abdoulaye (12/27 0013)         1. Chronic lacunar infarct in the right frontal periventricular white matter and adjacent microangiopathic change. No intracranial hemorrhage or mass effect.   2. Patent dural venous sinuses.         Workstation performed: [id number]         IR lumbar puncture    (Results Pending)         Procedures  ECG 12 Lead Documentation Only    Date/Time: 12/26/2023 8:50 PM    Performed by: Singh Nj DO  Authorized by: Singh Nj DO    Indications / Diagnosis:  Headaches, vision changes  ECG reviewed by me, the ED Provider: yes    Patient location:  ED  Previous ECG:     Previous ECG:  Compared to current    Comparison ECG info:  No PVCs noted on today's ECG    Similarity:  Changes noted  Interpretation:     Interpretation: normal    Quality:     Tracing quality:  Limited by artifact  Rate:     ECG rate assessment: normal    Rhythm:     Rhythm: sinus rhythm    Ectopy:     Ectopy: none    QRS:     QRS axis:  Normal    QRS intervals:  Normal  Conduction:     Conduction: normal    ST segments:     ST segments:  Normal  T waves:     T waves: normal    Comments:      After several attempts, LP ultimately unsuccessful  Lumbar puncture    Date/Time: 12/27/2023 1:20 AM    Performed by: Singh Nj DO  Authorized by: Singh Nj DO  Universal Protocol:  Procedure performed by: (Dr. Del Castillo)  Consent: Verbal consent obtained. Written consent obtained.  Risks and benefits: risks, benefits and alternatives were discussed  Consent given by: patient  Patient understanding: patient states understanding of the procedure being performed  Patient consent: the patient's understanding of the procedure matches consent given  Patient identity confirmed: verbally with patient, arm band and hospital-assigned identification number    Patient location:  ED  Pre-procedure details:     Preparation: Patient was prepped and draped in usual sterile fashion    Indications:     Indications:  evaluation for infection and evaluation of opening pressure    Sedation:     Sedation type:  Anxiolysis  Anesthesia (see MAR for exact dosages):     Anesthesia method:  Local infiltration    Local anesthetic:  Lidocaine 1% w/o epi  Procedure details:     Lumbar space:  L3-L4 interspace    Patient position:  L lateral decubitus    Equipment: Lumbar puncture kit used      Needle gauge:  20G x 3.5in    Number of attempts:  4        ED Course                  Stroke Assessment       Row Name 12/27/23 0031             NIH Stroke Scale    Interval --      Level of Consciousness (1a.) 0      LOC Questions (1b.) 0      LOC Commands (1c.) 0      Best Gaze (2.) 0      Visual (3.) 0      Facial Palsy (4.) 0      Motor Arm, Left (5a.) 0      Motor Arm, Right (5b.) 0      Motor Leg, Left (6a.) 0      Motor Leg, Right (6b.) 0      Limb Ataxia (7.) 0      Sensory (8.) 0      Best Language (9.) 0      Dysarthria (10.) 0      Extinction and Inattention (11.) (Formerly Neglect) 0      Total 0                              SBIRT 22yo+      Flowsheet Row Most Recent Value   Initial Alcohol Screen: US AUDIT-C     1. How often do you have a drink containing alcohol? 0 Filed at: 12/27/2023 0755   2. How many drinks containing alcohol do you have on a typical day you are drinking?  0 Filed at: 12/27/2023 0755   3b. FEMALE Any Age, or MALE 65+: How often do you have 4 or more drinks on one occassion? 0 Filed at: 12/27/2023 0755   Audit-C Score 0 Filed at: 12/27/2023 0755   JACOB: How many times in the past year have you...    Used an illegal drug or used a prescription medication for non-medical reasons? Never Filed at: 12/27/2023 0755                  Medical Decision Making  38F with history of bariatric surgery presents to the emergency department with several month history of headaches and recent visit to ophthalmologist where she was told she has papilledema.  She also reports intermittent vision changes including visual field cuts.   These are resolved at this time.  On physical exam, patient has normal neurologic exam but papilledema is noted on bedside funduscopic exam.  Patient is obese but otherwise normal physical exam.  Laboratory evaluation including CBC, CMP, TSH all within normal limits other than elevated white blood cell count of 13.  ECG nonconcerning for acute ischemia or dysrhythmia.  CT venogram reveals patent dural venous sinuses, however chronic lacunar infarct of the right frontal periventricular white matter and adjacent microangiopathic change is noted.  Given the surprising finding in a relatively young healthy patient, patient's case is discussed with neurologist on-call who advises that we are okay to pursue lumbar puncture for opening pressure for evaluation of papilledema.  Several attempts at lumbar puncture were attempted, however patient had significant difficulty remaining still and, despite Versed and local anesthetic application, reported to be very anxious as she has a strong phobia of needles.  With concern for idiopathic intracranial hypertension, and history of previously unknown ischemic stroke, patient is admitted under the care of [facility] internal medicine for further evaluation and treatment and eventual IR guided lumbar puncture.  Patient is admitted.    Amount and/or Complexity of Data Reviewed  Labs: ordered.  Radiology: ordered.    Risk  Prescription drug management.  Decision regarding hospitalization.          Disposition  Final diagnoses:   Headache   Papilledema   Chronic arterial ischemic stroke     Time reflects when diagnosis was documented in both MDM as applicable and the Disposition within this note       Time User Action Codes Description Comment    12/27/2023 12:08 AM Allan Del Castillo Add [R51.9] Headache     12/27/2023 12:08 AM Allan Del Castillo Add [H53.8] Vision blurring     12/27/2023 12:08 AM Allan Del Castillo Remove [H53.8] Vision blurring     12/27/2023  1:30 AM Clem  Singh Add [H47.10] Papilledema     12/27/2023  1:30 AM Singh Nj [Z86.73] Chronic arterial ischemic stroke     12/27/2023  9:53 AM Cuong Pan [I63.9] Chronic R frontal stroke           ED Disposition       ED Disposition   Admit    Condition   Stable    Date/Time   Wed Dec 27, 2023 0125    Comment   Case was discussed with [facility] and the patient's admission status was agreed to be Admission Status: inpatient status to the service of Dr. Reeves.               Follow-up Information    None         Patient's Medications   Discharge Prescriptions    No medications on file     No discharge procedures on file.    PDMP Review         Value Time User    PDMP Reviewed  Yes 11/21/2022 10:46 AM Beatriz Rowland PA-C             ED Provider  Attending physically available and evaluated Allison Gamboa. I managed the patient along with the ED Attending.    Electronically Signed by           Singh Nj,   12/27/23 6676

## 2023-12-27 NOTE — H&P
[facility]  H&P  Name: Allison Gamboa 38 y.o. female I MRN: [id number]  Unit/Bed#: ED-01 I Date of Admission: 12/26/2023   Date of Service: 12/27/2023 I Hospital Day: 0      Assessment/Plan   * Papilledema  Assessment & Plan  Found incidentally during optometrist visit  Concern for idiopathic intracranial hypertension versus cerebral venous thrombosis  Recommended ED evaluation.  CT venous sampling showed chronic lacunar infarct in the right frontal periventricular white matter and associated microangiopathic changes  No masses noted.  Attempt for lumbar puncture at the ED was unsuccessful  Ddx idiopathic IH, Cerebral venous thrombosis, mass lesions rule out    Plan  Will admit under stroke pathway  IR lumbar puncture  Neurochecks  Scheduled Tylenol 975 mg twice daily      Lacunar Infarct (HCC)  Assessment & Plan  Incidental finding of CTchronic lacunar infarct in the right frontal periventricular white matter and adjacent microangiopathic chaNGE  Chronic headaches with papilledema and ophthalmic examination  No other focal deficits    PLAN  Stroke pathway  Appreciate neurology recommendation  Get MRI  Echo with bubble study  Telemetry  Normotension, normoglycemia              Chronic headaches  Assessment & Plan  See above in papilledema  Scheduled Tylenol 975 mg 3 times daily  Neurochecks every 2 hours    Leucocytosis  Assessment & Plan  Possibly reactive  Will monitor off antibiotics    Essential hypertension  Assessment & Plan  Home medications losartan 50 mg-continue    Lymph edema  Assessment & Plan  On as needed diuretics  Compression stockings  Follows with PT at home    Hypothyroidism  Assessment & Plan  Continue levothyroxine to 225 mcg daily                VTE Pharmacologic Prophylaxis:   Moderate Risk (Score 3-4) - Pharmacological DVT Prophylaxis Ordered: enoxaparin (Lovenox).  Code Status: full v  Discussion with family:   updated.      Anticipated Length of Stay:  Patient will be admitted on an inpatient basis with an anticipated length of stay of greater than 2 midnights secondary to papilledema with lacunar stroke.     Chief Complaint:      History of Present Illness:  Allison Gamboa is a 38 y.o. female with a PMH of hypothyroidism/Hashimoto's, ANA LAURA, NICOLÁS cervix status post hysterectomy ,obesity status post Yuko-en-Y gastric bypass who presents with 6-month history of intermittent headaches.  Describes headaches  as pulsatile, sometimes squeezing,associated with blurred vision-mostly peripheral, floaters and diplopia.  Usually frontal , radiating to the temporal and occipital regions. Headache intensity rated 3 -10, no aggravating or relieving factors, patient denies headaches which are worse in the morning nor associated with nausea/ vomiting.  She reports headaches have been progressively worse and more frequent over the past months  Was seen by ophthalmology 5 days ago and noted bilateral papilledema.  They recommended ED evaluation given concern for possible intracranial hypertension  In the ED, she was hemodynamically stable with blood pressure 155/73 saturating normally on room air  No focal deficits documented  except for sixth nerve palsy  Lab work largely unremarkable except for leukocytosis on CBC 12 K  CT venogram showed chronic lacunar infarct in the right frontal periventricular white matter and adjacent microangiopathic change.  No mass change no hemorrhage and patent dural venous sinuses  Unsuccessful lumbar puncture attempted to ED  Admission under SLIM for suspected pseudotumor cerebri, and incidental finding of lacunar infarct under the stroke pathway           Review of Systems:  Review of Systems   Constitutional:  Negative for chills and fever.   HENT:  Negative for ear pain and sore throat.    Eyes:  Negative for pain and visual disturbance.   Respiratory:  Negative for cough and shortness of breath.    Cardiovascular:  Negative for chest pain and  palpitations.   Gastrointestinal:  Negative for abdominal pain and vomiting.   Genitourinary:  Negative for dysuria and hematuria.   Musculoskeletal:  Negative for arthralgias and back pain.   Skin:  Negative for color change and rash.   Neurological:  Positive for headaches. Negative for dizziness, seizures and syncope.   Psychiatric/Behavioral:  Negative for agitation and behavioral problems.    All other systems reviewed and are negative.        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    Endometriosis      Follicular thyroid cancer (HCC)      Gastroenteritis      Hashimoto's disease      Hepatic steatosis      Hyperlipidemia      Hypertension      Hypothyroid      IBS (irritable bowel syndrome)      Kidney lesion, native, left      Kidney stone      Lymphedema       left leg    Obesity      Palpitation      Pancreatitis      Pneumonia      Polycystic ovarian disease      Polyuria      RUQ abdominal pain 11/1/2021    Sleep apnea      Thyroid nodule      Thyromegaly      Tinea corporis      Tinea pedis              Surgical History         Past Surgical History:   Procedure Laterality Date    ADENOIDECTOMY        DILATION AND CURETTAGE OF UTERUS   2020    HYSTERECTOMY   02/2020     left ovaries    NV LAPS GSTR RSTCV PX W/BYP ELKIN-EN-Y LIMB <150 CM N/A 12/5/2022     Procedure: BYPASS GASTRIC ELKIN-EN-Y LAPAROSCOPIC ROBOT & INTRAOPERATIVE EGD;  Surgeon: Graeme Pool MD;  Location: [facility] OR;  Service: Bariatrics    THYROIDECTOMY, PARTIAL Left 06/2019     benign    TONSILLECTOMY        US GUIDED THYROID BIOPSY   03/01/2019            Meds/Allergies:          Prior to Admission medications     Medication Sig Start Date End Date Taking? Authorizing Provider   albuterol (Ventolin HFA) 90 mcg/act inhaler Inhale 2 puffs every 6 (six) hours as needed for wheezing or shortness of breath 7/19/23     Elissa Hyatt MD   ALPRAZolam (XANAX) 0.25 mg tablet Take 1 tablet (0.25 mg total) by mouth 3 (three) times a day as needed for anxiety 8/17/21     Elissa Hyatt MD   buPROPion (WELLBUTRIN) 75 mg tablet Take 1 tablet (75 mg total) by mouth 2 (two) times a day 8/11/23     Elissa Hyatt MD   Calcium Carbonate (CALCIUM 500 PO) Take 500 mg by mouth 3 (three) times a day       Historical Provider, MD   citalopram (CeleXA) 20 mg tablet Take 1 tablet (20 mg total) by mouth daily 9/1/23     Elissa Hyatt MD   fluticasone (FLONASE) 50 mcg/act nasal spray 2 sprays into each nostril daily 4/4/19     Ion Ahumada PA-C   furosemide (LASIX) 20 mg tablet Take 1 tablet (20 mg total) by mouth daily as needed (edema) 6/20/23 8/8/23   Elissa Hyatt MD   levothyroxine 200 mcg tablet Take 1 tablet Monday-Friday and 1.5 tablets on Saturday and Sunday. 8/8/23     Debra Bourgeois MD   levothyroxine 25 mcg tablet TAKE ONE TABLET BY MOUTH EVERY DAY 10/24/23     DAVID Akhtar   loratadine (CLARITIN) 10 mg tablet Take 10 mg by mouth if needed       Historical Provider, MD   losartan (COZAAR) 50 mg tablet Take 1 tablet (50 mg total) by mouth daily 9/5/23     Elissa Hyatt MD   Multiple Vitamins-Minerals (Bariatric Fusion) CHEW Chew 4 tablets daily       Historical Provider, MD   omeprazole (PriLOSEC) 20 mg delayed release capsule TAKE ONE CAPSULE BY MOUTH EVERY OTHER DAY 11/13/23     Elissa Hyatt MD   triamcinolone (KENALOG) 0.1 % cream APPLY TOPICALLY DAILY AT BEDTIME AS NEEDED FOR RASH 7/19/21     Harrison Hussein, DO      I have reviewed home medications with patient personally.     Allergies:         Allergies   Allergen Reactions    Dust Mite Extract Other (See Comments)       Sinus inflammation    Latex Blisters    Molds &  Smuts Other (See Comments)       Sinus inflammation    Penicillins Rash       Skin rash and sheds    Jorge Luis Grass Pollen Allergen Other (See Comments)       Sinus inflammation         Social History:  Marital Status: /Civil Union   Occupation:   Patient Pre-hospital Living Situation: Home  Patient Pre-hospital Level of Mobility: walks  Patient Pre-hospital Diet Restrictions: bariatric  Substance Use History:   Social History          Substance and Sexual Activity   Alcohol Use Not Currently      Social History          Tobacco Use   Smoking Status Never   Smokeless Tobacco Never      Social History          Substance and Sexual Activity   Drug Use Never         Family History:  Family History         Family History   Problem Relation Age of Onset    Hyperlipidemia Mother      Restless legs syndrome Mother      Sleep apnea Father      Hyperlipidemia Father      Hypertension Father      Hypertension Maternal Grandmother      Restless legs syndrome Maternal Grandmother      Ovarian cancer Maternal Grandmother      Cancer Paternal Grandmother              Physical Exam:      Vitals:   Blood Pressure: 101/51 (12/27/23 0530)  Pulse: 71 (12/27/23 0530)  Temperature: 98 °F (36.7 °C) (12/26/23 2026)  Temp Source: Oral (12/26/23 2026)  Respirations: 18 (12/27/23 0530)  SpO2: 93 % (12/27/23 0530)     Physical Exam  Vitals and nursing note reviewed.   Constitutional:       General: She is not in acute distress.     Appearance: She is well-developed. She is obese.   HENT:      Head: Normocephalic and atraumatic.   Eyes:      General: Visual field deficit present.      Conjunctiva/sclera: Conjunctivae normal.      Funduscopic exam:     Right eye: Papilledema present.         Left eye: Papilledema present.   Cardiovascular:      Rate and Rhythm: Normal rate and regular rhythm.      Heart sounds: No murmur heard.  Pulmonary:      Effort: Pulmonary effort is normal. No respiratory distress.      Breath sounds: Normal breath  sounds.   Abdominal:      Palpations: Abdomen is soft.      Tenderness: There is no abdominal tenderness.   Musculoskeletal:         General: No swelling.      Cervical back: Neck supple.   Skin:     General: Skin is warm and dry.      Capillary Refill: Capillary refill takes less than 2 seconds.   Neurological:      Mental Status: She is alert.      GCS: GCS eye subscore is 4. GCS verbal subscore is 5. GCS motor subscore is 6.      Sensory: Sensation is intact.      Motor: Motor function is intact.   Psychiatric:         Mood and Affect: Mood normal.            Additional Data:      Lab Results:       Results from last 7 days   Lab Units 12/26/23  2208   WBC Thousand/uL 12.97*   HEMOGLOBIN g/dL 12.6   HEMATOCRIT % 40.8   PLATELETS Thousands/uL 275   NEUTROS PCT % 64   LYMPHS PCT % 26   MONOS PCT % 7   EOS PCT % 2           Results from last 7 days   Lab Units 12/26/23  2208   SODIUM mmol/L 137   POTASSIUM mmol/L 4.4   CHLORIDE mmol/L 106   CO2 mmol/L 24   BUN mg/dL 18   CREATININE mg/dL 0.80   ANION GAP mmol/L 7   CALCIUM mg/dL 9.4   ALBUMIN g/dL 4.0   TOTAL BILIRUBIN mg/dL 0.31   ALK PHOS U/L 105*   ALT U/L 21   AST U/L 22   GLUCOSE RANDOM mg/dL 78                         Lines/Drains:  Invasive Devices         Peripheral Intravenous Line  Duration                Peripheral IV 12/26/23 Left Antecubital <1 day                                Imaging: Reviewed radiology reports from this admission including: CT venous sampling  CT VENOGRAM BRAIN   Final Result by Deepak Stanford MD (12/27 0013)           1. Chronic lacunar infarct in the right frontal periventricular white matter and adjacent microangiopathic change. No intracranial hemorrhage or mass effect.   2. Patent dural venous sinuses.           Workstation performed: [id number]           MRI inpatient order    (Results Pending)         EKG and Other Studies Reviewed on Admission:   EKG:  N/A.     ** Please Note: This note has been constructed using a  voice recognition system. **

## 2023-12-27 NOTE — ASSESSMENT & PLAN NOTE
38 y.o. right handed female patient with dyslipidemia, obesity s/p gastric bypass (Dec 2022) evaluated for b/l papilledema, headaches since 6 months concerning for IIH. Character of headaches is pulsatile and pressure-like. She also has postural component to her headaches: Lying down makes it significantly worse. On exam, patient has bilateral papilledema, but no other focal sensorimotor deficits.     Initial labwork signficant for WBC 12, which is now downtrending. TSH WNL. LDL 98.    NCCTH showed chronic R frontal periventricular infarct.    CT venogram showed no evidence of CVST.  MR can of orbits showed optic nerve tortuosity b/l, partially empty sella, transverse venous sinus stenosis.    IR guided LP (12/28) demonstrated normal OP; I confirmed with IR- this was not a technical error.     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 Patient meets all aspects of the Modified Dandy Criteria for for IIH except for increased CSF opening pressure. However, our pretest probability for IIH was ~90% (39 y/o female, obesity, papilledema, radiographic signs) and LP has ~70% sensitivity and 80% specificity. Post test likelihood ratio will still be positive (albeit lower). Therefore, we conclude the patient has probable IIH. We do not currently suspect other etiologies for increased ICP as patient denies any new med changes (no Madeline/retinol use, OCP use, hormonal therapy use). CVST/aneurysm ruled out via negative vessel imaging. Suspect her IIH is a sequale of obesity, although she does report losing significant weight s/p gastric bypass. Recommend initiating Diamox 500mg BID.     Plan:  Start PO Diamox 500mg BID today   Can restart Lovenox 12 hrs after LP.   For headache management: IV Mag Sulfate 2g, Reglan 5mg x1  Low Na diet  STAT CTH in case of neuro exam changes

## 2023-12-27 NOTE — ASSESSMENT & PLAN NOTE
Incidental finding of CTchronic lacunar infarct in the right frontal periventricular white matter and adjacent microangiopathic chaNGE  Chronic headaches with papilledema and ophthalmic examination  No other focal deficits    PLAN  Stroke pathway  Appreciate neurology recommendation  Get MRI  Echo with bubble study  Telemetry  Normotension, normoglycemia

## 2023-12-27 NOTE — ED ATTENDING ATTESTATION
12/26/2023  I, Allan Del Castillo MD, saw and evaluated the patient. I have discussed the patient with the resident/non-physician practitioner and agree with the resident's/non-physician practitioner's findings, Plan of Care, and MDM as documented in the resident's/non-physician practitioner's note, except where noted. All available labs and Radiology studies were reviewed.  I was present for key portions of any procedure(s) performed by the resident/non-physician practitioner and I was immediately available to provide assistance.       At this point I agree with the current assessment done in the Emergency Department.  I have conducted an independent evaluation of this patient a history and physical is as follows:    History    Patient is a 38-year-old female, with a history significant for cervical cancer s/p total hysterectomy as well as possible thyroid cancer, who presents to the ED today, at the recommendation of her ophthalmologist, due to papilledema with concern for intracranial hypertension versus dural venous sinus thrombosis.  Patient takes no antithrombotic medication.  Patient states she initially sought evaluation with ophthalmology due to a 6-month history of intermittent, gradual onset, waxing and waning, atraumatic, pressure in character headaches with associated intermittent visual field deficits/blurring as well as nausea and dizziness.  Patient has taken Tylenol, 2 to 3 pills daily, to remit her symptoms.  Laying flat exacerbates her symptoms.  Patient's , present in room and helping provide history, states that patient has had some confusion/memory issues but this has been chronic since the thyroid issue.  Patient denies new dysphagia or changes in urination.    Patient also complains of atraumatic lower back pain.  She denies fever, bowel bladder incontinence, urinary retention, history of IVDU, weakness, numbness.    Patient is without other concerns at this time.      ROS  Patient denies: Fever; no dysphagia; chest pain; dyspnea; abdominal pain; changes in urinary volume; dysuria; rash; weakness; numbness; difficulty walking    Physical Exam    GENERAL APPEARANCE: NAD  NEURO: Cranial nerves 2-12 intact.  5/5 strength in all four extremities including finger extension against resistance.  Sensation to light touch subjectively intact/equal in all four extremities and the face.  Patient able to ambulate without difficulty.  Patient is speaking clearly in complete sentences.  Patient is answering appropriately and able to follow commands.   Intact L5 and S1 motor and sensory function bilaterally.  2/4 patellar reflexes bilaterally.  No saddle anesthesia.  HEENT: PERRL, Moist mucous membranes, external ears normal, nose normal  Papilledema present bilaterally on funduscopy  Neck: No cervical adenopathy  CV: RRR. No murmurs, rubs, gallops  LUNGS: Clear to auscultation: No wheezes, stridor, rhonchi, rales  GI: Abdomen non-distended. Soft. Non-tender and without rebound or guarding   : Deferred at this time  MSK: No deformity.  Midline and bilateral lumbar paraspinal tenderness to palpation.  No overlying erythema.  Skin: Warm and dry  Capillary refill: <2 seconds    Assessment/Plan  Headache, papilledema   -Concern for idiopathic intracranial hypertension.  Also considered primary headache, mass lesion, dural venous sinus thrombosis.  No reason suspect meningitis or SAH at this time based on history physical exam.  -Given history of possible thyroid cancer, will also investigate with thyroid studies  -Will investigate with CBC, CMP, CT head venous phase  -Likely lumbar puncture after CT head  -Will manage based upon workup.    ED Course  ED Course as of 12/27/23 0125   Tue Dec 26, 2023   2120 ECG per my independent interpretation: Normal rate, regular rhythm, no ectopy, normal axis, no ST elevations or depressions     2219 Hemoglobin: 12.6  WNL   2242 eGFR: 94  WNL   2251 TSH 3RD  SAMIR: 0.621  WNL   Wed Dec 27, 2023   0122 LP unsuccessful secondary to body habitus and frequent patient moving.  Patient denies any symptoms after LP attempts.  Will admit.         Critical Care Time  Procedures

## 2023-12-28 ENCOUNTER — APPOINTMENT (INPATIENT)
Dept: RADIOLOGY | Facility: HOSPITAL | Age: 38
DRG: 103 | End: 2023-12-28
Payer: COMMERCIAL

## 2023-12-28 LAB
ANION GAP SERPL CALCULATED.3IONS-SCNC: 5 MMOL/L
APPEARANCE CSF: NORMAL
BASOPHILS # BLD AUTO: 0.04 THOUSANDS/ÂΜL (ref 0–0.1)
BASOPHILS NFR BLD AUTO: 1 % (ref 0–1)
BUN SERPL-MCNC: 17 MG/DL (ref 5–25)
C GATTII+NEOFOR DNA CSF QL NAA+NON-PROBE: NOT DETECTED
CALCIUM SERPL-MCNC: 8.8 MG/DL (ref 8.4–10.2)
CHLORIDE SERPL-SCNC: 107 MMOL/L (ref 96–108)
CMV DNA CSF QL NAA+NON-PROBE: NOT DETECTED
CO2 SERPL-SCNC: 27 MMOL/L (ref 21–32)
CREAT SERPL-MCNC: 0.66 MG/DL (ref 0.6–1.3)
CRYPTOC AG CSF QL IA: NEGATIVE
DEPRECATED AT III PPP: 117 % OF NORMAL (ref 92–136)
E COLI K1 DNA CSF QL NAA+NON-PROBE: NOT DETECTED
EOSINOPHIL # BLD AUTO: 0.15 THOUSAND/ÂΜL (ref 0–0.61)
EOSINOPHIL NFR BLD AUTO: 2 % (ref 0–6)
ERYTHROCYTE [DISTWIDTH] IN BLOOD BY AUTOMATED COUNT: 14.1 % (ref 11.6–15.1)
EV RNA CSF QL NAA+NON-PROBE: NOT DETECTED
GFR SERPL CREATININE-BSD FRML MDRD: 113 ML/MIN/1.73SQ M
GLUCOSE CSF-MCNC: 55 MG/DL (ref 40–70)
GLUCOSE SERPL-MCNC: 77 MG/DL (ref 65–140)
GP B STREP DNA CSF QL NAA+NON-PROBE: NOT DETECTED
GRAM STN SPEC: NORMAL
GRAM STN SPEC: NORMAL
HAEM INFLU DNA CSF QL NAA+NON-PROBE: NOT DETECTED
HCT VFR BLD AUTO: 37.8 % (ref 34.8–46.1)
HGB BLD-MCNC: 11.6 G/DL (ref 11.5–15.4)
HHV6 DNA CSF QL NAA+NON-PROBE: NOT DETECTED
HSV1 DNA CSF QL NAA+NON-PROBE: NOT DETECTED
HSV2 DNA CSF QL NAA+NON-PROBE: NOT DETECTED
IMM GRANULOCYTES # BLD AUTO: 0.02 THOUSAND/UL (ref 0–0.2)
IMM GRANULOCYTES NFR BLD AUTO: 0 % (ref 0–2)
L MONOCYTOG DNA CSF QL NAA+NON-PROBE: NOT DETECTED
LYMPHOCYTES # BLD AUTO: 2.52 THOUSANDS/ÂΜL (ref 0.6–4.47)
LYMPHOCYTES NFR BLD AUTO: 32 % (ref 14–44)
LYMPHOCYTES NFR CSF MANUAL: 58 %
MCH RBC QN AUTO: 28 PG (ref 26.8–34.3)
MCHC RBC AUTO-ENTMCNC: 30.7 G/DL (ref 31.4–37.4)
MCV RBC AUTO: 91 FL (ref 82–98)
MONOCYTES # BLD AUTO: 0.7 THOUSAND/ÂΜL (ref 0.17–1.22)
MONOCYTES NFR BLD AUTO: 9 % (ref 4–12)
MONOS+MACROS CSF MANUAL: 12 %
N MEN DNA CSF QL NAA+NON-PROBE: NOT DETECTED
NEUTROPHILS # BLD AUTO: 4.48 THOUSANDS/ÂΜL (ref 1.85–7.62)
NEUTROPHILS NFR CSF MANUAL: 30 %
NEUTS SEG NFR BLD AUTO: 56 % (ref 43–75)
NRBC BLD AUTO-RTO: 0 /100 WBCS
PARECHOVIRUS A RNA CSF QL NAA+NON-PROBE: NOT DETECTED
PLATELET # BLD AUTO: 236 THOUSANDS/UL (ref 149–390)
PMV BLD AUTO: 11.6 FL (ref 8.9–12.7)
POTASSIUM SERPL-SCNC: 3.9 MMOL/L (ref 3.5–5.3)
PROT C AG ACT/NOR PPP IA: 144 % OF NORMAL (ref 60–150)
PROT CSF-MCNC: 34 MG/DL (ref 15–45)
PROT S ACT/NOR PPP: 120 % (ref 68–108)
RBC # BLD AUTO: 4.15 MILLION/UL (ref 3.81–5.12)
RBC # CSF MANUAL: 410 UL (ref 0–10)
S PNEUM DNA CSF QL NAA+NON-PROBE: NOT DETECTED
SODIUM SERPL-SCNC: 139 MMOL/L (ref 135–147)
TOTAL CELLS COUNTED BLD: NO
TOTAL CELLS COUNTED SPEC: 92
TUBE # CSF: 4
VZV DNA CSF QL NAA+NON-PROBE: NOT DETECTED
WBC # BLD AUTO: 7.91 THOUSAND/UL (ref 4.31–10.16)
WBC # CSF AUTO: 1 /UL (ref 0–5)

## 2023-12-28 PROCEDURE — 85025 COMPLETE CBC W/AUTO DIFF WBC: CPT

## 2023-12-28 PROCEDURE — NC001 PR NO CHARGE: Performed by: NURSE PRACTITIONER

## 2023-12-28 PROCEDURE — 86039 ANTINUCLEAR ANTIBODIES (ANA): CPT

## 2023-12-28 PROCEDURE — 80048 BASIC METABOLIC PNL TOTAL CA: CPT

## 2023-12-28 PROCEDURE — 86592 SYPHILIS TEST NON-TREP QUAL: CPT

## 2023-12-28 PROCEDURE — 88185 FLOWCYTOMETRY/TC ADD-ON: CPT

## 2023-12-28 PROCEDURE — 82784 ASSAY IGA/IGD/IGG/IGM EACH: CPT

## 2023-12-28 PROCEDURE — 87899 AGENT NOS ASSAY W/OPTIC: CPT

## 2023-12-28 PROCEDURE — 99232 SBSQ HOSP IP/OBS MODERATE 35: CPT | Performed by: INTERNAL MEDICINE

## 2023-12-28 PROCEDURE — 82042 OTHER SOURCE ALBUMIN QUAN EA: CPT

## 2023-12-28 PROCEDURE — 87483 CNS DNA AMP PROBE TYPE 12-25: CPT

## 2023-12-28 PROCEDURE — 62328 DX LMBR SPI PNXR W/FLUOR/CT: CPT | Performed by: STUDENT IN AN ORGANIZED HEALTH CARE EDUCATION/TRAINING PROGRAM

## 2023-12-28 PROCEDURE — 87116 MYCOBACTERIA CULTURE: CPT

## 2023-12-28 PROCEDURE — 82164 ANGIOTENSIN I ENZYME TEST: CPT

## 2023-12-28 PROCEDURE — 88184 FLOWCYTOMETRY/ TC 1 MARKER: CPT

## 2023-12-28 PROCEDURE — 89050 BODY FLUID CELL COUNT: CPT

## 2023-12-28 PROCEDURE — 83873 ASSAY OF CSF PROTEIN: CPT

## 2023-12-28 PROCEDURE — 62328 DX LMBR SPI PNXR W/FLUOR/CT: CPT

## 2023-12-28 PROCEDURE — 87102 FUNGUS ISOLATION CULTURE: CPT

## 2023-12-28 PROCEDURE — 89051 BODY FLUID CELL COUNT: CPT

## 2023-12-28 PROCEDURE — 87206 SMEAR FLUORESCENT/ACID STAI: CPT

## 2023-12-28 PROCEDURE — 84157 ASSAY OF PROTEIN OTHER: CPT

## 2023-12-28 PROCEDURE — 82040 ASSAY OF SERUM ALBUMIN: CPT

## 2023-12-28 PROCEDURE — 87476 LYME DIS DNA AMP PROBE: CPT

## 2023-12-28 PROCEDURE — 83916 OLIGOCLONAL BANDS: CPT

## 2023-12-28 PROCEDURE — 84166 PROTEIN E-PHORESIS/URINE/CSF: CPT

## 2023-12-28 PROCEDURE — 82945 GLUCOSE OTHER FLUID: CPT

## 2023-12-28 RX ORDER — LIDOCAINE WITH 8.4% SOD BICARB 0.9%(10ML)
SYRINGE (ML) INJECTION AS NEEDED
Status: DISCONTINUED | OUTPATIENT
Start: 2023-12-28 | End: 2023-12-29 | Stop reason: HOSPADM

## 2023-12-28 RX ORDER — ENOXAPARIN SODIUM 100 MG/ML
60 INJECTION SUBCUTANEOUS EVERY 12 HOURS SCHEDULED
Status: DISCONTINUED | OUTPATIENT
Start: 2023-12-28 | End: 2023-12-29 | Stop reason: HOSPADM

## 2023-12-28 RX ORDER — ACETAZOLAMIDE 250 MG/1
500 TABLET ORAL 2 TIMES DAILY
Status: DISCONTINUED | OUTPATIENT
Start: 2023-12-28 | End: 2023-12-29 | Stop reason: HOSPADM

## 2023-12-28 RX ORDER — MAGNESIUM SULFATE HEPTAHYDRATE 40 MG/ML
2 INJECTION, SOLUTION INTRAVENOUS EVERY 8 HOURS PRN
Status: DISCONTINUED | OUTPATIENT
Start: 2023-12-28 | End: 2023-12-29 | Stop reason: HOSPADM

## 2023-12-28 RX ORDER — CLOPIDOGREL BISULFATE 75 MG/1
75 TABLET ORAL DAILY
Status: DISCONTINUED | OUTPATIENT
Start: 2023-12-28 | End: 2023-12-29 | Stop reason: HOSPADM

## 2023-12-28 RX ADMIN — LEVOTHYROXINE SODIUM 25 MCG: 25 TABLET ORAL at 06:28

## 2023-12-28 RX ADMIN — BUPROPION HYDROCHLORIDE 75 MG: 75 TABLET, FILM COATED ORAL at 08:09

## 2023-12-28 RX ADMIN — ENOXAPARIN SODIUM 60 MG: 60 INJECTION SUBCUTANEOUS at 22:03

## 2023-12-28 RX ADMIN — Medication 1 TABLET: at 16:59

## 2023-12-28 RX ADMIN — CITALOPRAM HYDROBROMIDE 20 MG: 20 TABLET ORAL at 22:03

## 2023-12-28 RX ADMIN — Medication 10 ML: at 10:58

## 2023-12-28 RX ADMIN — ATORVASTATIN CALCIUM 40 MG: 40 TABLET, FILM COATED ORAL at 17:31

## 2023-12-28 RX ADMIN — MULTIPLE VITAMINS W/ MINERALS TAB 1 TABLET: TAB ORAL at 08:09

## 2023-12-28 RX ADMIN — LEVOTHYROXINE SODIUM 200 MCG: 25 TABLET ORAL at 06:28

## 2023-12-28 RX ADMIN — BUPROPION HYDROCHLORIDE 75 MG: 75 TABLET, FILM COATED ORAL at 17:31

## 2023-12-28 RX ADMIN — ACETAZOLAMIDE 500 MG: 250 TABLET ORAL at 17:31

## 2023-12-28 RX ADMIN — Medication 1 TABLET: at 08:09

## 2023-12-28 RX ADMIN — CLOPIDOGREL BISULFATE 75 MG: 75 TABLET ORAL at 17:31

## 2023-12-28 RX ADMIN — PANTOPRAZOLE SODIUM 40 MG: 40 TABLET, DELAYED RELEASE ORAL at 06:29

## 2023-12-28 RX ADMIN — FLUTICASONE PROPIONATE 2 SPRAY: 50 SPRAY, METERED NASAL at 08:09

## 2023-12-28 NOTE — ASSESSMENT & PLAN NOTE
CT Venogram brain showed chronic right lacunar infarct in the right frontal periventricular white matter and adjacent microangiopathic change  MRI brain showed hypterintense signal on T2 and FLAIR imaging within right frontal lobe immediately adjacent to the frontal horn of the lateral ventricle and tracking posteriorly and inferiorly into the anterior aspect of the external capsule may represent chronic microangiopathic change. Small adjacent developmental venous anomaly in the anterior aspect of the right lentiform nucleus.   Plan:  6 month follow up MRI with contrast to assess change over time to ensure stability

## 2023-12-28 NOTE — PHYSICAL THERAPY NOTE
Physical Therapy Screen Note       12/28/23 0855   Note Type   Note type Screen   Additional Comments referral received for PT eval and tx. Inna NSG states pt is ambulating independently. pt observed independently mobilizing in the bathroom. pt states walking w/o difficulty and has no concerns regarding discharge from the hospital. inpatient PT is not indicated due to pt's independent mobility status. discontinue PT. notified Mari GALEANA of screening pt for inpatient PT services.     Aurelio Luque, PT

## 2023-12-28 NOTE — ASSESSMENT & PLAN NOTE
Incidental finding of CTchronic lacunar infarct in the right frontal periventricular white matter and adjacent microangiopathic chaNGE  Chronic headaches with papilledema and ophthalmic examination  No other focal deficits    MRI brain performed: hypterintense signal on T2 and FLAIR imaging within right frontal lobe immediately adjacent to the frontal horn of the lateral ventricle and tracking posteriorly and inferiorly into the anterior aspect of the external capsule may represent chronic microangiopathic change. Small adjacent developmental venous anomaly in the anterior aspect of the right lentiform nucleus.   MRI orbits performed: Probable left-sided papilledema, tortuosity of the optic nerves and slight prominence of the optic nerve sheaths. In addition there is partially empty sella and narrowing of the transverse dural venous sinuses. Findings are suggestive of idiopathic intracranial hypertension.  PLAN  Stroke pathway  Appreciate neurology recommendation  Echo with bubble study  Telemetry  Normotension, normoglycemia  Lumbar puncture 12/28 had low to normal opening pressure

## 2023-12-28 NOTE — PLAN OF CARE
Problem: Potential for Falls  Goal: Patient will remain free of falls  Description: INTERVENTIONS:  - Educate patient/family on patient safety including physical limitations  - Instruct patient to call for assistance with activity   - Consult OT/PT to assist with strengthening/mobility   - Keep Call bell within reach  - Keep bed low and locked with side rails adjusted as appropriate  - Keep care items and personal belongings within reach  - Initiate and maintain comfort rounds  - Make Fall Risk Sign visible to staff  - Offer Toileting every 2 Hours, in advance of need  - Initiate/Maintain bed/chair alarm  - Obtain necessary fall risk management equipment  - Apply yellow socks and bracelet for high fall risk patients  - Consider moving patient to room near nurses station  Outcome: Progressing     Problem: PAIN - ADULT  Goal: Verbalizes/displays adequate comfort level or baseline comfort level  Description: Interventions:  - Encourage patient to monitor pain and request assistance  - Assess pain using appropriate pain scale  - Administer analgesics based on type and severity of pain and evaluate response  - Implement non-pharmacological measures as appropriate and evaluate response  - Consider cultural and social influences on pain and pain management  - Notify physician/advanced practitioner if interventions unsuccessful or patient reports new pain  Outcome: Progressing     Problem: INFECTION - ADULT  Goal: Absence or prevention of progression during hospitalization  Description: INTERVENTIONS:  - Assess and monitor for signs and symptoms of infection  - Monitor lab/diagnostic results  - Monitor all insertion sites, i.e. indwelling lines, tubes, and drains  - Monitor endotracheal if appropriate and nasal secretions for changes in amount and color  - [place] appropriate cooling/warming therapies per order  - Administer medications as ordered  - Instruct and encourage patient and family to use good hand hygiene  technique  - Identify and instruct in appropriate isolation precautions for identified infection/condition  Outcome: Progressing  Goal: Absence of fever/infection during neutropenic period  Description: INTERVENTIONS:  - Monitor WBC    Outcome: Progressing     Problem: SAFETY ADULT  Goal: Patient will remain free of falls  Description: INTERVENTIONS:  - Educate patient/family on patient safety including physical limitations  - Instruct patient to call for assistance with activity   - Consult OT/PT to assist with strengthening/mobility   - Keep Call bell within reach  - Keep bed low and locked with side rails adjusted as appropriate  - Keep care items and personal belongings within reach  - Initiate and maintain comfort rounds  - Make Fall Risk Sign visible to staff  - Offer Toileting every 2 Hours, in advance of need  - Initiate/Maintain bed/chair alarm  - Obtain necessary fall risk management equipment  - Apply yellow socks and bracelet for high fall risk patients  - Consider moving patient to room near nurses station  Outcome: Progressing  Goal: Maintain or return to baseline ADL function  Description: INTERVENTIONS:  -  Assess patient's ability to carry out ADLs; assess patient's baseline for ADL function and identify physical deficits which impact ability to perform ADLs (bathing, care of mouth/teeth, toileting, grooming, dressing, etc.)  - Assess/evaluate cause of self-care deficits   - Assess range of motion  - Assess patient's mobility; develop plan if impaired  - Assess patient's need for assistive devices and provide as appropriate  - Encourage maximum independence but intervene and supervise when necessary  - Involve family in performance of ADLs  - Assess for home care needs following discharge   - Consider OT consult to assist with ADL evaluation and planning for discharge  - Provide patient education as appropriate  Outcome: Progressing  Goal: Maintains/Returns to pre admission functional  level  Description: INTERVENTIONS:  - Perform AM-PAC 6 Click Basic Mobility/ Daily Activity assessment daily.  - Set and communicate daily mobility goal to care team and patient/family/caregiver.   - Collaborate with rehabilitation services on mobility goals if consulted  - Perform Range of Motion  - Reposition patient   - Dangle patient   - Stand patient   - Ambulate patient  - Out of bed to chair  - Out of bed for meals   - Out of bed for toileting  - Record patient progress and toleration of activity level   Outcome: Progressing     Problem: DISCHARGE PLANNING  Goal: Discharge to home or other facility with appropriate resources  Description: INTERVENTIONS:  - Identify barriers to discharge w/patient and caregiver  - Arrange for needed discharge resources and transportation as appropriate  - Identify discharge learning needs (meds, wound care, etc.)  - Arrange for interpretive services to assist at discharge as needed  - Refer to Case Management Department for coordinating discharge planning if the patient needs post-hospital services based on physician/advanced practitioner order or complex needs related to functional status, cognitive ability, or social support system  Outcome: Progressing     Problem: Knowledge Deficit  Goal: Patient/family/caregiver demonstrates understanding of disease process, treatment plan, medications, and discharge instructions  Description: Complete learning assessment and assess knowledge base.  Interventions:  - Provide teaching at level of understanding  - Provide teaching via preferred learning methods  Outcome: Progressing     Problem: Neurological Deficit  Goal: Neurological status is stable or improving  Description: Interventions:  - Monitor and assess patient's level of consciousness, motor function, sensory function, and level of assistance needed for ADLs.   - Monitor and report changes from baseline. Collaborate with interdisciplinary team to initiate plan and implement  interventions as ordered.   - Provide and maintain a safe environment.  - Consider seizure precautions.  - Consider fall precautions.  - Consider aspiration precautions.  - Consider bleeding precautions.  Outcome: Progressing     Problem: Activity Intolerance/Impaired Mobility  Goal: Mobility/activity is maintained at optimum level for patient  Description: Interventions:  - Assess and monitor patient  barriers to mobility and need for assistive/adaptive devices.  - Assess patient's emotional response to limitations.  - Collaborate with interdisciplinary team and initiate plans and interventions as ordered.  - Encourage independent activity per ability.  - Maintain proper body alignment.  - Perform active/passive rom as tolerated/ordered.  - Plan activities to conserve energy.  - Turn patient as appropriate  Outcome: Progressing     Problem: Communication Impairment  Goal: Ability to express needs and understand communication  Description: Assess patient's communication skills and ability to understand information.  Patient will demonstrate use of effective communication techniques, alternative methods of communication and understanding even if not able to speak.     - Encourage communication and provide alternate methods of communication as needed.  - Collaborate with case management/ for discharge needs.  - Include patient/family/caregiver in decisions related to communication.  Outcome: Progressing     Problem: Potential for Aspiration  Goal: Non-ventilated patient's risk of aspiration is minimized  Description: Assess and monitor vital signs, respiratory status, and labs (WBC).  Monitor for signs of aspiration (tachypnea, cough, rales, wheezing, cyanosis, fever).    - Assess and monitor patient's ability to swallow.  - Place patient up in chair to eat if possible.  - HOB up at 90 degrees to eat if unable to get patient up into chair.  - Supervise patient during oral intake.   - Instruct patient/  family to take small bites.  - Instruct patient/ family to take small single sips when taking liquids.  - Follow patient-specific strategies generated by speech pathologist.  Outcome: Progressing  Goal: Ventilated patient's risk of aspiration is minimized  Description: Assess and monitor vital signs, respiratory status, airway cuff pressure, and labs (WBC).  Monitor for signs of aspiration (tachypnea, cough, rales, wheezing, cyanosis, fever).    - Elevate head of bed 30 degrees if patient has tube feeding.  - Monitor tube feeding.  Outcome: Progressing     Problem: Nutrition  Goal: Nutrition/Hydration status is improving  Description: Monitor and assess patient's nutrition/hydration status for malnutrition (ex- brittle hair, bruises, dry skin, pale skin and conjunctiva, muscle wasting, smooth red tongue, and disorientation). Collaborate with interdisciplinary team and initiate plan and interventions as ordered.  Monitor patient's weight and dietary intake as ordered or per policy. Utilize nutrition screening tool and intervene per policy. Determine patient's food preferences and provide high-protein, high-caloric foods as appropriate.     - Assist patient with eating.  - Allow adequate time for meals.  - Encourage patient to take dietary supplement as ordered.  - Collaborate with clinical nutritionist.  - Include patient/family/caregiver in decisions related to nutrition.  Outcome: Progressing

## 2023-12-28 NOTE — OCCUPATIONAL THERAPY NOTE
Occupational Therapy Screen    Patient Name: Allison Gamboa  Today's Date: 12/28/2023 12/28/23 1425   Note Type   Note type Screen   Additional Comments OT orders received and chart review performed. Pt admitted w/ headache. Per imaging: Findings are suggestive of idiopathic intracranial hypertension. Pt found to have chronic R frontal stroke. Contact made w/ pt who reports she is fully (I) w/ ADLs/mobility w/o AD and does not have concerns about DC to home. Pt does report some difficuty driving at night, worsening recently. Pt educated on [employer] for vision therapy and fitness to drive evaluation if symptoms persist/worsen. Pt does not currently require formal IPOT evaluation and will screen from caseload. Please reconsult if fuctional status changes.     Amira Wesley, OTD, OTR/L  [place] License [id number]  [place] License [id number]

## 2023-12-28 NOTE — CONSULTS
The patient is ambulating independently in her room and has no rehabilitation or case management needs for discharge. Please re-consult should function change.

## 2023-12-28 NOTE — PLAN OF CARE
Problem: Potential for Falls  Goal: Patient will remain free of falls  Description: INTERVENTIONS:  - Educate patient/family on patient safety including physical limitations  - Instruct patient to call for assistance with activity   - Consult OT/PT to assist with strengthening/mobility   - Keep Call bell within reach  - Keep bed low and locked with side rails adjusted as appropriate  - Keep care items and personal belongings within reach  - Initiate and maintain comfort rounds  - Make Fall Risk Sign visible to staff  - Apply yellow socks and bracelet for high fall risk patients  - Consider moving patient to room near nurses station  Outcome: Progressing     Problem: PAIN - ADULT  Goal: Verbalizes/displays adequate comfort level or baseline comfort level  Description: Interventions:  - Encourage patient to monitor pain and request assistance  - Assess pain using appropriate pain scale  - Administer analgesics based on type and severity of pain and evaluate response  - Implement non-pharmacological measures as appropriate and evaluate response  - Consider cultural and social influences on pain and pain management  - Notify physician/advanced practitioner if interventions unsuccessful or patient reports new pain  Outcome: Progressing     Problem: INFECTION - ADULT  Goal: Absence or prevention of progression during hospitalization  Description: INTERVENTIONS:  - Assess and monitor for signs and symptoms of infection  - Monitor lab/diagnostic results  - Monitor all insertion sites, i.e. indwelling lines, tubes, and drains  - Monitor endotracheal if appropriate and nasal secretions for changes in amount and color  - [place] appropriate cooling/warming therapies per order  - Administer medications as ordered  - Instruct and encourage patient and family to use good hand hygiene technique  - Identify and instruct in appropriate isolation precautions for identified infection/condition  Outcome: Progressing  Goal: Absence  of fever/infection during neutropenic period  Description: INTERVENTIONS:  - Monitor WBC    Outcome: Progressing     Problem: SAFETY ADULT  Goal: Patient will remain free of falls  Description: INTERVENTIONS:  - Educate patient/family on patient safety including physical limitations  - Instruct patient to call for assistance with activity   - Consult OT/PT to assist with strengthening/mobility   - Keep Call bell within reach  - Keep bed low and locked with side rails adjusted as appropriate  - Keep care items and personal belongings within reach  - Initiate and maintain comfort rounds  - Make Fall Risk Sign visible to staff  - Apply yellow socks and bracelet for high fall risk patients  - Consider moving patient to room near nurses station  Outcome: Progressing  Goal: Maintain or return to baseline ADL function  Description: INTERVENTIONS:  -  Assess patient's ability to carry out ADLs; assess patient's baseline for ADL function and identify physical deficits which impact ability to perform ADLs (bathing, care of mouth/teeth, toileting, grooming, dressing, etc.)  - Assess/evaluate cause of self-care deficits   - Assess range of motion  - Assess patient's mobility; develop plan if impaired  - Assess patient's need for assistive devices and provide as appropriate  - Encourage maximum independence but intervene and supervise when necessary  - Involve family in performance of ADLs  - Assess for home care needs following discharge   - Consider OT consult to assist with ADL evaluation and planning for discharge  - Provide patient education as appropriate  Outcome: Progressing  Goal: Maintains/Returns to pre admission functional level  Description: INTERVENTIONS:  - Perform AM-PAC 6 Click Basic Mobility/ Daily Activity assessment daily.  - Set and communicate daily mobility goal to care team and patient/family/caregiver.   - Collaborate with rehabilitation services on mobility goals if consulted  - Out of bed for  toileting  - Record patient progress and toleration of activity level   Outcome: Progressing     Problem: DISCHARGE PLANNING  Goal: Discharge to home or other facility with appropriate resources  Description: INTERVENTIONS:  - Identify barriers to discharge w/patient and caregiver  - Arrange for needed discharge resources and transportation as appropriate  - Identify discharge learning needs (meds, wound care, etc.)  - Arrange for interpretive services to assist at discharge as needed  - Refer to Case Management Department for coordinating discharge planning if the patient needs post-hospital services based on physician/advanced practitioner order or complex needs related to functional status, cognitive ability, or social support system  Outcome: Progressing     Problem: Knowledge Deficit  Goal: Patient/family/caregiver demonstrates understanding of disease process, treatment plan, medications, and discharge instructions  Description: Complete learning assessment and assess knowledge base.  Interventions:  - Provide teaching at level of understanding  - Provide teaching via preferred learning methods  Outcome: Progressing     Problem: Neurological Deficit  Goal: Neurological status is stable or improving  Description: Interventions:  - Monitor and assess patient's level of consciousness, motor function, sensory function, and level of assistance needed for ADLs.   - Monitor and report changes from baseline. Collaborate with interdisciplinary team to initiate plan and implement interventions as ordered.   - Provide and maintain a safe environment.  - Consider seizure precautions.  - Consider fall precautions.  - Consider aspiration precautions.  - Consider bleeding precautions.  Outcome: Progressing     Problem: Activity Intolerance/Impaired Mobility  Goal: Mobility/activity is maintained at optimum level for patient  Description: Interventions:  - Assess and monitor patient  barriers to mobility and need for  assistive/adaptive devices.  - Assess patient's emotional response to limitations.  - Collaborate with interdisciplinary team and initiate plans and interventions as ordered.  - Encourage independent activity per ability.  - Maintain proper body alignment.  - Perform active/passive rom as tolerated/ordered.  - Plan activities to conserve energy.  - Turn patient as appropriate  Outcome: Progressing     Problem: Communication Impairment  Goal: Ability to express needs and understand communication  Description: Assess patient's communication skills and ability to understand information.  Patient will demonstrate use of effective communication techniques, alternative methods of communication and understanding even if not able to speak.     - Encourage communication and provide alternate methods of communication as needed.  - Collaborate with case management/ for discharge needs.  - Include patient/family/caregiver in decisions related to communication.  Outcome: Progressing     Problem: Potential for Aspiration  Goal: Non-ventilated patient's risk of aspiration is minimized  Description: Assess and monitor vital signs, respiratory status, and labs (WBC).  Monitor for signs of aspiration (tachypnea, cough, rales, wheezing, cyanosis, fever).    - Assess and monitor patient's ability to swallow.  - Place patient up in chair to eat if possible.  - HOB up at 90 degrees to eat if unable to get patient up into chair.  - Supervise patient during oral intake.   - Instruct patient/ family to take small bites.  - Instruct patient/ family to take small single sips when taking liquids.  - Follow patient-specific strategies generated by speech pathologist.  Outcome: Progressing  Goal: Ventilated patient's risk of aspiration is minimized  Description: Assess and monitor vital signs, respiratory status, airway cuff pressure, and labs (WBC).  Monitor for signs of aspiration (tachypnea, cough, rales, wheezing, cyanosis,  fever).    - Elevate head of bed 30 degrees if patient has tube feeding.  - Monitor tube feeding.  Outcome: Progressing     Problem: Nutrition  Goal: Nutrition/Hydration status is improving  Description: Monitor and assess patient's nutrition/hydration status for malnutrition (ex- brittle hair, bruises, dry skin, pale skin and conjunctiva, muscle wasting, smooth red tongue, and disorientation). Collaborate with interdisciplinary team and initiate plan and interventions as ordered.  Monitor patient's weight and dietary intake as ordered or per policy. Utilize nutrition screening tool and intervene per policy. Determine patient's food preferences and provide high-protein, high-caloric foods as appropriate.     - Assist patient with eating.  - Allow adequate time for meals.  - Encourage patient to take dietary supplement as ordered.  - Collaborate with clinical nutritionist.  - Include patient/family/caregiver in decisions related to nutrition.  Outcome: Progressing

## 2023-12-28 NOTE — PROGRESS NOTES
[facility]  Progress Note  Name: Allison Gamboa I  MRN: [id number]  Unit/Bed#: S -01 I Date of Admission: 12/26/2023   Date of Service: 12/28/2023 I Hospital Day: 1    Assessment/Plan   * Secondary headache syndrome- suspect IIH  Assessment & Plan  Found incidentally during optometrist visit  Concern for idiopathic intracranial hypertension versus cerebral venous thrombosis  Recommended ED evaluation.  CT venous sampling showed chronic lacunar infarct in the right frontal periventricular white matter and associated microangiopathic changes  No masses noted.  Attempt for lumbar puncture at the ED was unsuccessful  Ddx idiopathic IH, Cerebral venous thrombosis, mass lesions rule out    Plan  Will admit under stroke pathway  IR lumbar puncture 12/28, opening pressure was low to normal  Neurochecks  Scheduled Tylenol 975 mg twice daily  Per neuro, start diamox 500 mg BID after LP, start plavix 6 hours after LP (about 5pm), and start lovenox 12 hours after LP (11 pm).       Chronic headaches  Assessment & Plan  See above in papilledema  Scheduled Tylenol 975 mg 3 times daily  Neurochecks every 2 hours    Chronic R frontal stroke  Assessment & Plan  CT Venogram brain showed chronic right lacunar infarct in the right frontal periventricular white matter and adjacent microangiopathic change  MRI brain showed hypterintense signal on T2 and FLAIR imaging within right frontal lobe immediately adjacent to the frontal horn of the lateral ventricle and tracking posteriorly and inferiorly into the anterior aspect of the external capsule may represent chronic microangiopathic change. Small adjacent developmental venous anomaly in the anterior aspect of the right lentiform nucleus.   Plan:  6 month follow up MRI with contrast to assess change over time to ensure stability      Lacunar Infarct (HCC)  Assessment & Plan  Incidental finding of CTchronic lacunar infarct in the right frontal  periventricular white matter and adjacent microangiopathic chaNGE  Chronic headaches with papilledema and ophthalmic examination  No other focal deficits    MRI brain performed: hypterintense signal on T2 and FLAIR imaging within right frontal lobe immediately adjacent to the frontal horn of the lateral ventricle and tracking posteriorly and inferiorly into the anterior aspect of the external capsule may represent chronic microangiopathic change. Small adjacent developmental venous anomaly in the anterior aspect of the right lentiform nucleus.   MRI orbits performed: Probable left-sided papilledema, tortuosity of the optic nerves and slight prominence of the optic nerve sheaths. In addition there is partially empty sella and narrowing of the transverse dural venous sinuses. Findings are suggestive of idiopathic intracranial hypertension.  PLAN  Stroke pathway  Appreciate neurology recommendation  Echo with bubble study  Telemetry  Normotension, normoglycemia  Lumbar puncture 12/28 had low to normal opening pressure            Leucocytosis  Assessment & Plan  Possibly reactive, resolved on 12/28 7.91.  Will monitor off antibiotics    Essential hypertension  Assessment & Plan  Home medications losartan 50 mg-continue    ANA LAURA on CPAP  Assessment & Plan  Patient diagnosed with mild obstructive sleep apnea in 2022.     Lymph edema  Assessment & Plan  On as needed diuretics  Compression stockings  Follows with PT at home    Hypothyroidism  Assessment & Plan  Continue levothyroxine to 225 mcg daily         VTE Pharmacologic Prophylaxis:   Moderate Risk (Score 3-4) - Pharmacological DVT Prophylaxis Ordered: enoxaparin (Lovenox).    Mobility:   Basic Mobility Inpatient Raw Score: 24  JH-HLM Goal: 8: Walk 250 feet or more  JH-HLM Achieved: 8: Walk 250 feet ot more  HLM Goal achieved. Continue to encourage appropriate mobility.    Patient Centered Rounds: I performed bedside rounds with nursing staff today.  Discussions with  Specialists or Other Care Team Provider: Attending, nursing, neuro, case management    Education and Discussions with Family / Patient: Updated  () at bedside.    Current Length of Stay: 1 day(s)  Current Patient Status: Inpatient   Discharge Plan: Anticipate discharge in 24-48 hrs to home.    Code Status: Level 1 - Full Code    Subjective:   Patient seen this morning at bedside. She states she had a headache this morning but it has gone away. It was on her left side and throbbing in nature. She also stated she had left sided tinnitus that resolved as the day went on. She is ready for the lumbar puncture and feels less anxious about it because she wants answers and to feel better.    Objective:     Vitals:   Temp (24hrs), Av.5 °F (36.4 °C), Min:97.5 °F (36.4 °C), Max:97.5 °F (36.4 °C)    Temp:  [97.5 °F (36.4 °C)] 97.5 °F (36.4 °C)  HR:  [60-82] 75  Resp:  [18] 18  BP: (108-132)/(61-75) 115/71  SpO2:  [95 %-99 %] 97 %  Body mass index is 50.75 kg/m².     Input and Output Summary (last 24 hours):     Intake/Output Summary (Last 24 hours) at 2023 1356  Last data filed at 2023 1320  Gross per 24 hour   Intake 0 ml   Output --   Net 0 ml       Physical Exam:   Physical Exam  Constitutional:       Appearance: Normal appearance. She is obese.   Eyes:      Extraocular Movements: Extraocular movements intact.      Comments: Right pupil> left but both reactive. Patient states right pupil has been like this for at least a year.   Cardiovascular:      Rate and Rhythm: Normal rate and regular rhythm.   Pulmonary:      Effort: Pulmonary effort is normal.      Breath sounds: Normal breath sounds.   Abdominal:      General: Abdomen is flat. Bowel sounds are normal.      Palpations: Abdomen is soft.   Skin:     General: Skin is warm and dry.      Capillary Refill: Capillary refill takes less than 2 seconds.   Neurological:      General: No focal deficit present.      Mental Status: She is alert  and oriented to person, place, and time.   Psychiatric:         Mood and Affect: Mood normal.         Behavior: Behavior normal.          Additional Data:     Labs:  Results from last 7 days   Lab Units 12/28/23  0507   WBC Thousand/uL 7.91   HEMOGLOBIN g/dL 11.6   HEMATOCRIT % 37.8   PLATELETS Thousands/uL 236   NEUTROS PCT % 56   LYMPHS PCT % 32   MONOS PCT % 9   EOS PCT % 2     Results from last 7 days   Lab Units 12/28/23  0507 12/26/23  2208   SODIUM mmol/L 139 137   POTASSIUM mmol/L 3.9 4.4   CHLORIDE mmol/L 107 106   CO2 mmol/L 27 24   BUN mg/dL 17 18   CREATININE mg/dL 0.66 0.80   ANION GAP mmol/L 5 7   CALCIUM mg/dL 8.8 9.4   ALBUMIN g/dL  --  4.0   TOTAL BILIRUBIN mg/dL  --  0.31   ALK PHOS U/L  --  105*   ALT U/L  --  21   AST U/L  --  22   GLUCOSE RANDOM mg/dL 77 78     Results from last 7 days   Lab Units 12/27/23  0859   INR  0.99         Results from last 7 days   Lab Units 12/27/23  0622   HEMOGLOBIN A1C % 5.6           Lines/Drains:  Invasive Devices       Peripheral Intravenous Line  Duration             Peripheral IV 12/26/23 Left Antecubital 1 day                      Telemetry:  Telemetry Orders (From admission, onward)               24 Hour Telemetry Monitoring  Continuous x 24 Hours (Telem)        Question:  Reason for 24 Hour Telemetry  Answer:  TIA/Suspected CVA/ Confirmed CVA                     Telemetry Reviewed: Normal Sinus Rhythm  Indication for Continued Telemetry Use: Patient was placed on stroke pathway.              Imaging: Reviewed radiology reports from this admission including: MRI brain and CT venogram brain, MRI orbits, and IR lumbar puncture.    Recent Cultures (last 7 days):         Last 24 Hours Medication List:   Current Facility-Administered Medications   Medication Dose Route Frequency Provider Last Rate    albuterol  2 puff Inhalation Q6H PRN Anjana Coreas MD      ALPRAZolam  0.25 mg Oral TID PRN Anjana Coreas MD      atorvastatin  40 mg  Oral QPM Anjana Coreas MD      buPROPion  75 mg Oral BID Anjana Coreas MD      calcium carbonate  1 tablet Oral BID With Meals Anjana Coreas MD      citalopram  20 mg Oral Daily Anjana Coreas MD      fluticasone  2 spray Nasal Daily Anjana Coreas MD      levothyroxine  200 mcg Oral Early Morning Anjana Coreas MD      levothyroxine  25 mcg Oral Early Morning Anjana Coreas MD      lidocaine 1% buffered   Infiltration PRN Akbar Ugarte MD      loratadine  10 mg Oral Daily PRN Anjana Coreas MD      metoclopramide  5 mg Intravenous PRN Cuong Pan MD      multivitamin-minerals  1 tablet Oral Daily Anjana Coreas MD      pantoprazole  40 mg Oral Early Morning Anjana Coreas MD          Today, Patient Was Seen By: Nicki Cortés DO    **Please Note: This note may have been constructed using a voice recognition system.**

## 2023-12-28 NOTE — UTILIZATION REVIEW
Initial Clinical Review    Admission: Date/Time/Statement:   Admission Orders (From admission, onward)       Ordered        12/27/23 0129  INPATIENT ADMISSION  Once                          Orders Placed This Encounter   Procedures    INPATIENT ADMISSION     Standing Status:   Standing     Number of Occurrences:   1     Order Specific Question:   Level of Care     Answer:   Med Surg [16]     Order Specific Question:   Estimated length of stay     Answer:   More than 2 Midnights     Order Specific Question:   Certification     Answer:   I certify that inpatient services are medically necessary for this patient for a duration of greater than two midnights. See H&P and MD Progress Notes for additional information about the patient's course of treatment.     ED Arrival Information       Expected   -    Arrival   12/26/2023 19:30    Acuity   Urgent              Means of arrival   Walk-In    Escorted by   Spouse    Service   Hospitalist    Admission type   Emergency              Arrival complaint   Headaches / Vision Changes / Head Pressure             Chief Complaint   Patient presents with    Headache    Dizziness     Pt reports headache, dizziness, back pain. Pt reports opthomologist sent in for eval for papilledema of both eyes. Pt reports double and blurry vision intermittently.       Initial Presentation: 38 y.o. female with a PMH of hypothyroidism/Hashimoto's, ANA LUARA, NICOLÁS cervix status post hysterectomy ,obesity status post Yuko-en-Y gastric bypass who presents with 6-month history of intermittent headaches. Describes headaches as pulsatile, sometimes squeezing,associated with blurred vision-mostly peripheral, floaters and diplopia. Usually frontal, radiating to the temporal and occipital regions. Headache intensity rated 3 -10, no aggravating or relieving factors, patient denies headaches which are worse in the morning nor associated with nausea/ vomiting. She reports headaches have been progressively worse and more  frequent over the past months. Was seen by ophthalmology 5 days ago and noted bilateral papilledema. They recommended ED evaluation given concern for possible intracranial hypertension. Plan: Inpatient admission for evaluation and treatment of papilledema, lacunar infarct, chronic headaches, leukocytosis, HTN, lymphedema, hypothyroidism: stroke pathway, IR lumbar puncture, neuro checks, scheduled Tylenol, Neurology consult, MRI brain, Echo, telemetry, normotension, continue losartan, levothyroxine.     Neurology consult: For bilateral papilledema, unremarkable neurological examination.  She mentions baseline 3-4 out of 10 pressure. Some light sensitivity.  Brain orbits imaging also suggestive of idiopathic intracranial hypertension along with symptomology and presentation.  Obesity is likely playing a role as well despite losing 100 pounds she still 305 pounds.  This is likely the main culprit here.  LP under IR tomorrow.  Will need opening pressure and take fluid off to get it under normal range and recorded close to closing pressure as well.  Will likely start her on Diamox tomorrow after confirming the idiopathic intracranial hypertension.     Date: 12/28   Day 2:     Internal medicine: continue stroke pathway. IR lumbar puncture 12/28, opening pressure was low to normal. Continue neuro checks, scheduled Tylenol, start diamox, start Plavix 6 hrs after LP, Echo, telemetry.     ED Triage Vitals   Temperature Pulse Respirations Blood Pressure SpO2   12/26/23 2026 12/26/23 2026 12/26/23 2026 12/26/23 2026 12/26/23 2026   98 °F (36.7 °C) 77 18 155/73 98 %      Temp Source Heart Rate Source Patient Position - Orthostatic VS BP Location FiO2 (%)   12/26/23 2026 12/26/23 2026 12/26/23 2026 12/26/23 2026 --   Oral Monitor Sitting Right arm       Pain Score       12/27/23 2100       2          Wt Readings from Last 1 Encounters:   12/27/23 (!) 138 kg (305 lb)     Additional Vital Signs:     Date/Time Temp Pulse Resp BP MAP  (mmHg) SpO2 O2 Device   12/28/23 16:01:37 -- 67 16 123/72 89 96 % --   12/28/23 11:31:17 -- 75 -- 115/71 86 97 % --   12/28/23 07:21:58 97.5 °F (36.4 °C) 60 18 117/74 88 96 % --   12/27/23 23:25:02 -- 80 -- 128/75 93 95 % --   12/27/23 22:01:55 -- 82 -- 108/70 83 96 % --   12/27/23 20:29:45 -- 75 -- 127/70 89 97 % --   12/27/23 18:52:23 -- 75 -- 117/67 84 95 % --   12/27/23 1700 -- 75 18 130/65 -- 99 % --   12/27/23 1500 -- 74 -- 132/61 -- 98 % --   12/27/23 1200 -- 74 18 132/61 88 94 % --   12/27/23 1145 -- 73 18 123/59 82 96 % --   12/27/23 1100 -- -- -- -- -- 96 % --   12/27/23 1000 -- 71 18 105/59 80 94 % --   12/27/23 0900 -- 68 18 109/57 81 96 % --   12/27/23 0830 -- 67 18 103/62 78 96 % --   12/27/23 0800 -- -- -- 108/56 -- 97 % --   12/27/23 0600 -- 67 18 99/53 -- 94 % --   12/27/23 0530 -- 71 18 101/51 73 93 % --   12/27/23 0500 -- 69 18 110/53 77 94 % --   12/27/23 0430 -- 75 18 108/53 75 93 % --   12/27/23 0400 -- 77 18 100/61 -- 95 % --   12/27/23 0358 -- 79 18 110/59 79 95 % None (Room air)   12/27/23 0015 -- 78 18 110/67 81 97 % --   12/26/23 2200 -- 70 18 117/63 84 96 % --     Pertinent Labs/Diagnostic Test Results:   IR lumbar puncture   Final Result by Akbar Ugarte MD (12/28 4079)   Fluoroscopic-guided diagnostic lumbar puncture.      Workstation performed: [id number]         MRI orbits wo and w contrast   Final Result by Jose Reeves MD (12/27 7338)      Exam is degraded by motion artifact.      -Probable left-sided papilledema, tortuosity of the optic nerves and slight prominence of the optic nerve sheaths. In addition there is partially empty sella and narrowing of the transverse dural venous sinuses. Findings are suggestive of idiopathic    intracranial hypertension. Correlate with lumbar puncture.      -Redemonstrated T2 signal abnormality within the white matter adjacent to the frontal horn of the right lateral ventricle and extending into the external capsule as seen on same day MRI of  the brain. No associated enhancement. Redemonstrated small    adjacent developmental venous anomaly within the lentiform nucleus. Findings may represent chronic microangiopathic changes. Follow-up MRI of the brain with and without contrast in 6 months is recommended to ensure stability.         Workstation performed: [id number]         MRI brain wo contrast   Final Result by Ifeanyi Lance DO (12/27 0823)      Hyperintense signal on T2 and FLAIR imaging within the right frontal lobe immediately adjacent to the frontal horn of the lateral ventricle and tracking posteriorly and inferiorly into the anterior aspect of the external capsule may represent chronic    microangiopathic change. There is a small adjacent developmental venous anomaly identified within the anterior aspect of the right lentiform nucleus. 6-month follow-up examination recommended with contrast to assess change over time and ensure stability.      No old lacunar infarcts identified.      Workstation performed: [id number]         CT VENOGRAM BRAIN   Final Result by Deepak Stanford MD (12/27 0013)         1. Chronic lacunar infarct in the right frontal periventricular white matter and adjacent microangiopathic change. No intracranial hemorrhage or mass effect.   2. Patent dural venous sinuses.         Workstation performed: [id number]           12/28 Echo:  Interpretation Summary       Left Ventricle: Left ventricular cavity size is normal. Wall thickness is normal. The left ventricular ejection fraction is 60%. Systolic function is normal. Wall motion is normal. Diastolic function is normal.    Right Ventricle: Right ventricular cavity size is normal. Systolic function is normal.    Mitral Valve: There is trace regurgitation.    12/27 EKG:  Normal sinus rhythm  Normal ECG  When compared with ECG of 22-JUL-2022 15:36,  Premature ventricular complexes are no longer Present      Results from last 7 days   Lab Units 12/28/23  1819  12/26/23  2208   WBC Thousand/uL 7.91 12.97*   HEMOGLOBIN g/dL 11.6 12.6   HEMATOCRIT % 37.8 40.8   PLATELETS Thousands/uL 236 275   NEUTROS ABS Thousands/µL 4.48 8.34*         Results from last 7 days   Lab Units 12/28/23  0507 12/26/23  2208   SODIUM mmol/L 139 137   POTASSIUM mmol/L 3.9 4.4   CHLORIDE mmol/L 107 106   CO2 mmol/L 27 24   ANION GAP mmol/L 5 7   BUN mg/dL 17 18   CREATININE mg/dL 0.66 0.80   EGFR ml/min/1.73sq m 113 94   CALCIUM mg/dL 8.8 9.4     Results from last 7 days   Lab Units 12/26/23  2208   AST U/L 22   ALT U/L 21   ALK PHOS U/L 105*   TOTAL PROTEIN g/dL 7.7   ALBUMIN g/dL 4.0   TOTAL BILIRUBIN mg/dL 0.31         Results from last 7 days   Lab Units 12/28/23  0507 12/26/23  2208   GLUCOSE RANDOM mg/dL 77 78         Results from last 7 days   Lab Units 12/27/23  0622   HEMOGLOBIN A1C % 5.6   EAG mg/dl 114           Results from last 7 days   Lab Units 12/27/23  0859   PROTIME seconds 13.7   INR  0.99     Results from last 7 days   Lab Units 12/26/23  2208   TSH 3RD GENERATON uIU/mL 0.621           Results from last 7 days   Lab Units 12/28/23  1117   GRAM STAIN RESULT  Rare Polys  No organisms seen     Results from last 7 days   Lab Units 12/28/23  1117   TOTAL COUNTED  92     Results from last 7 days   Lab Units 12/28/23  1117   APPEARANCE CSF  Clear, colorless   TUBE NUM CSF  4   WBC CSF /uL 1   XANTHOCHROMIA  No   NEUTROPHILS % (CSF) % 30   LYMPHS % (CSF) % 58   MONOCYTES % (CSF) % 12   GLUCOSE CSF mg/dL 55   PROTEIN CSF mg/dL 34   RBC CSF uL 410*           ED Treatment:   Medication Administration from 12/26/2023 1930 to 12/27/2023 1849         Date/Time Order Dose Route Action     12/26/2023 2307 EST iohexol (OMNIPAQUE) 350 MG/ML injection (MULTI-DOSE) 85 mL 85 mL Intravenous Given     12/27/2023 0012 EST midazolam (VERSED) injection 0.5 mg 0.5 mg Intravenous Given     12/27/2023 0128 EST lidocaine (PF) (XYLOCAINE-MPF) 1 % injection 10 mL 10 mL Infiltration Given     12/27/2023 0129  EST lidocaine (PF) (XYLOCAINE-MPF) 1 % injection 10 mL 10 mL Infiltration Given     12/27/2023 0902 EST fluticasone (FLONASE) 50 mcg/act nasal spray 2 spray 2 spray Nasal Given     12/27/2023 0643 EST levothyroxine tablet 200 mcg 200 mcg Oral Given     12/27/2023 1759 EST buPROPion (WELLBUTRIN) tablet 75 mg 75 mg Oral Given     12/27/2023 0902 EST buPROPion (WELLBUTRIN) tablet 75 mg 75 mg Oral Given     12/27/2023 0902 EST citalopram (CeleXA) tablet 20 mg 20 mg Oral Given     12/27/2023 0644 EST levothyroxine tablet 25 mcg 25 mcg Oral Given     12/27/2023 0617 EST pantoprazole (PROTONIX) EC tablet 40 mg 40 mg Oral Given     12/27/2023 0902 EST multivitamin-minerals (CENTRUM) tablet 1 tablet 1 tablet Oral Given     12/27/2023 1745 EST calcium carbonate (OYSTER SHELL,OSCAL) 500 mg tablet 1 tablet 1 tablet Oral Given     12/27/2023 0644 EST calcium carbonate (OYSTER SHELL,OSCAL) 500 mg tablet 1 tablet 1 tablet Oral Given     12/27/2023 1759 EST atorvastatin (LIPITOR) tablet 40 mg 40 mg Oral Given     12/27/2023 0902 EST aspirin chewable tablet 81 mg 0 mg Oral Hold     12/27/2023 0644 EST enoxaparin (LOVENOX) subcutaneous injection 60 mg 60 mg Subcutaneous Given     12/27/2023 1127 EST magnesium sulfate 2 g/50 mL IVPB (premix) 2 g 2 g Intravenous New Bag     12/27/2023 1307 EST Gadobutrol injection (SINGLE-DOSE) SOLN 13 mL 13 mL Intravenous Given     12/27/2023 1530 EST perflutren lipid microsphere (DEFINITY) injection 0.6 mL/min Intravenous Given          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    Endometriosis     Follicular thyroid cancer (HCC)     Gastroenteritis     Hashimoto's disease     Hepatic steatosis     Hyperlipidemia     Hypertension     Hypothyroid      IBS (irritable bowel syndrome)     Kidney lesion, native, left     Kidney stone     Lymphedema     left leg    Obesity     Palpitation     Pancreatitis     Pneumonia     Polycystic ovarian disease     Polyuria     RUQ abdominal pain 11/1/2021    Sleep apnea     Thyroid nodule     Thyromegaly     Tinea corporis     Tinea pedis      Present on Admission:   Hypothyroidism   Lymph edema   Essential hypertension   Leucocytosis      Admitting Diagnosis: Papilledema [H47.10]  Stroke (cerebrum) (HCC) [I63.9]  Chronic arterial ischemic stroke [Z86.73]  Headache [R51.9]  Age/Sex: 38 y.o. female  Admission Orders:  Scheduled Medications:  acetaZOLAMIDE, 500 mg, Oral, BID  atorvastatin, 40 mg, Oral, QPM  buPROPion, 75 mg, Oral, BID  calcium carbonate, 1 tablet, Oral, BID With Meals  citalopram, 20 mg, Oral, Daily  clopidogrel, 75 mg, Oral, Daily  enoxaparin, 60 mg, Subcutaneous, Q12H MARJAN  fluticasone, 2 spray, Nasal, Daily  levothyroxine, 200 mcg, Oral, Early Morning  levothyroxine, 25 mcg, Oral, Early Morning  multivitamin-minerals, 1 tablet, Oral, Daily  pantoprazole, 40 mg, Oral, Early Morning      Continuous IV Infusions:     PRN Meds:  albuterol, 2 puff, Inhalation, Q6H PRN  ALPRAZolam, 0.25 mg, Oral, TID PRN  lidocaine 1% buffered, , Infiltration, PRN  loratadine, 10 mg, Oral, Daily PRN  magnesium sulfate, 2 g, Intravenous, Q8H PRN  metoclopramide, 5 mg, Intravenous, PRN        IP CONSULT TO NEUROLOGY  IP CONSULT TO CASE MANAGEMENT  IP CONSULT TO NUTRITION SERVICES  IP CONSULT TO PHYSICAL MEDICINE REHAB    Network Utilization Review Department  ATTENTION: Please call with any questions or concerns to [phone] and carefully listen to the prompts so that you are directed to the right person. All voicemails are confidential.   For Discharge needs, contact Care Management DC Support Team at [phone] opt. 2  Send all requests for admission clinical reviews, approved or denied determinations and any other requests to  dedicated fax number below belonging to the campus where the patient is receiving treatment. List of dedicated fax numbers for the Facilities:  FACILITY NAME UR FAX NUMBER   ADMISSION DENIALS (Administrative/Medical Necessity) [phone]   DISCHARGE SUPPORT TEAM (NETWORK) [phone]   PARENT CHILD HEALTH (Maternity/NICU/Pediatrics) [phone]   [facility] [phone]   [facility] [phone]   [facility] [phone]   [facility] [phone]   [facility] [phone]   [facility] [phone]   [facility] [phone]   [facility] [phone]   [facility] [phone]   [facility] [phone]   [facility] [phone]

## 2023-12-28 NOTE — ASSESSMENT & PLAN NOTE
Found incidentally during optometrist visit  Concern for idiopathic intracranial hypertension versus cerebral venous thrombosis  Recommended ED evaluation.  CT venous sampling showed chronic lacunar infarct in the right frontal periventricular white matter and associated microangiopathic changes  No masses noted.  Attempt for lumbar puncture at the ED was unsuccessful  Ddx idiopathic IH, Cerebral venous thrombosis, mass lesions rule out    Plan  Will admit under stroke pathway  IR lumbar puncture 12/28, opening pressure was low to normal  Neurochecks  Scheduled Tylenol 975 mg twice daily  Per neuro, start diamox 500 mg BID after LP, start plavix 6 hours after LP (about 5pm), and start lovenox 12 hours after LP (11 pm).

## 2023-12-28 NOTE — CASE MANAGEMENT
Case Management Assessment    Patient name Allison Gamboa  Location S /S -01 MRN [id number]  : 1985 Date 2023       Current Admission Date: 2023  Current Admission Diagnosis:Secondary headache syndrome- suspect IIH   Patient Active Problem List    Diagnosis Date Noted    Secondary headache syndrome- suspect IIH 2023    Chronic headaches 2023    Lacunar Infarct (HCC) 2023    Chronic R frontal stroke 2023    Monilial rash 2023    Vitamin D deficiency 2023    Transaminitis 2022    Leucocytosis 2022    PCOS (polycystic ovarian syndrome) 2022    Sleep related hypoxia     ANA LAURA on CPAP     Essential hypertension 2021    Mixed anxiety depressive disorder 2021    Lymph edema 2021    Eczema 2021    Hypothyroidism 10/27/2020    Arthralgia 10/27/2020    Hashimoto's thyroiditis 2019    BMI 50.0-59.9, adult (HCC) 2019      LOS (days): 1  Geometric Mean LOS (GMLOS) (days):   Days to GMLOS:     OBJECTIVE:    Risk of Unplanned Readmission Score: 9.58         Current admission status: Inpatient       Preferred Pharmacy:   [address] [facility]  [address] [facility] [address]  Phone: [phone] Fax: [phone]    Primary Care Provider: Elissa Hyatt MD    Primary Insurance: [facility] BLUE [employer]IELD  Secondary Insurance:     ASSESSMENT:  Active Health Care Proxies    There are no active Health Care Proxies on file.                      Patient Information  Admitted from:: Home  Mental Status: Alert  During Assessment patient was accompanied by: Not accompanied during assessment  Assessment information provided by:: Patient  Primary Caregiver: Self  Support Systems: Family members  County of Residence: [place]  What city do you live in?: [place]  Home entry access options. Select all that apply.: Stairs  Number of steps to enter home.:  (2 flights)  Do the steps  have railings?: Yes  Type of Current Residence: Apartment  Floor Level: 3  Upon entering residence, is there a bedroom on the main floor (no further steps)?: Yes  Upon entering residence, is there a bathroom on the main floor (no further steps)?: Yes  Living Arrangements: Lives w/ Spouse/significant other    Activities of Daily Living Prior to Admission  Functional Status: Independent  Completes ADLs independently?: Yes  Ambulates independently?: Yes  Does patient use assisted devices?: No  Does patient currently own DME?: No  Does patient have a history of Outpatient Therapy (PT/OT)?: Yes  Does the patient have a history of Short-Term Rehab?: No  Does patient have a history of HHC?: No  Does patient currently have HHC?: No         Patient Information Continued  Does patient have prescription coverage?: Yes  Does patient receive dialysis treatments?: No         Means of Transportation  Means of Transport to Appts:: Drives Self    CM met w/ pt at bedside re: stroke consult received. Patient lives with spouse in 3rd floor apartment, 2 flights steps to access. Patient independent with ADLS, does not use any dme to ambulate, has hx of OPPT, and drives self to appointments. Pt relayed spouse to provide transport home when ready for d/c. No CM d/c needs identified at this time, CM remains available.     Housing Stability: Unknown (12/28/2023)    Housing Stability Vital Sign     Unable to Pay for Housing in the Last Year: No     Number of Places Lived in the Last Year: Not on file     Unstable Housing in the Last Year: No   Food Insecurity: No Food Insecurity (12/28/2023)    Hunger Vital Sign     Worried About Running Out of Food in the Last Year: Never true     Ran Out of Food in the Last Year: Never true   Transportation Needs: No Transportation Needs (12/28/2023)    PRAPARE - Transportation     Lack of Transportation (Medical): No     Lack of Transportation (Non-Medical): No   Utilities: Not At Risk (12/28/2023)    [facility]  Utilities     Threatened with loss of utilities: No

## 2023-12-28 NOTE — QUICK NOTE
* Secondary headache syndrome-probable IIH  Assessment & Plan  38 y.o. right handed female patient with dyslipidemia, obesity s/p gastric bypass (Dec 2022) evaluated for b/l papilledema, headaches since 6 months concerning for IIH. Character of headaches is pulsatile and pressure-like. She also has postural component to her headaches: Lying down makes it significantly worse. On exam, patient has bilateral papilledema, but no other focal sensorimotor deficits.     Initial labwork signficant for WBC 12, which is now downtrending. TSH WNL. LDL 98.    NCCTH showed chronic R frontal periventricular infarct.    CT venogram showed no evidence of CVST.  MR can of orbits showed optic nerve tortuosity b/l, partially empty sella, transverse venous sinus stenosis.    IR guided LP (12/28) demonstrated normal OP; I confirmed with IR- this was not a technical error.     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 Patient meets all aspects of the Modified Dandy Criteria for for IIH except for increased CSF opening pressure. However, our pretest probability for IIH was ~90% (39 y/o female, obesity, papilledema, radiographic signs) and LP has ~70% sensitivity and 80% specificity. Post test likelihood ratio will still be positive (albeit lower). Therefore, we conclude the patient has probable IIH. We do not currently suspect other etiologies for increased ICP as patient denies any new med changes (no Madeline/retinol use, OCP use, hormonal therapy use). CVST/aneurysm ruled out via negative vessel imaging. Suspect her IIH is a sequale of obesity, although she does report losing significant weight s/p gastric bypass. Recommend initiating Diamox 500mg BID.     Plan:  Start PO Diamox 500mg BID today   Can restart Lovenox 12 hrs after LP.   For headache management: IV Mag Sulfate 2g, Reglan 5mg x1  Low Na diet  STAT CTH in case of neuro  exam changes    Chronic R frontal stroke  Assessment & Plan  Noted on NCCTH, MR brain scan.   Chronic. No hemorrhagic conversion.  No fam hx of strokes. No hx of DVTs/blood clots/coagulopathies.  LDL 98, A1c 5.6  TTE shows EF 60%, normal bi-atrial size.         IMP: Chronic R frontal stroke in a patient with vascular risk factors. Etiology ESUS for now.     Plan:  For secondary stroke prevention- Clopidogrel 75 mg QD start 6 hrs after LP.   ASA not recommended due to hx of gastric bypass.  Cont atorvastatin 40mg QHS  BP goals: normotension  F/u thrombosis panel  Telemetry  Cards referral for ziopatch outpatient  PT/OT/PMR  DVT ppx: SCDs only for now  F/u with vascular neurology AP/attending/ resident clinic in 6-8 weeks post discharge.

## 2023-12-28 NOTE — BRIEF OP NOTE (RAD/CATH)
INTERVENTIONAL RADIOLOGY PROCEDURE NOTE    Date: 12/28/2023    Procedure:   LP      Preoperative diagnosis:   1. Headache    2. Papilledema    3. Chronic arterial ischemic stroke    4. Chronic R frontal stroke         Postoperative diagnosis: Same.    Surgeon: Akbar Ugarte MD     Assistant: None. No qualified resident was available.    Blood loss: 0 ml    Specimens: sent to lab     Findings: LP L2-L3 returned 17 mls clear csf.    OP was clearly normal to low.    Complications: None immediate.    Anesthesia: local

## 2023-12-28 NOTE — SEDATION DOCUMENTATION
17 mL csf obtained, first tube slightly blood tinged, others with less pink in each tube.  Opening pressure to low to measure.  Pt tolerated well. Bandaid applied.

## 2023-12-29 VITALS
HEART RATE: 69 BPM | HEIGHT: 65 IN | DIASTOLIC BLOOD PRESSURE: 76 MMHG | RESPIRATION RATE: 17 BRPM | BODY MASS INDEX: 48.82 KG/M2 | OXYGEN SATURATION: 95 % | WEIGHT: 293 LBS | TEMPERATURE: 97.6 F | SYSTOLIC BLOOD PRESSURE: 121 MMHG

## 2023-12-29 DIAGNOSIS — G93.2 IIH (IDIOPATHIC INTRACRANIAL HYPERTENSION): ICD-10-CM

## 2023-12-29 DIAGNOSIS — F41.8 MIXED ANXIETY DEPRESSIVE DISORDER: ICD-10-CM

## 2023-12-29 PROBLEM — D72.829 LEUCOCYTOSIS: Status: RESOLVED | Noted: 2022-12-06 | Resolved: 2023-12-29

## 2023-12-29 LAB
ANION GAP SERPL CALCULATED.3IONS-SCNC: 7 MMOL/L
BASOPHILS # BLD AUTO: 0.05 THOUSANDS/ÂΜL (ref 0–0.1)
BASOPHILS NFR BLD AUTO: 1 % (ref 0–1)
BUN SERPL-MCNC: 18 MG/DL (ref 5–25)
CALCIUM SERPL-MCNC: 9.1 MG/DL (ref 8.4–10.2)
CHLORIDE SERPL-SCNC: 108 MMOL/L (ref 96–108)
CO2 SERPL-SCNC: 24 MMOL/L (ref 21–32)
CREAT SERPL-MCNC: 0.78 MG/DL (ref 0.6–1.3)
EOSINOPHIL # BLD AUTO: 0.17 THOUSAND/ÂΜL (ref 0–0.61)
EOSINOPHIL NFR BLD AUTO: 2 % (ref 0–6)
ERYTHROCYTE [DISTWIDTH] IN BLOOD BY AUTOMATED COUNT: 13.9 % (ref 11.6–15.1)
GFR SERPL CREATININE-BSD FRML MDRD: 97 ML/MIN/1.73SQ M
GLUCOSE SERPL-MCNC: 79 MG/DL (ref 65–140)
HCT VFR BLD AUTO: 39.6 % (ref 34.8–46.1)
HGB BLD-MCNC: 12.4 G/DL (ref 11.5–15.4)
IMM GRANULOCYTES # BLD AUTO: 0.03 THOUSAND/UL (ref 0–0.2)
IMM GRANULOCYTES NFR BLD AUTO: 0 % (ref 0–2)
LYMPHOCYTES # BLD AUTO: 2.31 THOUSANDS/ÂΜL (ref 0.6–4.47)
LYMPHOCYTES NFR BLD AUTO: 23 % (ref 14–44)
MCH RBC QN AUTO: 28.5 PG (ref 26.8–34.3)
MCHC RBC AUTO-ENTMCNC: 31.3 G/DL (ref 31.4–37.4)
MCV RBC AUTO: 91 FL (ref 82–98)
MONOCYTES # BLD AUTO: 0.7 THOUSAND/ÂΜL (ref 0.17–1.22)
MONOCYTES NFR BLD AUTO: 7 % (ref 4–12)
NEUTROPHILS # BLD AUTO: 6.63 THOUSANDS/ÂΜL (ref 1.85–7.62)
NEUTS SEG NFR BLD AUTO: 67 % (ref 43–75)
NRBC BLD AUTO-RTO: 0 /100 WBCS
PLATELET # BLD AUTO: 242 THOUSANDS/UL (ref 149–390)
PMV BLD AUTO: 11.3 FL (ref 8.9–12.7)
POTASSIUM SERPL-SCNC: 3.7 MMOL/L (ref 3.5–5.3)
PROT S ACT/NOR PPP: 115 % (ref 61–136)
PROT S PPP-ACNC: 101 % (ref 60–150)
RBC # BLD AUTO: 4.35 MILLION/UL (ref 3.81–5.12)
RHODAMINE-AURAMINE STN SPEC: NORMAL
SODIUM SERPL-SCNC: 139 MMOL/L (ref 135–147)
WBC # BLD AUTO: 9.89 THOUSAND/UL (ref 4.31–10.16)

## 2023-12-29 PROCEDURE — 85306 CLOT INHIBIT PROT S FREE: CPT

## 2023-12-29 PROCEDURE — 85303 CLOT INHIBIT PROT C ACTIVITY: CPT

## 2023-12-29 PROCEDURE — 85305 CLOT INHIBIT PROT S TOTAL: CPT

## 2023-12-29 PROCEDURE — 85613 RUSSELL VIPER VENOM DILUTED: CPT

## 2023-12-29 PROCEDURE — 86146 BETA-2 GLYCOPROTEIN ANTIBODY: CPT

## 2023-12-29 PROCEDURE — 85025 COMPLETE CBC W/AUTO DIFF WBC: CPT

## 2023-12-29 PROCEDURE — 85670 THROMBIN TIME PLASMA: CPT

## 2023-12-29 PROCEDURE — 85732 THROMBOPLASTIN TIME PARTIAL: CPT

## 2023-12-29 PROCEDURE — 85598 HEXAGNAL PHOSPH PLTLT NEUTRL: CPT

## 2023-12-29 PROCEDURE — 80048 BASIC METABOLIC PNL TOTAL CA: CPT

## 2023-12-29 PROCEDURE — 85705 THROMBOPLASTIN INHIBITION: CPT

## 2023-12-29 PROCEDURE — 86147 CARDIOLIPIN ANTIBODY EA IG: CPT

## 2023-12-29 PROCEDURE — 85300 ANTITHROMBIN III ACTIVITY: CPT

## 2023-12-29 PROCEDURE — 99239 HOSP IP/OBS DSCHRG MGMT >30: CPT | Performed by: INTERNAL MEDICINE

## 2023-12-29 RX ORDER — ACETAZOLAMIDE 250 MG/1
500 TABLET ORAL 2 TIMES DAILY
Qty: 240 TABLET | Refills: 0 | Status: SHIPPED | OUTPATIENT
Start: 2023-12-29 | End: 2023-12-29

## 2023-12-29 RX ORDER — ATORVASTATIN CALCIUM 40 MG/1
40 TABLET, FILM COATED ORAL EVERY EVENING
Qty: 30 TABLET | Refills: 0 | Status: SHIPPED | OUTPATIENT
Start: 2023-12-29 | End: 2024-01-28

## 2023-12-29 RX ORDER — BUPROPION HYDROCHLORIDE 75 MG/1
75 TABLET ORAL 2 TIMES DAILY
Qty: 60 TABLET | Refills: 3 | Status: SHIPPED | OUTPATIENT
Start: 2023-12-29

## 2023-12-29 RX ORDER — CLOPIDOGREL BISULFATE 75 MG/1
75 TABLET ORAL DAILY
Qty: 30 TABLET | Refills: 0 | Status: SHIPPED | OUTPATIENT
Start: 2023-12-30 | End: 2024-01-29

## 2023-12-29 RX ORDER — ACETAZOLAMIDE 250 MG/1
500 TABLET ORAL 2 TIMES DAILY
Qty: 240 TABLET | Refills: 0 | Status: SHIPPED | OUTPATIENT
Start: 2023-12-29 | End: 2024-02-27

## 2023-12-29 RX ADMIN — Medication 1 TABLET: at 08:15

## 2023-12-29 RX ADMIN — CLOPIDOGREL BISULFATE 75 MG: 75 TABLET ORAL at 08:41

## 2023-12-29 RX ADMIN — FLUTICASONE PROPIONATE 2 SPRAY: 50 SPRAY, METERED NASAL at 08:44

## 2023-12-29 RX ADMIN — BUPROPION HYDROCHLORIDE 75 MG: 75 TABLET, FILM COATED ORAL at 08:41

## 2023-12-29 RX ADMIN — LEVOTHYROXINE SODIUM 25 MCG: 25 TABLET ORAL at 04:53

## 2023-12-29 RX ADMIN — ENOXAPARIN SODIUM 60 MG: 60 INJECTION SUBCUTANEOUS at 08:41

## 2023-12-29 RX ADMIN — LEVOTHYROXINE SODIUM 200 MCG: 25 TABLET ORAL at 04:52

## 2023-12-29 RX ADMIN — PANTOPRAZOLE SODIUM 40 MG: 40 TABLET, DELAYED RELEASE ORAL at 04:54

## 2023-12-29 RX ADMIN — ACETAZOLAMIDE 500 MG: 250 TABLET ORAL at 08:41

## 2023-12-29 RX ADMIN — MULTIPLE VITAMINS W/ MINERALS TAB 1 TABLET: TAB ORAL at 08:42

## 2023-12-29 NOTE — DISCHARGE INSTR - AVS FIRST PAGE
Dear Allison Gamboa,     It was our pleasure to care for you here at [facility].  It is our hope that we were always able to exceed the expected standards for your care during your stay.  You were hospitalized due to Secondary headache syndrome- possible IIH.  You were cared for on the 3rd floor by Nicki Cortés DO under the service of Karthikeyan Corona MD with the [facility] who covers for your primary care physician (PCP), Elissa Hyatt MD, while you were hospitalized.  If you have any questions or concerns related to this hospitalization, you may contact us at .  For follow up as well as any medication refills, we recommend that you follow up with your primary care physician.  A registered nurse will reach out to you by phone within a few days after your discharge to answer any additional questions that you may have after going home.  However, at this time we provide for you here, the most important instructions / recommendations at discharge:     Notable Medication Adjustments -   Start taking Diamox 500 mg twice daily for idiopathic intracranial hypertension  Start taking Plavix 75 mg once a day  Testing Required after Discharge -   Brain MRI in 6 months  ** Please contact your PCP to request testing orders for any of the testing recommended here **  Important follow up information -   Follow up with PCP in one week  Follow up with vascular neurology in 6-8 weeks  Other Instructions -   Watch for signs/symptoms such as increasingly severe headache, nausea/vomiting, facial droop, weakness of arms or legs, slurred speech or inability to speak, confusion/altered mental status. Report to ED immediately if these symptoms occur.   Please review this entire after visit summary as additional general instructions including medication list, appointments, activity, diet, any pertinent wound care, and other additional recommendations from your care  team that may be provided for you.      Sincerely,     Nicki Cortés, DO

## 2023-12-29 NOTE — ASSESSMENT & PLAN NOTE
Found incidentally during optometrist visit  Concern for idiopathic intracranial hypertension versus cerebral venous thrombosis  Recommended ED evaluation.  CT venous sampling showed chronic lacunar infarct in the right frontal periventricular white matter and associated microangiopathic changes  No masses noted.  Attempt for lumbar puncture at the ED was unsuccessful  Ddx idiopathic IH, Cerebral venous thrombosis, mass lesions rule out    Plan  Was admitted under stroke pathway  IR lumbar puncture 12/28, opening pressure was low to normal  Neurochecks  Scheduled Tylenol 975 mg twice daily  Per neuro, started diamox 500 mg BID after LP, started plavix 6 hours after LP (about 5pm), and started lovenox 12 hours after LP(11 pm).   Patient stable and ready to be discharged, cleared by neuro w/ diagnosis of IIH. Will be discharged on plavix 75 mg/day and diamox 500 mg BID. Follow up with vascular neurology is 6-8 weeks, get Brain MRI in 6 months.

## 2023-12-29 NOTE — DISCHARGE SUMMARY
[facility]  Discharge- Allison Gamboa 1985, 38 y.o. female MRN: [id number]  Unit/Bed#: S -Dhruv Encounter: [id number]  Primary Care Provider: Elissa Hyatt MD   Date and time admitted to hospital: 12/26/2023  8:34 PM    * Secondary headache syndrome-probable IIH  Assessment & Plan  Found incidentally during optometrist visit  Concern for idiopathic intracranial hypertension versus cerebral venous thrombosis  Recommended ED evaluation.  CT venous sampling showed chronic lacunar infarct in the right frontal periventricular white matter and associated microangiopathic changes  No masses noted.  Attempt for lumbar puncture at the ED was unsuccessful  Ddx idiopathic IH, Cerebral venous thrombosis, mass lesions rule out    Plan  Was admitted under stroke pathway  IR lumbar puncture 12/28, opening pressure was low to normal  Neurochecks  Scheduled Tylenol 975 mg twice daily  Per neuro, started diamox 500 mg BID after LP, started plavix 6 hours after LP (about 5pm), and started lovenox 12 hours after LP(11 pm).   Patient stable and ready to be discharged, cleared by neuro w/ diagnosis of IIH. Will be discharged on plavix 75 mg/day and diamox 500 mg BID. Follow up with vascular neurology is 6-8 weeks, get Brain MRI in 6 months.      Chronic headaches  Assessment & Plan  See above in papilledema  Scheduled Tylenol 975 mg 3 times daily  Neurochecks every 2 hours    Chronic R frontal stroke  Assessment & Plan  CT Venogram brain showed chronic right lacunar infarct in the right frontal periventricular white matter and adjacent microangiopathic change  MRI brain showed hypterintense signal on T2 and FLAIR imaging within right frontal lobe immediately adjacent to the frontal horn of the lateral ventricle and tracking posteriorly and inferiorly into the anterior aspect of the external capsule may represent chronic microangiopathic change. Small adjacent developmental venous anomaly in the  anterior aspect of the right lentiform nucleus.   Plan:  6 month follow up MRI with contrast to assess change over time to ensure stability      Lacunar Infarct (HCC)  Assessment & Plan  Incidental finding of CT chronic lacunar infarct in the right frontal periventricular white matter and adjacent microangiopathic chaNGE  Chronic headaches with papilledema and ophthalmic examination  No other focal deficits    MRI brain performed: hypterintense signal on T2 and FLAIR imaging within right frontal lobe immediately adjacent to the frontal horn of the lateral ventricle and tracking posteriorly and inferiorly into the anterior aspect of the external capsule may represent chronic microangiopathic change. Small adjacent developmental venous anomaly in the anterior aspect of the right lentiform nucleus.   MRI orbits performed: Probable left-sided papilledema, tortuosity of the optic nerves and slight prominence of the optic nerve sheaths. In addition there is partially empty sella and narrowing of the transverse dural venous sinuses. Findings are suggestive of idiopathic intracranial hypertension.  PLAN  Stroke pathway  Appreciate neurology recommendation  Echo with bubble study, EF 60%   Telemetry  Normotension, normoglycemia  Lumbar puncture 12/28 had low to normal opening pressure            Leucocytosis-resolved as of 12/29/2023  Assessment & Plan  Possibly reactive, resolved on 12/28 7.91.  Will monitor off antibiotics    Essential hypertension  Assessment & Plan  Home medications losartan 50 mg-continue    ANA LAURA on CPAP  Assessment & Plan  Patient diagnosed with mild obstructive sleep apnea in 2022.     Lymph edema  Assessment & Plan  On as needed diuretics  Compression stockings  Follows with PT at home    Hypothyroidism  Assessment & Plan  Continue levothyroxine to 225 mcg daily        Medical Problems       Resolved Problems  Date Reviewed: 12/29/2023            Resolved    Leucocytosis 12/29/2023     Resolved by   Nicki Cortés DO        Discharging Resident: Nicki Cortés DO  Discharging Attending: Karthikeyan Corona MD  PCP: Elissa Hyatt MD  Admission Date:   Admission Orders (From admission, onward)       Ordered        12/27/23 0129  INPATIENT ADMISSION  Once                          Discharge Date: 12/29/23    Consultations During Hospital Stay:  IR, Neuro, PT, PMR, OT    Procedures Performed:   Lumbar puncture attempted 12/26 by ED  Lumbar puncture performed 12/28 by IR    Significant Findings / Test Results:   Opening pressure of lumbar puncture was low to normal, neuro still thinks she has IIH.   CT Venogram Brain: Chronic lacunar infarct in the right frontal periventricular white matter and adjacent microangiopathic change. No intracranial hemorrhage or mass effect. Patent dural venous sinuses  MRI brain: Hyperintense signal on T2 and FLAIR imaging within the right frontal lobe immediately adjacent to the frontal horn of the lateral ventricle and tracking posteriorly and inferiorly into the anterior aspect of the external capsule may represent chronic   microangiopathic change. There is a small adjacent developmental venous anomaly identified within the anterior aspect of the right lentiform nucleus.  No old lacunar infarcts identified.  MRI Orbits: Probable left-sided papilledema, tortuosity of the optic nerves and slight prominence of the optic nerve sheaths. In addition there is partially empty sella and narrowing of the transverse dural venous sinuses. Findings are suggestive of idiopathic intracranial hypertension. Redemonstrated T2 signal abnormality within the white matter adjacent to the frontal horn of the right lateral ventricle and extending into the external capsule as seen on same day MRI of the brain.    Incidental Findings:   None     Test Results Pending at Discharge (will require follow up):  None     Outpatient Tests Requested:  MRI brain 6-month follow-up examination recommended with contrast to  assess change over time and ensure stability.    Complications:  LP unsuccessful on 12/26 by ED    Reason for Admission: Secondary headache syndrome- possible IIH    Hospital Course:   Allison Gamboa is a 38 y.o. female patient who originally presented to the hospital on 12/26/2023 due to Secondary headache syndrome- possible IIH. She had about 6 months of chronic but worsening headaches that are pulsatile and squeezing, with blurred peripheral vision, floaters, and diplopia. Her ophthalmologist five days before admission noticed papilledema b/l so she came to the ED. They did a CT venogram which showed a chronic lacunar infarct in the right frontal periventricular white matter and adjacent microangiopathic changes. They were unsuccessful with a lumbar puncture due to patient anxiety despite sedating medications. It was decided to admit her under the stroke pathway due to this chronic lacunar infarct and to get further imaging for her headaches. Neuro and IR were  consulted along with PT, OT and PMR. MRI of the brain also showed chronic microangiopathic change and a small adjacent venous developmental anomaly in the right lentiform nucleus. MRI orbits showed probable left sided papilledema, tortuosity of the optic nerves and slight prominence of their sheaths, a partially empty sella and narrowing of the transverse dural venous sinus; all of these findings suggested IIH. Patient then underwent LP with IR. Opening pressure was low to normal; although this was unexpected by neuro, they still suggest the diagnosis of IIH according to her imaging findings. Patient also had an ECHO which showed 60% EF and trace regurgitation at mitral, tricuspid, and pulmonic valves. After the LP, patient was started on diamox 500 mg BID, plavix 6 hours after LP, and lovenox 12 hours after LP. PT, PMR, and OT determined patient did not need their services at this time. Patient was stable and ready for discharge with instructions for  "repeat brain MRI in 6 months and follow up with vascular neurology in 6-8 weeks.    Please see above list of diagnoses and related plan for additional information.     Condition at Discharge: stable    Discharge Day Visit / Exam:   Subjective:  Patient is seen at bedside this morning. She denies current headache and feels better today. She understands that according to neurology she most likely has IIH. She feels well and ready to go home. She understands the plan for follow up brain MRI in 6 months.   Vitals: Blood Pressure: 121/76 (12/29/23 1111)  Pulse: 69 (12/29/23 1111)  Temperature: 97.6 °F (36.4 °C) (12/29/23 0816)  Temp Source: Oral (12/29/23 0816)  Respirations: 17 (12/29/23 1111)  Height: 5' 5\" (165.1 cm) (12/27/23 1500)  Weight - Scale: (!) 138 kg (305 lb) (12/27/23 1500)  SpO2: 95 % (12/29/23 1111)  Exam:   Physical Exam  Constitutional:       Appearance: Normal appearance. She is obese.      Comments: Patient has nasal cannula in rather than CPAP   HENT:      Head: Normocephalic.   Eyes:      Extraocular Movements: Extraocular movements intact.      Pupils: Pupils are equal, round, and reactive to light.   Cardiovascular:      Rate and Rhythm: Normal rate and regular rhythm.      Heart sounds: Normal heart sounds.   Pulmonary:      Effort: Pulmonary effort is normal.      Breath sounds: Normal breath sounds.   Skin:     General: Skin is warm and dry.   Neurological:      General: No focal deficit present.      Mental Status: She is alert and oriented to person, place, and time.   Psychiatric:         Mood and Affect: Mood normal.         Behavior: Behavior normal.          Discussion with Family: Updated  () at bedside.    Discharge instructions/Information to patient and family:   See after visit summary for information provided to patient and family.      Provisions for Follow-Up Care:  See after visit summary for information related to follow-up care and any pertinent home health " orders.      Mobility at time of Discharge:   Basic Mobility Inpatient Raw Score: 24  JH-HLM Goal: 8: Walk 250 feet or more  JH-HLM Achieved: 6: Walk 10 steps or more  HLM Goal achieved. Continue to encourage appropriate mobility.     Disposition:   Home    Planned Readmission: No    Discharge Medications:  See after visit summary for reconciled discharge medications provided to patient and/or family.      **Please Note: This note may have been constructed using a voice recognition system**

## 2023-12-29 NOTE — ASSESSMENT & PLAN NOTE
Incidental finding of CT chronic lacunar infarct in the right frontal periventricular white matter and adjacent microangiopathic chaNGE  Chronic headaches with papilledema and ophthalmic examination  No other focal deficits    MRI brain performed: hypterintense signal on T2 and FLAIR imaging within right frontal lobe immediately adjacent to the frontal horn of the lateral ventricle and tracking posteriorly and inferiorly into the anterior aspect of the external capsule may represent chronic microangiopathic change. Small adjacent developmental venous anomaly in the anterior aspect of the right lentiform nucleus.   MRI orbits performed: Probable left-sided papilledema, tortuosity of the optic nerves and slight prominence of the optic nerve sheaths. In addition there is partially empty sella and narrowing of the transverse dural venous sinuses. Findings are suggestive of idiopathic intracranial hypertension.  PLAN  Stroke pathway  Appreciate neurology recommendation  Echo with bubble study, EF 60%   Telemetry  Normotension, normoglycemia  Lumbar puncture 12/28 had low to normal opening pressure

## 2023-12-29 NOTE — PLAN OF CARE
Problem: Potential for Falls  Goal: Patient will remain free of falls  Description: INTERVENTIONS:  - Educate patient/family on patient safety including physical limitations  - Instruct patient to call for assistance with activity   - Consult OT/PT to assist with strengthening/mobility   - Keep Call bell within reach  - Keep bed low and locked with side rails adjusted as appropriate  - Keep care items and personal belongings within reach  - Initiate and maintain comfort rounds  - Make Fall Risk Sign visible to staff  - Offer Toileting every 2 Hours, in advance of need  - Initiate/Maintain bed/chair alarm  - Obtain necessary fall risk management equipment  - Apply yellow socks and bracelet for high fall risk patients  - Consider moving patient to room near nurses station  Outcome: Progressing     Problem: PAIN - ADULT  Goal: Verbalizes/displays adequate comfort level or baseline comfort level  Description: Interventions:  - Encourage patient to monitor pain and request assistance  - Assess pain using appropriate pain scale  - Administer analgesics based on type and severity of pain and evaluate response  - Implement non-pharmacological measures as appropriate and evaluate response  - Consider cultural and social influences on pain and pain management  - Notify physician/advanced practitioner if interventions unsuccessful or patient reports new pain  Outcome: Progressing     Problem: INFECTION - ADULT  Goal: Absence or prevention of progression during hospitalization  Description: INTERVENTIONS:  - Assess and monitor for signs and symptoms of infection  - Monitor lab/diagnostic results  - Monitor all insertion sites, i.e. indwelling lines, tubes, and drains  - Monitor endotracheal if appropriate and nasal secretions for changes in amount and color  - [place] appropriate cooling/warming therapies per order  - Administer medications as ordered  - Instruct and encourage patient and family to use good hand hygiene  technique  - Identify and instruct in appropriate isolation precautions for identified infection/condition  Outcome: Progressing  Goal: Absence of fever/infection during neutropenic period  Description: INTERVENTIONS:  - Monitor WBC    Outcome: Progressing     Problem: SAFETY ADULT  Goal: Patient will remain free of falls  Description: INTERVENTIONS:  - Educate patient/family on patient safety including physical limitations  - Instruct patient to call for assistance with activity   - Consult OT/PT to assist with strengthening/mobility   - Keep Call bell within reach  - Keep bed low and locked with side rails adjusted as appropriate  - Keep care items and personal belongings within reach  - Initiate and maintain comfort rounds  - Make Fall Risk Sign visible to staff  - Offer Toileting every 2 Hours, in advance of need  - Initiate/Maintain bed/chair alarm  - Obtain necessary fall risk management equipment  - Apply yellow socks and bracelet for high fall risk patients  - Consider moving patient to room near nurses station  Outcome: Progressing  Goal: Maintain or return to baseline ADL function  Description: INTERVENTIONS:  -  Assess patient's ability to carry out ADLs; assess patient's baseline for ADL function and identify physical deficits which impact ability to perform ADLs (bathing, care of mouth/teeth, toileting, grooming, dressing, etc.)  - Assess/evaluate cause of self-care deficits   - Assess range of motion  - Assess patient's mobility; develop plan if impaired  - Assess patient's need for assistive devices and provide as appropriate  - Encourage maximum independence but intervene and supervise when necessary  - Involve family in performance of ADLs  - Assess for home care needs following discharge   - Consider OT consult to assist with ADL evaluation and planning for discharge  - Provide patient education as appropriate  Outcome: Progressing  Goal: Maintains/Returns to pre admission functional  level  Description: INTERVENTIONS:  - Perform AM-PAC 6 Click Basic Mobility/ Daily Activity assessment daily.  - Set and communicate daily mobility goal to care team and patient/family/caregiver.   - Collaborate with rehabilitation services on mobility goals if consulted  - Perform Range of Motion   - Reposition patient   - Dangle patient   - Stand patient   - Ambulate patient  - Out of bed to chair   - Out of bed for meals   - Out of bed for toileting  - Record patient progress and toleration of activity level   Outcome: Progressing     Problem: DISCHARGE PLANNING  Goal: Discharge to home or other facility with appropriate resources  Description: INTERVENTIONS:  - Identify barriers to discharge w/patient and caregiver  - Arrange for needed discharge resources and transportation as appropriate  - Identify discharge learning needs (meds, wound care, etc.)  - Arrange for interpretive services to assist at discharge as needed  - Refer to Case Management Department for coordinating discharge planning if the patient needs post-hospital services based on physician/advanced practitioner order or complex needs related to functional status, cognitive ability, or social support system  Outcome: Progressing     Problem: Knowledge Deficit  Goal: Patient/family/caregiver demonstrates understanding of disease process, treatment plan, medications, and discharge instructions  Description: Complete learning assessment and assess knowledge base.  Interventions:  - Provide teaching at level of understanding  - Provide teaching via preferred learning methods  Outcome: Progressing     Problem: Neurological Deficit  Goal: Neurological status is stable or improving  Description: Interventions:  - Monitor and assess patient's level of consciousness, motor function, sensory function, and level of assistance needed for ADLs.   - Monitor and report changes from baseline. Collaborate with interdisciplinary team to initiate plan and implement  interventions as ordered.   - Provide and maintain a safe environment.  - Consider seizure precautions.  - Consider fall precautions.  - Consider aspiration precautions.  - Consider bleeding precautions.  Outcome: Progressing     Problem: Activity Intolerance/Impaired Mobility  Goal: Mobility/activity is maintained at optimum level for patient  Description: Interventions:  - Assess and monitor patient  barriers to mobility and need for assistive/adaptive devices.  - Assess patient's emotional response to limitations.  - Collaborate with interdisciplinary team and initiate plans and interventions as ordered.  - Encourage independent activity per ability.  - Maintain proper body alignment.  - Perform active/passive rom as tolerated/ordered.  - Plan activities to conserve energy.  - Turn patient as appropriate  Outcome: Progressing     Problem: Communication Impairment  Goal: Ability to express needs and understand communication  Description: Assess patient's communication skills and ability to understand information.  Patient will demonstrate use of effective communication techniques, alternative methods of communication and understanding even if not able to speak.     - Encourage communication and provide alternate methods of communication as needed.  - Collaborate with case management/ for discharge needs.  - Include patient/family/caregiver in decisions related to communication.  Outcome: Progressing     Problem: Potential for Aspiration  Goal: Non-ventilated patient's risk of aspiration is minimized  Description: Assess and monitor vital signs, respiratory status, and labs (WBC).  Monitor for signs of aspiration (tachypnea, cough, rales, wheezing, cyanosis, fever).    - Assess and monitor patient's ability to swallow.  - Place patient up in chair to eat if possible.  - HOB up at 90 degrees to eat if unable to get patient up into chair.  - Supervise patient during oral intake.   - Instruct patient/  family to take small bites.  - Instruct patient/ family to take small single sips when taking liquids.  - Follow patient-specific strategies generated by speech pathologist.  Outcome: Progressing  Goal: Ventilated patient's risk of aspiration is minimized  Description: Assess and monitor vital signs, respiratory status, airway cuff pressure, and labs (WBC).  Monitor for signs of aspiration (tachypnea, cough, rales, wheezing, cyanosis, fever).    - Elevate head of bed 30 degrees if patient has tube feeding.  - Monitor tube feeding.  Outcome: Progressing     Problem: Nutrition  Goal: Nutrition/Hydration status is improving  Description: Monitor and assess patient's nutrition/hydration status for malnutrition (ex- brittle hair, bruises, dry skin, pale skin and conjunctiva, muscle wasting, smooth red tongue, and disorientation). Collaborate with interdisciplinary team and initiate plan and interventions as ordered.  Monitor patient's weight and dietary intake as ordered or per policy. Utilize nutrition screening tool and intervene per policy. Determine patient's food preferences and provide high-protein, high-caloric foods as appropriate.     - Assist patient with eating.  - Allow adequate time for meals.  - Encourage patient to take dietary supplement as ordered.  - Collaborate with clinical nutritionist.  - Include patient/family/caregiver in decisions related to nutrition.  Outcome: Progressing

## 2023-12-30 LAB
APTT HEX PL PPP: 9 SEC (ref 0–11)
APTT SCREEN TO CONFIRM RATIO: 1.04 RATIO (ref 0–1.34)
APTT-LA IMM 4:1 NP PPP: 48.5 SEC (ref 0–40.5)
CONFIRM APTT/NORMAL: 40.3 SEC (ref 0–47.6)
DEPRECATED AT III PPP: 108 % OF NORMAL (ref 92–136)
DRVVT IMM 1:2 NP PPP: 43.2 SEC (ref 0–40.4)
DRVVT SCREEN TO CONFIRM RATIO: 1.1 RATIO (ref 0.8–1.2)
LA PPP-IMP: ABNORMAL
SCREEN APTT: 53 SEC (ref 0–43.5)
SCREEN DRVVT: 48 SEC (ref 0–47)
THROMBIN TIME: 18.1 SEC (ref 0–23)

## 2023-12-31 LAB
PROT S ACT/NOR PPP: 110 % (ref 61–136)
PROT S PPP-ACNC: 90 % (ref 60–150)

## 2024-01-01 LAB
PROT C AG ACT/NOR PPP IA: 148 % OF NORMAL (ref 60–150)
PROT S ACT/NOR PPP: 110 % (ref 68–108)

## 2024-01-02 ENCOUNTER — RA CDI HCC (OUTPATIENT)
Dept: OTHER | Facility: HOSPITAL | Age: 39
End: 2024-01-02

## 2024-01-02 ENCOUNTER — TRANSITIONAL CARE MANAGEMENT (OUTPATIENT)
Dept: FAMILY MEDICINE CLINIC | Facility: CLINIC | Age: 39
End: 2024-01-02

## 2024-01-02 LAB
APTT HEX PL PPP: 6 SEC (ref 0–11)
APTT SCREEN TO CONFIRM RATIO: 1.01 RATIO (ref 0–1.34)
APTT-LA IMM 4:1 NP PPP: 41.9 SEC (ref 0–40.5)
B BURGDOR DNA SPEC QL NAA+PROBE: NEGATIVE
B2 GLYCOPROT1 IGA SERPL IA-ACNC: 1.4
B2 GLYCOPROT1 IGA SERPL IA-ACNC: 1.8
B2 GLYCOPROT1 IGG SERPL IA-ACNC: 1.1
B2 GLYCOPROT1 IGG SERPL IA-ACNC: 1.3
B2 GLYCOPROT1 IGM SERPL IA-ACNC: <2.4
B2 GLYCOPROT1 IGM SERPL IA-ACNC: <2.4
CARDIOLIPIN IGA SER IA-ACNC: 1.7
CARDIOLIPIN IGA SER IA-ACNC: 2.2
CARDIOLIPIN IGG SER IA-ACNC: 1.1
CARDIOLIPIN IGG SER IA-ACNC: 1.4
CARDIOLIPIN IGM SER IA-ACNC: 1.3
CARDIOLIPIN IGM SER IA-ACNC: 1.5
CONFIRM APTT/NORMAL: 37.2 SEC (ref 0–47.6)
FUNGUS SPEC CULT: NORMAL
LA PPP-IMP: ABNORMAL
REAGIN AB CSF QL: NON REACTIVE
SCAN RESULT: NORMAL
SCREEN APTT: 50.4 SEC (ref 0–43.5)
SCREEN DRVVT: 45.8 SEC (ref 0–47)
THROMBIN TIME: 16.7 SEC (ref 0–23)

## 2024-01-02 NOTE — PROGRESS NOTES
HCC coding opportunities          Chart Reviewed number of suggestions sent to Provider: 2   E66.01  I63.81    Patients Insurance        Commercial Insurance: [employer]

## 2024-01-03 ENCOUNTER — TELEPHONE (OUTPATIENT)
Dept: NEUROLOGY | Facility: CLINIC | Age: 39
End: 2024-01-03

## 2024-01-03 ENCOUNTER — OFFICE VISIT (OUTPATIENT)
Dept: FAMILY MEDICINE CLINIC | Facility: CLINIC | Age: 39
End: 2024-01-03
Payer: COMMERCIAL

## 2024-01-03 VITALS
RESPIRATION RATE: 16 BRPM | TEMPERATURE: 97.5 F | HEIGHT: 65 IN | HEART RATE: 87 BPM | BODY MASS INDEX: 48.82 KG/M2 | WEIGHT: 293 LBS | SYSTOLIC BLOOD PRESSURE: 96 MMHG | OXYGEN SATURATION: 97 % | DIASTOLIC BLOOD PRESSURE: 68 MMHG

## 2024-01-03 DIAGNOSIS — I10 ESSENTIAL HYPERTENSION: ICD-10-CM

## 2024-01-03 DIAGNOSIS — G93.2 IIH (IDIOPATHIC INTRACRANIAL HYPERTENSION): Primary | ICD-10-CM

## 2024-01-03 DIAGNOSIS — I63.81 LACUNAR STROKE (HCC): ICD-10-CM

## 2024-01-03 LAB
ACE CSF-CCNC: <1.5 U/L (ref 0–2.5)
ALBUMIN MFR CSF ELPH: 46.7 % (ref 47.8–69.1)
ALPHA1 GLOB MFR CSF ELPH: 4.3 % (ref 2.2–6.2)
ALPHA2 GLOB MFR CSF ELPH: 3.7 % (ref 2.7–8.2)
B-GLOBULIN MFR CSF ELPH: 34.1 % (ref 11.8–21.7)
GAMMA GLOB MFR CSF ELPH: 6.6 % (ref 2.8–8.5)
INTERPRETATION CSF IFE-IMP: ABNORMAL
MYCOBACTERIUM SPEC CULT: NORMAL
OLIGOCLONAL BANDS CSF ELPH-IMP: ABNORMAL %
PREALB MFR CSF ELPH: 4.6 % (ref 1.9–7.1)
PROT CSF-MCNC: 22.7 MG/DL (ref 0–44)
RHODAMINE-AURAMINE STN SPEC: NORMAL

## 2024-01-03 PROCEDURE — 99496 TRANSJ CARE MGMT HIGH F2F 7D: CPT | Performed by: INTERNAL MEDICINE

## 2024-01-03 RX ORDER — LOSARTAN POTASSIUM 50 MG/1
50 TABLET ORAL DAILY
Qty: 90 TABLET | Refills: 1 | Status: SHIPPED | OUTPATIENT
Start: 2024-01-03

## 2024-01-03 NOTE — ASSESSMENT & PLAN NOTE
Patient was admitted to the hospital after a visit to her ophthalmologist was followed by neurology. She underwent a lumbar puncture and also placed on Diamox. She currently is in the process of waiting for neuro- to do further evaluation and see if she needs to see neurosurgery. She does continue to have headaches

## 2024-01-03 NOTE — PROGRESS NOTES
Assessment & Plan     1. IIH (idiopathic intracranial hypertension)  Assessment & Plan:  Patient was admitted to the hospital after a visit to her ophthalmologist was followed by neurology. She underwent a lumbar puncture and also placed on Diamox. She currently is in the process of waiting for neuro- to do further evaluation and see if she needs to see neurosurgery. She does continue to have headaches      2. Essential hypertension  -     losartan (COZAAR) 50 mg tablet; Take 1 tablet (50 mg total) by mouth daily    3. Lacunar Infarct (HCC)  Assessment & Plan:  She also found infarct but without obvious neurological changes      4. BMI 50.0-59.9, adult (HCC)  Assessment & Plan:  Her BMI remains acutely with weight management          Depression Screening and Follow-up Plan: Patient's depression screening was positive with a PHQ-9 score of 5.       Subjective     Transitional Care Management Review:   Allison Gamboa is a 38 y.o. female here for TCM follow up.     During the TCM phone call patient stated:  TCM Call       Date and time call was made  1/2/2024 10:07 AM    Patient was hospitialized at  [facility]    Diagnosis  Secondary headache syndrome-probable IIH    Disposition  Home          TCM Call       Should patient be enrolled in anticoag monitoring?  No    I have advised the patient to call PCP with any new or worsening symptoms  Komal Vivas MR          HPI  Review of Systems   Constitutional:  Negative for chills and fever.   HENT:  Negative for ear pain and sore throat.    Eyes:  Positive for visual disturbance. Negative for pain.   Respiratory:  Negative for cough and shortness of breath.    Cardiovascular:  Positive for leg swelling. Negative for chest pain and palpitations.   Gastrointestinal:  Negative for abdominal pain and vomiting.   Genitourinary:  Negative for dysuria and hematuria.   Musculoskeletal:  Negative for arthralgias and back pain.   Skin:  Negative for color change and  "rash.   Neurological:  Positive for headaches. Negative for seizures and syncope.   All other systems reviewed and are negative.      Objective     BP 96/68 (BP Location: Left arm, Patient Position: Sitting, Cuff Size: Large)   Pulse 87   Temp 97.5 °F (36.4 °C) (Tympanic)   Resp 16   Ht 5' 5\" (1.651 m)   Wt 135 kg (298 lb)   LMP 12/17/2019   SpO2 97%   BMI 49.59 kg/m²      Physical Exam  Constitutional:       Appearance: She is obese.   HENT:      Head: Normocephalic and atraumatic.      Right Ear: Tympanic membrane normal.      Left Ear: Tympanic membrane normal.      Nose: Nose normal.      Mouth/Throat:      Mouth: Mucous membranes are moist.   Eyes:      Extraocular Movements: Extraocular movements intact.      Pupils: Pupils are equal, round, and reactive to light.   Neck:      Vascular: No carotid bruit.   Cardiovascular:      Rate and Rhythm: Normal rate and regular rhythm.      Pulses: Normal pulses.      Heart sounds: Normal heart sounds.   Pulmonary:      Effort: Pulmonary effort is normal.      Breath sounds: Normal breath sounds.   Abdominal:      General: Bowel sounds are normal.      Palpations: There is no mass.   Musculoskeletal:      Right lower leg: Edema present.      Left lower leg: Edema present.   Lymphadenopathy:      Cervical: No cervical adenopathy.   Skin:     General: Skin is warm and dry.   Neurological:      General: No focal deficit present.   Psychiatric:         Mood and Affect: Mood normal.         Behavior: Behavior normal.         Thought Content: Thought content normal.         Judgment: Judgment normal.       Medications have been reviewed by provider in current encounter    Elissa Hyatt MD         "

## 2024-01-03 NOTE — TELEPHONE ENCOUNTER
HFU/SLAN/Secondary headache syndrome-probable IIH/DC 12/29/23 to Home       Neurology consult Note: F/u with vascular neurology AP/attending/ resident clinic in 6-8 weeks post discharge.         Called 1 time No answer LM to CB to get scheduled for HFU

## 2024-01-04 LAB
ANA ELISA RESULT: NEGATIVE RATIO
ANTINUCLEAR ANTIBODY BY ELISA: NORMAL
Lab: NORMAL
RATIO: 0

## 2024-01-05 LAB
ALB CSF/SERPL: 3 {RATIO} (ref 0–8)
ALBUMIN CSF-MCNC: 11 MG/DL (ref 7–29)
ALBUMIN SERPL-MCNC: 4.2 G/DL (ref 3.9–4.9)
IGG CSF-MCNC: 2.7 MG/DL (ref 0–6.7)
IGG SERPL-MCNC: 1349 MG/DL (ref 586–1602)
IGG SYNTH RATE SER+CSF CALC-MRATE: 0.2 MG/DAY
IGG/ALB CLEAR SER+CSF-RTO: 0.8 (ref 0–0.7)
IGG/ALB CSF: 0.25 {RATIO} (ref 0–0.25)
MBP CSF-MCNC: 2.4 NG/ML (ref 0–3.7)
OLIGOCLONAL BANDS.IT SER+CSF QL: ABNORMAL

## 2024-01-08 LAB — FUNGUS SPEC CULT: NORMAL

## 2024-01-09 LAB
MYCOBACTERIUM SPEC CULT: NORMAL
RHODAMINE-AURAMINE STN SPEC: NORMAL

## 2024-01-12 ENCOUNTER — OFFICE VISIT (OUTPATIENT)
Dept: BARIATRICS | Facility: CLINIC | Age: 39
End: 2024-01-12
Payer: COMMERCIAL

## 2024-01-12 VITALS
WEIGHT: 293 LBS | TEMPERATURE: 96.2 F | BODY MASS INDEX: 48.82 KG/M2 | SYSTOLIC BLOOD PRESSURE: 120 MMHG | HEART RATE: 75 BPM | HEIGHT: 65 IN | DIASTOLIC BLOOD PRESSURE: 72 MMHG | OXYGEN SATURATION: 99 %

## 2024-01-12 DIAGNOSIS — L98.7 EXCESSIVE AND REDUNDANT SKIN AND SUBCUTANEOUS TISSUE: ICD-10-CM

## 2024-01-12 DIAGNOSIS — Z48.815 ENCOUNTER FOR SURGICAL AFTERCARE FOLLOWING SURGERY OF DIGESTIVE SYSTEM: Primary | ICD-10-CM

## 2024-01-12 DIAGNOSIS — K91.2 POSTSURGICAL MALABSORPTION: ICD-10-CM

## 2024-01-12 DIAGNOSIS — E66.01 OBESITY, CLASS III, BMI 40-49.9 (MORBID OBESITY) (HCC): ICD-10-CM

## 2024-01-12 DIAGNOSIS — Z98.84 BARIATRIC SURGERY STATUS: ICD-10-CM

## 2024-01-12 DIAGNOSIS — G47.33 OSA ON CPAP: ICD-10-CM

## 2024-01-12 PROCEDURE — 99214 OFFICE O/P EST MOD 30 MIN: CPT | Performed by: NURSE PRACTITIONER

## 2024-01-12 RX ORDER — PANTOPRAZOLE SODIUM 40 MG/1
40 TABLET, DELAYED RELEASE ORAL DAILY
Qty: 90 TABLET | Refills: 3 | Status: SHIPPED | OUTPATIENT
Start: 2024-01-12

## 2024-01-12 NOTE — PROGRESS NOTES
Assessment/Plan:     Patient ID: Allison Gamboa is a 38 y.o. female.     Bariatric Surgery Status    -s/p Yuko-En-Y Gastric Bypass with Dr. Charles Pool on 12/05/2022. Presents to the office today for annual visit. Overall doing fair, not losing a lot of weight. She reports feeling that she is eating enough - eating 900-1100 calories per day. She is tolerating a regular diet. Denies having any abdominal pain, N/V/D/C, regurgitation, reflux or dysphagia. Concern of her stomach in regards to taking NSAIDs PRN and on anticoagulants. Taking omeprazole QOD at this time.       Of note, per chart review, in December 2023 had hospitalization due to secondary headache syndrome with Dx of II. Was found to have a chronic lacunar infarct. Currently on diamox and will be seeing neurosurgery.     PLAN:     - discussed risks of NSAIDs use. Recommend to switch her PPI to pantoprazole due to drug interaction with plavix. Take daily for now.  - recommend to see RD to help further weight loss and post op support. May need to increase her caloric intake. Was on saxenda in the past and was able to lose weight with this. Discussed MWM options however she would like to defer for now. - Routine follow up in 6 months for 18 month PO visit.   - Continue with healthy lifestyle, adequate protein intake of 60 gm, fluid intake of at least 64 oz.   - Continue with MVI daily.   - Activity as tolerated.   - Labs ordered and will adjust accordingly if any deficiency.   - Follow up with RD and SW as needed.       Excessive skin - having excess skin of abdominal folds and of bilateral thighs. She is concern - discussed she should reach goal weight or plateau weight before considering skin removal.   - continue with compression as much as possible, antifungal creams. Supportive care for now.     ANA LAURA - using her CPAP machine daily. Recommend to continue for now and follow up with sleep medicine as scheduled/needed.        Continued/Maintain healthy  weight loss with good nutrition intakes.  Adequate hydration with at least 64oz. fluid intake.  Follow diet as discussed.  Follow vitamin and mineral recommendations as reviewed with you.  Exercise as tolerated.    Colonoscopy referral made: n/a  Mammogram - N/A    Follow-up in 6 months for 18 month PO visit. We kindly ask that your arrive 15 minutes before your scheduled appointment time with your provider to allow our staff to room you, get your vital signs and update your chart.    Get lab work done prior to annual visit. Please call the office if you need a script.  It is recommended to check with your insurance BEFORE getting labs done to make sure they are covered by your policy.      Call our office if you have any problems with abdominal pain especially associated with fever, chills, nausea, vomiting or any other concerns.    All  Post-bariatric surgery patients should be aware that very small quantities of any alcohol can cause impairment and it is very possible not to feel the effect. The effect can be in the system for several hours.  It is also a stomach irritant.     It is advised to AVOID alcohol, Nonsteroidal antiinflammatory drugs (NSAIDS) and nicotine of all forms . Any of these can cause stomach irritation/pain.    Discussed the effects of alcohol on a bariatric patient and the increased impairment risk.     Keep up the good work!     Postsurgical Malabsorption   -At risk for malabsorption of vitamins/minerals secondary to malabsorption and restriction of intake from bariatric surgery  -Currently taking adequate postop bariatric surgery vitamin supplementation  -Last set of bariatric labs completed on 07/30/2023 and showed mildly low iron with repeat levels normalizing.    -Next set of bariatric labs ordered for approximately 2 weeks  -Patient received education about the importance of adhering to a lifelong supplementation regimen to avoid vitamin/mineral deficiencies      Diagnoses and all orders  for this visit:    Encounter for surgical aftercare following surgery of digestive system  -     CBC and Platelet; Future  -     Comprehensive metabolic panel; Future  -     Folate; Future  -     Iron Panel (Includes Ferritin, Iron Sat%, Iron, and TIBC); Future  -     Zinc; Future  -     Vitamin D 25 hydroxy; Future  -     Vitamin B12; Future  -     Vitamin B1, whole blood; Future  -     Vitamin A; Future  -     PTH, intact; Future    Bariatric surgery status  -     CBC and Platelet; Future  -     Comprehensive metabolic panel; Future  -     Folate; Future  -     Iron Panel (Includes Ferritin, Iron Sat%, Iron, and TIBC); Future  -     Zinc; Future  -     Vitamin D 25 hydroxy; Future  -     Vitamin B12; Future  -     Vitamin B1, whole blood; Future  -     Vitamin A; Future  -     PTH, intact; Future  -     pantoprazole (PROTONIX) 40 mg tablet; Take 1 tablet (40 mg total) by mouth daily    Postsurgical malabsorption  -     CBC and Platelet; Future  -     Comprehensive metabolic panel; Future  -     Folate; Future  -     Iron Panel (Includes Ferritin, Iron Sat%, Iron, and TIBC); Future  -     Zinc; Future  -     Vitamin D 25 hydroxy; Future  -     Vitamin B12; Future  -     Vitamin B1, whole blood; Future  -     Vitamin A; Future  -     PTH, intact; Future    Obesity, Class III, BMI 40-49.9 (morbid obesity) (HCC)  -     CBC and Platelet; Future  -     Comprehensive metabolic panel; Future  -     Folate; Future  -     Iron Panel (Includes Ferritin, Iron Sat%, Iron, and TIBC); Future  -     Zinc; Future  -     Vitamin D 25 hydroxy; Future  -     Vitamin B12; Future  -     Vitamin B1, whole blood; Future  -     Vitamin A; Future  -     PTH, intact; Future    Excessive and redundant skin and subcutaneous tissue    ANA LAURA on CPAP  -     CBC and Platelet; Future  -     Comprehensive metabolic panel; Future  -     Folate; Future  -     Iron Panel (Includes Ferritin, Iron Sat%, Iron, and TIBC); Future  -     Zinc; Future  -      Vitamin D 25 hydroxy; Future  -     Vitamin B12; Future  -     Vitamin B1, whole blood; Future  -     Vitamin A; Future  -     PTH, intact; Future         Subjective:      Patient ID: Allison Gamboa is a 38 y.o. female.      -s/p Yuko-En-Y Gastric Bypass with Dr. Charles Pool on 12/05/2022. Presents to the office today for annual visit. Overall doing fair, not losing a lot of weight. She reports feeling that she is eating enough - eating 900-1100 calories per day. She is tolerating a regular diet. Denies having any abdominal pain, N/V/D/C, regurgitation, reflux or dysphagia. Concern of her stomach in regards to taking NSAIDs PRN and on anticoagulants. Taking omeprazole QOD at this time.       Of note, per chart review, in December 2023 had hospitalization due to secondary headache syndrome with Dx of II. Was found to have a chronic lacunar infarct. Currently on diamox and will be seeing neurosurgery.       Initial: 378 LBS   Current: 294.5 lbs   EWL: (Weight loss is ahead of schedule at this post surgical period.)  Gerald: 290 lbs.   Current BMI is Body mass index is 49.01 kg/m².    Tolerating a regular diet-yes  Eating at least 60 grams of protein per day-yes  Following 30/60 minute rule with liquids-yes  Drinking at least 64 ounces of fluid per day-yes  Drinking carbonated beverages-no  Sufficient exercise-yes  Using NSAIDs regularly-no  Using nicotine-no  Using alcohol-no  Supplements: Multivitamins, Iron, and Calcium     EWL is 37%, which places the patient ahead of schedule for expected post surgical weight loss at this time.     The following portions of the patient's history were reviewed and updated as appropriate: allergies, current medications, past family history, past medical history, past social history, past surgical history and problem list.    Review of Systems   Constitutional: Negative.    Respiratory: Negative.     Cardiovascular: Negative.    Gastrointestinal: Negative.    Musculoskeletal:  "Negative.    Neurological:  Positive for headaches.   Psychiatric/Behavioral: Negative.           Objective:    /72 (BP Location: Left arm, Patient Position: Sitting, Cuff Size: Large)   Pulse 75   Temp (!) 96.2 °F (35.7 °C) (Tympanic)   Ht 5' 5\" (1.651 m)   Wt 134 kg (294 lb 8 oz)   LMP 12/17/2019   SpO2 99%   BMI 49.01 kg/m²      Physical Exam  Vitals and nursing note reviewed.   Constitutional:       Appearance: Normal appearance. She is obese.   Cardiovascular:      Rate and Rhythm: Normal rate and regular rhythm.      Pulses: Normal pulses.      Heart sounds: Normal heart sounds.   Pulmonary:      Effort: Pulmonary effort is normal.      Breath sounds: Normal breath sounds.   Abdominal:      General: Bowel sounds are normal.      Palpations: Abdomen is soft.      Tenderness: There is no abdominal tenderness.   Musculoskeletal:         General: Normal range of motion.   Skin:     General: Skin is warm and dry.   Neurological:      General: No focal deficit present.      Mental Status: She is alert and oriented to person, place, and time.   Psychiatric:         Mood and Affect: Mood normal.         Behavior: Behavior normal.         Thought Content: Thought content normal.         Judgment: Judgment normal.             "

## 2024-01-15 LAB — FUNGUS SPEC CULT: NORMAL

## 2024-01-16 LAB
MYCOBACTERIUM SPEC CULT: NORMAL
RHODAMINE-AURAMINE STN SPEC: NORMAL

## 2024-01-22 LAB — FUNGUS SPEC CULT: NORMAL

## 2024-01-23 LAB
MYCOBACTERIUM SPEC CULT: NORMAL
RHODAMINE-AURAMINE STN SPEC: NORMAL

## 2024-01-24 DIAGNOSIS — E06.3 HYPOTHYROIDISM DUE TO HASHIMOTO'S THYROIDITIS: ICD-10-CM

## 2024-01-24 DIAGNOSIS — F41.8 MIXED ANXIETY DEPRESSIVE DISORDER: ICD-10-CM

## 2024-01-24 DIAGNOSIS — E03.8 HYPOTHYROIDISM DUE TO HASHIMOTO'S THYROIDITIS: ICD-10-CM

## 2024-01-24 DIAGNOSIS — I63.81 LACUNAR STROKE (HCC): ICD-10-CM

## 2024-01-24 DIAGNOSIS — I10 ESSENTIAL HYPERTENSION: ICD-10-CM

## 2024-01-24 RX ORDER — LEVOTHYROXINE SODIUM 0.03 MG/1
25 TABLET ORAL DAILY
Qty: 90 TABLET | Refills: 0 | Status: SHIPPED | OUTPATIENT
Start: 2024-01-24

## 2024-01-24 RX ORDER — BUPROPION HYDROCHLORIDE 75 MG/1
75 TABLET ORAL 2 TIMES DAILY
Qty: 60 TABLET | Refills: 0 | Status: SHIPPED | OUTPATIENT
Start: 2024-01-24 | End: 2024-01-29

## 2024-01-24 RX ORDER — CLOPIDOGREL BISULFATE 75 MG/1
75 TABLET ORAL DAILY
Qty: 30 TABLET | Refills: 1 | Status: SHIPPED | OUTPATIENT
Start: 2024-01-24 | End: 2024-03-24

## 2024-01-24 RX ORDER — LEVOTHYROXINE SODIUM 0.2 MG/1
TABLET ORAL
Qty: 90 TABLET | Refills: 0 | Status: SHIPPED | OUTPATIENT
Start: 2024-01-24

## 2024-01-24 RX ORDER — ATORVASTATIN CALCIUM 40 MG/1
40 TABLET, FILM COATED ORAL EVERY EVENING
Qty: 30 TABLET | Refills: 2 | Status: SHIPPED | OUTPATIENT
Start: 2024-01-24 | End: 2024-04-23

## 2024-01-28 DIAGNOSIS — E66.01 MORBID (SEVERE) OBESITY DUE TO EXCESS CALORIES (HCC): ICD-10-CM

## 2024-01-28 DIAGNOSIS — F41.8 MIXED ANXIETY DEPRESSIVE DISORDER: ICD-10-CM

## 2024-01-29 ENCOUNTER — RA CDI HCC (OUTPATIENT)
Dept: OTHER | Facility: HOSPITAL | Age: 39
End: 2024-01-29

## 2024-01-29 LAB — FUNGUS SPEC CULT: NORMAL

## 2024-01-29 RX ORDER — BUPROPION HYDROCHLORIDE 75 MG/1
75 TABLET ORAL 2 TIMES DAILY
Qty: 60 TABLET | Refills: 0 | Status: SHIPPED | OUTPATIENT
Start: 2024-01-29

## 2024-01-29 RX ORDER — OMEPRAZOLE 20 MG/1
CAPSULE, DELAYED RELEASE ORAL
Qty: 30 CAPSULE | Refills: 0 | Status: SHIPPED | OUTPATIENT
Start: 2024-01-29

## 2024-01-29 NOTE — PROGRESS NOTES
HCC coding opportunities          Chart Reviewed number of suggestions sent to Provider: 1   E66.01    Patients Insurance        Commercial Insurance: [employer]

## 2024-01-30 LAB
MYCOBACTERIUM SPEC CULT: NORMAL
RHODAMINE-AURAMINE STN SPEC: NORMAL

## 2024-02-06 LAB
MYCOBACTERIUM SPEC CULT: NORMAL
RHODAMINE-AURAMINE STN SPEC: NORMAL

## 2024-02-09 ENCOUNTER — TELEPHONE (OUTPATIENT)
Dept: NEUROLOGY | Facility: CLINIC | Age: 39
End: 2024-02-09

## 2024-02-12 LAB
MYCOBACTERIUM SPEC CULT: NORMAL
RHODAMINE-AURAMINE STN SPEC: NORMAL

## 2024-02-14 ENCOUNTER — OFFICE VISIT (OUTPATIENT)
Dept: NEUROLOGY | Facility: CLINIC | Age: 39
End: 2024-02-14
Payer: COMMERCIAL

## 2024-02-14 VITALS
HEART RATE: 74 BPM | TEMPERATURE: 96.5 F | BODY MASS INDEX: 48.94 KG/M2 | DIASTOLIC BLOOD PRESSURE: 74 MMHG | OXYGEN SATURATION: 99 % | WEIGHT: 293 LBS | SYSTOLIC BLOOD PRESSURE: 118 MMHG

## 2024-02-14 DIAGNOSIS — I63.81 LACUNAR STROKE (HCC): ICD-10-CM

## 2024-02-14 DIAGNOSIS — G43.109 MIGRAINE WITH AURA AND WITHOUT STATUS MIGRAINOSUS, NOT INTRACTABLE: ICD-10-CM

## 2024-02-14 DIAGNOSIS — G93.2 IIH (IDIOPATHIC INTRACRANIAL HYPERTENSION): Primary | ICD-10-CM

## 2024-02-14 DIAGNOSIS — G93.2 IIH (IDIOPATHIC INTRACRANIAL HYPERTENSION): ICD-10-CM

## 2024-02-14 PROCEDURE — 99215 OFFICE O/P EST HI 40 MIN: CPT

## 2024-02-14 RX ORDER — ACETAZOLAMIDE 250 MG/1
750 TABLET ORAL 2 TIMES DAILY
Qty: 180 TABLET | Refills: 3 | Status: SHIPPED | OUTPATIENT
Start: 2024-02-14 | End: 2024-02-22 | Stop reason: SDUPTHER

## 2024-02-14 RX ORDER — CYPROHEPTADINE HYDROCHLORIDE 4 MG/1
4 TABLET ORAL
Qty: 30 TABLET | Refills: 3 | Status: SHIPPED | OUTPATIENT
Start: 2024-02-14

## 2024-02-14 NOTE — PROGRESS NOTES
Luther carpenter c/b [facility]  PATIENT:  Allison Gamboa  MRN:  [id number]  :  1985  DATE OF SERVICE:  2024      Assessment/Plan:     Idiopathic intracranial hypertension/potential migraines with aura:    I had the pleasure of seeing Allison today in the office at [facility]  in [place].  She is presenting today for a hospital follow-up appointment in regard to her suspected IIH and also chronic right frontal lobe infarct that was discovered on subsequent imaging in the hospital.  In regard to the patient's suspected IIH, she notes that for the most part the headaches and the difficulty with her vision has become much better since leaving the hospital and discharged on Diamox 500 mg twice daily.  Although, she still notes that she is getting headaches occasionally every once in a while.  Having about 3-4 significantly debilitating headaches a month, and she notes about having some degree of headache almost daily which can present as a pressure or more stabbing pain that is very short and intermittent.  When the patient describes her headaches, it is unclear that the entirety of her headaches are most likely all related to IIH.  I believe that these headaches may have a potential migraine component to them as well.  However, she is still noting that every once in a while she will have difficulty with her vision.  Not having any significant blackouts, but she notes that her vision will be much darker and much dimmer than it was previously.  She notes that her peripheral vision at this time is not great and she continues to follow with an ophthalmologist for visual field testing.  She notes that she sees Dr. Linares at [facility] as her ophthalmologist.  The patient also notes that she is still having slight positional change headaches but not as severe as they were previously.  With this being said, I do feel as though the Diamox is working although I feel like the  patient would benefit from a higher dosage if she is still having some symptoms consistent or related to IIH.  Therefore, would like for her to increase her Diamox dosage from 500 mg twice daily to 750 mg twice daily.  We will see if the slight increase the medication will result in improvement in some of the symptoms that she is having.  She should still continue to follow with ophthalmology to evaluate her papilledema and make sure that it is not worsening and is relatively under control with her Diamox dosing increase.  We are also going to try cyproheptadine as well for some of the weather change headaches that she was having and see if there was a component of potential migraine headache/sinus headaches associated with the IIH as well.      -In regard to the patient's IIH, would like for the patient to start taking Diamox 250 mg, take 3 tablets by mouth twice daily at this point in time.  Patient is still having persistent issues in regards to some additional headaches, some positional change headaches, and also still having difficulty with her peripheral vision and noticing just more dimming of her vision at this time.  To make sure there is not any issues with recurring elevated ICP, would like to slightly increase the dosage of Diamox to see if the patient can tolerate this and see if I can help with some of her symptoms.  -Would like for the patient to start taking cyproheptadine 4 mg, take 1 tablet by mouth once daily at bedtime at this point in time.  I think that some of the patient's headaches are related to may be potentially migraines or sinus headaches outside of the current IIH.  With this being said, she noted significant weather change headaches and also headaches related to potential seasonal changes or allergies, therefore I do feel as though it is appropriate to try cyproheptadine and see if this also helps as well with the patient's symptoms.  -Would continue to follow with ophthalmology at  this time and would like for the patient to have the ophthalmology evaluation and note sent over to us at [facility] that way we can compare with what were doing to treat her IIH to what is currently being seen through the means of her ophthalmologist and repeat visual field testing and eye exams.  -Would like for the patient to also continue with her weight management and exercise at this moment in time.  Feel as though it is important for the patient to lose as much weight as much as she possibly can to help prevent the reoccurrence of IIH moving forward.  -Finally, if the patient is experiencing any new visual changes or any worsening visual symptoms at this point in time she should report to the ED as soon as possible to make sure that she can be evaluated for an ophthalmologic emergency.  We do not want to risk the patient's changes in vision at this point in time and make sure that we are adequately reducing her pressure if need be and see if the patient needs to have an additional lumbar puncture as well.      History of chronic right frontal lobe infarct:    In regards to the patient's incidentally discovered right frontal lobe stroke, she states that it was not known previously that she may have had a stroke in the past.  It was noted that the patient was having some slight difficulty with word finding/difficulty speaking and also having some issues with short-term memory that she is not sure if that could be related to the chronic right frontal lobe stroke.  In regards to the stroke itself, the area seems to be consistent with a chronic lacunar infarct although the etiology cannot be confirmed at this time.  The patient is relatively young and does not seem to have significant vascular risk factors currently.  It is possible that she may have had issues with hyperlipidemia in the past but at this time she does not pose any significant vascular risk.  Regardless, the patient should  still continue to take Plavix 75 mg once daily and atorvastatin 40 mg once daily.  It was recommended that the patient have an ambulatory referral to cardiology on outpatient basis to have a Zio patch to evaluate for cardioembolic cause.  Recommended that the patient also have a repeat thrombosis panel and lipid panel as well in regard to her evaluation for secondary stroke risk.  She was to repeat MRI brain and orbits with and without contrast in 4 months time as well to evaluate the suspected area of infarct and make sure that it was not enhancing at this time.      -Repeat MRI brain and orbits with and without contrast in about 4 months time.  Patient can schedule this appointment before she leaves today.  -Repeat thrombosis panel ordered and repeat lipid panel ordered to be completed before the next appointment in 4 months time.  -Ambulatory referral to cardiology placed for the patient to have a Zio patch to be properly evaluated for a potential cardioembolic cause of her previous right chronic frontal lobe infarct.    - For ongoing stroke prevention continue: Plavix 75 mg once daily, atorvastatin 40 mg once daily    - Discussed the importance of antiplatelet management with the patient to prevent future strokes.   - Recommend to check blood pressure occasionally away from the doctor's office to make sure that those numbers are typically less than 130/80.  If they are frequently higher than that, we recommend checking a little more often and to follow up with primary care team   - Will defer to primary care team for monitoring of cholesterol panel and blood sugar numbers with target LDL cholesterol of less than 70 and hemoglobin A1c less than 7%  - Recommend following a low salt, mediterranean diet   - Recommend routine physical exercise as tolerated     We will plan for her to return to the office in 4 months time to see on of the APPs or Dr. Phillips but would be happy to see her sooner if the need should  "arise.  If she has any symptoms concerning for TIA or stroke including sudden painless loss of vision or double vision, difficulty speaking or swallowing, vertigo/room spinning that does not quickly resolve, or weakness/numbness/loss of coordination affecting 1 side of the face or body she should proceed by ambulance to the nearest emergency room immediately.        History of Present Illness:     For Review:    We had the pleasure of evaluating Allison Gamboa in neurological follow up  today for headaches.  As you know,  she is a 38 y.o.   female with a past history of hypothyroidism/Hashimoto's thyroiditis, PCOS, obstructive sleep apnea, hypertension, chronic right frontal lobe stroke, morbid obesity, status post gastric sleeve procedure, anxiety, chronic headaches presenting today for evaluation regard to her hospitalization for suspected IIH and also subsequently discovered right frontal lobe stroke.    \"38 y.o. right handed female patient with dyslipidemia, obesity s/p gastric bypass (Dec 2022) evaluated for b/l papilledema, headaches since 6 months concerning for IIH. Character of headaches is pulsatile and pressure-like. She also has postural component to her headaches: Lying down makes it significantly worse. On exam, patient has bilateral papilledema, but no other focal sensorimotor deficits.      Initial labwork signficant for WBC 12, which is now downtrending. TSH WNL. LDL 98.    NCCTH showed chronic R frontal periventricular infarct.    CT venogram showed no evidence of CVST.  MR can of orbits showed optic nerve tortuosity b/l, partially empty sella, transverse venous sinus stenosis.    IR guided LP (12/28) demonstrated normal OP; I confirmed with IR- this was not a technical error.      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 Patient meets all aspects of the Modified Dandy Criteria for for " "IIH except for increased CSF opening pressure. However, our pretest probability for IIH was ~90% (39 y/o female, obesity, papilledema, radiographic signs) and LP has ~70% sensitivity and 80% specificity. Post test likelihood ratio will still be positive (albeit lower). Therefore, we conclude the patient has probable IIH. We do not currently suspect other etiologies for increased ICP as patient denies any new med changes (no Madeline/retinol use, OCP use, hormonal therapy use). CVST/aneurysm ruled out via negative vessel imaging. Suspect her IIH is a sequale of obesity, although she does report losing significant weight s/p gastric bypass. Recommend initiating Diamox 500mg BID.\" - per Cuong Pan MD, 12/28/2023    During the patient's hospitalization for IIH, on neuroimaging she was found to have a chronic right frontal lobe stroke.  It is noted that the patient has had no significant family history of strokes.  No history of DVT/blood clots/coagulopathies.  Her most recent LDL was 98 mg/dL and most recent hemoglobin A1c was 5.6%.  Patient had a transthoracic esophageal echocardiogram her ejection fraction was 60% and had normal biatrial size.  At that time, no specific etiology for the stroke and determined as Stinson for the time being.  Patient was recommended to continue with Plavix 75 mg once daily for secondary stroke prevention as the patient cannot take aspirin due to history of gastric bypass.  She was to continue taking atorvastatin 40 mg daily.  Recommended to have a follow-up of her thrombosis panel and referral to cardiology for Zio patch on outpatient basis.  It was noted that the patient's thrombosis panel was insignificant for lupus anticoagulant at this time.  Patient also had an PTT- LA mixing study which was slightly positive but not significant.  No other significant coagulopathy is indicated on this testing.      What medications do you take or have you taken for your headaches?   Current " "Preventive:   Diamox 500 mg twice daily     Current Abortive:   Ibuprofen (one a day with PPI), Tylenol        Interval updates as of 2/14/2024:    In some ways she notes that the headaches are better, other days not as good as she believed. More overcast and rainy days she is having more headaches. Squeezing and pressure feeling at this time. Does have sinus headaches as well but not as bad now then where they were before. Does work high stress job and some days are much worse than others with headaches from this. Not having as many \"blackouts\" with her vision as before. Went to go see Dr. Linares at [facility] for ophthalmology she states. Her ophthalmologist originally found her papilledema initially before presenting to the hospital. Field of vision loss at night time she states. Peripheral vision has changed and has gotten much worse over the last few years. States the although complete \"blackouts\" of vision do not occur now, she does not her vision is much more dim or darker than before. Not having as much worsening headaches with laying down at this time, not having as much pulsatile tinnitus she states. Pain in the neck with her headaches and pain going down the neck she states as well. Trying to lay more completely flat at this time. Diamox 500 mg twice daily at this time she states.     The patient states that she was never made aware of her frontal lobe stroke in the past.  She notes that over the past few years she had trouble with aphasia/difficulty finding words and also some issues with short-term memory.  She is not sure if this could be contributed towards that.  She still continuously takes the Plavix 75 mg daily and the atorvastatin 40 mg daily at this time.  No new strokelike symptoms reported.  She never received a referral to cardiology for additional testing she states.    How often do the headaches occur?  Once a day with some pressure to her head she states. Quick pounding throbbing and can " go away. 3-4/30 days out of the month with a debilitating headache.     Are you ever headache free? yes    What time of the day do the headaches start?   In the morning usually and in the middle of the day    How long do the headaches last?   Can take ibuprofen but with PPI, uses Tylenol but does not seems to help as much. Limited with NSAIDs due to gastric bypass. Can last 10 minutes up to about a few hours at a time.     Describe your usual headache ?  Pressure and stabbing     Where is your headache located?   Back of the head and neck pressure, but then can move to either side of the head.     What is the intensity of pain?   Average: 3/10  Worst: 8/10    Associated symptoms:   Nausea, vomiting  Photophobia, phonophobia  Blurred vision or loss of vision  Pulsatile tinnitus (left side of the head, some days going towards the right side)    Headache history with updates:  Headache are worse if the patient: bending over makes it worse and positional change     Any positional change headaches? Not having as frequent positional change headaches when laying down     Headache triggers:  stress/anxiety, weather changes, sinus issues/allergies    Aura/warning and how long does it last ? Yes, halo or tinge of yellow in vision leading up to this. About 25 minutes before headache sets in.      What time of the year do headaches occur more frequently? are usually worse in the winter and spring    Have you seen someone else for headaches or pain? No    Are you current pregnant or planning on getting pregnant? No, has a hysterectomy she states.     Have you ever had any Brain imaging? Yes, MRI brain without contrast and MRI orbits with and without contrast. I personally reviewed these images.    Reviewed old notes from physician seen in the past- see above HPI for summary of previous encounters.       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    Endometriosis     Follicular thyroid cancer (HCC)     Gastroenteritis     Hashimoto's disease     Hepatic steatosis     Hyperlipidemia     Hypertension     Hypothyroid     IBS (irritable bowel syndrome)     Kidney lesion, native, left     Kidney stone     Lymphedema     left leg    Obesity     Palpitation     Pancreatitis     Pneumonia     Polycystic ovarian disease     Polyuria     RUQ abdominal pain 11/01/2021    Sleep apnea     Stroke (HCC) 12/26/2023    Discovered on CT Scan    Thyroid nodule     Thyromegaly     Tinea corporis     Tinea pedis        Patient Active Problem List   Diagnosis    Hashimoto's thyroiditis    Hypothyroidism    BMI 50.0-59.9, adult (HCC)    Arthralgia    Eczema    Lymph edema    Mixed anxiety depressive disorder    Essential hypertension    ANA LAURA on CPAP    Sleep related hypoxia    PCOS (polycystic ovarian syndrome)    Transaminitis    Vitamin D deficiency    Monilial rash    IIH (idiopathic intracranial hypertension)    Chronic headaches    Lacunar Infarct (HCC)    Chronic R frontal stroke       Medications:      Current Outpatient Medications   Medication Sig Dispense Refill    acetaZOLAMIDE (DIAMOX) 250 mg tablet Take 2 tablets (500 mg total) by mouth 2 (two) times a day 240 tablet 0    albuterol (Ventolin HFA) 90 mcg/act inhaler Inhale 2 puffs every 6 (six) hours as needed for wheezing or shortness of breath 18 g 0    ALPRAZolam (XANAX) 0.25 mg tablet Take 1 tablet (0.25 mg total) by mouth 3 (three) times a day as needed for anxiety 60 tablet 0    atorvastatin (LIPITOR) 40 mg tablet Take 1 tablet (40 mg total) by mouth every evening 30 tablet 2    buPROPion (WELLBUTRIN) 75 mg tablet TAKE ONE TABLET BY MOUTH TWICE A DAY 60 tablet 0    Calcium Carbonate (CALCIUM 500 PO) Take 500 mg by mouth 3 (three) times  a day      citalopram (CeleXA) 20 mg tablet Take 1 tablet (20 mg total) by mouth daily 90 tablet 1    clopidogrel (PLAVIX) 75 mg tablet Take 1 tablet (75 mg total) by mouth daily 30 tablet 1    fluticasone (FLONASE) 50 mcg/act nasal spray 2 sprays into each nostril daily 16 g 0    furosemide (LASIX) 20 mg tablet Take 1 tablet (20 mg total) by mouth daily as needed (edema) 30 tablet 0    levothyroxine 200 mcg tablet Take 1 tablet Monday-Friday and 1.5 tablets on Saturday and . 90 tablet 0    levothyroxine 25 mcg tablet Take 1 tablet (25 mcg total) by mouth daily 90 tablet 0    loratadine (CLARITIN) 10 mg tablet Take 10 mg by mouth if needed      losartan (COZAAR) 50 mg tablet Take 1 tablet (50 mg total) by mouth daily 90 tablet 1    Multiple Vitamins-Minerals (Bariatric Fusion) CHEW Chew 4 tablets daily      omeprazole (PriLOSEC) 20 mg delayed release capsule TAKE ONE CAPSULE EVERY OTHER DAY 30 capsule 0    pantoprazole (PROTONIX) 40 mg tablet Take 1 tablet (40 mg total) by mouth daily 90 tablet 3     No current facility-administered medications for this visit.        Allergies:      Allergies   Allergen Reactions    Dust Mite Extract Other (See Comments)     Sinus inflammation    Latex Blisters    Molds & Smuts Other (See Comments)     Sinus inflammation    Penicillins Rash     Skin rash and sheds    Jorge Luis Grass Pollen Allergen Other (See Comments)     Sinus inflammation       Family History:     Family History   Problem Relation Age of Onset    Hyperlipidemia Mother     Restless legs syndrome Mother     Alcohol abuse Mother             Drug abuse Mother     Substance Abuse Mother     Sleep apnea Father     Hyperlipidemia Father     Hypertension Father     Alcohol abuse Father             Drug abuse Father     Substance Abuse Father     Hypertension Maternal Grandmother     Restless legs syndrome Maternal Grandmother     Ovarian cancer Maternal Grandmother     Arthritis Maternal Grandmother      Cancer Maternal Grandmother         My grandmother had cervical cancer    COPD Maternal Grandmother     Cancer Paternal Grandmother     Drug abuse Paternal Uncle     Substance Abuse Paternal Uncle        Social History:     Social History     Socioeconomic History    Marital status: /Civil Union     Spouse name: Not on file    Number of children: Not on file    Years of education: Not on file    Highest education level: Not on file   Occupational History    Not on file   Tobacco Use    Smoking status: Never    Smokeless tobacco: Never   Vaping Use    Vaping status: Never Used   Substance and Sexual Activity    Alcohol use: Not Currently    Drug use: Never    Sexual activity: Yes     Partners: Female, Male     Birth control/protection: Condom Male, Other     Comment: Hysterectomy in February 2020 for pre cervical cancer   Other Topics Concern    Not on file   Social History Narrative    Not on file     Social Determinants of Health     Financial Resource Strain: Not on file   Food Insecurity: No Food Insecurity (12/28/2023)    Hunger Vital Sign     Worried About Running Out of Food in the Last Year: Never true     Ran Out of Food in the Last Year: Never true   Transportation Needs: No Transportation Needs (12/28/2023)    PRAPARE - Transportation     Lack of Transportation (Medical): No     Lack of Transportation (Non-Medical): No   Physical Activity: Not on file   Stress: Not on file   Social Connections: Not on file   Intimate Partner Violence: Not on file   Housing Stability: Unknown (12/28/2023)    Housing Stability Vital Sign     Unable to Pay for Housing in the Last Year: No     Number of Places Lived in the Last Year: Not on file     Unstable Housing in the Last Year: No         Objective:     Physical Exam:                                                                 Vitals:            Constitutional:    /74 (BP Location: Left arm, Patient Position: Sitting, Cuff Size: Adult)   Pulse 74    Temp (!) 96.5 °F (35.8 °C) (Temporal)   Wt 133 kg (294 lb 1.6 oz)   LMP 12/17/2019   SpO2 99%   BMI 48.94 kg/m²   BP Readings from Last 3 Encounters:   02/14/24 118/74   01/12/24 120/72   01/03/24 96/68     Pulse Readings from Last 3 Encounters:   02/14/24 74   01/12/24 75   01/03/24 87         Well developed, well nourished, well groomed. No dysmorphic features.       Psychiatric:  Normal behavior and appropriate affect        Neurological Examination:     Mental status/cognitive function:   Orientated to time, place and person. Recent and remote memory intact. Attention span and concentration as well as fund of knowledge are appropriate for age. Normal language and spontaneous speech.     Cranial Nerves:  II-visual fields full.   Fundi poorly visualized due to pupillary constriction  III, IV, VI-Pupils were round and reactive to light. Anisocoria of the right eye noted. Extraocular movements were full and conjugate without nystagmus. Conjugate gaze, normal smooth pursuits, normal saccades   V-facial sensation symmetric.    VII-facial expression symmetric, intact forehead wrinkle, strong eye closure, symmetric smile    VIII-hearing grossly intact bilaterally   IX, X-palate elevation symmetric, no dysarthria.   XI-shoulder shrug strength intact    XII-tongue protrusion midline.    Motor Exam: symmetric bulk and tone throughout, no pronator drift. Power/strength 5/5 bilateral upper and lower extremities, no atrophy, fasciculations or abnormal movements noted.   Sensory: grossly intact light touch in all extremities.   Reflexes: brachioradialis 2+, biceps 2+, knee 2+ bilaterally  Coordination: Finger nose finger intact bilaterally, no apparent dysmetria, ataxia or tremor noted  Gait: steady casual and tandem gait.       Review of Systems:     Review of Systems   Constitutional:  Negative for appetite change, fatigue and fever.   HENT: Negative.  Negative for hearing loss, tinnitus, trouble swallowing and voice  change.    Eyes: Negative.  Negative for photophobia, pain and visual disturbance.   Respiratory: Negative.  Negative for shortness of breath.    Cardiovascular: Negative.  Negative for palpitations.   Gastrointestinal: Negative.  Negative for nausea and vomiting.   Endocrine: Negative.  Negative for cold intolerance.   Genitourinary: Negative.  Negative for dysuria, frequency and urgency.   Musculoskeletal:  Positive for back pain and neck pain. Negative for gait problem, myalgias and neck stiffness.   Skin: Negative.  Negative for rash.   Allergic/Immunologic: Negative.    Neurological:  Positive for headaches. Negative for dizziness, tremors, seizures, syncope, facial asymmetry, speech difficulty, weakness, light-headedness and numbness.   Hematological: Negative.  Does not bruise/bleed easily.   Psychiatric/Behavioral: Negative.  Negative for confusion, hallucinations and sleep disturbance.    All other systems reviewed and are negative.      I personally reviewed the ROS entered by the MA    I spent 60 minutes in total time for this visit.    Author:  Maikel Kapadia PA-C 2/14/2024 10:18 AM

## 2024-02-14 NOTE — PATIENT INSTRUCTIONS
Idiopathic intracranial hypertension/potential migraines with aura:    -In regard to the patient's IIH, would like for the patient to start taking Diamox 250 mg, take 3 tablets by mouth twice daily at this point in time.  Patient is still having persistent issues in regards to some additional headaches, some positional change headaches, and also still having difficulty with her peripheral vision and noticing just more dimming of her vision at this time.  To make sure there is not any issues with recurring elevated ICP, would like to slightly increase the dosage of Diamox to see if the patient can tolerate this and see if I can help with some of her symptoms.  -Would like for the patient to start taking cyproheptadine 4 mg, take 1 tablet by mouth once daily at bedtime at this point in time.  I think that some of the patient's headaches are related to may be potentially migraines or sinus headaches outside of the current IIH.  With this being said, she noted significant weather change headaches and also headaches related to potential seasonal changes or allergies, therefore I do feel as though it is appropriate to try cyproheptadine and see if this also helps as well with the patient's symptoms.  -Would continue to follow with ophthalmology at this time and would like for the patient to have the ophthalmology evaluation and note sent over to us at [facility] that way we can compare with what were doing to treat her IIH to what is currently being seen through the means of her ophthalmologist and repeat visual field testing and eye exams.  -Would like for the patient to also continue with her weight management and exercise at this moment in time.  Feel as though it is important for the patient to lose as much weight as much as she possibly can to help prevent the reoccurrence of IIH moving forward.  -Finally, if the patient is experiencing any new visual changes or any worsening visual symptoms at  this point in time she should report to the ED as soon as possible to make sure that she can be evaluated for an ophthalmologic emergency.  We do not want to risk the patient's changes in vision at this point in time and make sure that we are adequately reducing her pressure if need be and see if the patient needs to have an additional lumbar puncture as well.      History of chronic right frontal lobe infarct:    -Repeat MRI brain and orbits with and without contrast in about 4 months time.  Patient can schedule this appointment before she leaves today.  -Repeat thrombosis panel ordered and repeat lipid panel ordered to be completed before the next appointment in 4 months time.  -Ambulatory referral to cardiology placed for the patient to have a Zio patch to be properly evaluated for a potential cardioembolic cause of her previous right chronic frontal lobe infarct.    - For ongoing stroke prevention continue: Plavix 75 mg once daily, atorvastatin 40 mg once daily    - Discussed the importance of antiplatelet management with the patient to prevent future strokes.   - Recommend to check blood pressure occasionally away from the doctor's office to make sure that those numbers are typically less than 130/80.  If they are frequently higher than that, we recommend checking a little more often and to follow up with primary care team   - Will defer to primary care team for monitoring of cholesterol panel and blood sugar numbers with target LDL cholesterol of less than 70 and hemoglobin A1c less than 7%  - Recommend following a low salt, mediterranean diet   - Recommend routine physical exercise as tolerated     We will plan for her to return to the office in 4 months time to see on of the APPs or Dr. Phillips but would be happy to see her sooner if the need should arise.  If she has any symptoms concerning for TIA or stroke including sudden painless loss of vision or double vision, difficulty speaking or swallowing,  vertigo/room spinning that does not quickly resolve, or weakness/numbness/loss of coordination affecting 1 side of the face or body she should proceed by ambulance to the nearest emergency room immediately.

## 2024-02-14 NOTE — PROGRESS NOTES
Review of Systems   Constitutional:  Negative for appetite change, fatigue and fever.   HENT: Negative.  Negative for hearing loss, tinnitus, trouble swallowing and voice change.    Eyes: Negative.  Negative for photophobia, pain and visual disturbance.   Respiratory: Negative.  Negative for shortness of breath.    Cardiovascular: Negative.  Negative for palpitations.   Gastrointestinal: Negative.  Negative for nausea and vomiting.   Endocrine: Negative.  Negative for cold intolerance.   Genitourinary: Negative.  Negative for dysuria, frequency and urgency.   Musculoskeletal:  Positive for back pain and neck pain. Negative for gait problem, myalgias and neck stiffness.   Skin: Negative.  Negative for rash.   Allergic/Immunologic: Negative.    Neurological:  Positive for headaches. Negative for dizziness, tremors, seizures, syncope, facial asymmetry, speech difficulty, weakness, light-headedness and numbness.   Hematological: Negative.  Does not bruise/bleed easily.   Psychiatric/Behavioral: Negative.  Negative for confusion, hallucinations and sleep disturbance.    All other systems reviewed and are negative.

## 2024-02-22 DIAGNOSIS — G93.2 IIH (IDIOPATHIC INTRACRANIAL HYPERTENSION): ICD-10-CM

## 2024-02-23 RX ORDER — ACETAZOLAMIDE 250 MG/1
750 TABLET ORAL 2 TIMES DAILY
Qty: 180 TABLET | Refills: 1 | Status: SHIPPED | OUTPATIENT
Start: 2024-02-23 | End: 2024-04-23

## 2024-02-23 NOTE — TELEPHONE ENCOUNTER
Pt is requesting a script for acetazolamide. States she has only one dose left. Last OV was 2/14/24. Routed to provider to review.

## 2024-02-27 DIAGNOSIS — F41.8 MIXED ANXIETY DEPRESSIVE DISORDER: ICD-10-CM

## 2024-02-27 RX ORDER — CITALOPRAM 20 MG/1
20 TABLET ORAL DAILY
Qty: 90 TABLET | Refills: 1 | Status: SHIPPED | OUTPATIENT
Start: 2024-02-27

## 2024-02-27 RX ORDER — BUPROPION HYDROCHLORIDE 75 MG/1
75 TABLET ORAL 2 TIMES DAILY
Qty: 60 TABLET | Refills: 0 | Status: SHIPPED | OUTPATIENT
Start: 2024-02-27

## 2024-03-03 ENCOUNTER — HOSPITAL ENCOUNTER (EMERGENCY)
Facility: HOSPITAL | Age: 39
Discharge: HOME | End: 2024-03-03
Attending: EMERGENCY MEDICINE
Payer: COMMERCIAL

## 2024-03-03 VITALS
SYSTOLIC BLOOD PRESSURE: 147 MMHG | TEMPERATURE: 97.4 F | RESPIRATION RATE: 20 BRPM | DIASTOLIC BLOOD PRESSURE: 80 MMHG | HEART RATE: 81 BPM | OXYGEN SATURATION: 97 %

## 2024-03-03 DIAGNOSIS — N39.0 UTI (URINARY TRACT INFECTION): Primary | ICD-10-CM

## 2024-03-03 DIAGNOSIS — N30.91 HEMORRHAGIC CYSTITIS: ICD-10-CM

## 2024-03-03 LAB
ALBUMIN SERPL BCP-MCNC: 4.1 G/DL (ref 3.5–5)
ALP SERPL-CCNC: 117 U/L (ref 34–104)
ALT SERPL W P-5'-P-CCNC: 20 U/L (ref 7–52)
ANION GAP SERPL CALCULATED.3IONS-SCNC: 6 MMOL/L
AST SERPL W P-5'-P-CCNC: 19 U/L (ref 13–39)
BACTERIA UR QL AUTO: ABNORMAL /HPF
BASOPHILS # BLD AUTO: 0.06 THOUSANDS/ÂΜL (ref 0–0.1)
BASOPHILS NFR BLD AUTO: 0 % (ref 0–1)
BILIRUB SERPL-MCNC: 0.38 MG/DL (ref 0.2–1)
BILIRUB UR QL STRIP: NEGATIVE
BUN SERPL-MCNC: 20 MG/DL (ref 5–25)
CALCIUM SERPL-MCNC: 8.9 MG/DL (ref 8.4–10.2)
CHLORIDE SERPL-SCNC: 111 MMOL/L (ref 96–108)
CLARITY UR: ABNORMAL
CO2 SERPL-SCNC: 22 MMOL/L (ref 21–32)
COLOR UR: ABNORMAL
CREAT SERPL-MCNC: 0.74 MG/DL (ref 0.6–1.3)
EOSINOPHIL # BLD AUTO: 0.12 THOUSAND/ÂΜL (ref 0–0.61)
EOSINOPHIL NFR BLD AUTO: 1 % (ref 0–6)
ERYTHROCYTE [DISTWIDTH] IN BLOOD BY AUTOMATED COUNT: 14.6 % (ref 11.6–15.1)
GFR SERPL CREATININE-BSD FRML MDRD: 103 ML/MIN/1.73SQ M
GLUCOSE SERPL-MCNC: 91 MG/DL (ref 65–140)
GLUCOSE UR STRIP-MCNC: NEGATIVE MG/DL
HCT VFR BLD AUTO: 38.7 % (ref 34.8–46.1)
HGB BLD-MCNC: 12.1 G/DL (ref 11.5–15.4)
HGB UR QL STRIP.AUTO: ABNORMAL
IMM GRANULOCYTES # BLD AUTO: 0.05 THOUSAND/UL (ref 0–0.2)
IMM GRANULOCYTES NFR BLD AUTO: 0 % (ref 0–2)
KETONES UR STRIP-MCNC: NEGATIVE MG/DL
LEUKOCYTE ESTERASE UR QL STRIP: ABNORMAL
LYMPHOCYTES # BLD AUTO: 3.02 THOUSANDS/ÂΜL (ref 0.6–4.47)
LYMPHOCYTES NFR BLD AUTO: 21 % (ref 14–44)
MCH RBC QN AUTO: 28.1 PG (ref 26.8–34.3)
MCHC RBC AUTO-ENTMCNC: 31.3 G/DL (ref 31.4–37.4)
MCV RBC AUTO: 90 FL (ref 82–98)
MONOCYTES # BLD AUTO: 1.02 THOUSAND/ÂΜL (ref 0.17–1.22)
MONOCYTES NFR BLD AUTO: 7 % (ref 4–12)
NEUTROPHILS # BLD AUTO: 9.91 THOUSANDS/ÂΜL (ref 1.85–7.62)
NEUTS SEG NFR BLD AUTO: 71 % (ref 43–75)
NITRITE UR QL STRIP: NEGATIVE
NON-SQ EPI CELLS URNS QL MICRO: ABNORMAL /HPF
NRBC BLD AUTO-RTO: 0 /100 WBCS
PH UR STRIP.AUTO: 5.5 [PH]
PLATELET # BLD AUTO: 254 THOUSANDS/UL (ref 149–390)
PMV BLD AUTO: 11.6 FL (ref 8.9–12.7)
POTASSIUM SERPL-SCNC: 4.2 MMOL/L (ref 3.5–5.3)
PROT SERPL-MCNC: 7.6 G/DL (ref 6.4–8.4)
PROT UR STRIP-MCNC: ABNORMAL MG/DL
RBC # BLD AUTO: 4.3 MILLION/UL (ref 3.81–5.12)
RBC #/AREA URNS AUTO: ABNORMAL /HPF
SODIUM SERPL-SCNC: 139 MMOL/L (ref 135–147)
SP GR UR STRIP.AUTO: 1.02 (ref 1–1.03)
UROBILINOGEN UR STRIP-ACNC: <2 MG/DL
WBC # BLD AUTO: 14.18 THOUSAND/UL (ref 4.31–10.16)
WBC #/AREA URNS AUTO: ABNORMAL /HPF

## 2024-03-03 PROCEDURE — 85025 COMPLETE CBC W/AUTO DIFF WBC: CPT

## 2024-03-03 PROCEDURE — 80053 COMPREHEN METABOLIC PANEL: CPT

## 2024-03-03 PROCEDURE — 81001 URINALYSIS AUTO W/SCOPE: CPT

## 2024-03-03 PROCEDURE — 99283 EMERGENCY DEPT VISIT LOW MDM: CPT

## 2024-03-03 PROCEDURE — 87186 SC STD MICRODIL/AGAR DIL: CPT

## 2024-03-03 PROCEDURE — 36415 COLL VENOUS BLD VENIPUNCTURE: CPT

## 2024-03-03 PROCEDURE — 87086 URINE CULTURE/COLONY COUNT: CPT

## 2024-03-03 PROCEDURE — 87077 CULTURE AEROBIC IDENTIFY: CPT

## 2024-03-03 RX ORDER — CEPHALEXIN 500 MG/1
500 CAPSULE ORAL EVERY 12 HOURS SCHEDULED
Qty: 20 CAPSULE | Refills: 0 | Status: SHIPPED | OUTPATIENT
Start: 2024-03-03 | End: 2024-03-13

## 2024-03-03 RX ORDER — PHENAZOPYRIDINE HYDROCHLORIDE 100 MG/1
100 TABLET, FILM COATED ORAL ONCE
Status: COMPLETED | OUTPATIENT
Start: 2024-03-03 | End: 2024-03-03

## 2024-03-03 RX ORDER — CEPHALEXIN 500 MG/1
500 CAPSULE ORAL ONCE
Status: COMPLETED | OUTPATIENT
Start: 2024-03-03 | End: 2024-03-03

## 2024-03-03 RX ADMIN — CEPHALEXIN 500 MG: 500 CAPSULE ORAL at 19:03

## 2024-03-03 RX ADMIN — PHENAZOPYRIDINE 100 MG: 100 TABLET ORAL at 18:32

## 2024-03-03 NOTE — ED ATTENDING ATTESTATION
3/3/2024  I, Sonam King DO, saw and evaluated the patient. I have discussed the patient with the resident/non-physician practitioner and agree with the resident's/non-physician practitioner's findings, Plan of Care, and MDM as documented in the resident's/non-physician practitioner's note, except where noted. All available labs and Radiology studies were reviewed.  I was present for key portions of any procedure(s) performed by the resident/non-physician practitioner and I was immediately available to provide assistance.       At this point I agree with the current assessment done in the Emergency Department.  I have conducted an independent evaluation of this patient a history and physical is as follows:    38-year-old female presents with hematuria.  Patient states that started around 145 this afternoon.  She denies abdominal pain, no back pain.  Denies nausea vomiting.  Does report dysuria as well as urinary frequency.  She states this feels similar to previous urinary tract infections.  Denies vaginal bleeding.  On exam-no acute distress, appears nontoxic, heart regular, no respiratory distress.  Plan-suspect hemorrhagic cystitis.  Ureterolithiasis seems less likely without discomfort, patient is on Plavix so we will check labs.  Check urine.  If all normal start antibiotics and patient likely stable for discharge with follow-up.  Will be given strict return precautions including return if symptoms worsen or persist or if any difficulty urinating    ED Course         Critical Care Time  Procedures

## 2024-03-03 NOTE — ED PROVIDER NOTES
History  Chief Complaint   Patient presents with    Blood in Urine     Pt presents ambulatory with c/o hematuria and pain with urination since about 1345 today     38-year-old female with past medical history of Yuko-en-Y gastric bypass and hysterectomy presents to the ED for evaluation of burning sensation with urination and hematuria for the past 2 hours.  Patient notes that she was walking out of the house heading towards movies when she felt sudden urge to urinate.  When she wiped she noted blood, and then noted blood in her urine.  Patient reports no similar episodes in the past.  Patient denies fever, chills, nausea, vomiting, vaginal bleed      History provided by:  Patient   used: No        Prior to Admission Medications   Prescriptions Last Dose Informant Patient Reported? Taking?   ALPRAZolam (XANAX) 0.25 mg tablet  Self No No   Sig: Take 1 tablet (0.25 mg total) by mouth 3 (three) times a day as needed for anxiety   Calcium Carbonate (CALCIUM 500 PO)  Self Yes No   Sig: Take 500 mg by mouth 3 (three) times a day   Multiple Vitamins-Minerals (Bariatric Fusion) CHEW  Self Yes No   Sig: Chew 4 tablets daily   acetaZOLAMIDE (DIAMOX) 250 mg tablet   No No   Sig: Take 3 tablets (750 mg total) by mouth 2 (two) times a day   albuterol (Ventolin HFA) 90 mcg/act inhaler  Self No No   Sig: Inhale 2 puffs every 6 (six) hours as needed for wheezing or shortness of breath   atorvastatin (LIPITOR) 40 mg tablet  Self No No   Sig: Take 1 tablet (40 mg total) by mouth every evening   buPROPion (WELLBUTRIN) 75 mg tablet   No No   Sig: TAKE ONE TABLET BY MOUTH TWICE A DAY   citalopram (CeleXA) 20 mg tablet   No No   Sig: TAKE ONE TABLET BY MOUTH EVERY DAY   clopidogrel (PLAVIX) 75 mg tablet  Self No No   Sig: Take 1 tablet (75 mg total) by mouth daily   cyproheptadine (PERIACTIN) 4 mg tablet   No No   Sig: Take 1 tablet (4 mg total) by mouth daily at bedtime   fluticasone (FLONASE) 50 mcg/act nasal spray   Self No No   Si sprays into each nostril daily   furosemide (LASIX) 20 mg tablet  Self No No   Sig: Take 1 tablet (20 mg total) by mouth daily as needed (edema)   levothyroxine 200 mcg tablet  Self No No   Sig: Take 1 tablet Monday-Friday and 1.5 tablets on Saturday and .   levothyroxine 25 mcg tablet  Self No No   Sig: Take 1 tablet (25 mcg total) by mouth daily   loratadine (CLARITIN) 10 mg tablet  Self Yes No   Sig: Take 10 mg by mouth if needed   losartan (COZAAR) 50 mg tablet  Self No No   Sig: Take 1 tablet (50 mg total) by mouth daily   omeprazole (PriLOSEC) 20 mg delayed release capsule  Self No No   Sig: TAKE ONE CAPSULE EVERY OTHER DAY   pantoprazole (PROTONIX) 40 mg tablet  Self No No   Sig: Take 1 tablet (40 mg total) by mouth daily      Facility-Administered Medications: None       Past Medical History:   Diagnosis Date    Anxiety     Bruises easily     Cancer (HCC)     COVID-19 10/10/2022    CPAP (continuous positive airway pressure) dependence     Depression     Disease of thyroid gland     DELVALLE (dyspnea on exertion)     EIN (endometrial intraepithelial neoplasia) 2019    Endometriosis     Follicular thyroid cancer (HCC)     Gastroenteritis     Hashimoto's disease     Hepatic steatosis     Hyperlipidemia     Hypertension     Hypothyroid     IBS (irritable bowel syndrome)     Kidney lesion, native, left     Kidney stone     Lymphedema     left leg    Obesity     Palpitation     Pancreatitis     Pneumonia     Polycystic ovarian disease     Polyuria     RUQ abdominal pain 2021    Sleep apnea     Stroke (HCC) 2023    Discovered on CT Scan    Thyroid nodule     Thyromegaly     Tinea corporis     Tinea pedis        Past Surgical History:   Procedure Laterality Date    ADENOIDECTOMY      DILATION AND CURETTAGE OF UTERUS      HYSTERECTOMY  2020    left ovaries    IR LUMBAR PUNCTURE  2023    WY LAPS GSTR RSTCV PX W/BYP ELKIN-EN-Y LIMB <150 CM N/A 2022    Procedure:  BYPASS GASTRIC ELKIN-EN-Y LAPAROSCOPIC ROBOT & INTRAOPERATIVE EGD;  Surgeon: Graeme Pool MD;  Location: [facility];  Service: Bariatrics    THYROIDECTOMY, PARTIAL Left 2019    benign    TONSILLECTOMY      US GUIDED THYROID BIOPSY  2019       Family History   Problem Relation Age of Onset    Hyperlipidemia Mother     Restless legs syndrome Mother     Alcohol abuse Mother             Drug abuse Mother     Substance Abuse Mother     Sleep apnea Father     Hyperlipidemia Father     Hypertension Father     Alcohol abuse Father             Drug abuse Father     Substance Abuse Father     Hypertension Maternal Grandmother     Restless legs syndrome Maternal Grandmother     Ovarian cancer Maternal Grandmother     Arthritis Maternal Grandmother     Cancer Maternal Grandmother         My grandmother had cervical cancer    COPD Maternal Grandmother     Cancer Paternal Grandmother     Drug abuse Paternal Uncle     Substance Abuse Paternal Uncle      I have reviewed and agree with the history as documented.    E-Cigarette/Vaping    E-Cigarette Use Never User      E-Cigarette/Vaping Substances    Nicotine No     THC No     CBD No     Flavoring No     Other No     Unknown No      Social History     Tobacco Use    Smoking status: Never    Smokeless tobacco: Never   Vaping Use    Vaping status: Never Used   Substance Use Topics    Alcohol use: Not Currently    Drug use: Never        Review of Systems   Constitutional:  Negative for appetite change, diaphoresis, fever and unexpected weight change.   HENT:  Negative for dental problem, ear pain, facial swelling, sore throat and trouble swallowing.    Eyes:  Negative for pain and visual disturbance.   Respiratory:  Negative for cough, chest tightness and shortness of breath.    Cardiovascular:  Negative for chest pain, palpitations and leg swelling.   Gastrointestinal:  Negative for abdominal distention, abdominal pain, constipation, diarrhea, nausea and  vomiting.   Endocrine: Negative for polyuria.   Genitourinary:  Positive for dysuria, hematuria and urgency. Negative for difficulty urinating.   Musculoskeletal:  Negative for back pain.   Neurological:  Negative for dizziness, syncope, light-headedness and headaches.   Psychiatric/Behavioral:  Negative for confusion.    All other systems reviewed and are negative.      Physical Exam  ED Triage Vitals [03/03/24 1709]   Temperature Pulse Respirations Blood Pressure SpO2   (!) 97.4 °F (36.3 °C) 81 20 147/80 97 %      Temp Source Heart Rate Source Patient Position - Orthostatic VS BP Location FiO2 (%)   Temporal Monitor -- -- --      Pain Score       --             Orthostatic Vital Signs  Vitals:    03/03/24 1709   BP: 147/80   Pulse: 81       Physical Exam  Vitals and nursing note reviewed.   Constitutional:       General: She is not in acute distress.     Appearance: She is well-developed.   HENT:      Head: Normocephalic and atraumatic.   Eyes:      Conjunctiva/sclera: Conjunctivae normal.   Cardiovascular:      Rate and Rhythm: Normal rate and regular rhythm.      Heart sounds: No murmur heard.  Pulmonary:      Effort: Pulmonary effort is normal. No respiratory distress.      Breath sounds: Normal breath sounds.   Abdominal:      Palpations: Abdomen is soft.      Tenderness: There is no abdominal tenderness.   Musculoskeletal:         General: No swelling.      Cervical back: Neck supple.   Skin:     General: Skin is warm and dry.      Capillary Refill: Capillary refill takes less than 2 seconds.   Neurological:      Mental Status: She is alert.   Psychiatric:         Mood and Affect: Mood normal.         ED Medications  Medications   phenazopyridine (PYRIDIUM) tablet 100 mg (100 mg Oral Given 3/3/24 1832)   cephalexin (KEFLEX) capsule 500 mg (500 mg Oral Given 3/3/24 1903)       Diagnostic Studies  Results Reviewed       Procedure Component Value Units Date/Time    Urine Microscopic [[id number]]  (Abnormal)  Collected: 03/03/24 1818    Lab Status: Final result Specimen: Urine, Clean Catch Updated: 03/03/24 1852     RBC, UA Innumerable /hpf      WBC, UA Innumerable /hpf      Epithelial Cells None Seen /hpf      Bacteria, UA Moderate /hpf     Urine culture [[id number]] Collected: 03/03/24 1818    Lab Status: In process Specimen: Urine, Clean Catch Updated: 03/03/24 1852    Comprehensive metabolic panel [[id number]]  (Abnormal) Collected: 03/03/24 1818    Lab Status: Final result Specimen: Blood from Arm, Left Updated: 03/03/24 1843     Sodium 139 mmol/L      Potassium 4.2 mmol/L      Chloride 111 mmol/L      CO2 22 mmol/L      ANION GAP 6 mmol/L      BUN 20 mg/dL      Creatinine 0.74 mg/dL      Glucose 91 mg/dL      Calcium 8.9 mg/dL      AST 19 U/L      ALT 20 U/L      Alkaline Phosphatase 117 U/L      Total Protein 7.6 g/dL      Albumin 4.1 g/dL      Total Bilirubin 0.38 mg/dL      eGFR 103 ml/min/1.73sq m     Narrative:      National Kidney Disease Foundation guidelines for Chronic Kidney Disease (CKD):     Stage 1 with normal or high GFR (GFR > 90 mL/min/1.73 square meters)    Stage 2 Mild CKD (GFR = 60-89 mL/min/1.73 square meters)    Stage 3A Moderate CKD (GFR = 45-59 mL/min/1.73 square meters)    Stage 3B Moderate CKD (GFR = 30-44 mL/min/1.73 square meters)    Stage 4 Severe CKD (GFR = 15-29 mL/min/1.73 square meters)    Stage 5 End Stage CKD (GFR <15 mL/min/1.73 square meters)  Note: GFR calculation is accurate only with a steady state creatinine    UA w Reflex to Microscopic w Reflex to Culture [[id number]]  (Abnormal) Collected: 03/03/24 1818    Lab Status: Final result Specimen: Urine, Clean Catch Updated: 03/03/24 1839     Color, UA Dark Brown     Clarity, UA Extra Turbid     Specific Gravity, UA 1.018     pH, UA 5.5     Leukocytes, UA Large     Nitrite, UA Negative     Protein,  (2+) mg/dl      Glucose, UA Negative mg/dl      Ketones, UA Negative mg/dl      Urobilinogen, UA <2.0 mg/dl      Bilirubin, UA  Negative     Occult Blood, UA Large    CBC and differential [[id number]]  (Abnormal) Collected: 03/03/24 1818    Lab Status: Final result Specimen: Blood from Arm, Left Updated: 03/03/24 1827     WBC 14.18 Thousand/uL      RBC 4.30 Million/uL      Hemoglobin 12.1 g/dL      Hematocrit 38.7 %      MCV 90 fL      MCH 28.1 pg      MCHC 31.3 g/dL      RDW 14.6 %      MPV 11.6 fL      Platelets 254 Thousands/uL      nRBC 0 /100 WBCs      Neutrophils Relative 71 %      Immat GRANS % 0 %      Lymphocytes Relative 21 %      Monocytes Relative 7 %      Eosinophils Relative 1 %      Basophils Relative 0 %      Neutrophils Absolute 9.91 Thousands/µL      Immature Grans Absolute 0.05 Thousand/uL      Lymphocytes Absolute 3.02 Thousands/µL      Monocytes Absolute 1.02 Thousand/µL      Eosinophils Absolute 0.12 Thousand/µL      Basophils Absolute 0.06 Thousands/µL                    No orders to display         Procedures  Procedures      ED Course  ED Course as of 03/03/24 2145   Sun Mar 03, 2024   1856 WBC, UA(!): Innumerable   1856 Sodium: 139   1856 Bacteria, UA(!): Moderate   1856 WBC(!): 14.18                             SBIRT 20yo+      Flowsheet Row Most Recent Value   Initial Alcohol Screen: US AUDIT-C     1. How often do you have a drink containing alcohol? 0 Filed at: 03/03/2024 1710   2. How many drinks containing alcohol do you have on a typical day you are drinking?  0 Filed at: 03/03/2024 1710   3a. Male UNDER 65: How often do you have five or more drinks on one occasion? 0 Filed at: 03/03/2024 1710   3b. FEMALE Any Age, or MALE 65+: How often do you have 4 or more drinks on one occassion? 0 Filed at: 03/03/2024 1710   Audit-C Score 0 Filed at: 03/03/2024 1710   JACOB: How many times in the past year have you...    Used an illegal drug or used a prescription medication for non-medical reasons? Never Filed at: 03/03/2024 1710                  Medical Decision Making  38-year-old female with past medical history of  Yuko-en-Y gastric bypass and hysterectomy presents to the ED for evaluation of burning sensation with urination and hematuria for the past 2 hours.  Patient notes that she was walking out of the house heading towards movies when she felt sudden urge to urinate.  When she wiped she noted blood, and then noted blood in her urine.  Patient reports no similar episodes in the past.  Patient denies fever, chills, nausea, vomiting, vaginal bleed    Symptoms consistent with hemorrhagic cystitis will evaluate with urinalysis and treat with pyridoxine    UA significant for UTI, patient given first dose of antibiotics  Strict return precaution given to return to the ED if patient unable to urinate.   Patient discharged home with follow-up with urology and PCP.      Amount and/or Complexity of Data Reviewed  Labs: ordered. Decision-making details documented in ED Course.    Risk  Prescription drug management.          Disposition  Final diagnoses:   UTI (urinary tract infection)   Hemorrhagic cystitis     Time reflects when diagnosis was documented in both MDM as applicable and the Disposition within this note       Time User Action Codes Description Comment    3/3/2024  6:57 PM Raymundo Ceron Add [N39.0] UTI (urinary tract infection)     3/3/2024  6:58 PM Raymundo Ceron Add [N30.91] Hemorrhagic cystitis           ED Disposition       ED Disposition   Discharge    Condition   Stable    Date/Time   Sun Mar 3, 2024 1857    Comment   Allison Gamboa discharge to home/self care.                   Follow-up Information       Follow up With Specialties Details Why Contact Info    Elissa Hyatt MD Family Medicine, Internal Medicine In 1 week  [address] [place] [id number] [phone]              Discharge Medication List as of 3/3/2024  7:00 PM        START taking these medications    Details   cephalexin (KEFLEX) 500 mg capsule Take 1 capsule (500 mg total) by mouth every 12 (twelve) hours for 10 days, Starting Sun  3/3/2024, Until Wed 3/13/2024, Normal           CONTINUE these medications which have NOT CHANGED    Details   acetaZOLAMIDE (DIAMOX) 250 mg tablet Take 3 tablets (750 mg total) by mouth 2 (two) times a day, Starting Fri 2/23/2024, Until Tue 4/23/2024, Normal      albuterol (Ventolin HFA) 90 mcg/act inhaler Inhale 2 puffs every 6 (six) hours as needed for wheezing or shortness of breath, Starting Wed 7/19/2023, Normal      ALPRAZolam (XANAX) 0.25 mg tablet Take 1 tablet (0.25 mg total) by mouth 3 (three) times a day as needed for anxiety, Starting Tue 8/17/2021, Normal      atorvastatin (LIPITOR) 40 mg tablet Take 1 tablet (40 mg total) by mouth every evening, Starting Wed 1/24/2024, Until Tue 4/23/2024, Normal      buPROPion (WELLBUTRIN) 75 mg tablet TAKE ONE TABLET BY MOUTH TWICE A DAY, Starting Tue 2/27/2024, Normal      Calcium Carbonate (CALCIUM 500 PO) Take 500 mg by mouth 3 (three) times a day, Historical Med      citalopram (CeleXA) 20 mg tablet TAKE ONE TABLET BY MOUTH EVERY DAY, Starting Tue 2/27/2024, Normal      clopidogrel (PLAVIX) 75 mg tablet Take 1 tablet (75 mg total) by mouth daily, Starting Wed 1/24/2024, Until Sun 3/24/2024, Normal      cyproheptadine (PERIACTIN) 4 mg tablet Take 1 tablet (4 mg total) by mouth daily at bedtime, Starting Wed 2/14/2024, Normal      fluticasone (FLONASE) 50 mcg/act nasal spray 2 sprays into each nostril daily, Starting Thu 4/4/2019, Normal      furosemide (LASIX) 20 mg tablet Take 1 tablet (20 mg total) by mouth daily as needed (edema), Starting Tue 6/20/2023, Until Wed 2/14/2024 at 2359, Normal      !! levothyroxine 200 mcg tablet Take 1 tablet Monday-Friday and 1.5 tablets on Saturday and Sunday., Normal      !! levothyroxine 25 mcg tablet Take 1 tablet (25 mcg total) by mouth daily, Starting Wed 1/24/2024, Normal      loratadine (CLARITIN) 10 mg tablet Take 10 mg by mouth if needed, Historical Med      losartan (COZAAR) 50 mg tablet Take 1 tablet (50 mg total) by  mouth daily, Starting Wed 1/3/2024, Normal      Multiple Vitamins-Minerals (Bariatric Fusion) CHEW Chew 4 tablets daily, Historical Med      omeprazole (PriLOSEC) 20 mg delayed release capsule TAKE ONE CAPSULE EVERY OTHER DAY, Normal      pantoprazole (PROTONIX) 40 mg tablet Take 1 tablet (40 mg total) by mouth daily, Starting Fri 1/12/2024, Normal       !! - Potential duplicate medications found. Please discuss with provider.            PDMP Review         Value Time User    PDMP Reviewed  Yes 11/21/2022 10:46 AM Beatriz Rowland PA-C             ED Provider  Attending physically available and evaluated Allison Gamboa. I managed the patient along with the ED Attending.    Electronically Signed by           Raymundo Ceron DO  03/03/24 [id number]

## 2024-03-03 NOTE — DISCHARGE INSTRUCTIONS
You were evaluated in the Emergency Department today for evaluation of blood in your urine.     Please schedule an appointment with your primary care physician within the next 2-3 days.    Return to the Emergency Department if you experience worsening or uncontrolled pain, fevers 100.4°F or greater, recurrent vomiting, inability to tolerate food or fluids by mouth, bloody stools or vomit, black or tarry stools, or any other concerning symptoms.    Thank you for choosing us for your care.

## 2024-03-04 ENCOUNTER — VBI (OUTPATIENT)
Dept: FAMILY MEDICINE CLINIC | Facility: CLINIC | Age: 39
End: 2024-03-04

## 2024-03-04 NOTE — TELEPHONE ENCOUNTER
03/04/24 2:30 PM    Patient contacted post ED visit, [facility] department spoke with patient/caregiver and outreach was successful.    Thank you.  Garrett Rosa MA  [facility]

## 2024-03-05 LAB — BACTERIA UR CULT: ABNORMAL

## 2024-03-26 ENCOUNTER — OFFICE VISIT (OUTPATIENT)
Dept: CARDIOLOGY CLINIC | Facility: CLINIC | Age: 39
End: 2024-03-26
Payer: COMMERCIAL

## 2024-03-26 VITALS
WEIGHT: 293 LBS | DIASTOLIC BLOOD PRESSURE: 68 MMHG | BODY MASS INDEX: 48.82 KG/M2 | HEART RATE: 83 BPM | SYSTOLIC BLOOD PRESSURE: 128 MMHG | OXYGEN SATURATION: 98 % | HEIGHT: 65 IN

## 2024-03-26 DIAGNOSIS — I63.81 LACUNAR STROKE (HCC): ICD-10-CM

## 2024-03-26 PROCEDURE — 99214 OFFICE O/P EST MOD 30 MIN: CPT | Performed by: INTERNAL MEDICINE

## 2024-03-26 NOTE — PROGRESS NOTES
Cardiology Follow Up    Allison Gamboa  1985  [id number]  [facility]  [address]  [address] [id number] [phone]    1. Lacunar Infarct (HCC)  Ambulatory Referral to Cardiology          Interval History: Followup for CVA    Recent evaluation for headaches and she was found to have a chronic right frontal lobe CVA. She has no chest pain or shortness of breath.     Medical Problems       Problem List       Hashimoto's thyroiditis    Hypothyroidism    BMI 50.0-59.9, adult (HCC)    Arthralgia    Eczema    Lymph edema    Mixed anxiety depressive disorder    Essential hypertension    ANA LAURA on CPAP    Overview Signed 5/10/2022  3:17 PM by Melo Bobby MD     Mild obstructive sleep apnea diagnosed 2022, AHI 11.9         Sleep related hypoxia    PCOS (polycystic ovarian syndrome)    Transaminitis    Vitamin D deficiency    Monilial rash    IIH (idiopathic intracranial hypertension)    Chronic headaches    Lacunar Infarct (HCC)    Chronic R frontal stroke    Overview Deleted 12/27/2023  9:51 AM by Cuong Pan MD                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    Endometriosis     Follicular thyroid cancer (HCC)     Gastroenteritis     Hashimoto's disease     Hepatic steatosis     Hyperlipidemia     Hypertension     Hypothyroid     IBS (irritable bowel syndrome)     Kidney lesion, native, left     Kidney stone     Lymphedema     left leg    Obesity     Palpitation     Pancreatitis     Pneumonia     Polycystic ovarian disease     Polyuria     RUQ abdominal pain 11/01/2021    Sleep apnea     Stroke (HCC) 12/26/2023    Discovered on CT Scan     Thyroid nodule     Thyromegaly     Tinea corporis     Tinea pedis      Social History     Socioeconomic History    Marital status: /Civil Union     Spouse name: Not on file    Number of children: Not on file    Years of education: Not on file    Highest education level: Not on file   Occupational History    Not on file   Tobacco Use    Smoking status: Never    Smokeless tobacco: Never   Vaping Use    Vaping status: Never Used   Substance and Sexual Activity    Alcohol use: Not Currently    Drug use: Never    Sexual activity: Yes     Partners: Female, Male     Birth control/protection: Condom Male, Other     Comment: Hysterectomy in 2020 for pre cervical cancer   Other Topics Concern    Not on file   Social History Narrative    Not on file     Social Determinants of Health     Financial Resource Strain: Not on file   Food Insecurity: No Food Insecurity (2023)    Hunger Vital Sign     Worried About Running Out of Food in the Last Year: Never true     Ran Out of Food in the Last Year: Never true   Transportation Needs: No Transportation Needs (2023)    PRAPARE - Transportation     Lack of Transportation (Medical): No     Lack of Transportation (Non-Medical): No   Physical Activity: Not on file   Stress: Not on file   Social Connections: Not on file   Intimate Partner Violence: Not on file   Housing Stability: Unknown (2023)    Housing Stability Vital Sign     Unable to Pay for Housing in the Last Year: No     Number of Places Lived in the Last Year: Not on file     Unstable Housing in the Last Year: No      Family History   Problem Relation Age of Onset    Hyperlipidemia Mother     Restless legs syndrome Mother     Alcohol abuse Mother             Drug abuse Mother     Substance Abuse Mother     Sleep apnea Father     Hyperlipidemia Father     Hypertension Father     Alcohol abuse Father             Drug abuse Father     Substance Abuse Father     Hypertension Maternal  Grandmother     Restless legs syndrome Maternal Grandmother     Ovarian cancer Maternal Grandmother     Arthritis Maternal Grandmother     Cancer Maternal Grandmother         My grandmother had cervical cancer    COPD Maternal Grandmother     Cancer Paternal Grandmother     Drug abuse Paternal Uncle     Substance Abuse Paternal Uncle      Past Surgical History:   Procedure Laterality Date    ADENOIDECTOMY      DILATION AND CURETTAGE OF UTERUS  2020    HYSTERECTOMY  02/2020    left ovaries    IR LUMBAR PUNCTURE  12/28/2023    AK LAPS GSTR RSTCV PX W/BYP ELKIN-EN-Y LIMB <150 CM N/A 12/5/2022    Procedure: BYPASS GASTRIC ELKIN-EN-Y LAPAROSCOPIC ROBOT & INTRAOPERATIVE EGD;  Surgeon: Graeme Pool MD;  Location: [facility];  Service: Bariatrics    THYROIDECTOMY, PARTIAL Left 06/2019    benign    TONSILLECTOMY      US GUIDED THYROID BIOPSY  03/01/2019       Current Outpatient Medications:     acetaZOLAMIDE (DIAMOX) 250 mg tablet, Take 3 tablets (750 mg total) by mouth 2 (two) times a day, Disp: 180 tablet, Rfl: 1    albuterol (Ventolin HFA) 90 mcg/act inhaler, Inhale 2 puffs every 6 (six) hours as needed for wheezing or shortness of breath, Disp: 18 g, Rfl: 0    ALPRAZolam (XANAX) 0.25 mg tablet, Take 1 tablet (0.25 mg total) by mouth 3 (three) times a day as needed for anxiety, Disp: 60 tablet, Rfl: 0    atorvastatin (LIPITOR) 40 mg tablet, Take 1 tablet (40 mg total) by mouth every evening, Disp: 30 tablet, Rfl: 2    buPROPion (WELLBUTRIN) 75 mg tablet, TAKE ONE TABLET BY MOUTH TWICE A DAY, Disp: 60 tablet, Rfl: 0    Calcium Carbonate (CALCIUM 500 PO), Take 500 mg by mouth daily, Disp: , Rfl:     citalopram (CeleXA) 20 mg tablet, TAKE ONE TABLET BY MOUTH EVERY DAY, Disp: 90 tablet, Rfl: 1    clopidogrel (PLAVIX) 75 mg tablet, Take 1 tablet (75 mg total) by mouth daily, Disp: 30 tablet, Rfl: 1    cyproheptadine (PERIACTIN) 4 mg tablet, Take 1 tablet (4 mg total) by mouth daily at bedtime (Patient taking differently: Take  4 mg by mouth daily as needed), Disp: 30 tablet, Rfl: 3    fluticasone (FLONASE) 50 mcg/act nasal spray, 2 sprays into each nostril daily, Disp: 16 g, Rfl: 0    furosemide (LASIX) 20 mg tablet, Take 1 tablet (20 mg total) by mouth daily as needed (edema), Disp: 30 tablet, Rfl: 0    levothyroxine 200 mcg tablet, Take 1 tablet Monday-Friday and 1.5 tablets on Saturday and Sunday. (Patient taking differently: No sig reported), Disp: 90 tablet, Rfl: 0    levothyroxine 25 mcg tablet, Take 1 tablet (25 mcg total) by mouth daily, Disp: 90 tablet, Rfl: 0    loratadine (CLARITIN) 10 mg tablet, Take 10 mg by mouth if needed, Disp: , Rfl:     losartan (COZAAR) 50 mg tablet, Take 1 tablet (50 mg total) by mouth daily, Disp: 90 tablet, Rfl: 1    Multiple Vitamins-Minerals (Bariatric Fusion) CHEW, Chew 4 tablets daily, Disp: , Rfl:     pantoprazole (PROTONIX) 40 mg tablet, Take 1 tablet (40 mg total) by mouth daily, Disp: 90 tablet, Rfl: 3    omeprazole (PriLOSEC) 20 mg delayed release capsule, TAKE ONE CAPSULE EVERY OTHER DAY (Patient not taking: Reported on 3/26/2024), Disp: 30 capsule, Rfl: 0  Allergies   Allergen Reactions    Dust Mite Extract Other (See Comments)     Sinus inflammation    Latex Blisters    Molds & Smuts Other (See Comments)     Sinus inflammation    Penicillins Rash     Skin rash and sheds    Jorge Luis Grass Pollen Allergen Other (See Comments)     Sinus inflammation       Labs:     Chemistry        Component Value Date/Time    K 4.2 03/03/2024 1818    K 4.2 03/12/2020 1657     (H) 03/03/2024 1818     03/12/2020 1657    CO2 22 03/03/2024 1818    CO2 24 03/12/2020 1657    BUN 20 03/03/2024 1818    BUN 15 03/12/2020 1657    CREATININE 0.74 03/03/2024 1818    CREATININE 0.81 03/12/2020 1657        Component Value Date/Time    CALCIUM 8.9 03/03/2024 1818    CALCIUM 9.1 03/12/2020 1657    ALKPHOS 117 (H) 03/03/2024 1818    ALKPHOS 114 03/12/2020 1657    AST 19 03/03/2024 1818    AST 18 03/12/2020 1657  "   ALT 20 03/03/2024 1818    ALT 28 03/12/2020 1657            No results found for: \"CHOL\"  Lab Results   Component Value Date    HDL 58 12/27/2023    HDL 56 07/28/2022    HDL 57 08/03/2021     Lab Results   Component Value Date    LDLCALC 98 12/27/2023    LDLCALC 117 (H) 07/28/2022    LDLCALC 113 (H) 08/03/2021     Lab Results   Component Value Date    TRIG 84 12/27/2023    TRIG 91 07/28/2022    TRIG 126 08/03/2021     No results found for: \"CHOLHDL\"    Imaging: No results found.    EKG: .    Review of Systems   Constitutional: Negative.   HENT: Negative.     Eyes: Negative.    Cardiovascular: Negative.    Respiratory: Negative.     Endocrine: Negative.    Hematologic/Lymphatic: Negative.    Skin: Negative.    Musculoskeletal: Negative.    Gastrointestinal: Negative.    Genitourinary: Negative.    Neurological: Negative.    Psychiatric/Behavioral: Negative.     Allergic/Immunologic: Negative.    All other systems reviewed and are negative.      Vitals:    03/26/24 0902   BP: 128/68   Pulse: 83   SpO2: 98%           Physical Exam  Vitals and nursing note reviewed.   Constitutional:       Appearance: Normal appearance.   HENT:      Head: Normocephalic.      Nose: Nose normal.      Mouth/Throat:      Mouth: Mucous membranes are moist.   Eyes:      General: No scleral icterus.     Conjunctiva/sclera: Conjunctivae normal.   Cardiovascular:      Rate and Rhythm: Normal rate and regular rhythm.      Heart sounds: No murmur heard.     No gallop.   Pulmonary:      Effort: Pulmonary effort is normal. No respiratory distress.      Breath sounds: Normal breath sounds. No wheezing or rales.   Abdominal:      General: Abdomen is flat. Bowel sounds are normal. There is no distension.      Palpations: Abdomen is soft.      Tenderness: There is no abdominal tenderness. There is no guarding.   Musculoskeletal:      Cervical back: Normal range of motion and neck supple.      Right lower leg: No edema.      Left lower leg: No edema. "   Skin:     General: Skin is warm and dry.   Neurological:      General: No focal deficit present.      Mental Status: She is alert and oriented to person, place, and time.   Psychiatric:         Mood and Affect: Mood normal.         Behavior: Behavior normal.         Discussion/Summary:    CVA: Lacunar CVA on head CT. BP is well controlled. Continue clopidogrel and statin therapy. Check zio monitor.       The patient was counseled regarding diagnostic results, instructions for management, risk factor reductions, impressions. total time of encounter was 25 minutes and 15 minutes was spent counseling.

## 2024-03-28 ENCOUNTER — TELEPHONE (OUTPATIENT)
Dept: CARDIOLOGY CLINIC | Facility: CLINIC | Age: 39
End: 2024-03-28

## 2024-03-28 DIAGNOSIS — E66.01 MORBID (SEVERE) OBESITY DUE TO EXCESS CALORIES (HCC): ICD-10-CM

## 2024-03-28 NOTE — TELEPHONE ENCOUNTER
AMB Extended Holter Monitor ([id number]) and ([id number]):    No Authorization Required per [facility] (Benefits) on  03- at 8:49am   (Reference #[id number])

## 2024-03-31 DIAGNOSIS — F41.8 MIXED ANXIETY DEPRESSIVE DISORDER: ICD-10-CM

## 2024-03-31 DIAGNOSIS — E03.8 HYPOTHYROIDISM DUE TO HASHIMOTO'S THYROIDITIS: ICD-10-CM

## 2024-03-31 DIAGNOSIS — E06.3 HYPOTHYROIDISM DUE TO HASHIMOTO'S THYROIDITIS: ICD-10-CM

## 2024-04-01 RX ORDER — OMEPRAZOLE 20 MG/1
CAPSULE, DELAYED RELEASE ORAL
Qty: 30 CAPSULE | Refills: 0 | Status: SHIPPED | OUTPATIENT
Start: 2024-04-01

## 2024-04-01 RX ORDER — BUPROPION HYDROCHLORIDE 75 MG/1
75 TABLET ORAL 2 TIMES DAILY
Qty: 60 TABLET | Refills: 0 | Status: SHIPPED | OUTPATIENT
Start: 2024-04-01

## 2024-04-01 RX ORDER — LEVOTHYROXINE SODIUM 0.2 MG/1
TABLET ORAL
Qty: 90 TABLET | Refills: 1 | Status: SHIPPED | OUTPATIENT
Start: 2024-04-01

## 2024-04-05 DIAGNOSIS — I63.81 LACUNAR STROKE (HCC): ICD-10-CM

## 2024-04-05 RX ORDER — CLOPIDOGREL BISULFATE 75 MG/1
75 TABLET ORAL DAILY
Qty: 30 TABLET | Refills: 5 | Status: SHIPPED | OUTPATIENT
Start: 2024-04-05 | End: 2024-06-04

## 2024-04-08 ENCOUNTER — TELEPHONE (OUTPATIENT)
Dept: FAMILY MEDICINE CLINIC | Facility: CLINIC | Age: 39
End: 2024-04-08

## 2024-04-11 ENCOUNTER — RA CDI HCC (OUTPATIENT)
Dept: OTHER | Facility: HOSPITAL | Age: 39
End: 2024-04-11

## 2024-04-11 DIAGNOSIS — E66.01 MORBID (SEVERE) OBESITY DUE TO EXCESS CALORIES (HCC): Primary | ICD-10-CM

## 2024-04-19 DIAGNOSIS — I10 ESSENTIAL HYPERTENSION: ICD-10-CM

## 2024-04-20 RX ORDER — ATORVASTATIN CALCIUM 40 MG/1
40 TABLET, FILM COATED ORAL EVERY EVENING
Qty: 30 TABLET | Refills: 2 | Status: SHIPPED | OUTPATIENT
Start: 2024-04-20

## 2024-04-24 DIAGNOSIS — G93.2 IIH (IDIOPATHIC INTRACRANIAL HYPERTENSION): ICD-10-CM

## 2024-04-24 DIAGNOSIS — Z98.84 BARIATRIC SURGERY STATUS: ICD-10-CM

## 2024-04-25 RX ORDER — PANTOPRAZOLE SODIUM 40 MG/1
40 TABLET, DELAYED RELEASE ORAL DAILY
Qty: 90 TABLET | Refills: 1 | Status: SHIPPED | OUTPATIENT
Start: 2024-04-25

## 2024-04-26 RX ORDER — ACETAZOLAMIDE 250 MG/1
750 TABLET ORAL 2 TIMES DAILY
Qty: 180 TABLET | Refills: 2 | Status: SHIPPED | OUTPATIENT
Start: 2024-04-26

## 2024-04-27 DIAGNOSIS — E03.8 HYPOTHYROIDISM DUE TO HASHIMOTO'S THYROIDITIS: ICD-10-CM

## 2024-04-27 DIAGNOSIS — E06.3 HYPOTHYROIDISM DUE TO HASHIMOTO'S THYROIDITIS: ICD-10-CM

## 2024-04-27 DIAGNOSIS — F41.8 MIXED ANXIETY DEPRESSIVE DISORDER: ICD-10-CM

## 2024-04-29 RX ORDER — LEVOTHYROXINE SODIUM 0.03 MG/1
25 TABLET ORAL DAILY
Qty: 90 TABLET | Refills: 1 | Status: SHIPPED | OUTPATIENT
Start: 2024-04-29

## 2024-04-29 RX ORDER — BUPROPION HYDROCHLORIDE 75 MG/1
75 TABLET ORAL 2 TIMES DAILY
Qty: 60 TABLET | Refills: 0 | Status: SHIPPED | OUTPATIENT
Start: 2024-04-29

## 2024-04-29 NOTE — TELEPHONE ENCOUNTER
VM 4/25 at 2:45 pm:    Hi, my name is Allison Gamboa. My YOB: 1985. I am calling in regards to the status of my acetazolamide 250 milligram tablets. Take 3 tablets by mouth 2 times a day. The prescriber is Maikel Kapadia. I had sent in a request for refill on [facility] last night just to kind of  get ahead because I don't have anymore as of tomorrow.  I won't have enough for like the morning. I think I'll have enough for the morning then I have nothing after that, so I would just need to get this filled because otherwise I wouldn't have the medication to take it. If you could just get back to me or just let me know, because now I'm getting a message on [facility] saying that the prescription isn't available for refills through [employer]. So I'm just a little confused as to what is going on. So if you could just give me a call back [phone]. Thank you. Laurent.  ______________________________________________________    Already addressed. Script was sent to the pharmacy on 4/26, and pt read the message in [employer] making her aware a refill was sent.

## 2024-05-06 ENCOUNTER — APPOINTMENT (EMERGENCY)
Dept: CT IMAGING | Facility: HOSPITAL | Age: 39
End: 2024-05-06
Payer: COMMERCIAL

## 2024-05-06 ENCOUNTER — HOSPITAL ENCOUNTER (EMERGENCY)
Facility: HOSPITAL | Age: 39
Discharge: HOME | End: 2024-05-06
Attending: EMERGENCY MEDICINE
Payer: COMMERCIAL

## 2024-05-06 VITALS
DIASTOLIC BLOOD PRESSURE: 57 MMHG | HEART RATE: 60 BPM | SYSTOLIC BLOOD PRESSURE: 116 MMHG | RESPIRATION RATE: 18 BRPM | TEMPERATURE: 97.5 F | OXYGEN SATURATION: 100 %

## 2024-05-06 DIAGNOSIS — N39.0 UTI (URINARY TRACT INFECTION): ICD-10-CM

## 2024-05-06 DIAGNOSIS — N20.1 URETEROLITHIASIS: Primary | ICD-10-CM

## 2024-05-06 LAB
ALBUMIN SERPL BCP-MCNC: 4 G/DL (ref 3.5–5)
ALP SERPL-CCNC: 110 U/L (ref 34–104)
ALT SERPL W P-5'-P-CCNC: 26 U/L (ref 7–52)
ANION GAP SERPL CALCULATED.3IONS-SCNC: 4 MMOL/L (ref 4–13)
AST SERPL W P-5'-P-CCNC: 35 U/L (ref 13–39)
BACTERIA UR QL AUTO: ABNORMAL /HPF
BASOPHILS # BLD AUTO: 0.07 THOUSANDS/ÂΜL (ref 0–0.1)
BASOPHILS NFR BLD AUTO: 1 % (ref 0–1)
BILIRUB SERPL-MCNC: 0.43 MG/DL (ref 0.2–1)
BILIRUB UR QL STRIP: NEGATIVE
BUN SERPL-MCNC: 17 MG/DL (ref 5–25)
CALCIUM SERPL-MCNC: 9 MG/DL (ref 8.4–10.2)
CHLORIDE SERPL-SCNC: 114 MMOL/L (ref 96–108)
CLARITY UR: CLEAR
CO2 SERPL-SCNC: 20 MMOL/L (ref 21–32)
COLOR UR: ABNORMAL
CREAT SERPL-MCNC: 0.77 MG/DL (ref 0.6–1.3)
EOSINOPHIL # BLD AUTO: 0.06 THOUSAND/ÂΜL (ref 0–0.61)
EOSINOPHIL NFR BLD AUTO: 1 % (ref 0–6)
ERYTHROCYTE [DISTWIDTH] IN BLOOD BY AUTOMATED COUNT: 14.6 % (ref 11.6–15.1)
GFR SERPL CREATININE-BSD FRML MDRD: 98 ML/MIN/1.73SQ M
GLUCOSE SERPL-MCNC: 91 MG/DL (ref 65–140)
GLUCOSE UR STRIP-MCNC: NEGATIVE MG/DL
HCT VFR BLD AUTO: 41.8 % (ref 34.8–46.1)
HGB BLD-MCNC: 12.3 G/DL (ref 11.5–15.4)
HGB UR QL STRIP.AUTO: ABNORMAL
IMM GRANULOCYTES # BLD AUTO: 0.04 THOUSAND/UL (ref 0–0.2)
IMM GRANULOCYTES NFR BLD AUTO: 0 % (ref 0–2)
KETONES UR STRIP-MCNC: NEGATIVE MG/DL
LEUKOCYTE ESTERASE UR QL STRIP: NEGATIVE
LIPASE SERPL-CCNC: 15 U/L (ref 11–82)
LYMPHOCYTES # BLD AUTO: 1.94 THOUSANDS/ÂΜL (ref 0.6–4.47)
LYMPHOCYTES NFR BLD AUTO: 16 % (ref 14–44)
MCH RBC QN AUTO: 28.2 PG (ref 26.8–34.3)
MCHC RBC AUTO-ENTMCNC: 29.4 G/DL (ref 31.4–37.4)
MCV RBC AUTO: 96 FL (ref 82–98)
MONOCYTES # BLD AUTO: 0.7 THOUSAND/ÂΜL (ref 0.17–1.22)
MONOCYTES NFR BLD AUTO: 6 % (ref 4–12)
NEUTROPHILS # BLD AUTO: 9.51 THOUSANDS/ÂΜL (ref 1.85–7.62)
NEUTS SEG NFR BLD AUTO: 76 % (ref 43–75)
NITRITE UR QL STRIP: NEGATIVE
NON-SQ EPI CELLS URNS QL MICRO: ABNORMAL /HPF
NRBC BLD AUTO-RTO: 0 /100 WBCS
PH UR STRIP.AUTO: 6.5 [PH]
PLATELET # BLD AUTO: 251 THOUSANDS/UL (ref 149–390)
PMV BLD AUTO: 11.9 FL (ref 8.9–12.7)
POTASSIUM SERPL-SCNC: 5.6 MMOL/L (ref 3.5–5.3)
PROT SERPL-MCNC: 7.7 G/DL (ref 6.4–8.4)
PROT UR STRIP-MCNC: NEGATIVE MG/DL
RBC # BLD AUTO: 4.36 MILLION/UL (ref 3.81–5.12)
RBC #/AREA URNS AUTO: ABNORMAL /HPF
SODIUM SERPL-SCNC: 138 MMOL/L (ref 135–147)
SP GR UR STRIP.AUTO: 1.01 (ref 1–1.03)
UROBILINOGEN UR STRIP-ACNC: <2 MG/DL
WBC # BLD AUTO: 12.32 THOUSAND/UL (ref 4.31–10.16)
WBC #/AREA URNS AUTO: ABNORMAL /HPF

## 2024-05-06 PROCEDURE — 80053 COMPREHEN METABOLIC PANEL: CPT | Performed by: EMERGENCY MEDICINE

## 2024-05-06 PROCEDURE — 36415 COLL VENOUS BLD VENIPUNCTURE: CPT

## 2024-05-06 PROCEDURE — 99284 EMERGENCY DEPT VISIT MOD MDM: CPT

## 2024-05-06 PROCEDURE — 81001 URINALYSIS AUTO W/SCOPE: CPT

## 2024-05-06 PROCEDURE — 99284 EMERGENCY DEPT VISIT MOD MDM: CPT | Performed by: EMERGENCY MEDICINE

## 2024-05-06 PROCEDURE — 74176 CT ABD & PELVIS W/O CONTRAST: CPT

## 2024-05-06 PROCEDURE — 96365 THER/PROPH/DIAG IV INF INIT: CPT

## 2024-05-06 PROCEDURE — 85025 COMPLETE CBC W/AUTO DIFF WBC: CPT | Performed by: EMERGENCY MEDICINE

## 2024-05-06 PROCEDURE — 83690 ASSAY OF LIPASE: CPT | Performed by: EMERGENCY MEDICINE

## 2024-05-06 RX ORDER — CEFPODOXIME PROXETIL 200 MG/1
200 TABLET, FILM COATED ORAL 2 TIMES DAILY
Qty: 14 TABLET | Refills: 0 | Status: SHIPPED | OUTPATIENT
Start: 2024-05-06 | End: 2024-05-13

## 2024-05-06 RX ORDER — TAMSULOSIN HYDROCHLORIDE 0.4 MG/1
0.4 CAPSULE ORAL
Qty: 7 CAPSULE | Refills: 0 | Status: SHIPPED | OUTPATIENT
Start: 2024-05-06 | End: 2024-05-13

## 2024-05-06 RX ORDER — CEPHALEXIN 500 MG/1
500 CAPSULE ORAL EVERY 8 HOURS SCHEDULED
Qty: 21 CAPSULE | Refills: 0 | Status: SHIPPED | OUTPATIENT
Start: 2024-05-06 | End: 2024-05-06 | Stop reason: DRUGHIGH

## 2024-05-06 RX ORDER — CEFPODOXIME PROXETIL 200 MG/1
400 TABLET, FILM COATED ORAL 2 TIMES DAILY WITH MEALS
Status: DISCONTINUED | OUTPATIENT
Start: 2024-05-07 | End: 2024-05-06

## 2024-05-06 RX ADMIN — CEFTRIAXONE SODIUM 1000 MG: 10 INJECTION, POWDER, FOR SOLUTION INTRAVENOUS at 18:45

## 2024-05-06 NOTE — ED PROVIDER NOTES
History  Chief Complaint   Patient presents with    Flank Pain     Left flank pain radiating to lower back. Pt reports blood in urine yesterday. +vomiting.      Patient is a 38-year-old female with history of idiopathic intracranial hypertension that presents to the emergency department with blood noted on the toilet paper when wiping after being.  She also developed significant left flank pain that started today while driving.  She reports pain improved after Tylenol administration today.  She first noticed blood on the toilet paper last night.  This cleared away with subsequent urinary episodes, but she noticed it again today.  She has a remote history of kidney stones but is unsure if this is what it felt like.  She reports that she has otherwise been well recently.  She reports 1 episode of vomiting after the pain started today but does not feel nauseated at this time.  She denies any fevers, or burning with urination.        Prior to Admission Medications   Prescriptions Last Dose Informant Patient Reported? Taking?   ALPRAZolam (XANAX) 0.25 mg tablet  Self No No   Sig: Take 1 tablet (0.25 mg total) by mouth 3 (three) times a day as needed for anxiety   Calcium Carbonate (CALCIUM 500 PO)  Self Yes No   Sig: Take 500 mg by mouth daily   Multiple Vitamins-Minerals (Bariatric Fusion) CHEW  Self Yes No   Sig: Chew 4 tablets daily   acetaZOLAMIDE (DIAMOX) 250 mg tablet   No No   Sig: Take 3 tablets (750 mg total) by mouth 2 (two) times a day   albuterol (Ventolin HFA) 90 mcg/act inhaler  Self No No   Sig: Inhale 2 puffs every 6 (six) hours as needed for wheezing or shortness of breath   atorvastatin (LIPITOR) 40 mg tablet   No No   Sig: TAKE ONE TABLET BY MOUTH EVERY EVENING   buPROPion (WELLBUTRIN) 75 mg tablet   No No   Sig: TAKE ONE TABLET BY MOUTH TWICE A DAY   citalopram (CeleXA) 20 mg tablet  Self No No   Sig: TAKE ONE TABLET BY MOUTH EVERY DAY   clopidogrel (PLAVIX) 75 mg tablet   No No   Sig: Take 1 tablet  (75 mg total) by mouth daily   cyproheptadine (PERIACTIN) 4 mg tablet  Self No No   Sig: Take 1 tablet (4 mg total) by mouth daily at bedtime   Patient taking differently: Take 4 mg by mouth daily as needed   fluticasone (FLONASE) 50 mcg/act nasal spray  Self No No   Si sprays into each nostril daily   furosemide (LASIX) 20 mg tablet  Self No No   Sig: Take 1 tablet (20 mg total) by mouth daily as needed (edema)   levothyroxine 200 mcg tablet   No No   Sig: TAKE 1 TABLET  MONDAY THROUGH FRIDAY AND 1.5 TABLETS ON SATURDAY AND    levothyroxine 25 mcg tablet   No No   Sig: TAKE ONE TABLET BY MOUTH EVERY DAY   loratadine (CLARITIN) 10 mg tablet  Self Yes No   Sig: Take 10 mg by mouth if needed   losartan (COZAAR) 50 mg tablet  Self No No   Sig: Take 1 tablet (50 mg total) by mouth daily   omeprazole (PriLOSEC) 20 mg delayed release capsule   No No   Sig: TAKE ONE CAPSULE EVERY OTHER DAY   pantoprazole (PROTONIX) 40 mg tablet   No No   Sig: Take 1 tablet (40 mg total) by mouth daily      Facility-Administered Medications: None       Past Medical History:   Diagnosis Date    Anxiety     Bruises easily     Cancer (HCC)     COVID-19 10/10/2022    CPAP (continuous positive airway pressure) dependence     Depression     Disease of thyroid gland     DELVALLE (dyspnea on exertion)     EIN (endometrial intraepithelial neoplasia) 2019    Endometriosis     Follicular thyroid cancer (HCC)     Gastroenteritis     Hashimoto's disease     Hepatic steatosis     Hyperlipidemia     Hypertension     Hypothyroid     IBS (irritable bowel syndrome)     Kidney lesion, native, left     Kidney stone     Lymphedema     left leg    Obesity     Palpitation     Pancreatitis     Pneumonia     Polycystic ovarian disease     Polyuria     RUQ abdominal pain 2021    Sleep apnea     Stroke (HCC) 2023    Discovered on CT Scan    Thyroid nodule     Thyromegaly     Tinea corporis     Tinea pedis        Past Surgical History:    Procedure Laterality Date    ADENOIDECTOMY      DILATION AND CURETTAGE OF UTERUS      HYSTERECTOMY  2020    left ovaries    IR LUMBAR PUNCTURE  2023    AK LAPS GSTR RSTCV PX W/BYP ELKIN-EN-Y LIMB <150 CM N/A 2022    Procedure: BYPASS GASTRIC ELKIN-EN-Y LAPAROSCOPIC ROBOT & INTRAOPERATIVE EGD;  Surgeon: Graeme Pool MD;  Location: [facility];  Service: Bariatrics    THYROIDECTOMY, PARTIAL Left 2019    benign    TONSILLECTOMY      US GUIDED THYROID BIOPSY  2019       Family History   Problem Relation Age of Onset    Hyperlipidemia Mother     Restless legs syndrome Mother     Alcohol abuse Mother             Drug abuse Mother     Substance Abuse Mother     Sleep apnea Father     Hyperlipidemia Father     Hypertension Father     Alcohol abuse Father             Drug abuse Father     Substance Abuse Father     Hypertension Maternal Grandmother     Restless legs syndrome Maternal Grandmother     Ovarian cancer Maternal Grandmother     Arthritis Maternal Grandmother     Cancer Maternal Grandmother         My grandmother had cervical cancer    COPD Maternal Grandmother     Cancer Paternal Grandmother     Drug abuse Paternal Uncle     Substance Abuse Paternal Uncle      I have reviewed and agree with the history as documented.    E-Cigarette/Vaping    E-Cigarette Use Never User      E-Cigarette/Vaping Substances    Nicotine No     THC No     CBD No     Flavoring No     Other No     Unknown No      Social History     Tobacco Use    Smoking status: Never    Smokeless tobacco: Never   Vaping Use    Vaping status: Never Used   Substance Use Topics    Alcohol use: Not Currently    Drug use: Never        Review of Systems   Constitutional:  Negative for chills and fever.   HENT:  Negative for congestion, ear pain and sore throat.    Eyes:  Negative for pain and visual disturbance.   Respiratory:  Negative for cough and shortness of breath.    Cardiovascular:  Negative for chest pain  and palpitations.   Gastrointestinal:  Negative for abdominal pain, constipation, diarrhea, nausea and vomiting.   Genitourinary:  Positive for flank pain and hematuria. Negative for dysuria, pelvic pain, vaginal bleeding and vaginal discharge.   Musculoskeletal:  Negative for arthralgias and back pain.   Skin:  Negative for color change and rash.   Neurological:  Negative for dizziness, seizures, syncope, light-headedness and headaches.   All other systems reviewed and are negative.      Physical Exam  ED Triage Vitals   Temperature Pulse Respirations Blood Pressure SpO2   05/06/24 1413 05/06/24 1413 05/06/24 1413 05/06/24 1413 05/06/24 1413   97.5 °F (36.4 °C) 69 18 120/60 99 %      Temp Source Heart Rate Source Patient Position - Orthostatic VS BP Location FiO2 (%)   05/06/24 1413 05/06/24 1413 05/06/24 1413 05/06/24 1413 --   Oral Monitor Sitting Right arm       Pain Score       05/06/24 1629       3             Orthostatic Vital Signs  Vitals:    05/06/24 1413 05/06/24 1629   BP: 120/60 116/57   Pulse: 69 60   Patient Position - Orthostatic VS: Sitting Sitting       Physical Exam  Vitals and nursing note reviewed.   Constitutional:       General: She is not in acute distress.     Appearance: Normal appearance. She is well-developed. She is not ill-appearing.   HENT:      Head: Normocephalic and atraumatic.      Right Ear: External ear normal.      Left Ear: External ear normal.      Mouth/Throat:      Pharynx: Oropharynx is clear. No oropharyngeal exudate or posterior oropharyngeal erythema.   Eyes:      General:         Right eye: No discharge.         Left eye: No discharge.      Extraocular Movements: Extraocular movements intact.      Conjunctiva/sclera: Conjunctivae normal.   Cardiovascular:      Rate and Rhythm: Normal rate and regular rhythm.      Pulses: Normal pulses.      Heart sounds: Normal heart sounds. No murmur heard.  Pulmonary:      Effort: Pulmonary effort is normal. No respiratory distress.       Breath sounds: Normal breath sounds. No wheezing, rhonchi or rales.   Abdominal:      General: Abdomen is flat.      Palpations: Abdomen is soft.      Tenderness: There is no abdominal tenderness. There is no guarding or rebound.   Musculoskeletal:         General: No swelling or deformity. Normal range of motion.      Cervical back: Neck supple. No tenderness.   Skin:     General: Skin is warm and dry.      Capillary Refill: Capillary refill takes less than 2 seconds.   Neurological:      Mental Status: She is alert.         ED Medications  Medications - No data to display      Diagnostic Studies  Results Reviewed       Procedure Component Value Units Date/Time    Urine Microscopic [[id number]]  (Abnormal) Collected: 05/06/24 1627    Lab Status: Final result Specimen: Urine, Clean Catch Updated: 05/06/24 1643     RBC, UA Innumerable /hpf      WBC, UA 4-10 /hpf      Epithelial Cells Occasional /hpf      Bacteria, UA Occasional /hpf     UA w Reflex to Microscopic w Reflex to Culture [[id number]]  (Abnormal) Collected: 05/06/24 1627    Lab Status: Final result Specimen: Urine, Clean Catch Updated: 05/06/24 1636     Color, UA Light Yellow     Clarity, UA Clear     Specific Gravity, UA 1.008     pH, UA 6.5     Leukocytes, UA Negative     Nitrite, UA Negative     Protein, UA Negative mg/dl      Glucose, UA Negative mg/dl      Ketones, UA Negative mg/dl      Urobilinogen, UA <2.0 mg/dl      Bilirubin, UA Negative     Occult Blood, UA Large    Comprehensive metabolic panel [[id number]]  (Abnormal) Collected: 05/06/24 1420    Lab Status: Final result Specimen: Blood from Arm, Left Updated: 05/06/24 1504     Sodium 138 mmol/L      Potassium 5.6 mmol/L      Chloride 114 mmol/L      CO2 20 mmol/L      ANION GAP 4 mmol/L      BUN 17 mg/dL      Creatinine 0.77 mg/dL      Glucose 91 mg/dL      Calcium 9.0 mg/dL      AST 35 U/L      ALT 26 U/L      Alkaline Phosphatase 110 U/L      Total Protein 7.7 g/dL      Albumin 4.0 g/dL       Total Bilirubin 0.43 mg/dL      eGFR 98 ml/min/1.73sq m     Narrative:      National Kidney Disease Foundation guidelines for Chronic Kidney Disease (CKD):     Stage 1 with normal or high GFR (GFR > 90 mL/min/1.73 square meters)    Stage 2 Mild CKD (GFR = 60-89 mL/min/1.73 square meters)    Stage 3A Moderate CKD (GFR = 45-59 mL/min/1.73 square meters)    Stage 3B Moderate CKD (GFR = 30-44 mL/min/1.73 square meters)    Stage 4 Severe CKD (GFR = 15-29 mL/min/1.73 square meters)    Stage 5 End Stage CKD (GFR <15 mL/min/1.73 square meters)  Note: GFR calculation is accurate only with a steady state creatinine    Lipase [[id number]]  (Normal) Collected: 05/06/24 1420    Lab Status: Final result Specimen: Blood from Arm, Left Updated: 05/06/24 1504     Lipase 15 u/L     CBC and differential [[id number]]  (Abnormal) Collected: 05/06/24 1420    Lab Status: Final result Specimen: Blood from Arm, Left Updated: 05/06/24 1446     WBC 12.32 Thousand/uL      RBC 4.36 Million/uL      Hemoglobin 12.3 g/dL      Hematocrit 41.8 %      MCV 96 fL      MCH 28.2 pg      MCHC 29.4 g/dL      RDW 14.6 %      MPV 11.9 fL      Platelets 251 Thousands/uL      nRBC 0 /100 WBCs      Segmented % 76 %      Immature Grans % 0 %      Lymphocytes % 16 %      Monocytes % 6 %      Eosinophils Relative 1 %      Basophils Relative 1 %      Absolute Neutrophils 9.51 Thousands/µL      Absolute Immature Grans 0.04 Thousand/uL      Absolute Lymphocytes 1.94 Thousands/µL      Absolute Monocytes 0.70 Thousand/µL      Eosinophils Absolute 0.06 Thousand/µL      Basophils Absolute 0.07 Thousands/µL                    CT renal stone study abdomen pelvis without contrast   Final Result by Singh Lau MD (05/06 1755)      2 mm proximal left ureteral calculus resulting in mild hydronephrosis.         Workstation performed: [id number]         US kidney and bladder with pvr    (Results Pending)         Procedures  Procedures      ED Course                                        Medical Decision Making  38F here with left flank pain and hematuria.    DDx including but not limited to: UTI, hemorrhagic cystitis, coagulopathy, anemia, renal colic, pyelonephritis, tumor, urinary retention, recent instrumentation.     Patient with mild elevation of WBC on CBC.  Patient also noted to have elevated potassium, but this is likely falsely elevated in the setting of mild hemolysis.  Lipase without evidence of pancreatitis.  UA reveals red blood cells as well as 4-10 WBCs.    Renal stone study reveals 2 mm proximal left ureteral calculus with mild hydronephrosis.  Patient's pain is well-controlled on Tylenol that she took prior to arrival.    Given renal stone as well as elevated WBCs on UA, concern for possible UTI in the setting of renal stone.  Patient's case discussed with Radha Márquez from the urology team who agrees with antibiotic administration and helps to arrange for prompt outpatient follow-up.  She also recommends outpatient renal/bladder ultrasound as well as Flomax and strainer.  All of these are ordered for the patient.    Over the course of the patient's stay, her pain is well-controlled and does not require additional pain medication administration.  First dose of antibiotic administered in the emergency department via IV and 7-day course prescribed.    In the absence of additional concerning findings on physical exam, laboratory evaluation, or imaging patient is appropriate for discharge at this time.  Patient provided with informational handout that discusses symptom management and reasons to return to the emergency department.  Return precautions are discussed and the patient and/or family demonstrate understanding of the plan.  Their questions are all answered to their satisfaction and the patient is discharged.      Amount and/or Complexity of Data Reviewed  Labs: ordered.  Radiology: ordered.    Risk  Prescription drug  management.          Disposition  Final diagnoses:   Ureterolithiasis   UTI (urinary tract infection)     Time reflects when diagnosis was documented in both MDM as applicable and the Disposition within this note       Time User Action Codes Description Comment    5/6/2024  6:12 PM Singh Nj [N20.1] Ureterolithiasis     5/6/2024  6:12 PM Singh Nj [N39.0] UTI (urinary tract infection)           ED Disposition       ED Disposition   Discharge    Condition   Stable    Date/Time   Mon May 6, 2024  6:12 PM    Comment   Allison Gamboa discharge to home/self care.                   Follow-up Information       Follow up With Specialties Details Why Contact Info Additional Information    [facility] Schedule an appointment as soon as possible for a visit   [address]  [facility] [address]  [phone] [facility], [address] [place] [address] [address], [place], [place], [address]   [phone]            Discharge Medication List as of 5/6/2024  6:43 PM        START taking these medications    Details   cefpodoxime (VANTIN) 200 mg tablet Take 1 tablet (200 mg total) by mouth 2 (two) times a day for 7 days, Starting Mon 5/6/2024, Until Mon 5/13/2024, Normal      tamsulosin (FLOMAX) 0.4 mg Take 1 capsule (0.4 mg total) by mouth daily with dinner for 7 days, Starting Mon 5/6/2024, Until Mon 5/13/2024, Normal           CONTINUE these medications which have NOT CHANGED    Details   acetaZOLAMIDE (DIAMOX) 250 mg tablet Take 3 tablets (750 mg total) by mouth 2 (two) times a day, Starting Fri 4/26/2024, Normal      albuterol (Ventolin HFA) 90 mcg/act inhaler Inhale 2 puffs every 6 (six) hours as needed for wheezing or shortness of breath, Starting Wed 7/19/2023, Normal      ALPRAZolam (XANAX) 0.25 mg tablet Take 1 tablet (0.25 mg total) by mouth 3 (three) times a day as needed for anxiety, Starting Tue 8/17/2021, Normal      atorvastatin  (LIPITOR) 40 mg tablet TAKE ONE TABLET BY MOUTH EVERY EVENING, Starting Sat 4/20/2024, Normal      buPROPion (WELLBUTRIN) 75 mg tablet TAKE ONE TABLET BY MOUTH TWICE A DAY, Starting Mon 4/29/2024, Normal      Calcium Carbonate (CALCIUM 500 PO) Take 500 mg by mouth daily, Historical Med      citalopram (CeleXA) 20 mg tablet TAKE ONE TABLET BY MOUTH EVERY DAY, Starting Tue 2/27/2024, Normal      clopidogrel (PLAVIX) 75 mg tablet Take 1 tablet (75 mg total) by mouth daily, Starting Fri 4/5/2024, Until Tue 6/4/2024, Normal      cyproheptadine (PERIACTIN) 4 mg tablet Take 1 tablet (4 mg total) by mouth daily at bedtime, Starting Wed 2/14/2024, Normal      fluticasone (FLONASE) 50 mcg/act nasal spray 2 sprays into each nostril daily, Starting Thu 4/4/2019, Normal      furosemide (LASIX) 20 mg tablet Take 1 tablet (20 mg total) by mouth daily as needed (edema), Starting Tue 6/20/2023, Until Tue 3/26/2024 at 2359, Normal      !! levothyroxine 200 mcg tablet TAKE 1 TABLET  MONDAY THROUGH FRIDAY AND 1.5 TABLETS ON SATURDAY AND SUNDAY, Normal      !! levothyroxine 25 mcg tablet TAKE ONE TABLET BY MOUTH EVERY DAY, Starting Mon 4/29/2024, Normal      loratadine (CLARITIN) 10 mg tablet Take 10 mg by mouth if needed, Historical Med      losartan (COZAAR) 50 mg tablet Take 1 tablet (50 mg total) by mouth daily, Starting Wed 1/3/2024, Normal      Multiple Vitamins-Minerals (Bariatric Fusion) CHEW Chew 4 tablets daily, Historical Med      omeprazole (PriLOSEC) 20 mg delayed release capsule TAKE ONE CAPSULE EVERY OTHER DAY, Normal      pantoprazole (PROTONIX) 40 mg tablet Take 1 tablet (40 mg total) by mouth daily, Starting Thu 4/25/2024, Normal       !! - Potential duplicate medications found. Please discuss with provider.        Outpatient Discharge Orders   US kidney and bladder with pvr   Standing Status: Future Standing Exp. Date: 05/06/28       PDMP Review         Value Time User    PDMP Reviewed  Yes 11/21/2022 10:46 AM Beatriz PEMBERTON  JOE Rowland             ED Provider  Attending physically available and evaluated Allison Gamboa. I managed the patient along with the ED Attending.    Electronically Signed by           Singh Nj DO  05/07/24 0005

## 2024-05-06 NOTE — ED ATTENDING ATTESTATION
5/6/2024  I, Allan Del Castillo MD, saw and evaluated the patient. I have discussed the patient with the resident/non-physician practitioner and agree with the resident's/non-physician practitioner's findings, Plan of Care, and MDM as documented in the resident's/non-physician practitioner's note, except where noted. All available labs and Radiology studies were reviewed.  I was present for key portions of any procedure(s) performed by the resident/non-physician practitioner and I was immediately available to provide assistance.       At this point I agree with the current assessment done in the Emergency Department.  I have conducted an independent evaluation of this patient a history and physical is as follows:    History    Patient is a 38-year-old female, with a history significant for prior kidney stone and bariatric surgery and hysterectomy per my review of medical record, who presents to the ED today for evaluation of a 1 day history of left flank pain.  Patient states that this symptom was present mildly when she woke up this morning but, later in the day, she had a sudden and atraumatic exacerbation of her discomfort.  It radiated down to her groin.  Patient took Tylenol x 2 to remit her symptoms.  No clear exacerbating factors.  Patient also reports nausea, vomiting, blood in urine.  There is no associated fever, chills, history of IVDU, bowel or bladder incontinence, weakness, numbness, dysuria.     Per patient's , present in room and providing collateral history, patient is not confused.    Patient is without other concerns at this time.     ROS  Patient denies: Fever; dysphagia; vision change; chest pain; dyspnea; polyuria; dysuria; rash; weakness; numbness; difficulty walking; confusion    Physical Exam    GENERAL APPEARANCE: NAD  NEURO: Patient is speaking clearly in complete sentences.  Patient is answering appropriately and able follow commands.  Patient is moving all four extremities  spontaneously.  No facial droop.  Tongue midline.  HEENT: PERRL, Moist mucous membranes, external ears normal, nose normal  Neck: No cervical adenopathy  CV: RRR. No murmurs, rubs, gallops  LUNGS: Clear to auscultation: No wheezes, stridor, rhonchi, rales  GI: Abdomen non-distended. Soft.  No guarding or rebound.  No adnexal tenderness.  Left CVA tenderness.  : Deferred at this time  MSK: No deformity.   Skin: Warm and dry  Capillary refill: <2 seconds    Patient is currently afebrile and hemodynamically stable    Assessment/Plan/MDM  Abdominal/flank pain, nausea, vomiting  -This presentation is concerning for: Ureterolithiasis.  Patient at risk for UTI.  Also considered ISAMAR, electrolyte abnormality.  Low suspicion for bowel obstruction.  Doubt ovarian torsion, AAA at this time based on history physical exam  -Will investigate with CBC, CMP, UA, CT renal protocol  -Will manage based upon workup as patient does not desire analgesia or antiemetic at this time.    ED Course  ED Course as of 05/06/24 2131   Mon May 06, 2024   1547 Potassium(!): 5.6  Likely false elevation given moderate hemolysis    1547 WBC(!): 12.32  Slight elevation, improved from prior    1811 Bacteria, UA: Occasional  Likely contaminant especially given patient has no symptoms of UTI; however, due to bacteria in urine and stone, will administer antibiotics.         Critical Care Time  Procedures

## 2024-05-07 ENCOUNTER — VBI (OUTPATIENT)
Dept: INTERNAL MEDICINE CLINIC | Age: 39
End: 2024-05-07

## 2024-05-07 NOTE — TELEPHONE ENCOUNTER
05/07/24 9:55 AM    Patient contacted post ED visit, [facility] department spoke with patient/caregiver and outreach was successful.    Thank you.  Elisabeth Tai  [facility]

## 2024-05-20 ENCOUNTER — CLINICAL SUPPORT (OUTPATIENT)
Dept: CARDIOLOGY CLINIC | Facility: CLINIC | Age: 39
End: 2024-05-20
Payer: COMMERCIAL

## 2024-05-20 DIAGNOSIS — I63.81 LACUNAR INFARCTION (HCC): Primary | ICD-10-CM

## 2024-05-20 DIAGNOSIS — I63.81 LACUNAR STROKE (HCC): ICD-10-CM

## 2024-05-20 PROCEDURE — 93248 EXT ECG>7D<15D REV&INTERPJ: CPT

## 2024-05-27 DIAGNOSIS — E66.01 MORBID (SEVERE) OBESITY DUE TO EXCESS CALORIES (HCC): ICD-10-CM

## 2024-05-28 ENCOUNTER — HOSPITAL ENCOUNTER (OUTPATIENT)
Dept: ULTRASOUND IMAGING | Facility: HOSPITAL | Age: 39
Discharge: HOME | End: 2024-05-28
Attending: EMERGENCY MEDICINE
Payer: COMMERCIAL

## 2024-05-28 DIAGNOSIS — N20.1 URETEROLITHIASIS: ICD-10-CM

## 2024-05-28 PROCEDURE — 76770 US EXAM ABDO BACK WALL COMP: CPT

## 2024-05-28 RX ORDER — OMEPRAZOLE 20 MG/1
CAPSULE, DELAYED RELEASE ORAL
Qty: 30 CAPSULE | Refills: 0 | Status: SHIPPED | OUTPATIENT
Start: 2024-05-28

## 2024-05-29 ENCOUNTER — OFFICE VISIT (OUTPATIENT)
Dept: UROLOGY | Facility: MEDICAL CENTER | Age: 39
End: 2024-05-29
Payer: COMMERCIAL

## 2024-05-29 VITALS
BODY MASS INDEX: 48.82 KG/M2 | HEART RATE: 76 BPM | OXYGEN SATURATION: 98 % | SYSTOLIC BLOOD PRESSURE: 118 MMHG | WEIGHT: 293 LBS | HEIGHT: 65 IN | DIASTOLIC BLOOD PRESSURE: 68 MMHG

## 2024-05-29 DIAGNOSIS — N20.1 URETEROLITHIASIS: Primary | ICD-10-CM

## 2024-05-29 DIAGNOSIS — N20.0 KIDNEY STONE: ICD-10-CM

## 2024-05-29 LAB
SL AMB  POCT GLUCOSE, UA: NORMAL
SL AMB LEUKOCYTE ESTERASE,UA: NORMAL
SL AMB POCT BILIRUBIN,UA: NORMAL
SL AMB POCT BLOOD,UA: NORMAL
SL AMB POCT CLARITY,UA: CLEAR
SL AMB POCT COLOR,UA: YELLOW
SL AMB POCT KETONES,UA: NORMAL
SL AMB POCT NITRITE,UA: NORMAL
SL AMB POCT PH,UA: 6
SL AMB POCT SPECIFIC GRAVITY,UA: >=1.03
SL AMB POCT URINE PROTEIN: NORMAL
SL AMB POCT UROBILINOGEN: 0.1

## 2024-05-29 PROCEDURE — 99204 OFFICE O/P NEW MOD 45 MIN: CPT | Performed by: UROLOGY

## 2024-05-29 PROCEDURE — 81003 URINALYSIS AUTO W/O SCOPE: CPT | Performed by: UROLOGY

## 2024-05-29 NOTE — ASSESSMENT & PLAN NOTE
Left ureteral stone (s) measuring 2 mm on CT scan (5/6/2024)  Likely passed: asymptomatic, resolution of hydro on sonogram, resolution of microscopic hematuria on Udip  - discussed medical expulsive therapy recommended for ureteral stones < 10 mm in select patients  - discussed indications for ED or urgent intervention include: fevers/chills, inability to tolerate PO due to excessive nausea/vomiting, and uncontrolled pain with PO medication      Kidney stone prevention    First time stone formers have a 50% risk of forming a second stone in the next 10 years.  A 24 hour urine collection to assess risk factors for stone formation is recommended for motivated first time stone formers (particularly with a family history of stones) and recurrent stone formers.  Generalized dietary changes alone (such as increased fluid intake and low salt diet) have been shown in a prospective study to decrease the risk of recurrent stone formation to 23%, whereas targeted dietary modification based on 24 hour urine collection was shown to decrease the risk of new stone formation to 7%.      Low risk patients for recurrent stone disease:  - first time stone former  - no family history of stones  - no history of DM, obesity, gout, GI/bone disease, or nephrocalcinosis      High risk patients for recurrent stone disease:  - recurrent stone former  - child/adolescent with stones  - solitary kidney  - family history of stones  - patient has history of obesity, DM, GI/bone disease, gout, recurrent UTI, or nephrocalcinosis    Screening evaluation recommended for all patients  24 hour urine collection recommended for high risk patients or low risk patients with abnormalities identified on routine screening outlined below    Routine screening:  - send kidney stone for chemical analysis if available   - UA  - Ucx if UTI suspected  - BMP   - serum calcium  - serum uric acid  - PTH if serum calcium is high or high-normal      General dietary  recommendations for kidney stone prevention:  Fluid intake: 100 oz or 3 L per day, enough to make 2.5 L urine daily.  This is the most important modifiable lifestyle factor to prevent recurrent stones  Low salt diet (less than 2,300 mg/d or 100 mEq)  Low nondairy animal protein diet (less then 8-10 oz per day)  Increase fruits and vegetables (5 servings per day), which are high in citrate  Low oxalate diet (and keep Vitamin C intake < 1000 mg/day, as Vit C is metabolized to oxalate)  Normal dietary intake of calcium (1,000-1,200 mg per day).  Dietary restriction of calcium is not recommended as it leads to increase GI absorption of oxalate and thereby paradoxically increases the risk of calcium oxalate stones    Prior PTH was 75.9 in 7/2023   Normal serum calcium (5/6/2024)    Will proceed with UA, serum uric acid and [facility]

## 2024-05-29 NOTE — PROGRESS NOTES
5/29/2024    Allison Gamboa  1985  [id number]    1. Ureterolithiasis  -     Ambulatory Referral to Urology       History of Present Illness  38 y.o. female with a history of HTN, PCOS, Hypothyroidism, and ANA LAURA on CPAP who presented to ED on 5/6 with left flank pain and nausea/vomiting    CTAP (5/6/2024)  IMPRESSION:  2 mm proximal left ureteral calculus resulting in mild hydronephrosis.    Symptoms started a few days prior to ED with nausea    Interval renal sonogram (5/28) did not show hydronephrosis per my read (official read pending)    Currently reports:  Occasional intermittent dull flank pain, pain scale 2/10, but no significant flank pain in the past 2 weeks  No nausea/vomiting  No gross hematuria  No fevers/chills    Patient has a prior history of kidney stone: 1 stone over 15 years ago  No prior surgical intervention  No family history of kidney stones  Not taking any kidney stone prevention medications  No history of primary hyperparathyroidism  + obesity  No DM  No gout  + calcium or Vitamin D supplements  No gastrointestinal disease with malabsorption, but did have gastric bypass surgery     Review of Systems   All other systems reviewed and are negative.      Past Medical History  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    Endometriosis     Follicular thyroid cancer (HCC)     Gastroenteritis     Hashimoto's disease     Hepatic steatosis     Hyperlipidemia     Hypertension     Hypothyroid     IBS (irritable bowel syndrome)     Kidney lesion, native, left     Kidney stone     Lymphedema     left leg    Obesity     Palpitation     Pancreatitis     Pneumonia     Polycystic ovarian disease     Polyuria     RUQ abdominal pain 11/01/2021    Sleep  apnea     Stroke (HCC) 2023    Discovered on CT Scan    Thyroid nodule     Thyromegaly     Tinea corporis     Tinea pedis        Past Social History  Past Surgical History:   Procedure Laterality Date    ADENOIDECTOMY      DILATION AND CURETTAGE OF UTERUS      HYSTERECTOMY  2020    left ovaries    IR LUMBAR PUNCTURE  2023    [place] LAPS GSTR RSTCV PX W/BYP ELKIN-EN-Y LIMB <150 CM N/A 2022    Procedure: BYPASS GASTRIC ELKIN-EN-Y LAPAROSCOPIC ROBOT & INTRAOPERATIVE EGD;  Surgeon: Graeme Pool MD;  Location: [facility];  Service: Bariatrics    THYROIDECTOMY, PARTIAL Left 2019    benign    TONSILLECTOMY      US GUIDED THYROID BIOPSY  2019       Past Family History  Family History   Problem Relation Age of Onset    Hyperlipidemia Mother     Restless legs syndrome Mother     Alcohol abuse Mother             Drug abuse Mother     Substance Abuse Mother     Sleep apnea Father     Hyperlipidemia Father     Hypertension Father     Alcohol abuse Father             Drug abuse Father     Substance Abuse Father     Hypertension Maternal Grandmother     Restless legs syndrome Maternal Grandmother     Ovarian cancer Maternal Grandmother     Arthritis Maternal Grandmother     Cancer Maternal Grandmother         My grandmother had cervical cancer    COPD Maternal Grandmother     Cancer Paternal Grandmother     Drug abuse Paternal Uncle     Substance Abuse Paternal Uncle        Past Social history  Social History     Socioeconomic History    Marital status: /Civil Union     Spouse name: Not on file    Number of children: Not on file    Years of education: Not on file    Highest education level: Not on file   Occupational History    Not on file   Tobacco Use    Smoking status: Never    Smokeless tobacco: Never   Vaping Use    Vaping status: Never Used   Substance and Sexual Activity    Alcohol use: Not Currently    Drug use: Never    Sexual activity: Yes     Partners: Female, Male      Birth control/protection: Condom Male, Other     Comment: Hysterectomy in February 2020 for pre cervical cancer   Other Topics Concern    Not on file   Social History Narrative    Not on file     Social Determinants of Health     Financial Resource Strain: Not on file   Food Insecurity: No Food Insecurity (12/28/2023)    Hunger Vital Sign     Worried About Running Out of Food in the Last Year: Never true     Ran Out of Food in the Last Year: Never true   Transportation Needs: No Transportation Needs (12/28/2023)    PRAPARE - Transportation     Lack of Transportation (Medical): No     Lack of Transportation (Non-Medical): No   Physical Activity: Not on file   Stress: Not on file   Social Connections: Not on file   Intimate Partner Violence: Not on file   Housing Stability: Unknown (12/28/2023)    Housing Stability Vital Sign     Unable to Pay for Housing in the Last Year: No     Number of Places Lived in the Last Year: Not on file     Unstable Housing in the Last Year: No       Current Medications  Current Outpatient Medications   Medication Sig Dispense Refill    acetaZOLAMIDE (DIAMOX) 250 mg tablet Take 3 tablets (750 mg total) by mouth 2 (two) times a day 180 tablet 2    albuterol (Ventolin HFA) 90 mcg/act inhaler Inhale 2 puffs every 6 (six) hours as needed for wheezing or shortness of breath 18 g 0    ALPRAZolam (XANAX) 0.25 mg tablet Take 1 tablet (0.25 mg total) by mouth 3 (three) times a day as needed for anxiety 60 tablet 0    atorvastatin (LIPITOR) 40 mg tablet TAKE ONE TABLET BY MOUTH EVERY EVENING 30 tablet 2    buPROPion (WELLBUTRIN) 75 mg tablet TAKE ONE TABLET BY MOUTH TWICE A DAY 60 tablet 0    Calcium Carbonate (CALCIUM 500 PO) Take 500 mg by mouth daily      citalopram (CeleXA) 20 mg tablet TAKE ONE TABLET BY MOUTH EVERY DAY 90 tablet 1    clopidogrel (PLAVIX) 75 mg tablet Take 1 tablet (75 mg total) by mouth daily 30 tablet 5    cyproheptadine (PERIACTIN) 4 mg tablet Take 1 tablet (4 mg total) by  "mouth daily at bedtime (Patient taking differently: Take 4 mg by mouth daily as needed) 30 tablet 3    fluticasone (FLONASE) 50 mcg/act nasal spray 2 sprays into each nostril daily 16 g 0    furosemide (LASIX) 20 mg tablet Take 1 tablet (20 mg total) by mouth daily as needed (edema) 30 tablet 0    levothyroxine 200 mcg tablet TAKE 1 TABLET  MONDAY THROUGH FRIDAY AND 1.5 TABLETS ON SATURDAY AND SUNDAY 90 tablet 1    levothyroxine 25 mcg tablet TAKE ONE TABLET BY MOUTH EVERY DAY 90 tablet 1    loratadine (CLARITIN) 10 mg tablet Take 10 mg by mouth if needed      losartan (COZAAR) 50 mg tablet Take 1 tablet (50 mg total) by mouth daily 90 tablet 1    Multiple Vitamins-Minerals (Bariatric Fusion) CHEW Chew 4 tablets daily      omeprazole (PriLOSEC) 20 mg delayed release capsule TAKE ONE CAPSULE EVERY OTHER DAY 30 capsule 0    pantoprazole (PROTONIX) 40 mg tablet Take 1 tablet (40 mg total) by mouth daily 90 tablet 1    tamsulosin (FLOMAX) 0.4 mg Take 1 capsule (0.4 mg total) by mouth daily with dinner for 7 days 7 capsule 0     No current facility-administered medications for this visit.       Allergies  Allergies   Allergen Reactions    Dust Mite Extract Other (See Comments)     Sinus inflammation    Latex Blisters    Molds & Smuts Other (See Comments)     Sinus inflammation    Penicillins Rash     Skin rash and sheds    Jorge Luis Grass Pollen Allergen Other (See Comments)     Sinus inflammation       Past Medical History, Social History, Family History, medications and allergies were reviewed.    Vitals  Vitals:    05/29/24 1434   Height: 5' 5\" (1.651 m)       Physical Exam  Vitals reviewed.   Constitutional:       Appearance: Normal appearance. She is normal weight.   HENT:      Head: Normocephalic.      Nose: Nose normal. No congestion.   Eyes:      Conjunctiva/sclera: Conjunctivae normal.   Cardiovascular:      Rate and Rhythm: Normal rate.   Pulmonary:      Effort: Pulmonary effort is normal. No respiratory " "distress.   Abdominal:      General: Abdomen is flat. There is no distension.      Palpations: Abdomen is soft.      Tenderness: There is no right CVA tenderness or left CVA tenderness.   Musculoskeletal:         General: No swelling. Normal range of motion.      Comments: Normal gait   Skin:     General: Skin is warm and dry.   Neurological:      General: No focal deficit present.      Mental Status: She is alert and oriented to person, place, and time.   Psychiatric:         Mood and Affect: Mood normal.         Results  No results found for: \"PSA\"  Lab Results   Component Value Date    CALCIUM 9.0 05/06/2024    K 5.6 (H) 05/06/2024    CO2 20 (L) 05/06/2024     (H) 05/06/2024    BUN 17 05/06/2024    CREATININE 0.77 05/06/2024     Lab Results   Component Value Date    WBC 12.32 (H) 05/06/2024    HGB 12.3 05/06/2024    HCT 41.8 05/06/2024    MCV 96 05/06/2024     05/06/2024       Office Urine Dip  No results found for this or any previous visit (from the past 1 hour(s)).]    "

## 2024-05-30 DIAGNOSIS — F41.8 MIXED ANXIETY DEPRESSIVE DISORDER: ICD-10-CM

## 2024-05-30 RX ORDER — BUPROPION HYDROCHLORIDE 75 MG/1
75 TABLET ORAL 2 TIMES DAILY
Qty: 60 TABLET | Refills: 0 | Status: SHIPPED | OUTPATIENT
Start: 2024-05-30

## 2024-06-10 ENCOUNTER — TELEPHONE (OUTPATIENT)
Dept: NEUROLOGY | Facility: CLINIC | Age: 39
End: 2024-06-10

## 2024-06-14 ENCOUNTER — HOSPITAL ENCOUNTER (OUTPATIENT)
Facility: MEDICAL CENTER | Age: 39
Discharge: HOME | End: 2024-06-14
Payer: COMMERCIAL

## 2024-06-14 DIAGNOSIS — G93.2 IIH (IDIOPATHIC INTRACRANIAL HYPERTENSION): ICD-10-CM

## 2024-06-14 DIAGNOSIS — I63.81 LACUNAR STROKE (HCC): ICD-10-CM

## 2024-06-14 PROCEDURE — 70553 MRI BRAIN STEM W/O & W/DYE: CPT

## 2024-06-14 PROCEDURE — G1004 CDSM NDSC: HCPCS

## 2024-06-14 PROCEDURE — 70543 MRI ORBT/FAC/NCK W/O &W/DYE: CPT

## 2024-06-14 PROCEDURE — A9585 GADOBUTROL INJECTION: HCPCS

## 2024-06-14 RX ORDER — GADOBUTROL 604.72 MG/ML
13 INJECTION INTRAVENOUS
Status: COMPLETED | OUTPATIENT
Start: 2024-06-14 | End: 2024-06-14

## 2024-06-14 RX ADMIN — GADOBUTROL 13 ML: 604.72 INJECTION INTRAVENOUS at 14:43

## 2024-06-17 ENCOUNTER — APPOINTMENT (OUTPATIENT)
Dept: LAB | Facility: IMAGING CENTER | Age: 39
End: 2024-06-17
Payer: COMMERCIAL

## 2024-06-17 DIAGNOSIS — Z98.84 BARIATRIC SURGERY STATUS: ICD-10-CM

## 2024-06-17 DIAGNOSIS — E66.01 OBESITY, CLASS III, BMI 40-49.9 (MORBID OBESITY) (HCC): ICD-10-CM

## 2024-06-17 DIAGNOSIS — I63.81 LACUNAR STROKE (HCC): ICD-10-CM

## 2024-06-17 DIAGNOSIS — Z48.815 ENCOUNTER FOR SURGICAL AFTERCARE FOLLOWING SURGERY OF DIGESTIVE SYSTEM: ICD-10-CM

## 2024-06-17 DIAGNOSIS — N20.1 URETEROLITHIASIS: ICD-10-CM

## 2024-06-17 DIAGNOSIS — G47.33 OSA ON CPAP: ICD-10-CM

## 2024-06-17 DIAGNOSIS — K91.2 POSTSURGICAL MALABSORPTION: ICD-10-CM

## 2024-06-17 LAB
25(OH)D3 SERPL-MCNC: 30.3 NG/ML (ref 30–100)
ALBUMIN SERPL BCP-MCNC: 4 G/DL (ref 3.5–5)
ALP SERPL-CCNC: 118 U/L (ref 34–104)
ALT SERPL W P-5'-P-CCNC: 28 U/L (ref 7–52)
ANION GAP SERPL CALCULATED.3IONS-SCNC: 10 MMOL/L (ref 4–13)
AST SERPL W P-5'-P-CCNC: 25 U/L (ref 13–39)
BACTERIA UR QL AUTO: NORMAL /HPF
BILIRUB SERPL-MCNC: 0.47 MG/DL (ref 0.2–1)
BILIRUB UR QL STRIP: NEGATIVE
BUN SERPL-MCNC: 19 MG/DL (ref 5–25)
CALCIUM SERPL-MCNC: 9 MG/DL (ref 8.4–10.2)
CHLORIDE SERPL-SCNC: 111 MMOL/L (ref 96–108)
CHOLEST SERPL-MCNC: 136 MG/DL
CLARITY UR: CLEAR
CO2 SERPL-SCNC: 19 MMOL/L (ref 21–32)
COLOR UR: COLORLESS
CREAT SERPL-MCNC: 0.68 MG/DL (ref 0.6–1.3)
DEPRECATED AT III PPP: 117 % OF NORMAL (ref 92–136)
ERYTHROCYTE [DISTWIDTH] IN BLOOD BY AUTOMATED COUNT: 14.6 % (ref 11.6–15.1)
FERRITIN SERPL-MCNC: 48 NG/ML (ref 11–307)
FOLATE SERPL-MCNC: >22.3 NG/ML
GFR SERPL CREATININE-BSD FRML MDRD: 112 ML/MIN/1.73SQ M
GLUCOSE P FAST SERPL-MCNC: 77 MG/DL (ref 65–99)
GLUCOSE UR STRIP-MCNC: NEGATIVE MG/DL
HCT VFR BLD AUTO: 42.2 % (ref 34.8–46.1)
HDLC SERPL-MCNC: 60 MG/DL
HGB BLD-MCNC: 12.6 G/DL (ref 11.5–15.4)
HGB UR QL STRIP.AUTO: NEGATIVE
IRON SATN MFR SERPL: 14 % (ref 15–50)
IRON SERPL-MCNC: 48 UG/DL (ref 50–212)
KETONES UR STRIP-MCNC: NEGATIVE MG/DL
LDLC SERPL CALC-MCNC: 60 MG/DL (ref 0–100)
LEUKOCYTE ESTERASE UR QL STRIP: NEGATIVE
MCH RBC QN AUTO: 28.6 PG (ref 26.8–34.3)
MCHC RBC AUTO-ENTMCNC: 29.9 G/DL (ref 31.4–37.4)
MCV RBC AUTO: 96 FL (ref 82–98)
NITRITE UR QL STRIP: NEGATIVE
NON-SQ EPI CELLS URNS QL MICRO: NORMAL /HPF
PH UR STRIP.AUTO: 6.5 [PH]
PLATELET # BLD AUTO: 244 THOUSANDS/UL (ref 149–390)
PMV BLD AUTO: 12.1 FL (ref 8.9–12.7)
POTASSIUM SERPL-SCNC: 3.8 MMOL/L (ref 3.5–5.3)
PROT C AG ACT/NOR PPP IA: 107 % OF NORMAL (ref 60–150)
PROT SERPL-MCNC: 7.4 G/DL (ref 6.4–8.4)
PROT UR STRIP-MCNC: NEGATIVE MG/DL
PTH-INTACT SERPL-MCNC: 52.5 PG/ML (ref 12–88)
RBC # BLD AUTO: 4.4 MILLION/UL (ref 3.81–5.12)
RBC #/AREA URNS AUTO: NORMAL /HPF
SODIUM SERPL-SCNC: 140 MMOL/L (ref 135–147)
SP GR UR STRIP.AUTO: 1 (ref 1–1.03)
TIBC SERPL-MCNC: 347 UG/DL (ref 250–450)
TRIGL SERPL-MCNC: 82 MG/DL
UIBC SERPL-MCNC: 299 UG/DL (ref 155–355)
URATE SERPL-MCNC: 4.3 MG/DL (ref 2–7.5)
UROBILINOGEN UR STRIP-ACNC: <2 MG/DL
VIT B12 SERPL-MCNC: 509 PG/ML (ref 180–914)
WBC # BLD AUTO: 10.41 THOUSAND/UL (ref 4.31–10.16)
WBC #/AREA URNS AUTO: NORMAL /HPF

## 2024-06-17 PROCEDURE — 85732 THROMBOPLASTIN TIME PARTIAL: CPT

## 2024-06-17 PROCEDURE — 84425 ASSAY OF VITAMIN B-1: CPT

## 2024-06-17 PROCEDURE — 84630 ASSAY OF ZINC: CPT

## 2024-06-17 PROCEDURE — 84590 ASSAY OF VITAMIN A: CPT

## 2024-06-17 PROCEDURE — 82728 ASSAY OF FERRITIN: CPT

## 2024-06-17 PROCEDURE — 85306 CLOT INHIBIT PROT S FREE: CPT

## 2024-06-17 PROCEDURE — 85300 ANTITHROMBIN III ACTIVITY: CPT

## 2024-06-17 PROCEDURE — 86147 CARDIOLIPIN ANTIBODY EA IG: CPT

## 2024-06-17 PROCEDURE — 85305 CLOT INHIBIT PROT S TOTAL: CPT

## 2024-06-17 PROCEDURE — 80053 COMPREHEN METABOLIC PANEL: CPT

## 2024-06-17 PROCEDURE — 82306 VITAMIN D 25 HYDROXY: CPT

## 2024-06-17 PROCEDURE — 85670 THROMBIN TIME PLASMA: CPT

## 2024-06-17 PROCEDURE — 86146 BETA-2 GLYCOPROTEIN ANTIBODY: CPT

## 2024-06-17 PROCEDURE — 85303 CLOT INHIBIT PROT C ACTIVITY: CPT

## 2024-06-17 PROCEDURE — 82607 VITAMIN B-12: CPT

## 2024-06-17 PROCEDURE — 82746 ASSAY OF FOLIC ACID SERUM: CPT

## 2024-06-17 PROCEDURE — 36415 COLL VENOUS BLD VENIPUNCTURE: CPT

## 2024-06-17 PROCEDURE — 85613 RUSSELL VIPER VENOM DILUTED: CPT

## 2024-06-17 PROCEDURE — 83550 IRON BINDING TEST: CPT

## 2024-06-17 PROCEDURE — 85027 COMPLETE CBC AUTOMATED: CPT

## 2024-06-17 PROCEDURE — 85598 HEXAGNAL PHOSPH PLTLT NEUTRL: CPT

## 2024-06-17 PROCEDURE — 84550 ASSAY OF BLOOD/URIC ACID: CPT

## 2024-06-17 PROCEDURE — 85705 THROMBOPLASTIN INHIBITION: CPT

## 2024-06-17 PROCEDURE — 83970 ASSAY OF PARATHORMONE: CPT

## 2024-06-17 PROCEDURE — 80061 LIPID PANEL: CPT

## 2024-06-17 PROCEDURE — 83540 ASSAY OF IRON: CPT

## 2024-06-18 LAB
CARDIOLIPIN IGA SER IA-ACNC: 2.7
CARDIOLIPIN IGG SER IA-ACNC: 1.2
CARDIOLIPIN IGM SER IA-ACNC: 1.3
PROT S ACT/NOR PPP: 101 % (ref 61–136)
PROT S PPP-ACNC: 113 % (ref 60–150)

## 2024-06-19 LAB
APTT HEX PL PPP: 4 SEC (ref 0–11)
APTT SCREEN TO CONFIRM RATIO: 1.13 RATIO (ref 0–1.34)
APTT-LA IMM 4:1 NP PPP: 43.4 SEC (ref 0–40.5)
CONFIRM APTT/NORMAL: 35.2 SEC (ref 0–47.6)
LA PPP-IMP: ABNORMAL
SCREEN APTT: 47.1 SEC (ref 0–43.5)
SCREEN DRVVT: 39.4 SEC (ref 0–47)
THROMBIN TIME: 16.1 SEC (ref 0–23)
ZINC SERPL-MCNC: 79 UG/DL (ref 44–115)

## 2024-06-20 ENCOUNTER — OFFICE VISIT (OUTPATIENT)
Dept: NEUROLOGY | Facility: CLINIC | Age: 39
End: 2024-06-20
Payer: COMMERCIAL

## 2024-06-20 VITALS
BODY MASS INDEX: 48.68 KG/M2 | SYSTOLIC BLOOD PRESSURE: 109 MMHG | HEART RATE: 68 BPM | TEMPERATURE: 96.3 F | HEIGHT: 65 IN | WEIGHT: 292.2 LBS | DIASTOLIC BLOOD PRESSURE: 61 MMHG

## 2024-06-20 DIAGNOSIS — G43.109 MIGRAINE WITH AURA AND WITHOUT STATUS MIGRAINOSUS, NOT INTRACTABLE: ICD-10-CM

## 2024-06-20 DIAGNOSIS — I63.9 STROKE (CEREBRUM) (HCC): ICD-10-CM

## 2024-06-20 DIAGNOSIS — G93.2 IDIOPATHIC INTRACRANIAL HYPERTENSION: Primary | ICD-10-CM

## 2024-06-20 LAB — PROT S ACT/NOR PPP: 113 % (ref 68–108)

## 2024-06-20 PROCEDURE — 99215 OFFICE O/P EST HI 40 MIN: CPT

## 2024-06-20 NOTE — PROGRESS NOTES
[facility]  PATIENT:  Allison Gamboa  MRN:  [id number]  :  1985  DATE OF SERVICE:  2024      Assessment/Plan:     Idiopathic intracranial hypertension/potential migraines with aura:    - At this time, would like for the patient to continue taking Diamox 750 mg twice daily.  Patient notes that this has seemed to slightly help with the headaches and has also made a significant difference in regards to her visual disturbances as well associated with IIH.  The patient is to continue the medication at this time until she can be properly evaluated by a new ophthalmologist and evaluated to see if her papilledema has improved or if there is no evidence of active papilledema still at this time.  Do not feel that the patient needs to increase on the medication at this time although I would like to keep this the same.  - Ambulatory referral to ophthalmology, would like for the patient to see Dr. Lang and to be evaluated in regards to her previous papilledema and concern for IIH.  If there is noted improvement of the papilledema we can then start to slowly look at trying to decrease off of the Diamox.  - The patient was not able to tolerate cyproheptadine daily for preventative therapy.  Patient stated that the medication made her feel way too sleepy and drowsy to be able to take on a daily basis.  However, do feel as though the patient would benefit from an abortive medication when the headaches do become very severe.  Advised the patient to start Nurtec 75 mg, take 1 tablet by mouth once as needed at the onset of severe headache/migraine.  Max dose: 75 mg/day.  Patient is contraindicated to triptan medications at this time due to the fact that she has history of stroke, likely would have no issues being able to obtain Nurtec at this time.  - Would like for the patient to also continue with her weight management and exercise at this moment in time.  Feel as though it is important for  the patient to lose as much weight as much as she possibly can to help prevent the reoccurrence of IIH moving forward.  - Finally, if the patient is experiencing any new visual changes or any worsening visual symptoms at this point in time she should report to the ED as soon as possible to make sure that she can be evaluated for an ophthalmologic emergency.  We do not want to risk the patient's changes in vision at this point in time and make sure that we are adequately reducing her pressure if need be and see if the patient needs to have an additional lumbar puncture as well.    History of chronic right frontal lobe infarct:    - At this time, the patient's MRI brain and orbits results have not yet been completed.  Advised the patient if there is any significant change or any significant issues with the imaging once the report is received they will make sure to let her know as soon as possible.  - Patient had completed thrombosis panel, still waiting on all the results from the thrombosis panel.  For the most part, majority of the thrombosis panel seems to be okay and does not appear that the patient has any significant clotting disorders or issues at this time.  - Continue to follow with cardiology in regards to the need for further cardiac monitoring.  Patient had a Zio patch already completed, would be up to cardiology if they want to pursue any further monitoring with a loop recorder or not.    - For ongoing stroke prevention continue: Plavix 75 mg once daily, atorvastatin 40 mg once daily    - Discussed the importance of antiplatelet management with the patient to prevent future strokes.   - Recommend to check blood pressure occasionally away from the doctor's office to make sure that those numbers are typically less than 130/80.  If they are frequently higher than that, we recommend checking a little more often and to follow up with primary care team   - Will defer to primary care team for monitoring of  cholesterol panel and blood sugar numbers with target LDL cholesterol of less than 70 and hemoglobin A1c less than 7%  - Recommend following a low salt, mediterranean diet   - Recommend routine physical exercise as tolerated     We will plan for her to return to the office in 6 months time to see on of the APPs or Dr. Phillips but would be happy to see her sooner if the need should arise.  If she has any symptoms concerning for TIA or stroke including sudden painless loss of vision or double vision, difficulty speaking or swallowing, vertigo/room spinning that does not quickly resolve, or weakness/numbness/loss of coordination affecting 1 side of the face or body she should proceed by ambulance to the nearest emergency room immediately.       History of Present Illness:     For Review:    We had the pleasure of evaluating Allison Gamboa in neurological follow up  today for headaches.  As you know,  she is a 38 y.o.   female with a past history of migraine headaches. Patient was last seen in the office at [facility] on 02/14/2024 by myself.  The patient was last seen in the office for a hospital follow-up appointment in regard to her recently suspected IIH and also chronic right frontal lobe infarct.  Is noted that in regard to suspected IIH, the patient was having difficulty with headaches and some issues with her vision.  She was discharged on Diamox 500 mg twice daily.  Was noted at the last appointment she was getting headaches occasionally every once in a while and having about 3-4 significant debilitating headaches per month.  Was difficult at the time to discern if the patient's headaches seem more related to IIH or if there was a potential migraine headache component to them as well.  Patient noted that she was having some difficulty with her vision every once in a while, not having as many significant blackouts that she did previously.  She noted that her peripheral vision was not the  dharmesh and she continue to follow with her ophthalmologist for frequent visual field testing.  She was seeing Dr. Linares at [facility] ophthalmology who had noted the patient's papilledema in the past.  Is noted that the patient is having slight positional change headaches but not as significant as they were before.  With that said, the last appointment I recommended the patient increase her Diamox dosage from 500 mg twice daily to 750 mg twice daily.  Would like to see if the patient continue to have improvement of her symptoms with a slight increase to her Diamox dosing.  Advised the patient to continue to follow with ophthalmology to evaluate her papilledema and make sure this was not worsening with increased Diamox dosage.  Also initiated cyproheptadine in regards to some of the patient's weather change headaches and the fact that some of the patient's headaches may be migraine headache/sinus headaches as well.    During the patient's hospitalization, was noted that there was a recently discovered right frontal lobe infarct as well.  It was noted that the patient had some residual deficits in regards to slight difficulty with word finding and difficulty speaking.  Also having some issues with short-term memory which we were not sure if this could be related to chronic right frontal lobe stroke or not.  It seemed likely that the patient's stroke was more of a lacunar infarct from small vessel disease risk factors.  Although, since patient was relatively young and did not have a significant amount of vascular risk factors we opted to pursue further workup.  That said, would recommend that the patient follow-up with cardiology in regards to having an outpatient Zio patch placed to rule out cardioembolic cause.  Also recommended the patient repeat a thrombosis panel and lipid panel for further evaluation of secondary stroke risk.  She was to repeat MRI brain and orbits without contrast in 4 months time.  She was  also recommended to continue Plavix 75 mg daily and atorvastatin 40 mg daily for secondary stroke prevention.      Current medical illnesses: Hypertension, migraine with aura, ANA LAURA on CPAP, Hashimoto's thyroiditis, hypothyroidism, PCOS, idiopathic intracranial hypertension, mixed anxiety depressive disorder, morbid obesity, vitamin D deficiency      What medications do you take or have you taken for your headaches?     Current Preventive:   Diamox 750 mg twice daily      Current Abortive:   Ibuprofen (one a day with PPI), Tylenol     Previous preventive:  Cyproheptadine (did not tolerate, drowsiness/grogginess)    Previous abortive:  Contraindicated to triptans due to history of stroke      Interval updates as of 6/20/2024:    Some occasions the headaches have not been very bad, often times can just take a Tylenol and be fine usually. There are times where she is experiencing more severe migraine type of headaches. 2-3/30 days out of the month with more severe or debilitating headaches a month. About a month ago, very strange headache but very severe headache at the back of the head, made her feel very disoriented. Quick, stabbing pains at the back of the head as well. With rainy days, pressure of the headache seems to go up as well. With higher altitudes, increasing pressure as well. Has made an appointment with sleep medicine to work on correcting her CPAP for ANA LAURA. Slight increase to the Diamox has seemed to help with the headaches she states. Did have an appointment for her visual field testing at her ophthalmologist last time, unfortunately her ophthalmologist no longer takes her insurance and has to find a new one. She does feel that her visual fields have seem to increased since increasing her Diamox she states. Sometimes getting static in her vision and other times getting dark spots in her vision. Has not seemed to have any episodes of complete vision loss since the last appointment. Sometimes some dimming of  her vision, where everything looks much darker but not having any true full vision loss.  Advised patient if there were any significant abnormality on MRI brain with and without contrast when results are received that would order now.    No new stroke-like symptoms. Noticed some issues with memory and speech as a part of residual deficits from the stroke previously. Still taking Plavix 75 mg once daily, some bruising on the arms and legs she states. Atorvastatin 40 mg once daily as well. Did not have any findings of atrial fibrillation on her first Zio patch readings. Does have a follow up cardiology in July.  Still awaiting for results and full panel from most recently acquired thrombosis panel. Recent LDL 60 mg/dL. Has been difficult trying to lose weight recently she states. Does have a weight management appointment upcoming and does need to follow up with dietician as well. She is exercising and dieting but having difficulty still losing weight she states. Has lost weight in her upper body, but still excess skin in her lower body. Does struggle because she has excess fat and fluid due to lymphedema as well, weight seems to really fluctuate throughout the day.     Reviewed old notes from physician seen in the past- see above HPI for summary of previous encounters.       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    Endometriosis     Follicular thyroid cancer (HCC)     Gastroenteritis     Hashimoto's disease     Hepatic steatosis     Hyperlipidemia     Hypertension     Hypothyroid     IBS (irritable bowel syndrome)     Kidney lesion, native, left     Kidney stone     Lymphedema     left leg    Obesity     Palpitation     Pancreatitis     Pneumonia     Polycystic  ovarian disease     Polyuria     RUQ abdominal pain 11/01/2021    Sleep apnea     Stroke (HCC) 12/26/2023    Discovered on CT Scan    Thyroid nodule     Thyromegaly     Tinea corporis     Tinea pedis        Patient Active Problem List   Diagnosis    Hashimoto's thyroiditis    Hypothyroidism    BMI 50.0-59.9, adult (HCC)    Arthralgia    Eczema    Lymph edema    Mixed anxiety depressive disorder    Essential hypertension    ANA LAURA on CPAP    Sleep related hypoxia    PCOS (polycystic ovarian syndrome)    Transaminitis    Vitamin D deficiency    Monilial rash    IIH (idiopathic intracranial hypertension)    Chronic headaches    Lacunar Infarct (HCC)    Chronic R frontal stroke    Morbid (severe) obesity due to excess calories (HCC)    Kidney stone       Medications:      Current Outpatient Medications   Medication Sig Dispense Refill    acetaZOLAMIDE (DIAMOX) 250 mg tablet Take 3 tablets (750 mg total) by mouth 2 (two) times a day 180 tablet 2    albuterol (Ventolin HFA) 90 mcg/act inhaler Inhale 2 puffs every 6 (six) hours as needed for wheezing or shortness of breath 18 g 0    ALPRAZolam (XANAX) 0.25 mg tablet Take 1 tablet (0.25 mg total) by mouth 3 (three) times a day as needed for anxiety 60 tablet 0    atorvastatin (LIPITOR) 40 mg tablet TAKE ONE TABLET BY MOUTH EVERY EVENING 30 tablet 2    buPROPion (WELLBUTRIN) 75 mg tablet TAKE ONE TABLET BY MOUTH TWICE A DAY 60 tablet 0    Calcium Carbonate (CALCIUM 500 PO) Take 500 mg by mouth daily      citalopram (CeleXA) 20 mg tablet TAKE ONE TABLET BY MOUTH EVERY DAY 90 tablet 1    clopidogrel (PLAVIX) 75 mg tablet Take 1 tablet (75 mg total) by mouth daily 30 tablet 5    cyproheptadine (PERIACTIN) 4 mg tablet Take 1 tablet (4 mg total) by mouth daily at bedtime (Patient taking differently: Take 4 mg by mouth daily as needed) 30 tablet 3    fluticasone (FLONASE) 50 mcg/act nasal spray 2 sprays into each nostril daily 16 g 0    levothyroxine 200 mcg tablet TAKE 1 TABLET   MONDAY THROUGH FRIDAY AND 1.5 TABLETS ON SATURDAY AND  90 tablet 1    levothyroxine 25 mcg tablet TAKE ONE TABLET BY MOUTH EVERY DAY 90 tablet 1    loratadine (CLARITIN) 10 mg tablet Take 10 mg by mouth if needed      losartan (COZAAR) 50 mg tablet Take 1 tablet (50 mg total) by mouth daily 90 tablet 1    Multiple Vitamins-Minerals (Bariatric Fusion) CHEW Chew 4 tablets daily      pantoprazole (PROTONIX) 40 mg tablet Take 1 tablet (40 mg total) by mouth daily 90 tablet 1    furosemide (LASIX) 20 mg tablet Take 1 tablet (20 mg total) by mouth daily as needed (edema) (Patient not taking: Reported on 2024) 30 tablet 0    omeprazole (PriLOSEC) 20 mg delayed release capsule TAKE ONE CAPSULE EVERY OTHER DAY (Patient not taking: Reported on 2024) 30 capsule 0    tamsulosin (FLOMAX) 0.4 mg Take 1 capsule (0.4 mg total) by mouth daily with dinner for 7 days (Patient not taking: Reported on 2024) 7 capsule 0     No current facility-administered medications for this visit.        Allergies:      Allergies   Allergen Reactions    Dust Mite Extract Other (See Comments)     Sinus inflammation    Latex Blisters    Molds & Smuts Other (See Comments)     Sinus inflammation    Penicillins Rash     Skin rash and sheds    Jorge Luis Grass Pollen Allergen Other (See Comments)     Sinus inflammation       Family History:     Family History   Problem Relation Age of Onset    Hyperlipidemia Mother     Restless legs syndrome Mother     Alcohol abuse Mother             Drug abuse Mother     Substance Abuse Mother     Sleep apnea Father     Hyperlipidemia Father     Hypertension Father     Alcohol abuse Father             Drug abuse Father     Substance Abuse Father     Hypertension Maternal Grandmother     Restless legs syndrome Maternal Grandmother     Ovarian cancer Maternal Grandmother     Arthritis Maternal Grandmother     Cancer Maternal Grandmother         My grandmother had cervical cancer    COPD  "Maternal Grandmother     Cancer Paternal Grandmother     Drug abuse Paternal Uncle     Substance Abuse Paternal Uncle        Social History:     Social History     Socioeconomic History    Marital status: /Civil Union     Spouse name: Not on file    Number of children: Not on file    Years of education: Not on file    Highest education level: Not on file   Occupational History    Not on file   Tobacco Use    Smoking status: Never    Smokeless tobacco: Never   Vaping Use    Vaping status: Never Used   Substance and Sexual Activity    Alcohol use: Not Currently    Drug use: Never    Sexual activity: Yes     Partners: Female, Male     Birth control/protection: Condom Male, Other     Comment: Hysterectomy in February 2020 for pre cervical cancer   Other Topics Concern    Not on file   Social History Narrative    Not on file     Social Determinants of Health     Financial Resource Strain: Not on file   Food Insecurity: No Food Insecurity (12/28/2023)    Hunger Vital Sign     Worried About Running Out of Food in the Last Year: Never true     Ran Out of Food in the Last Year: Never true   Transportation Needs: No Transportation Needs (12/28/2023)    PRAPARE - Transportation     Lack of Transportation (Medical): No     Lack of Transportation (Non-Medical): No   Physical Activity: Not on file   Stress: Not on file   Social Connections: Not on file   Intimate Partner Violence: Not on file   Housing Stability: Unknown (12/28/2023)    Housing Stability Vital Sign     Unable to Pay for Housing in the Last Year: No     Number of Times Moved in the Last Year: Not on file     Homeless in the Last Year: No         Objective:     Physical Exam:                                                                 Vitals:            Constitutional:    /61 (BP Location: Left arm, Patient Position: Sitting, Cuff Size: Large)   Pulse 68   Temp (!) 96.3 °F (35.7 °C) (Temporal)   Ht 5' 5\" (1.651 m)   Wt 133 kg (292 lb 3.2 oz)   " LMP 12/17/2019   BMI 48.62 kg/m²   BP Readings from Last 3 Encounters:   06/20/24 109/61   05/29/24 118/68   05/06/24 116/57     Pulse Readings from Last 3 Encounters:   06/20/24 68   05/29/24 76   05/06/24 60         Well developed, well nourished, well groomed. No dysmorphic features.       Psychiatric:  Normal behavior and appropriate affect        Neurological Examination:     Mental status/cognitive function:   Orientated to time, place and person. Recent and remote memory intact. Attention span and concentration as well as fund of knowledge are appropriate for age. Normal language and spontaneous speech.     Cranial Nerves:  II-bilateral peripheral vision slightly impaired  III, IV, VI-Pupils were equal, round, and reactive to light and accomodation.  Delilah Jana of the right eye noted.  Extraocular movements were full and conjugate without nystagmus. Conjugate gaze, normal smooth pursuits, normal saccades   V-facial sensation symmetric.    VII-facial expression symmetric, intact forehead wrinkle, strong eye closure, symmetric smile    VIII-hearing grossly intact bilaterally   IX, X-palate elevation symmetric, no dysarthria.   XI-shoulder shrug strength intact    XII-tongue protrusion midline.    Motor Exam: symmetric bulk and tone throughout, no pronator drift. Power/strength 5/5 bilateral upper and lower extremities, no atrophy, fasciculations or abnormal movements noted.   Sensory: grossly intact light touch in all extremities.   Reflexes: brachioradialis 2+, biceps 2+, knee 2+ bilaterally  Coordination: Finger nose finger intact bilaterally, no apparent dysmetria, ataxia or tremor noted  Gait: steady casual and tandem gait.       Review of Systems:     Review of Systems   Constitutional:  Negative for appetite change, fatigue and fever.   HENT: Negative.  Negative for hearing loss, tinnitus, trouble swallowing and voice change.    Eyes: Negative.  Negative for photophobia, pain and visual disturbance.    Respiratory: Negative.  Negative for shortness of breath.    Cardiovascular: Negative.  Negative for palpitations.   Gastrointestinal: Negative.  Negative for nausea and vomiting.   Endocrine: Negative.  Negative for cold intolerance.   Genitourinary: Negative.  Negative for dysuria, frequency and urgency.   Musculoskeletal:  Negative for back pain, gait problem, myalgias, neck pain and neck stiffness.   Skin: Negative.  Negative for rash.   Allergic/Immunologic: Negative.    Neurological:  Positive for headaches (bad headache felt she was hit in the head). Negative for dizziness, tremors, seizures, syncope, facial asymmetry, speech difficulty, weakness, light-headedness and numbness.   Hematological: Negative.  Does not bruise/bleed easily.   Psychiatric/Behavioral: Negative.  Negative for confusion, hallucinations and sleep disturbance.    All other systems reviewed and are negative.    I personally reviewed the ROS entered by the MA    I spent 40 minutes in total time for this visit.    Author:  Maikel Kapadia PA-C 6/20/2024 7:46 AM

## 2024-06-20 NOTE — PROGRESS NOTES
ROS:    Review of Systems   Constitutional:  Negative for appetite change, fatigue and fever.   HENT: Negative.  Negative for hearing loss, tinnitus, trouble swallowing and voice change.    Eyes: Negative.  Negative for photophobia, pain and visual disturbance.   Respiratory: Negative.  Negative for shortness of breath.    Cardiovascular: Negative.  Negative for palpitations.   Gastrointestinal: Negative.  Negative for nausea and vomiting.   Endocrine: Negative.  Negative for cold intolerance.   Genitourinary: Negative.  Negative for dysuria, frequency and urgency.   Musculoskeletal:  Negative for back pain, gait problem, myalgias, neck pain and neck stiffness.   Skin: Negative.  Negative for rash.   Allergic/Immunologic: Negative.    Neurological:  Positive for headaches (bad headache felt she was hit in the head). Negative for dizziness, tremors, seizures, syncope, facial asymmetry, speech difficulty, weakness, light-headedness and numbness.   Hematological: Negative.  Does not bruise/bleed easily.   Psychiatric/Behavioral: Negative.  Negative for confusion, hallucinations and sleep disturbance.    All other systems reviewed and are negative.

## 2024-06-20 NOTE — PATIENT INSTRUCTIONS
Idiopathic intracranial hypertension/potential migraines with aura:    - At this time, would like for the patient to continue taking Diamox 750 mg twice daily.  Patient notes that this has seemed to slightly help with the headaches and has also made a significant difference in regards to her visual disturbances as well associated with IIH.  The patient is to continue the medication at this time until she can be properly evaluated by a new ophthalmologist and evaluated to see if her papilledema has improved or if there is no evidence of active papilledema still at this time.  Do not feel that the patient needs to increase on the medication at this time although I would like to keep this the same.  - Ambulatory referral to ophthalmology, would like for the patient to see Dr. Lang and to be evaluated in regards to her previous papilledema and concern for IIH.  If there is noted improvement of the papilledema we can then start to slowly look at trying to decrease off of the Diamox.  - The patient was not able to tolerate cyproheptadine daily for preventative therapy.  Patient stated that the medication made her feel way too sleepy and drowsy to be able to take on a daily basis.  However, do feel as though the patient would benefit from an abortive medication when the headaches do become very severe.  Advised the patient to start Nurtec 75 mg, take 1 tablet by mouth once as needed at the onset of severe headache/migraine.  Max dose: 75 mg/day.  Patient is contraindicated to triptan medications at this time due to the fact that she has history of stroke, likely would have no issues being able to obtain Nurtec at this time.  - Would like for the patient to also continue with her weight management and exercise at this moment in time.  Feel as though it is important for the patient to lose as much weight as much as she possibly can to help prevent the reoccurrence of IIH moving forward.  - Finally, if the patient is  experiencing any new visual changes or any worsening visual symptoms at this point in time she should report to the ED as soon as possible to make sure that she can be evaluated for an ophthalmologic emergency.  We do not want to risk the patient's changes in vision at this point in time and make sure that we are adequately reducing her pressure if need be and see if the patient needs to have an additional lumbar puncture as well.    History of chronic right frontal lobe infarct:    - At this time, the patient's MRI brain and orbits results have not yet been completed.  Advised the patient if there is any significant change or any significant issues with the imaging once the report is received they will make sure to let her know as soon as possible.  - Patient had completed thrombosis panel, still waiting on all the results from the thrombosis panel.  For the most part, majority of the thrombosis panel seems to be okay and does not appear that the patient has any significant clotting disorders or issues at this time.  - Continue to follow with cardiology in regards to the need for further cardiac monitoring.  Patient had a Zio patch already completed, would be up to cardiology if they want to pursue any further monitoring with a loop recorder or not.    - For ongoing stroke prevention continue: Plavix 75 mg once daily, atorvastatin 40 mg once daily    - Discussed the importance of antiplatelet management with the patient to prevent future strokes.   - Recommend to check blood pressure occasionally away from the doctor's office to make sure that those numbers are typically less than 130/80.  If they are frequently higher than that, we recommend checking a little more often and to follow up with primary care team   - Will defer to primary care team for monitoring of cholesterol panel and blood sugar numbers with target LDL cholesterol of less than 70 and hemoglobin A1c less than 7%  - Recommend following a low salt,  mediterranean diet   - Recommend routine physical exercise as tolerated     We will plan for her to return to the office in 6 months time to see on of the APPs or Dr. Phillips but would be happy to see her sooner if the need should arise.  If she has any symptoms concerning for TIA or stroke including sudden painless loss of vision or double vision, difficulty speaking or swallowing, vertigo/room spinning that does not quickly resolve, or weakness/numbness/loss of coordination affecting 1 side of the face or body she should proceed by ambulance to the nearest emergency room immediately.

## 2024-06-21 LAB
B2 GLYCOPROT1 IGA SERPL IA-ACNC: 1.6
B2 GLYCOPROT1 IGG SERPL IA-ACNC: 1.3
B2 GLYCOPROT1 IGM SERPL IA-ACNC: <2.4
VIT A SERPL-MCNC: 53.4 UG/DL (ref 18.9–57.3)
VIT B1 BLD-SCNC: 164.5 NMOL/L (ref 66.5–200)

## 2024-06-26 ENCOUNTER — PATIENT MESSAGE (OUTPATIENT)
Dept: NEUROLOGY | Facility: CLINIC | Age: 39
End: 2024-06-26

## 2024-06-26 DIAGNOSIS — F41.8 MIXED ANXIETY DEPRESSIVE DISORDER: ICD-10-CM

## 2024-06-26 DIAGNOSIS — E66.01 MORBID (SEVERE) OBESITY DUE TO EXCESS CALORIES (HCC): ICD-10-CM

## 2024-06-26 RX ORDER — BUPROPION HYDROCHLORIDE 75 MG/1
75 TABLET ORAL 2 TIMES DAILY
Qty: 60 TABLET | Refills: 0 | Status: SHIPPED | OUTPATIENT
Start: 2024-06-26

## 2024-06-26 RX ORDER — OMEPRAZOLE 20 MG/1
20 CAPSULE, DELAYED RELEASE ORAL EVERY OTHER DAY
Qty: 30 CAPSULE | Refills: 0 | Status: SHIPPED | OUTPATIENT
Start: 2024-06-26

## 2024-07-15 ENCOUNTER — TELEPHONE (OUTPATIENT)
Dept: UROLOGY | Facility: CLINIC | Age: 39
End: 2024-07-15

## 2024-07-15 ENCOUNTER — NEW PATIENT (OUTPATIENT)
Dept: URBAN - METROPOLITAN AREA CLINIC 6 | Facility: CLINIC | Age: 39
End: 2024-07-15

## 2024-07-15 DIAGNOSIS — G93.2: ICD-10-CM

## 2024-07-15 DIAGNOSIS — H47.10: ICD-10-CM

## 2024-07-15 PROCEDURE — 92133 CPTRZD OPH DX IMG PST SGM ON: CPT | Mod: NC

## 2024-07-15 PROCEDURE — 92083 EXTENDED VISUAL FIELD XM: CPT

## 2024-07-15 PROCEDURE — 99204 OFFICE O/P NEW MOD 45 MIN: CPT

## 2024-07-15 ASSESSMENT — VISUAL ACUITY
OD_CC: 20/40-2
OS_CC: 20/40
OD_PH: 20/40

## 2024-07-15 ASSESSMENT — TONOMETRY
OS_IOP_MMHG: 7
OD_IOP_MMHG: 7

## 2024-07-15 NOTE — TELEPHONE ENCOUNTER
Called patient - she is told that we cancelled her appointment since she didn't have the Litholink done and this is resent to patient.  We will call her when we get the results back to schedule follow up.  She had no further concerns at this time.

## 2024-07-26 ENCOUNTER — RA CDI HCC (OUTPATIENT)
Dept: OTHER | Facility: HOSPITAL | Age: 39
End: 2024-07-26

## 2024-07-26 DIAGNOSIS — I10 ESSENTIAL HYPERTENSION: ICD-10-CM

## 2024-07-29 DIAGNOSIS — F41.8 MIXED ANXIETY DEPRESSIVE DISORDER: ICD-10-CM

## 2024-07-29 RX ORDER — BUPROPION HYDROCHLORIDE 75 MG/1
75 TABLET ORAL 2 TIMES DAILY
Qty: 60 TABLET | Refills: 0 | Status: SHIPPED | OUTPATIENT
Start: 2024-07-29

## 2024-07-29 RX ORDER — ATORVASTATIN CALCIUM 40 MG/1
40 TABLET, FILM COATED ORAL EVERY EVENING
Qty: 30 TABLET | Refills: 2 | Status: SHIPPED | OUTPATIENT
Start: 2024-07-29

## 2024-07-30 ENCOUNTER — TELEPHONE (OUTPATIENT)
Age: 39
End: 2024-07-30

## 2024-07-30 NOTE — TELEPHONE ENCOUNTER
Keisha Rodriguez PA-C   to Neurology Concussion & Migraine Team 5       7/29/24 11:16 AM  Can the PA be looked into for this pt? Thank you.  Allison Gamboa   to Maikel Kapadia PA-C         7/29/24 10:03 AM  I am so sorry! I honestly thought I responded to this!      Headache wise things have been touch and go. Diamox definitely helps, but throughout the day sometimes I still get these breakout headaches that range in severity. Any word on the nuertec? Pharmacy is still saying it needs a prior authorization.

## 2024-07-31 NOTE — TELEPHONE ENCOUNTER
Nurtec has been approved through 1/28/25. Called [facility] and left a message making them aware of the approval. Sent pt a message through [facility].

## 2024-08-02 ENCOUNTER — OFFICE VISIT (OUTPATIENT)
Dept: FAMILY MEDICINE CLINIC | Facility: CLINIC | Age: 39
End: 2024-08-02
Payer: COMMERCIAL

## 2024-08-02 VITALS
SYSTOLIC BLOOD PRESSURE: 110 MMHG | OXYGEN SATURATION: 98 % | TEMPERATURE: 97.6 F | BODY MASS INDEX: 48.55 KG/M2 | HEART RATE: 70 BPM | HEIGHT: 65 IN | WEIGHT: 291.4 LBS | DIASTOLIC BLOOD PRESSURE: 82 MMHG

## 2024-08-02 DIAGNOSIS — E03.4 HYPOTHYROIDISM DUE TO ACQUIRED ATROPHY OF THYROID: ICD-10-CM

## 2024-08-02 DIAGNOSIS — G93.2 IIH (IDIOPATHIC INTRACRANIAL HYPERTENSION): ICD-10-CM

## 2024-08-02 DIAGNOSIS — G43.109 MIGRAINE WITH AURA AND WITHOUT STATUS MIGRAINOSUS, NOT INTRACTABLE: ICD-10-CM

## 2024-08-02 DIAGNOSIS — G47.33 OSA ON CPAP: ICD-10-CM

## 2024-08-02 DIAGNOSIS — I10 ESSENTIAL HYPERTENSION: Primary | ICD-10-CM

## 2024-08-02 DIAGNOSIS — F41.8 MIXED ANXIETY DEPRESSIVE DISORDER: ICD-10-CM

## 2024-08-02 PROCEDURE — 99395 PREV VISIT EST AGE 18-39: CPT | Performed by: INTERNAL MEDICINE

## 2024-08-02 PROCEDURE — 99214 OFFICE O/P EST MOD 30 MIN: CPT | Performed by: INTERNAL MEDICINE

## 2024-08-02 NOTE — PROGRESS NOTES
Adult Annual Physical  Name: Allison Gamboa      : 1985      MRN: [id number]  Encounter Provider: Elissa Hyatt MD  Encounter Date: 2024   Encounter department: [facility]    Assessment & Plan   1. Essential hypertension  Assessment & Plan:  Blood pressure remains well controlled on losartan  2. Migraine with aura and without status migrainosus, not intractable  Assessment & Plan:  She continues to have frequent had for both my and her increased intracranial pressure. He does see neuro- on a regular basis  3. ANA LAURA on CPAP  Assessment & Plan:  She remains on CPAP  4. Hypothyroidism due to acquired atrophy of thyroid  Assessment & Plan:  She sees endocrine and is on high dose levothyroxine  5. IIH (idiopathic intracranial hypertension)  6. Mixed anxiety depressive disorder  Assessment & Plan:  She does have mixed anxiety and depression. She is on citalopram and she does see a therapist  7. BMI 50.0-59.9, adult ([facility])  Assessment & Plan:  He is currently 120 sending her to the people who write the injectables    Immunizations and preventive care screenings were discussed with patient today. Appropriate education was printed on patient's after visit summary.    Counseling:  Exercise: the importance of regular exercise/physical activity was discussed. Recommend exercise 3-5 times per week for at least 30 minutes.       Depression Screening and Follow-up Plan: Patient was screened for depression during today's encounter. They screened negative with a PHQ-9 score of 2.        History of Present Illness     Adult Annual Physical:  Patient presents for annual physical. Patient is morbidly obese multiple health issues related to that see and has increased intracranial pressure. She does follow with multiple specialists including endocrine neuro- weight management etc..     Diet and Physical Activity:  - Diet/Nutrition: well balanced diet.  - Exercise: moderate cardiovascular  exercise.    Depression Screening:    - PHQ-9 Score: 2    General Health:  - Sleep: 4-6 hours of sleep on average.  - Hearing: normal hearing bilateral ears.  - Vision: vision problems and goes for regular eye exams.  - Dental: no dental visits for > 1 year.    /GYN Health:  - Follows with GYN: no.   - Menopause: premenopausal.   - Last menstrual cycle: 2/1/2020.   - History of STDs: no    Advanced Care Planning:  - Has an advanced directive?: no    - Has a durable medical POA?: no    - ACP document given to patient?: no      Review of Systems   Constitutional:  Positive for fatigue. Negative for chills, fever and unexpected weight change.   HENT:  Negative for congestion, ear pain, hearing loss, postnasal drip, sinus pressure, sore throat, trouble swallowing and voice change.    Eyes:  Positive for visual disturbance.   Respiratory:  Negative for cough, chest tightness, shortness of breath and wheezing.    Cardiovascular:  Positive for leg swelling. Negative for chest pain and palpitations.   Gastrointestinal:  Negative for abdominal distention, abdominal pain, anal bleeding, blood in stool, constipation, diarrhea and nausea.   Endocrine: Negative for cold intolerance, polydipsia, polyphagia and polyuria.   Genitourinary:  Negative for dysuria, flank pain, frequency, hematuria and urgency.   Musculoskeletal:  Positive for back pain. Negative for arthralgias, gait problem, joint swelling, myalgias and neck pain.   Skin:  Positive for rash (Flexural areas).   Allergic/Immunologic: Negative for immunocompromised state.   Neurological:  Negative for dizziness, syncope, facial asymmetry, weakness, light-headedness, numbness and headaches.   Hematological:  Negative for adenopathy.   Psychiatric/Behavioral:  Positive for dysphoric mood and sleep disturbance. Negative for confusion and suicidal ideas.          Objective     /82 (BP Location: Left arm, Patient Position: Sitting, Cuff Size: Large)   Pulse 70    "Temp 97.6 °F (36.4 °C) (Temporal)   Ht 5' 5\" (1.651 m)   Wt 132 kg (291 lb 6.4 oz)   LMP 12/17/2019   SpO2 98%   BMI 48.49 kg/m²     Physical Exam  Constitutional:       Appearance: She is obese.   HENT:      Head: Normocephalic and atraumatic.      Right Ear: External ear normal.      Left Ear: External ear normal.      Nose: Nose normal. No congestion.      Mouth/Throat:      Mouth: Mucous membranes are moist.   Neck:      Vascular: No carotid bruit.   Cardiovascular:      Rate and Rhythm: Normal rate and regular rhythm.      Heart sounds: No murmur heard.  Pulmonary:      Effort: Pulmonary effort is normal.      Breath sounds: Normal breath sounds.   Abdominal:      General: Bowel sounds are normal.      Palpations: Abdomen is soft.   Musculoskeletal:      Right lower leg: Edema present.      Left lower leg: Edema present.   Lymphadenopathy:      Cervical: No cervical adenopathy.   Skin:     General: Skin is warm and dry.      Findings: Rash (Rashes and Flexeril areas) present.   Neurological:      General: No focal deficit present.      Mental Status: She is alert and oriented to person, place, and time. Mental status is at baseline.      Cranial Nerves: Cranial nerve deficit present.      Sensory: No sensory deficit.      Coordination: Coordination normal.      Gait: Gait normal.      Deep Tendon Reflexes: Reflexes normal.         "

## 2024-08-02 NOTE — ASSESSMENT & PLAN NOTE
She continues to have frequent had for both my and her increased intracranial pressure. He does see neuro- on a regular basis

## 2024-08-25 DIAGNOSIS — F41.8 MIXED ANXIETY DEPRESSIVE DISORDER: ICD-10-CM

## 2024-08-25 DIAGNOSIS — I10 ESSENTIAL HYPERTENSION: ICD-10-CM

## 2024-08-26 DIAGNOSIS — G93.2 IIH (IDIOPATHIC INTRACRANIAL HYPERTENSION): ICD-10-CM

## 2024-08-26 RX ORDER — BUPROPION HYDROCHLORIDE 75 MG/1
75 TABLET ORAL 2 TIMES DAILY
Qty: 60 TABLET | Refills: 0 | Status: SHIPPED | OUTPATIENT
Start: 2024-08-26

## 2024-08-26 RX ORDER — LOSARTAN POTASSIUM 50 MG/1
50 TABLET ORAL DAILY
Qty: 90 TABLET | Refills: 1 | Status: SHIPPED | OUTPATIENT
Start: 2024-08-26

## 2024-08-26 RX ORDER — CITALOPRAM HYDROBROMIDE 20 MG/1
20 TABLET ORAL DAILY
Qty: 90 TABLET | Refills: 1 | Status: SHIPPED | OUTPATIENT
Start: 2024-08-26

## 2024-08-27 RX ORDER — ACETAZOLAMIDE 250 MG/1
750 TABLET ORAL 2 TIMES DAILY
Qty: 180 TABLET | Refills: 0 | Status: SHIPPED | OUTPATIENT
Start: 2024-08-27

## 2024-08-28 DIAGNOSIS — E03.8 HYPOTHYROIDISM DUE TO HASHIMOTO'S THYROIDITIS: ICD-10-CM

## 2024-08-28 DIAGNOSIS — E06.3 HYPOTHYROIDISM DUE TO HASHIMOTO'S THYROIDITIS: ICD-10-CM

## 2024-08-28 RX ORDER — LEVOTHYROXINE SODIUM 200 UG/1
TABLET ORAL
Qty: 108 TABLET | Refills: 1 | Status: SHIPPED | OUTPATIENT
Start: 2024-08-28

## 2024-09-16 ENCOUNTER — OFFICE VISIT (OUTPATIENT)
Dept: URGENT CARE | Facility: MEDICAL CENTER | Age: 39
End: 2024-09-16
Payer: COMMERCIAL

## 2024-09-16 VITALS
TEMPERATURE: 98.4 F | SYSTOLIC BLOOD PRESSURE: 126 MMHG | DIASTOLIC BLOOD PRESSURE: 74 MMHG | RESPIRATION RATE: 18 BRPM | OXYGEN SATURATION: 99 % | HEART RATE: 68 BPM

## 2024-09-16 DIAGNOSIS — H69.93 EUSTACHIAN TUBE DYSFUNCTION, BILATERAL: Primary | ICD-10-CM

## 2024-09-16 PROCEDURE — 99213 OFFICE O/P EST LOW 20 MIN: CPT | Performed by: PHYSICIAN ASSISTANT

## 2024-09-16 RX ORDER — PREDNISONE 10 MG/1
TABLET ORAL
Qty: 18 TABLET | Refills: 0 | Status: SHIPPED | OUTPATIENT
Start: 2024-09-16

## 2024-09-16 NOTE — LETTER
September 16, 2024     Patient: Allison Gamboa   YOB: 1985   Date of Visit: 9/16/2024       To Whom It May Concern:    It is my medical opinion that Allison Gamboa may return to work on 09/18/2024 .    If you have any questions or concerns, please don't hesitate to call.         Sincerely,        Charles Perez PA-C    CC: No Recipients

## 2024-09-16 NOTE — PROGRESS NOTES
[facility]        NAME: Allison Gamboa is a 38 y.o. female  : 1985    MRN: [id number]  DATE: 2024  TIME: 3:28 PM    Assessment and Plan   Eustachian tube dysfunction, bilateral [H69.93]  1. Eustachian tube dysfunction, bilateral  predniSONE 10 mg tablet            Patient Instructions       Follow up with PCP in 3-5 days.  Proceed to  ER if symptoms worsen.    If tests have been performed at [facility], our office will contact you with results if changes need to be made to the care plan discussed with you at the visit.  You can review your full results on [facility].    Chief Complaint     Chief Complaint   Patient presents with    Cold Like Symptoms     Patient c/o nasal congestion, chest congestion, wheezing , dry cough, weakness, and fatigue x 3 days          History of Present Illness       Sinusitis  This is a new problem. The current episode started in the past 7 days. The problem has been gradually worsening since onset. There has been no fever. The pain is moderate. Associated symptoms include congestion, headaches, sinus pressure, sneezing and a sore throat. Pertinent negatives include no chills, coughing, diaphoresis, ear pain, hoarse voice, neck pain, shortness of breath or swollen glands. Past treatments include oral decongestants and saline nose sprays. The treatment provided no relief.       Review of Systems   Review of Systems   Constitutional:  Negative for chills and diaphoresis.   HENT:  Positive for congestion, sinus pressure, sneezing and sore throat. Negative for ear pain and hoarse voice.    Respiratory:  Negative for cough and shortness of breath.    Musculoskeletal:  Negative for neck pain.   Neurological:  Positive for headaches.   All other systems reviewed and are negative.        Current Medications       Current Outpatient Medications:     omeprazole (PriLOSEC) 20 mg delayed release capsule, Take 20 mg by mouth every other day, Disp: , Rfl:      predniSONE 10 mg tablet, 4 x 3 days, 3 x 1, 2 x 1, 1 x 1, Disp: 18 tablet, Rfl: 0    acetaZOLAMIDE (DIAMOX) 250 mg tablet, Take 3 tablets (750 mg total) by mouth 2 (two) times a day, Disp: 180 tablet, Rfl: 0    albuterol (Ventolin HFA) 90 mcg/act inhaler, Inhale 2 puffs every 6 (six) hours as needed for wheezing or shortness of breath, Disp: 18 g, Rfl: 0    ALPRAZolam (XANAX) 0.25 mg tablet, Take 1 tablet (0.25 mg total) by mouth 3 (three) times a day as needed for anxiety, Disp: 60 tablet, Rfl: 0    atorvastatin (LIPITOR) 40 mg tablet, TAKE ONE TABLET BY MOUTH EVERY EVENING, Disp: 30 tablet, Rfl: 2    buPROPion (WELLBUTRIN) 75 mg tablet, TAKE ONE TABLET BY MOUTH TWICE A DAY, Disp: 60 tablet, Rfl: 0    Calcium Carbonate (CALCIUM 500 PO), Take 500 mg by mouth daily, Disp: , Rfl:     citalopram (CeleXA) 20 mg tablet, TAKE ONE TABLET BY MOUTH EVERY DAY, Disp: 90 tablet, Rfl: 1    clopidogrel (PLAVIX) 75 mg tablet, Take 1 tablet (75 mg total) by mouth daily, Disp: 30 tablet, Rfl: 5    fluticasone (FLONASE) 50 mcg/act nasal spray, 2 sprays into each nostril daily, Disp: 16 g, Rfl: 0    levothyroxine 200 mcg tablet, TAKE 1 TABLET  MONDAY THROUGH FRIDAY AND 1.5 TABLETS ON SATURDAY AND SUNDAY, Disp: 108 tablet, Rfl: 1    levothyroxine 25 mcg tablet, TAKE ONE TABLET BY MOUTH EVERY DAY, Disp: 90 tablet, Rfl: 1    loratadine (CLARITIN) 10 mg tablet, Take 10 mg by mouth if needed, Disp: , Rfl:     losartan (COZAAR) 50 mg tablet, TAKE ONE TABLET BY MOUTH EVERY DAY, Disp: 90 tablet, Rfl: 1    Multiple Vitamins-Minerals (Bariatric Fusion) CHEW, Chew 4 tablets daily, Disp: , Rfl:     pantoprazole (PROTONIX) 40 mg tablet, Take 1 tablet (40 mg total) by mouth daily, Disp: 90 tablet, Rfl: 1    rimegepant sulfate (NURTEC) 75 mg TBDP, Take 1 tablet (75 mg) by mouth once at the onset of a headache. Max dose: 75 mg/day., Disp: 16 tablet, Rfl: 3    Current Allergies     Allergies as of 09/16/2024 - Reviewed 09/16/2024   Allergen Reaction  Noted    Dust mite extract Other (See Comments) 12/03/2019    Latex Blisters 06/03/2019    Molds & smuts Other (See Comments) 03/20/2019    Penicillins Rash 02/28/2019    Jorge Luis grass pollen allergen Other (See Comments) 12/03/2019            The following portions of the patient's history were reviewed and updated as appropriate: allergies, current medications, past family history, past medical history, past social history, past surgical history and problem list.     Past Medical History:   Diagnosis Date    Allergic     Anxiety     Bruises easily     Cancer (HCC)     COVID-19 10/10/2022    CPAP (continuous positive airway pressure) dependence     Depression     Disease of thyroid gland     DELVALLE (dyspnea on exertion)     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    Endometriosis     Follicular thyroid cancer (HCC)     Gastroenteritis     Hashimoto's disease     Hepatic steatosis     Hyperlipidemia     Hypertension     Hypothyroid     IBS (irritable bowel syndrome)     Kidney lesion, native, left     Kidney stone     Lymphedema     left leg    Obesity     Palpitation     Pancreatitis     Pneumonia     Polycystic ovarian disease     Polyuria     RUQ abdominal pain 11/01/2021    Sleep apnea     Stroke (HCC) 12/26/2023    Discovered on CT Scan    Thyroid nodule     Thyromegaly     Tinea corporis     Tinea pedis     Visual impairment        Past Surgical History:   Procedure Laterality Date    ADENOIDECTOMY      DILATION AND CURETTAGE OF UTERUS  2020    HYSTERECTOMY  02/2020    left ovaries    IR LUMBAR PUNCTURE  12/28/2023    MA LAPS GSTR RSTCV PX W/BYP ELKIN-EN-Y LIMB <150 CM N/A 12/5/2022    Procedure: BYPASS GASTRIC ELKIN-EN-Y LAPAROSCOPIC ROBOT & INTRAOPERATIVE EGD;  Surgeon: Graeme Pool MD;  Location: [facility];  Service: Bariatrics    THYROIDECTOMY, PARTIAL Left 06/2019    benign    TONSILLECTOMY      US GUIDED THYROID BIOPSY  03/01/2019       Family History   Problem Relation Age of Onset     Hyperlipidemia Mother     Restless legs syndrome Mother     Alcohol abuse Mother             Drug abuse Mother     Substance Abuse Mother     Self-Injury Mother     Suicide Attempts Mother     Sleep apnea Father     Hyperlipidemia Father     Hypertension Father     Alcohol abuse Father             Drug abuse Father     Substance Abuse Father     Autoimmune disease Father         Not sure if he had any, but they run on my fathers side of the family. My aunt has lupus and a few others.    Hypertension Maternal Grandmother     Restless legs syndrome Maternal Grandmother     Ovarian cancer Maternal Grandmother     Arthritis Maternal Grandmother     Cancer Maternal Grandmother         My grandmother had cervical cancer    COPD Maternal Grandmother     Coronary artery disease Maternal Grandmother             Heart disease Maternal Grandmother         It runs on this side of the family. My grandmother didn't have it, but others on her side of the family did.    Cancer Paternal Grandmother     Breast cancer Paternal Grandmother         My father told me she had breast cancer at one point.    Drug abuse Paternal Uncle     Substance Abuse Paternal Uncle     Suicide Attempts Paternal Uncle          Medications have been verified.        Objective   /74   Pulse 68   Temp 98.4 °F (36.9 °C)   Resp 18   LMP 2019   SpO2 99%   Patient's last menstrual period was 2019.       Physical Exam     Physical Exam  Vitals and nursing note reviewed.   Constitutional:       Appearance: Normal appearance. She is normal weight.   HENT:      Right Ear: Ear canal and external ear normal.      Left Ear: Ear canal and external ear normal.      Nose: Congestion and rhinorrhea present.      Mouth/Throat:      Mouth: Mucous membranes are moist.      Pharynx: Posterior oropharyngeal erythema present. No oropharyngeal exudate.   Eyes:      Conjunctiva/sclera: Conjunctivae normal.   Cardiovascular:      Rate  and Rhythm: Normal rate and regular rhythm.      Pulses: Normal pulses.      Heart sounds: Normal heart sounds.   Pulmonary:      Effort: Pulmonary effort is normal.      Breath sounds: Normal breath sounds.   Lymphadenopathy:      Cervical: No cervical adenopathy.   Neurological:      Mental Status: She is alert.   Psychiatric:         Mood and Affect: Mood normal.         Behavior: Behavior normal.         Positive maxillary tenderness, nasal mucosa boggy left is 70% right 60% close.  Good transillumination bilaterally.

## 2024-09-23 ENCOUNTER — OFFICE VISIT (OUTPATIENT)
Dept: FAMILY MEDICINE CLINIC | Facility: CLINIC | Age: 39
End: 2024-09-23
Payer: COMMERCIAL

## 2024-09-23 VITALS
DIASTOLIC BLOOD PRESSURE: 74 MMHG | HEART RATE: 82 BPM | WEIGHT: 293 LBS | HEIGHT: 65 IN | TEMPERATURE: 97.5 F | RESPIRATION RATE: 16 BRPM | SYSTOLIC BLOOD PRESSURE: 106 MMHG | BODY MASS INDEX: 48.82 KG/M2 | OXYGEN SATURATION: 96 %

## 2024-09-23 DIAGNOSIS — R05.2 SUBACUTE COUGH: Primary | ICD-10-CM

## 2024-09-23 PROCEDURE — 99213 OFFICE O/P EST LOW 20 MIN: CPT | Performed by: INTERNAL MEDICINE

## 2024-09-23 RX ORDER — AZITHROMYCIN 250 MG/1
TABLET, FILM COATED ORAL
Qty: 6 TABLET | Refills: 0 | Status: SHIPPED | OUTPATIENT
Start: 2024-09-23 | End: 2024-09-28

## 2024-09-23 NOTE — PROGRESS NOTES
"Assessment & Plan:     Enhanced Visit       Subjective:     Patient ID: Allison Gamboa is a 38 y.o. female     Chief Complaint   Patient presents with    URI     Began 2 weeks ago.      URI   This is a new problem. The current episode started 1 to 4 weeks ago. The problem has been gradually worsening. The maximum temperature recorded prior to her arrival was 100.4 - 100.9 F. The fever has been present for 1 to 2 days. Associated symptoms include congestion, coughing, ear pain, headaches and sinus pain. Pertinent negatives include no abdominal pain, chest pain, dysuria, rash, sore throat or vomiting. She has tried antihistamine, sleep, acetaminophen, increased fluids and decongestant for the symptoms. The treatment provided mild relief.       Review of Systems   Constitutional:  Negative for chills and fever.   HENT:  Positive for congestion, ear pain and sinus pain. Negative for sore throat.    Eyes:  Negative for pain and visual disturbance.   Respiratory:  Positive for cough. Negative for shortness of breath.    Cardiovascular:  Negative for chest pain and palpitations.   Gastrointestinal:  Negative for abdominal pain and vomiting.   Genitourinary:  Negative for dysuria and hematuria.   Musculoskeletal:  Negative for arthralgias and back pain.   Skin:  Negative for color change and rash.   Neurological:  Positive for headaches. Negative for seizures and syncope.   All other systems reviewed and are negative.      Objective:      /74 (BP Location: Left arm, Patient Position: Sitting, Cuff Size: Large)   Pulse 82   Temp 97.5 °F (36.4 °C) (Temporal)   Resp 16   Ht 5' 5\" (1.651 m)   Wt 136 kg (299 lb)   LMP 12/17/2019   SpO2 96%   BMI 49.76 kg/m²     Problem List Items Addressed This Visit    None  Visit Diagnoses       Subacute cough    -  Primary    Relevant Medications    azithromycin (Zithromax) 250 mg tablet            Physical Exam  Constitutional:       Appearance: Normal appearance. She is " obese.   HENT:      Head: Normocephalic and atraumatic.      Right Ear: Tympanic membrane normal.      Left Ear: Tympanic membrane normal.      Nose: Congestion present.      Mouth/Throat:      Mouth: Mucous membranes are moist.      Pharynx: No oropharyngeal exudate.   Eyes:      Pupils: Pupils are equal, round, and reactive to light.   Cardiovascular:      Rate and Rhythm: Normal rate.      Pulses: Normal pulses.   Pulmonary:      Breath sounds: Rales (LLL) present.   Musculoskeletal:      Right lower leg: No edema.      Left lower leg: No edema.   Skin:     General: Skin is warm.      Findings: No rash.   Neurological:      General: No focal deficit present.      Mental Status: She is alert.   Psychiatric:         Mood and Affect: Mood normal.         Behavior: Behavior normal.         Thought Content: Thought content normal.         Judgment: Judgment normal.

## 2024-09-24 DIAGNOSIS — K21.9 GASTROESOPHAGEAL REFLUX DISEASE WITHOUT ESOPHAGITIS: Primary | ICD-10-CM

## 2024-09-24 DIAGNOSIS — F41.8 MIXED ANXIETY DEPRESSIVE DISORDER: ICD-10-CM

## 2024-09-24 RX ORDER — BUPROPION HYDROCHLORIDE 75 MG/1
75 TABLET ORAL 2 TIMES DAILY
Qty: 60 TABLET | Refills: 0 | Status: SHIPPED | OUTPATIENT
Start: 2024-09-24

## 2024-10-03 DIAGNOSIS — G93.2 IIH (IDIOPATHIC INTRACRANIAL HYPERTENSION): ICD-10-CM

## 2024-10-04 RX ORDER — ACETAZOLAMIDE 250 MG/1
750 TABLET ORAL 2 TIMES DAILY
Qty: 180 TABLET | Refills: 0 | Status: SHIPPED | OUTPATIENT
Start: 2024-10-04

## 2024-10-27 DIAGNOSIS — E06.3 HYPOTHYROIDISM DUE TO HASHIMOTO'S THYROIDITIS: ICD-10-CM

## 2024-10-27 DIAGNOSIS — F41.8 MIXED ANXIETY DEPRESSIVE DISORDER: ICD-10-CM

## 2024-10-28 RX ORDER — LEVOTHYROXINE SODIUM 25 UG/1
25 TABLET ORAL DAILY
Qty: 30 TABLET | Refills: 0 | Status: SHIPPED | OUTPATIENT
Start: 2024-10-28

## 2024-10-28 RX ORDER — BUPROPION HYDROCHLORIDE 75 MG/1
75 TABLET ORAL 2 TIMES DAILY
Qty: 60 TABLET | Refills: 0 | Status: SHIPPED | OUTPATIENT
Start: 2024-10-28

## 2024-11-03 DIAGNOSIS — G93.2 IIH (IDIOPATHIC INTRACRANIAL HYPERTENSION): ICD-10-CM

## 2024-11-05 RX ORDER — ACETAZOLAMIDE 250 MG/1
TABLET ORAL
Qty: 180 TABLET | Refills: 0 | Status: SHIPPED | OUTPATIENT
Start: 2024-11-05

## 2024-11-05 NOTE — TELEPHONE ENCOUNTER
Labs done 6/17/24  Last ov 6/20/24- Return in about 6 months (around 12/20/2024).  Follow up scheduled 12/23/24

## 2024-11-25 ENCOUNTER — PATIENT MESSAGE (OUTPATIENT)
Dept: BARIATRICS | Facility: CLINIC | Age: 39
End: 2024-11-25

## 2024-12-01 DIAGNOSIS — K21.9 GASTROESOPHAGEAL REFLUX DISEASE WITHOUT ESOPHAGITIS: ICD-10-CM

## 2024-12-01 DIAGNOSIS — F41.8 MIXED ANXIETY DEPRESSIVE DISORDER: ICD-10-CM

## 2024-12-01 DIAGNOSIS — E06.3 HYPOTHYROIDISM DUE TO HASHIMOTO'S THYROIDITIS: ICD-10-CM

## 2024-12-02 RX ORDER — BUPROPION HYDROCHLORIDE 75 MG/1
75 TABLET ORAL 2 TIMES DAILY
Qty: 60 TABLET | Refills: 0 | Status: SHIPPED | OUTPATIENT
Start: 2024-12-02

## 2024-12-03 RX ORDER — LEVOTHYROXINE SODIUM 25 UG/1
25 TABLET ORAL DAILY
Qty: 30 TABLET | Refills: 0 | Status: SHIPPED | OUTPATIENT
Start: 2024-12-03

## 2024-12-05 ENCOUNTER — TELEPHONE (OUTPATIENT)
Age: 39
End: 2024-12-05

## 2024-12-05 ENCOUNTER — RA CDI HCC (OUTPATIENT)
Dept: OTHER | Facility: HOSPITAL | Age: 39
End: 2024-12-05

## 2024-12-05 NOTE — PATIENT COMMUNICATION
LVM provider schedule incorrectly opened at 730 am but doesn't start until 8 am; appointment moved to 8 am and requested call back to confirm move or reschedule.

## 2024-12-05 NOTE — TELEPHONE ENCOUNTER
Spoke with  (patient in the background). They do have a copy of the lab results and they will work on sending a copy to us through [facility] for providers to view and advise.

## 2024-12-05 NOTE — TELEPHONE ENCOUNTER
Patient hallie in [place] on vacation.  Her  calling stating patient was in ER yesterday Patient drank and took her medication at night and woke up shaking. Bloodwork showed high white blood cells (38,000).  Everything else came back normal and CT scan was normal. The Er doctor wanted to admit her for evaluation but they declined.   did make an apt with Dr. Hyatt on Thursday but wants to know if he should take her to ER when they get back on Saturday?  Is is ok to wait?  They would like the doctor's opinion on what to do.   Please call  [facility] [phone]    Patient is feeling good today, no symptoms.    Thank you

## 2024-12-06 NOTE — TELEPHONE ENCOUNTER
Labs and radiology reviewed by covering provider JI VALENCIA. Recommendation is to return to ER for further evaluation and possible admit for additional testing as originally recommended.

## 2024-12-12 ENCOUNTER — OFFICE VISIT (OUTPATIENT)
Dept: FAMILY MEDICINE CLINIC | Facility: CLINIC | Age: 39
End: 2024-12-12
Payer: COMMERCIAL

## 2024-12-12 VITALS
HEIGHT: 65 IN | BODY MASS INDEX: 48.82 KG/M2 | TEMPERATURE: 97.3 F | DIASTOLIC BLOOD PRESSURE: 78 MMHG | OXYGEN SATURATION: 98 % | SYSTOLIC BLOOD PRESSURE: 108 MMHG | WEIGHT: 293 LBS | HEART RATE: 80 BPM

## 2024-12-12 DIAGNOSIS — L03.116 CELLULITIS OF LEFT LOWER EXTREMITY: Primary | ICD-10-CM

## 2024-12-12 DIAGNOSIS — I89.0 LYMPH EDEMA: ICD-10-CM

## 2024-12-12 PROCEDURE — 99496 TRANSJ CARE MGMT HIGH F2F 7D: CPT | Performed by: INTERNAL MEDICINE

## 2024-12-12 RX ORDER — SULFAMETHOXAZOLE AND TRIMETHOPRIM 800; 160 MG/1; MG/1
1 TABLET ORAL 2 TIMES DAILY
Qty: 20 TABLET | Refills: 0 | Status: SHIPPED | OUTPATIENT
Start: 2024-12-12 | End: 2024-12-19

## 2024-12-12 NOTE — PROGRESS NOTES
Transition of Care Visit  Name: Allison Gamboa      : 1985      MRN: [id number]  Encounter Provider: Elissa Hyatt MD  Encounter Date: 2024   Encounter department: [facility]    Assessment & Plan  Cellulitis of left lower extremity    Orders:    sulfamethoxazole-trimethoprim (BACTRIM DS) 800-160 mg per tablet; Take 1 tablet by mouth 2 (two) times a day for 7 days    Lymph edema              History of Present Illness     Transitional Care Management Review:   Allison Gamboa is a 39 y.o. female here for TCM follow up.     During the TCM phone call patient stated:  TCM Call       Date and time call was made  2024 10:07 AM    Patient was hospitialized at  [facility]    Diagnosis  Secondary headache syndrome-probable IIH    Disposition  Home          TCM Call       Should patient be enrolled in anticoag monitoring?  No    I have advised the patient to call PCP with any new or worsening symptoms  Komal DOVER          Was indication in [place] and developed a case of cellulitis they believe from a hot tub.  She eventually ended up in the ER for short stay and was finally discharged on Bactrim.  Since home it has been slowly improving with minimal erythema and or tenderness but does still have increased lymphedema      Review of Systems   Constitutional:  Positive for fatigue. Negative for chills and fever.   HENT:  Negative for ear pain and sore throat.    Eyes:  Negative for pain and visual disturbance.   Respiratory:  Negative for cough and shortness of breath.    Cardiovascular:  Positive for leg swelling (chronic). Negative for chest pain and palpitations.   Gastrointestinal:  Negative for abdominal pain and vomiting.   Genitourinary:  Negative for dysuria and hematuria.   Musculoskeletal:  Negative for arthralgias and back pain.   Skin:  Negative for color change and rash.   Neurological:  Negative for seizures and syncope.   All other systems reviewed and are  "negative.    Objective   /78 (BP Location: Left arm, Patient Position: Sitting, Cuff Size: Large)   Pulse 80   Temp (!) 97.3 °F (36.3 °C) (Temporal)   Ht 5' 5\" (1.651 m)   Wt (!) 137 kg (303 lb)   LMP 12/17/2019   SpO2 98%   BMI 50.42 kg/m²     Physical Exam  Vitals and nursing note reviewed.   Constitutional:       General: She is not in acute distress.     Appearance: Normal appearance. She is well-developed. She is obese.   HENT:      Head: Normocephalic and atraumatic.      Right Ear: Tympanic membrane normal.      Left Ear: Tympanic membrane normal.      Nose: No congestion.   Eyes:      Conjunctiva/sclera: Conjunctivae normal.   Cardiovascular:      Rate and Rhythm: Normal rate and regular rhythm.      Heart sounds: No murmur heard.  Pulmonary:      Effort: Pulmonary effort is normal. No respiratory distress.      Breath sounds: Normal breath sounds.   Abdominal:      Palpations: Abdomen is soft.      Tenderness: There is no abdominal tenderness.   Musculoskeletal:         General: Tenderness (mild tenderness left leg at knee) present. No swelling.      Cervical back: Neck supple.      Right lower leg: Edema present.      Left lower leg: Edema present.   Lymphadenopathy:      Cervical: No cervical adenopathy.   Skin:     General: Skin is warm and dry.      Capillary Refill: Capillary refill takes less than 2 seconds.      Findings: No rash.   Neurological:      General: No focal deficit present.      Mental Status: She is alert and oriented to person, place, and time.   Psychiatric:         Mood and Affect: Mood normal.         Behavior: Behavior normal.         Thought Content: Thought content normal.         Judgment: Judgment normal.       Medications have been reviewed by provider in current encounter      "

## 2024-12-12 NOTE — ASSESSMENT & PLAN NOTE
Orders:    sulfamethoxazole-trimethoprim (BACTRIM DS) 800-160 mg per tablet; Take 1 tablet by mouth 2 (two) times a day for 7 days

## 2024-12-19 DIAGNOSIS — I63.81 LACUNAR STROKE (HCC): ICD-10-CM

## 2024-12-19 DIAGNOSIS — G93.2 IIH (IDIOPATHIC INTRACRANIAL HYPERTENSION): ICD-10-CM

## 2024-12-19 DIAGNOSIS — I10 ESSENTIAL HYPERTENSION: ICD-10-CM

## 2024-12-20 RX ORDER — CLOPIDOGREL BISULFATE 75 MG/1
75 TABLET ORAL DAILY
Qty: 30 TABLET | Refills: 5 | Status: SHIPPED | OUTPATIENT
Start: 2024-12-20

## 2024-12-20 RX ORDER — ATORVASTATIN CALCIUM 40 MG/1
40 TABLET, FILM COATED ORAL EVERY EVENING
Qty: 30 TABLET | Refills: 5 | Status: SHIPPED | OUTPATIENT
Start: 2024-12-20

## 2024-12-20 RX ORDER — ACETAZOLAMIDE 250 MG/1
TABLET ORAL
Qty: 180 TABLET | Refills: 0 | Status: SHIPPED | OUTPATIENT
Start: 2024-12-20

## 2024-12-23 ENCOUNTER — OFFICE VISIT (OUTPATIENT)
Dept: NEUROLOGY | Facility: CLINIC | Age: 39
End: 2024-12-23
Payer: COMMERCIAL

## 2024-12-23 VITALS
DIASTOLIC BLOOD PRESSURE: 68 MMHG | SYSTOLIC BLOOD PRESSURE: 110 MMHG | TEMPERATURE: 97.9 F | WEIGHT: 293 LBS | BODY MASS INDEX: 48.82 KG/M2 | OXYGEN SATURATION: 99 % | HEART RATE: 70 BPM

## 2024-12-23 DIAGNOSIS — I63.9 STROKE (CEREBRUM) (HCC): ICD-10-CM

## 2024-12-23 DIAGNOSIS — G93.2 IDIOPATHIC INTRACRANIAL HYPERTENSION: Primary | ICD-10-CM

## 2024-12-23 DIAGNOSIS — G43.109 MIGRAINE WITH AURA AND WITHOUT STATUS MIGRAINOSUS, NOT INTRACTABLE: ICD-10-CM

## 2024-12-23 PROCEDURE — 99214 OFFICE O/P EST MOD 30 MIN: CPT

## 2024-12-23 NOTE — ASSESSMENT & PLAN NOTE
- For ongoing stroke prevention continue: Plavix 75 mg once daily, atorvastatin 40 mg once daily    - Discussed the importance of antiplatelet management with the patient to prevent future strokes.   - Recommend to check blood pressure occasionally away from the doctor's office to make sure that those numbers are typically less than 130/80.  If they are frequently higher than that, we recommend checking a little more often and to follow up with primary care team   - Will defer to primary care team for monitoring of cholesterol panel and blood sugar numbers with target LDL cholesterol of less than 70 and hemoglobin A1c less than 7%  - Recommend following a low salt, mediterranean diet   - Recommend routine physical exercise as tolerated

## 2024-12-23 NOTE — ASSESSMENT & PLAN NOTE
"I had the pleasure of seeing Allison today in the office at [facility] in [place].  She is presenting today for an office visit follow-up appointment in regard to her history of IIH and migraine with aura.  At this time, the patient is doing well with taking acetazolamide 750 mg twice daily.  The patient stated that the only thing she notices is that sometimes when she will have a headache there are certain objects that appear to be \"flashing\" although they are not really flashing.  She notes that there are no other significant issues with her vision outside of this.  She notes increased pressure when she does go on an airplane to have her IIH.  Advised the patient to take an additional 250 mg tablet of acetazolamide before she gets on an airplane along with her already pre-existing 750 mg dosage during the daytime.  Advised patient that we could certainly try to come off of the Diamox if she would like, although the patient is content with staying on the medication as it has seemed to work for her headaches and decreasing her pressure.  Patient was last seen by Dr. Lang on 07/15/2024.  Did not show any signs of papilledema and did not show any signs of optic nerve swelling/edema on examination.  She was advised to continue Diamox as directed.  Advised patient to continue to follow-up with ophthalmology.  Advised patient to still continue to follow with weight management as well at this time.  The patient also had elevated INR check 75 mg has been effective for abortive therapy of the headaches as well. The patient has not noted any new strokelike symptoms at this point in time. She is still taking Plavix 75 mg daily and atorvastatin 40 mg once daily for secondary stroke prevention. Advised patient to still continue on her same regimen at this point in time.  The patient will follow-up in approximately 6 months time with Lobo ALBERT    - At this time, would like for the patient to still " continue with acetazolamide 750 mg twice daily for prevention and treatment of IIH.  Patient states that she does experience more increased pressure when she goes on flights.  When advised the patient to take an additional 250 mg tablet of acetazolamide before going on the plane to help reduce pressure.  - Also encouraged patient to still continue to use Nurtec 75 mg as needed for abortive therapy of headaches/migraines when she does have them.  - Continue to follow-up with ophthalmology as recommended in regards to the patient's IIH.  Most recent report from Dr. Lang at [facility] showed that the patient did not have any concern for active papilledema or increased pressure at the time.  - Ordered comprehensive metabolic panel and CBC for further evaluation regards to base blood and metabolic levels.  Would like to check on the patient specifically in regards to her sodium and other electrolytes as well to make sure that she is not having any significant loss with Diamox therapy.  - Would like for the patient to also continue with her weight management and exercise at this moment in time.  Feel as though it is important for the patient to lose as much weight as much as she possibly can to help prevent the reoccurrence of IIH moving forward.  - Finally, if the patient is experiencing any new visual changes or any worsening visual symptoms at this point in time she should report to the ED as soon as possible to make sure that she can be evaluated for an ophthalmologic emergency.  We do not want to risk the patient's changes in vision at this point in time and make sure that we are adequately reducing her pressure if need be and see if the patient needs to have an additional lumbar puncture as well.    - For ongoing stroke prevention continue: Plavix 75 mg once daily, atorvastatin 40 mg once daily    - Discussed the importance of antiplatelet management with the patient to prevent future strokes.   -  Recommend to check blood pressure occasionally away from the doctor's office to make sure that those numbers are typically less than 130/80.  If they are frequently higher than that, we recommend checking a little more often and to follow up with primary care team   - Will defer to primary care team for monitoring of cholesterol panel and blood sugar numbers with target LDL cholesterol of less than 70 and hemoglobin A1c less than 7%  - Recommend following a low salt, mediterranean diet   - Recommend routine physical exercise as tolerated

## 2024-12-23 NOTE — PROGRESS NOTES
"Name: Allison Gamboa      : 1985      MRN: [id number]  Encounter Provider: Maikel Kapadia PA-C  Encounter Date: 2024   Encounter department: [facility]  :  Assessment & Plan  Idiopathic intracranial hypertension  I had the pleasure of seeing Allison today in the office at [facility] in [place].  She is presenting today for an office visit follow-up appointment in regard to her history of IIH and migraine with aura.  At this time, the patient is doing well with taking acetazolamide 750 mg twice daily.  The patient stated that the only thing she notices is that sometimes when she will have a headache there are certain objects that appear to be \"flashing\" although they are not really flashing.  She notes that there are no other significant issues with her vision outside of this.  She notes increased pressure when she does go on an airplane to have her IIH.  Advised the patient to take an additional 250 mg tablet of acetazolamide before she gets on an airplane along with her already pre-existing 750 mg dosage during the daytime.  Advised patient that we could certainly try to come off of the Diamox if she would like, although the patient is content with staying on the medication as it has seemed to work for her headaches and decreasing her pressure.  Patient was last seen by Dr. Lang on 07/15/2024.  Did not show any signs of papilledema and did not show any signs of optic nerve swelling/edema on examination.  She was advised to continue Diamox as directed.  Advised patient to continue to follow-up with ophthalmology.  Advised patient to still continue to follow with weight management as well at this time.  The patient also had elevated INR check 75 mg has been effective for abortive therapy of the headaches as well. The patient has not noted any new strokelike symptoms at this point in time. She is still taking Plavix 75 mg daily and " atorvastatin 40 mg once daily for secondary stroke prevention. Advised patient to still continue on her same regimen at this point in time.  The patient will follow-up in approximately 6 months time with Lobo DIAZ.    - At this time, would like for the patient to still continue with acetazolamide 750 mg twice daily for prevention and treatment of IIH.  Patient states that she does experience more increased pressure when she goes on flights.  When advised the patient to take an additional 250 mg tablet of acetazolamide before going on the plane to help reduce pressure.  - Also encouraged patient to still continue to use Nurtec 75 mg as needed for abortive therapy of headaches/migraines when she does have them.  - Continue to follow-up with ophthalmology as recommended in regards to the patient's IIH.  Most recent report from Dr. Lang at [facility] showed that the patient did not have any concern for active papilledema or increased pressure at the time.  - Ordered comprehensive metabolic panel and CBC for further evaluation regards to base blood and metabolic levels.  Would like to check on the patient specifically in regards to her sodium and other electrolytes as well to make sure that she is not having any significant loss with Diamox therapy.  - Would like for the patient to also continue with her weight management and exercise at this moment in time.  Feel as though it is important for the patient to lose as much weight as much as she possibly can to help prevent the reoccurrence of IIH moving forward.  - Finally, if the patient is experiencing any new visual changes or any worsening visual symptoms at this point in time she should report to the ED as soon as possible to make sure that she can be evaluated for an ophthalmologic emergency.  We do not want to risk the patient's changes in vision at this point in time and make sure that we are adequately reducing her pressure if need be and see if the  patient needs to have an additional lumbar puncture as well.    - For ongoing stroke prevention continue: Plavix 75 mg once daily, atorvastatin 40 mg once daily    - Discussed the importance of antiplatelet management with the patient to prevent future strokes.   - Recommend to check blood pressure occasionally away from the doctor's office to make sure that those numbers are typically less than 130/80.  If they are frequently higher than that, we recommend checking a little more often and to follow up with primary care team   - Will defer to primary care team for monitoring of cholesterol panel and blood sugar numbers with target LDL cholesterol of less than 70 and hemoglobin A1c less than 7%  - Recommend following a low salt, mediterranean diet   - Recommend routine physical exercise as tolerated       Migraine with aura and without status migrainosus, not intractable         Chronic R frontal stroke  - For ongoing stroke prevention continue: Plavix 75 mg once daily, atorvastatin 40 mg once daily    - Discussed the importance of antiplatelet management with the patient to prevent future strokes.   - Recommend to check blood pressure occasionally away from the doctor's office to make sure that those numbers are typically less than 130/80.  If they are frequently higher than that, we recommend checking a little more often and to follow up with primary care team   - Will defer to primary care team for monitoring of cholesterol panel and blood sugar numbers with target LDL cholesterol of less than 70 and hemoglobin A1c less than 7%  - Recommend following a low salt, mediterranean diet   - Recommend routine physical exercise as tolerated          Patient Instructions   - At this time, would like for the patient to still continue with acetazolamide 750 mg twice daily for prevention and treatment of IIH.  Patient states that she does experience more increased pressure when she goes on flights.  When advised the patient  to take an additional 250 mg tablet of acetazolamide before going on the plane to help reduce pressure.  - Also encouraged patient to still continue to use Nurtec 75 mg as needed for abortive therapy of headaches/migraines when she does have them.  - Continue to follow-up with ophthalmology as recommended in regards to the patient's IIH.  Most recent report from Dr. Lang at [facility] showed that the patient did not have any concern for active papilledema or increased pressure at the time.  - Ordered comprehensive metabolic panel and CBC for further evaluation regards to base blood and metabolic levels.  Would like to check on the patient specifically in regards to her sodium and other electrolytes as well to make sure that she is not having any significant loss with Diamox therapy.  - Would like for the patient to also continue with her weight management and exercise at this moment in time.  Feel as though it is important for the patient to lose as much weight as much as she possibly can to help prevent the reoccurrence of IIH moving forward.  - Finally, if the patient is experiencing any new visual changes or any worsening visual symptoms at this point in time she should report to the ED as soon as possible to make sure that she can be evaluated for an ophthalmologic emergency.  We do not want to risk the patient's changes in vision at this point in time and make sure that we are adequately reducing her pressure if need be and see if the patient needs to have an additional lumbar puncture as well.    - For ongoing stroke prevention continue: Plavix 75 mg once daily, atorvastatin 40 mg once daily    - Discussed the importance of antiplatelet management with the patient to prevent future strokes.   - Recommend to check blood pressure occasionally away from the doctor's office to make sure that those numbers are typically less than 130/80.  If they are frequently higher than that, we recommend checking  a little more often and to follow up with primary care team   - Will defer to primary care team for monitoring of cholesterol panel and blood sugar numbers with target LDL cholesterol of less than 70 and hemoglobin A1c less than 7%  - Recommend following a low salt, mediterranean diet   - Recommend routine physical exercise as tolerated     We will plan for her to return to the office in 6 months time to see on of the APPs or Dr. Phillips but would be happy to see her sooner if the need should arise.  If she has any symptoms concerning for TIA or stroke including sudden painless loss of vision or double vision, difficulty speaking or swallowing, vertigo/room spinning that does not quickly resolve, or weakness/numbness/loss of coordination affecting 1 side of the face or body she should proceed by ambulance to the nearest emergency room immediately.      History of Present Illness   HPI    For Review:    The patient was last seen in the office on 06/20/2024.  At that time the last visit the patient was presenting for follow-up appointment in regard to IIH/potential migraines.  Also, history of chronic right frontal lobe infarct as well.  At the time of the last appointment patient was taking Diamox 750 mg twice daily.  She noted that this had seemed to help her headaches and make a difference in regards to her visual symptoms.  Was noted that the patient was to continue the medication until being properly evaluated by an ophthalmologist.  Ambulatory referral placed for the patient to see Dr. Lang for evaluation regards to papilledema/IIH.  If there is no papilledema we can talk about potentially decreasing her Diamox in the future.  Patient was not able to tolerate cyproheptadine for preventative therapy of headaches.  Advised patient to start Nurtec 75 mg at the onset of migraine or severe headache.  Patient is contraindicated to triptans due to history of stroke.  On the patient to still continue to follow with  weight management at this point in time.  Also the patient was experiencing any new visual changes or worsening visual symptoms she should report to the ED as soon as possible and she can be evaluated for ophthalmologic emergency.  Advised patient to complete MRI brain and orbits for further evaluation regards to concern for potential IIH.  As noted at the last appointment the patient had no new strokelike symptoms.  Still taking Plavix 75 mg once daily and atorvastatin 40 mg daily.  Had completed thrombosis panel from previous appointment no significant clotting disorders or issues at this time.  Continue to follow with cardiology in regards to further cardiac monitoring needed.  Patient had a Zio patch already completed and it would be up to cardiology if any further testing was required with additional monitoring such as a loop recorder.      Current medical illnesses: Hypertension, migraine with aura, ANA LAURA on CPAP, Hashimoto's thyroiditis, hypothyroidism, PCOS, idiopathic intracranial hypertension, mixed anxiety depressive disorder, morbid obesity, vitamin D deficiency        What medications do you take or have you taken for your headaches?      Current Preventive:   Diamox 750 mg twice daily      Current Abortive:   Nurtec (effective), Ibuprofen (one a day with PPI), Tylenol      Previous preventive:  Cyproheptadine (did not tolerate, drowsiness/grogginess)     Previous abortive:  Contraindicated to triptans due to history of stroke      Interval updates as of 12/23/2024:    Has noticed some flashing lights around objects that are not really flashing. She does notice this may occur at the same time as headaches or if she has more eye strain that day. 6/30 days in the month with any type of headache she states. 9-10/30 days out of the month with more migraine related headaches as well. Does have short quick, fleeting pain on the right side of the head. Does not happen that often, maybe 4-5 times a month but if  usually only lasting a few seconds. Currently taking diamox 750 mg twice daily at this time for her IIH. She does notice more increased pressure when she is in the airplane. She has been taking Nurtec for abortive therapy since the last visit. Does seem to take the edge off when she does have a significant headache.     No new stroke-like symptoms. She is currently taking atorvastatin 40 mg once daily and Plavix 75 mg once daily. BP today in the office 110/68 BP today in the office. Recent LDL 60 mg/dL. Hemoglobin A1C recently 5.6%.  Did review the results of most recent MRI brain and orbits with and without contrast with the patient.  Advised patient that we could certainly look into further monitoring of MRI brain in the future due to 1 concerning area of chronic right frontal lobe stroke versus potential demyelinating lesion.        Review of Systems   Constitutional:  Positive for fatigue. Negative for appetite change and fever.   HENT:  Positive for sinus pressure. Negative for hearing loss, tinnitus, trouble swallowing and voice change.    Eyes: Negative.  Negative for photophobia, pain and visual disturbance.   Respiratory: Negative.  Negative for shortness of breath.    Cardiovascular: Negative.  Negative for palpitations.   Gastrointestinal: Negative.  Negative for nausea and vomiting.   Endocrine: Negative.  Negative for cold intolerance.   Genitourinary: Negative.  Negative for dysuria, frequency and urgency.   Musculoskeletal:  Negative for back pain, gait problem, myalgias, neck pain and neck stiffness.   Skin: Negative.  Negative for rash.   Allergic/Immunologic: Negative.    Neurological:  Positive for headaches. Negative for dizziness, tremors, seizures, syncope, facial asymmetry, speech difficulty, weakness, light-headedness and numbness.   Hematological: Negative.  Does not bruise/bleed easily.   Psychiatric/Behavioral: Negative.  Negative for confusion, hallucinations and sleep disturbance.    All  other systems reviewed and are negative.   I have personally reviewed the MA's review of systems and made changes as necessary.    Medical History Reviewed by provider this encounter:  Tobacco  Allergies  Meds  Problems  Med Hx  Surg Hx  Fam Hx     .  Past Medical History   Past Medical History:   Diagnosis Date    Allergic     Anxiety     Bruises easily     Cancer (HCC)     COVID-19 10/10/2022    CPAP (continuous positive airway pressure) dependence     Depression     Disease of thyroid gland     DELVALLE (dyspnea on exertion)     EIN (endometrial intraepithelial neoplasia) 2019    Endometriosis     Follicular thyroid cancer (HCC)     Gastroenteritis     Hashimoto's disease     Hepatic steatosis     Hyperlipidemia     Hypertension     Hypothyroid     IBS (irritable bowel syndrome)     Kidney lesion, native, left     Kidney stone     Lymphedema     left leg    Obesity     Palpitation     Pancreatitis     Pneumonia     Polycystic ovarian disease     Polyuria     RUQ abdominal pain 2021    Sleep apnea     Stroke (HCC) 2023    Discovered on CT Scan    Thyroid nodule     Thyromegaly     Tinea corporis     Tinea pedis     Visual impairment      Past Surgical History:   Procedure Laterality Date    ADENOIDECTOMY      DILATION AND CURETTAGE OF UTERUS      HYSTERECTOMY  2020    left ovaries    IR LUMBAR PUNCTURE  2023    ID LAPS GSTR RSTCV PX W/BYP ELKIN-EN-Y LIMB <150 CM N/A 2022    Procedure: BYPASS GASTRIC ELKIN-EN-Y LAPAROSCOPIC ROBOT & INTRAOPERATIVE EGD;  Surgeon: Graeme Pool MD;  Location: [facility];  Service: Bariatrics    THYROIDECTOMY, PARTIAL Left 2019    benign    TONSILLECTOMY      US GUIDED THYROID BIOPSY  2019     Family History   Problem Relation Age of Onset    Hyperlipidemia Mother     Restless legs syndrome Mother     Alcohol abuse Mother             Drug abuse Mother     Substance Abuse Mother     Self-Injury Mother     Suicide Attempts Mother      Sleep apnea Father     Hyperlipidemia Father     Hypertension Father     Alcohol abuse Father             Drug abuse Father     Substance Abuse Father     Autoimmune disease Father         Not sure if he had any, but they run on my fathers side of the family. My aunt has lupus and a few others.    Hypertension Maternal Grandmother     Restless legs syndrome Maternal Grandmother     Ovarian cancer Maternal Grandmother     Arthritis Maternal Grandmother     Cancer Maternal Grandmother         My grandmother had cervical cancer    COPD Maternal Grandmother     Coronary artery disease Maternal Grandmother             Heart disease Maternal Grandmother         It runs on this side of the family. My grandmother didn't have it, but others on her side of the family did.    Cancer Paternal Grandmother     Breast cancer Paternal Grandmother         My father told me she had breast cancer at one point.    Drug abuse Paternal Uncle     Substance Abuse Paternal Uncle     Suicide Attempts Paternal Uncle       reports that she has never smoked. She has never used smokeless tobacco. She reports that she does not currently use alcohol. She reports that she does not use drugs.  Current Outpatient Medications on File Prior to Visit   Medication Sig Dispense Refill    acetaZOLAMIDE (DIAMOX) 250 mg tablet TAKE THREE TABLETS BY MOUTH TWICE A  tablet 0    albuterol (Ventolin HFA) 90 mcg/act inhaler Inhale 2 puffs every 6 (six) hours as needed for wheezing or shortness of breath 18 g 0    ALPRAZolam (XANAX) 0.25 mg tablet Take 1 tablet (0.25 mg total) by mouth 3 (three) times a day as needed for anxiety 60 tablet 0    atorvastatin (LIPITOR) 40 mg tablet TAKE ONE TABLET BY MOUTH EVERY EVENING 30 tablet 5    buPROPion (WELLBUTRIN) 75 mg tablet TAKE ONE TABLET BY MOUTH TWICE A DAY 60 tablet 0    Calcium Carbonate (CALCIUM 500 PO) Take 500 mg by mouth daily      citalopram (CeleXA) 20 mg tablet TAKE ONE TABLET BY MOUTH  EVERY DAY 90 tablet 1    clopidogrel (PLAVIX) 75 mg tablet TAKE ONE TABLET BY MOUTH EVERY DAY 30 tablet 5    levothyroxine 200 mcg tablet TAKE 1 TABLET  MONDAY THROUGH FRIDAY AND 1.5 TABLETS ON SATURDAY AND SUNDAY 108 tablet 1    levothyroxine 25 mcg tablet TAKE ONE TABLET BY MOUTH EVERY DAY 30 tablet 0    loratadine (CLARITIN) 10 mg tablet Take 10 mg by mouth if needed      losartan (COZAAR) 50 mg tablet TAKE ONE TABLET BY MOUTH EVERY DAY 90 tablet 1    Multiple Vitamins-Minerals (Bariatric Fusion) CHEW Chew 4 tablets daily      pantoprazole (PROTONIX) 40 mg tablet Take 1 tablet (40 mg total) by mouth daily 90 tablet 1    rimegepant sulfate (NURTEC) 75 mg TBDP Take 1 tablet (75 mg) by mouth once at the onset of a headache. Max dose: 75 mg/day. 16 tablet 3    fluticasone (FLONASE) 50 mcg/act nasal spray 2 sprays into each nostril daily 16 g 0    omeprazole (PriLOSEC) 20 mg delayed release capsule TAKE ONE CAPSULE BY MOUTH EVERY OTHER DAY (Patient not taking: Reported on 12/23/2024) 30 capsule 0    predniSONE 10 mg tablet 4 x 3 days, 3 x 1, 2 x 1, 1 x 1 (Patient not taking: Reported on 9/23/2024) 18 tablet 0     No current facility-administered medications on file prior to visit.     Allergies   Allergen Reactions    Dust Mite Extract Other (See Comments)     Sinus inflammation    Latex Blisters    Molds & Smuts Other (See Comments)     Sinus inflammation    Penicillins Rash     Skin rash and sheds    Jorge Luis Grass Pollen Allergen Other (See Comments)     Sinus inflammation      Current Outpatient Medications on File Prior to Visit   Medication Sig Dispense Refill    acetaZOLAMIDE (DIAMOX) 250 mg tablet TAKE THREE TABLETS BY MOUTH TWICE A  tablet 0    albuterol (Ventolin HFA) 90 mcg/act inhaler Inhale 2 puffs every 6 (six) hours as needed for wheezing or shortness of breath 18 g 0    ALPRAZolam (XANAX) 0.25 mg tablet Take 1 tablet (0.25 mg total) by mouth 3 (three) times a day as needed for anxiety 60 tablet 0     atorvastatin (LIPITOR) 40 mg tablet TAKE ONE TABLET BY MOUTH EVERY EVENING 30 tablet 5    buPROPion (WELLBUTRIN) 75 mg tablet TAKE ONE TABLET BY MOUTH TWICE A DAY 60 tablet 0    Calcium Carbonate (CALCIUM 500 PO) Take 500 mg by mouth daily      citalopram (CeleXA) 20 mg tablet TAKE ONE TABLET BY MOUTH EVERY DAY 90 tablet 1    clopidogrel (PLAVIX) 75 mg tablet TAKE ONE TABLET BY MOUTH EVERY DAY 30 tablet 5    levothyroxine 200 mcg tablet TAKE 1 TABLET  MONDAY THROUGH FRIDAY AND 1.5 TABLETS ON SATURDAY AND SUNDAY 108 tablet 1    levothyroxine 25 mcg tablet TAKE ONE TABLET BY MOUTH EVERY DAY 30 tablet 0    loratadine (CLARITIN) 10 mg tablet Take 10 mg by mouth if needed      losartan (COZAAR) 50 mg tablet TAKE ONE TABLET BY MOUTH EVERY DAY 90 tablet 1    Multiple Vitamins-Minerals (Bariatric Fusion) CHEW Chew 4 tablets daily      pantoprazole (PROTONIX) 40 mg tablet Take 1 tablet (40 mg total) by mouth daily 90 tablet 1    rimegepant sulfate (NURTEC) 75 mg TBDP Take 1 tablet (75 mg) by mouth once at the onset of a headache. Max dose: 75 mg/day. 16 tablet 3    fluticasone (FLONASE) 50 mcg/act nasal spray 2 sprays into each nostril daily 16 g 0    omeprazole (PriLOSEC) 20 mg delayed release capsule TAKE ONE CAPSULE BY MOUTH EVERY OTHER DAY (Patient not taking: Reported on 12/23/2024) 30 capsule 0    predniSONE 10 mg tablet 4 x 3 days, 3 x 1, 2 x 1, 1 x 1 (Patient not taking: Reported on 9/23/2024) 18 tablet 0     No current facility-administered medications on file prior to visit.      Social History     Tobacco Use    Smoking status: Never    Smokeless tobacco: Never   Vaping Use    Vaping status: Never Used   Substance and Sexual Activity    Alcohol use: Not Currently    Drug use: Never    Sexual activity: Yes     Partners: Female, Male     Birth control/protection: Condom Male     Comment: Hysterectomy in February 2020 for pre cervical cancer        Objective   LMP 12/17/2019     Physical Exam  Neurological  Exam    Physical Exam:                                                                 Vitals:            Constitutional:    /68 (BP Location: Left arm, Patient Position: Sitting, Cuff Size: Standard)   Pulse 70   Temp 97.9 °F (36.6 °C) (Temporal)   Wt 133 kg (293 lb 6.4 oz)   LMP 12/17/2019   SpO2 99%   BMI 48.82 kg/m²   BP Readings from Last 3 Encounters:   12/23/24 110/68   12/12/24 108/78   09/23/24 106/74     Pulse Readings from Last 3 Encounters:   12/23/24 70   12/12/24 80   09/23/24 82         Well developed, well nourished, well groomed. No dysmorphic features.       Psychiatric:  Normal behavior and appropriate affect        Neurological Examination:     Mental status/cognitive function:   Orientated to time, place and person. Recent and remote memory intact. Attention span and concentration as well as fund of knowledge are appropriate for age. Normal language and spontaneous speech.    Cranial Nerves:  II--bilateral peripheral vision slightly impaired   III, IV, VI-Pupils were equal, round, and reactive to light and accomodation.  Anisocoria of the right eye noted.  Extraocular movements were full and conjugate without nystagmus. Conjugate gaze, normal smooth pursuits, normal saccades   V-facial sensation symmetric.    VII-facial expression symmetric, intact forehead wrinkle, strong eye closure, symmetric smile    VIII-hearing grossly intact bilaterally   IX, X-palate elevation symmetric, no dysarthria.   XI-shoulder shrug strength intact    XII-tongue protrusion midline.    Motor Exam: symmetric bulk and tone throughout, no pronator drift. Power/strength 5/5 bilateral upper and lower extremities, no atrophy, fasciculations or abnormal movements noted.   Sensory: Light touch grossly stable throughout except for left lower extremity as it felt a little bit more dull (recent cellulitis infection)  Reflexes: brachioradialis 2+, biceps 2+ avoided bilateral patellar reflexes due to significant  recent cellulitis of the left lower extremity  Coordination: Finger nose finger intact bilaterally, no apparent dysmetria, ataxia or tremor noted  Gait: steady casual and tandem gait.      Results/ Data:    MRI brain and orbits without and with contrast, 06/14/2024:    IMPRESSION:     Increased hyperintense T2/FLAIR white matter signal adjacent to the right frontal horn and extending along the anterior aspect of the right internal capsule without associated enhancement. Findings can be seen with lacunar infarction with increased   surrounding gliosis. A demyelinating process cannot be excluded in the setting.     There is again slight prominence of the CSF signal along the left greater than right optic nerve sheaths. Evidence of a partially empty sella. Findings can be seen in associated with idiopathic intracranial hypertension in the appropriate clinical   setting.    Radiology Results Review: I have reviewed radiology reports from 06/14/2024 including: MRI brain.    Administrative Statements   I have spent a total time of 30 minutes in caring for this patient on the day of the visit/encounter including Diagnostic results, Risks and benefits of tx options, Instructions for management, Patient and family education, Importance of tx compliance, Risk factor reductions, Impressions, Counseling / Coordination of care, Documenting in the medical record, Reviewing / ordering tests, medicine, procedures  , and Obtaining or reviewing history  .

## 2024-12-23 NOTE — PATIENT INSTRUCTIONS
- At this time, would like for the patient to still continue with acetazolamide 750 mg twice daily for prevention and treatment of IIH.  Patient states that she does experience more increased pressure when she goes on flights.  When advised the patient to take an additional 250 mg tablet of acetazolamide before going on the plane to help reduce pressure.  - Also encouraged patient to still continue to use Nurtec 75 mg as needed for abortive therapy of headaches/migraines when she does have them.  - Continue to follow-up with ophthalmology as recommended in regards to the patient's IIH.  Most recent report from Dr. Lang at [facility] showed that the patient did not have any concern for active papilledema or increased pressure at the time.  - Ordered comprehensive metabolic panel and CBC for further evaluation regards to base blood and metabolic levels.  Would like to check on the patient specifically in regards to her sodium and other electrolytes as well to make sure that she is not having any significant loss with Diamox therapy.  - Would like for the patient to also continue with her weight management and exercise at this moment in time.  Feel as though it is important for the patient to lose as much weight as much as she possibly can to help prevent the reoccurrence of IIH moving forward.  - Finally, if the patient is experiencing any new visual changes or any worsening visual symptoms at this point in time she should report to the ED as soon as possible to make sure that she can be evaluated for an ophthalmologic emergency.  We do not want to risk the patient's changes in vision at this point in time and make sure that we are adequately reducing her pressure if need be and see if the patient needs to have an additional lumbar puncture as well.    - For ongoing stroke prevention continue: Plavix 75 mg once daily, atorvastatin 40 mg once daily    - Discussed the importance of antiplatelet management  with the patient to prevent future strokes.   - Recommend to check blood pressure occasionally away from the doctor's office to make sure that those numbers are typically less than 130/80.  If they are frequently higher than that, we recommend checking a little more often and to follow up with primary care team   - Will defer to primary care team for monitoring of cholesterol panel and blood sugar numbers with target LDL cholesterol of less than 70 and hemoglobin A1c less than 7%  - Recommend following a low salt, mediterranean diet   - Recommend routine physical exercise as tolerated     We will plan for her to return to the office in 6 months time to see on of the APPs or Dr. Phillips but would be happy to see her sooner if the need should arise.  If she has any symptoms concerning for TIA or stroke including sudden painless loss of vision or double vision, difficulty speaking or swallowing, vertigo/room spinning that does not quickly resolve, or weakness/numbness/loss of coordination affecting 1 side of the face or body she should proceed by ambulance to the nearest emergency room immediately.

## 2024-12-26 ENCOUNTER — TELEPHONE (OUTPATIENT)
Age: 39
End: 2024-12-26

## 2024-12-26 DIAGNOSIS — L50.9 HIVES: Primary | ICD-10-CM

## 2024-12-26 RX ORDER — METHYLPREDNISOLONE 4 MG/1
TABLET ORAL
Qty: 21 EACH | Refills: 0 | Status: SHIPPED | OUTPATIENT
Start: 2024-12-26

## 2024-12-26 NOTE — TELEPHONE ENCOUNTER
Patient has been on (BACTRIM DS) for about 20 days now since she had cellulitis diagnosis on her legs, she stated that she is now breaking out since xmas jarrell in hives itching and rash soon after she takes the medication. She has two days left of the medication. Cellulitis has improved, there is still skin discoloration more like a tan linden color but no longer fire engine red. Swelling did go down, she did have a moment of skin peeling about a week but she was told it was apart of the heeling process. No longer hot to the touch, sometimes by the ankle it might be warm.  Please give her a call back to advise.

## 2025-01-06 DIAGNOSIS — F41.8 MIXED ANXIETY DEPRESSIVE DISORDER: ICD-10-CM

## 2025-01-06 RX ORDER — BUPROPION HYDROCHLORIDE 75 MG/1
75 TABLET ORAL 2 TIMES DAILY
Qty: 60 TABLET | Refills: 0 | Status: SHIPPED | OUTPATIENT
Start: 2025-01-06

## 2025-01-07 ENCOUNTER — OFFICE VISIT (OUTPATIENT)
Dept: ENDOCRINOLOGY | Facility: CLINIC | Age: 40
End: 2025-01-07
Payer: COMMERCIAL

## 2025-01-07 VITALS
HEIGHT: 65 IN | DIASTOLIC BLOOD PRESSURE: 76 MMHG | HEART RATE: 78 BPM | OXYGEN SATURATION: 96 % | WEIGHT: 289.6 LBS | SYSTOLIC BLOOD PRESSURE: 132 MMHG | BODY MASS INDEX: 48.25 KG/M2

## 2025-01-07 DIAGNOSIS — E04.1 THYROID NODULE: ICD-10-CM

## 2025-01-07 DIAGNOSIS — E66.01 MORBID (SEVERE) OBESITY DUE TO EXCESS CALORIES (HCC): ICD-10-CM

## 2025-01-07 DIAGNOSIS — E55.9 VITAMIN D DEFICIENCY: ICD-10-CM

## 2025-01-07 DIAGNOSIS — Z98.84 HX OF GASTRIC BYPASS: ICD-10-CM

## 2025-01-07 DIAGNOSIS — E06.3 HYPOTHYROIDISM DUE TO HASHIMOTO THYROIDITIS: Primary | ICD-10-CM

## 2025-01-07 PROCEDURE — 99214 OFFICE O/P EST MOD 30 MIN: CPT | Performed by: INTERNAL MEDICINE

## 2025-01-07 NOTE — ASSESSMENT & PLAN NOTE
Check TSH with reflex to free T4.  Based on results, make adjustments to the regimen if necessary.    Orders:    TSH, 3rd generation with Free T4 reflex; Future

## 2025-01-07 NOTE — ASSESSMENT & PLAN NOTE
She follows with weight management.  She is going to discuss using GLP-1 to continue weight loss.

## 2025-01-07 NOTE — PROGRESS NOTES
Name: Allison Gamboa      : 1985      MRN: [id number]  Encounter Provider: Debra Bourgeois MD  Encounter Date: 2025   Encounter department: [facility] [place]  :  Assessment & Plan  Hypothyroidism due to Hashimoto thyroiditis  Check TSH with reflex to free T4.  Based on results, make adjustments to the regimen if necessary.    Orders:    TSH, 3rd generation with Free T4 reflex; Future    Vitamin D deficiency  I asked her to increase her supplementation to 10,000 units/day.         Morbid (severe) obesity due to excess calories (HCC)  She follows with weight management.         Hx of gastric bypass  She follows with weight management.  She is going to discuss using GLP-1 to continue weight loss.         Thyroid nodule    Orders:    US thyroid; Future        History of Present Illness   HPI  Allison Gamboa is a 39 y.o. female who presents for follow-up of hypothyroidism.  She has had multiple medical events occur since I last saw her.  She is on levothyroxine.  She denies any symptoms of hypo or hyperthyroidism.  History obtained from: patient    Review of Systems   Constitutional:  Negative for chills and fever.   Respiratory:  Negative for shortness of breath.    Cardiovascular:  Negative for chest pain.   Gastrointestinal:  Negative for constipation, diarrhea, nausea and vomiting.   Endocrine: Negative for cold intolerance and heat intolerance.   All other systems reviewed and are negative.    Current Outpatient Medications on File Prior to Visit   Medication Sig Dispense Refill    acetaZOLAMIDE (DIAMOX) 250 mg tablet TAKE THREE TABLETS BY MOUTH TWICE A  tablet 0    albuterol (Ventolin HFA) 90 mcg/act inhaler Inhale 2 puffs every 6 (six) hours as needed for wheezing or shortness of breath 18 g 0    ALPRAZolam (XANAX) 0.25 mg tablet Take 1 tablet (0.25 mg total) by mouth 3 (three) times a day as needed for anxiety 60 tablet 0    atorvastatin  "(LIPITOR) 40 mg tablet TAKE ONE TABLET BY MOUTH EVERY EVENING 30 tablet 5    buPROPion (WELLBUTRIN) 75 mg tablet TAKE ONE TABLET BY MOUTH TWICE A DAY 60 tablet 0    Calcium Carbonate (CALCIUM 500 PO) Take 500 mg by mouth daily      citalopram (CeleXA) 20 mg tablet TAKE ONE TABLET BY MOUTH EVERY DAY 90 tablet 1    clopidogrel (PLAVIX) 75 mg tablet TAKE ONE TABLET BY MOUTH EVERY DAY 30 tablet 5    levothyroxine 200 mcg tablet TAKE 1 TABLET  MONDAY THROUGH FRIDAY AND 1.5 TABLETS ON SATURDAY AND SUNDAY 108 tablet 1    levothyroxine 25 mcg tablet TAKE ONE TABLET BY MOUTH EVERY DAY 30 tablet 0    loratadine (CLARITIN) 10 mg tablet Take 10 mg by mouth if needed      losartan (COZAAR) 50 mg tablet TAKE ONE TABLET BY MOUTH EVERY DAY 90 tablet 1    Multiple Vitamins-Minerals (Bariatric Fusion) CHEW Chew 4 tablets daily      omeprazole (PriLOSEC) 20 mg delayed release capsule TAKE ONE CAPSULE BY MOUTH EVERY OTHER DAY 30 capsule 0    pantoprazole (PROTONIX) 40 mg tablet Take 1 tablet (40 mg total) by mouth daily 90 tablet 1    rimegepant sulfate (NURTEC) 75 mg TBDP Take 1 tablet (75 mg) by mouth once at the onset of a headache. Max dose: 75 mg/day. 16 tablet 3    methylPREDNISolone 4 MG tablet therapy pack Use as directed on package (Patient not taking: Reported on 1/7/2025) 21 each 0     No current facility-administered medications on file prior to visit.         Objective   /76   Pulse 78   Ht 5' 5\" (1.651 m)   Wt 131 kg (289 lb 9.6 oz)   LMP 12/17/2019   SpO2 96%   BMI 48.19 kg/m²      Physical Exam  Vitals and nursing note reviewed.   Constitutional:       Appearance: Normal appearance. She is obese.   HENT:      Head: Normocephalic and atraumatic.   Eyes:      General: No scleral icterus.        Right eye: No discharge.         Left eye: No discharge.   Pulmonary:      Effort: Pulmonary effort is normal.   Musculoskeletal:         General: Normal range of motion.      Cervical back: Normal range of motion. "   Skin:     Coloration: Skin is not jaundiced.   Neurological:      General: No focal deficit present.      Mental Status: She is alert and oriented to person, place, and time.   Psychiatric:         Mood and Affect: Mood normal.         Behavior: Behavior normal.

## 2025-01-09 DIAGNOSIS — E06.3 HYPOTHYROIDISM DUE TO HASHIMOTO'S THYROIDITIS: ICD-10-CM

## 2025-01-09 RX ORDER — LEVOTHYROXINE SODIUM 25 UG/1
25 TABLET ORAL DAILY
Qty: 30 TABLET | Refills: 0 | Status: SHIPPED | OUTPATIENT
Start: 2025-01-09

## 2025-01-16 ENCOUNTER — OFFICE VISIT (OUTPATIENT)
Dept: BARIATRICS | Facility: CLINIC | Age: 40
End: 2025-01-16
Payer: COMMERCIAL

## 2025-01-16 ENCOUNTER — HOSPITAL ENCOUNTER (OUTPATIENT)
Dept: RADIOLOGY | Facility: IMAGING CENTER | Age: 40
End: 2025-01-16
Payer: COMMERCIAL

## 2025-01-16 VITALS
TEMPERATURE: 97.9 F | DIASTOLIC BLOOD PRESSURE: 86 MMHG | SYSTOLIC BLOOD PRESSURE: 122 MMHG | WEIGHT: 290 LBS | HEART RATE: 72 BPM | OXYGEN SATURATION: 99 % | BODY MASS INDEX: 48.32 KG/M2 | HEIGHT: 65 IN

## 2025-01-16 DIAGNOSIS — E04.1 THYROID NODULE: ICD-10-CM

## 2025-01-16 DIAGNOSIS — Z98.84 BARIATRIC SURGERY STATUS: ICD-10-CM

## 2025-01-16 DIAGNOSIS — Z48.815 ENCOUNTER FOR SURGICAL AFTERCARE FOLLOWING SURGERY OF DIGESTIVE SYSTEM: Primary | ICD-10-CM

## 2025-01-16 DIAGNOSIS — E66.01 OBESITY, CLASS III, BMI 40-49.9 (MORBID OBESITY) (HCC): ICD-10-CM

## 2025-01-16 DIAGNOSIS — G93.2 IIH (IDIOPATHIC INTRACRANIAL HYPERTENSION): ICD-10-CM

## 2025-01-16 DIAGNOSIS — K91.2 POSTSURGICAL MALABSORPTION: ICD-10-CM

## 2025-01-16 PROCEDURE — 99214 OFFICE O/P EST MOD 30 MIN: CPT | Performed by: NURSE PRACTITIONER

## 2025-01-16 PROCEDURE — 76536 US EXAM OF HEAD AND NECK: CPT

## 2025-01-16 RX ORDER — NALTREXONE HYDROCHLORIDE 50 MG/1
50 TABLET, FILM COATED ORAL DAILY
Qty: 30 TABLET | Refills: 3 | Status: SHIPPED | OUTPATIENT
Start: 2025-01-16

## 2025-01-16 RX ORDER — BUPROPION HYDROCHLORIDE 150 MG/1
150 TABLET, EXTENDED RELEASE ORAL 2 TIMES DAILY
Qty: 60 TABLET | Refills: 2 | Status: SHIPPED | OUTPATIENT
Start: 2025-01-16

## 2025-01-16 NOTE — PROGRESS NOTES
Date of surgery: 12/5/2022  Procedure: RNY gastric bypass  Performing surgeon: Dr. Charles Pool    Initial Weight - 378.5 lbs.  Current Weight - 290.0 lbs.  Gerald Weight - 290.0 lbs  Total Body Weight Loss (EWL) - 88.6 lbs.  EWL% - 39%  TWB% - 23%

## 2025-01-16 NOTE — PROGRESS NOTES
Addendum - discussed with Dr. Hyatt - ok to increase wellbutrin to 150 mg PO BID. Called patient to discuss to increase wellbutrin first to 75 mg in the morning, 150 mg at night x 1 week and eventually increase to 150 mg PO BID.   - can add naltrexone 25 mg week 3 and increase to 50 mg after.   Left VM and will send a [employer] message.     Assessment/Plan:    MORBID OBESITY  -Discussed role of weight loss medications.  -Initial weight loss goal of 5-10% weight loss for improved overall health  -Reviewed Screening labs - Needs lipid panel and hgba1c  -Patient is interested in pursuing Naltrexone  - can consider increasing wellbutrin or adding naltrexone to mimic contrave.   - she is currently on wellbutrin 150 mg/day - can increase to 300 mg but is also on celexa. Will discuss with her PCP to see if this can be increased   - patient is highly interested in GLP-1 injectables - has thyroid nodule and is getting a work up currently. - advised to hold off on GLP-1 injectables for now until we determine if her thyroid nodule is cancerous - Contraindicated if concerns for medullary thyroid cancer.   - medication contract signed.   - Discussed risks/side effects of medication   - Discussed importance of adequate protein intake and strength training to reduce risk of muscle loss.     Follow up in approximately 3 months with Surgical Advanced Practitioner.  Goals:  Food log (ie.) [email or web address],[email or web address],etc. baritastic  No sugary beverages. At least 64oz of water daily.  Increase physical activity by 10 minutes daily. Gradually increase physical activity to a goal of 5 days per week for 30 minutes of MODERATE intensity PLUS 2 days per week of FULL BODY resistance training  5-10 servings of fruits and vegetables per day and 25-35 grams of dietary fiber per day, gradually increasing  No sugary beverages. At least 64oz of water daily., Increase physical activity by 10 minutes daily,  Practice lesson plans 1-6 in bariatric manual , Practice 30/60 rule, Gradually increase physical activity to a goal of 5 days per week for 30 minutes of MODERATE intensity PLUS 2 days per week of FULL BODY resistance training, Continue with dietary and behavioral recommendations outlined in bariatric manual, Continue to take recommended bariatric vitamins as directed, Goal protein intake of 60-80 grams per day, 5-10 servings of fruits and vegetables per day, 25-35 grams of dietary fiber per day, 8090-1373 calories per day, and Limit caffeine to 16oz per day      Bariatric surgery status/postsurgical malabsorption  PLAN:   - Routine follow up in 1 year for annual visit - will make this appt at later time to combine her MWM f/u.   - Continue with healthy lifestyle, adequate protein intake of 60 gm, fluid intake of at least 64 oz.   - Continue with MVI daily. Endocrinology recommended to increase her vitamin d3 to 10,0000 IU daily - she is currently taking 5,000 IU. Will follow up with labs and she will increase to 10,000 IU if her labs shows suboptimal levels.    - Activity as tolerated.   - Labs ordered and will adjust accordingly if any deficiency.   - Follow up with RD and SW as needed.       Diagnoses and all orders for this visit:    Encounter for surgical aftercare following surgery of digestive system  -     naltrexone (REVIA) 50 mg tablet; Take 1 tablet (50 mg total) by mouth daily  -     CBC; Future  -     Comprehensive metabolic panel; Future  -     Folate; Future  -     Iron Panel (Includes Ferritin, Iron Sat%, Iron, and TIBC); Future  -     PTH, intact; Future  -     Vitamin A; Future  -     Vitamin B1, whole blood; Future  -     Vitamin B12; Future  -     Vitamin D 25 hydroxy; Future  -     Zinc; Future    Bariatric surgery status  -     CBC; Future  -     Comprehensive metabolic panel; Future  -     Folate; Future  -     Iron Panel (Includes Ferritin, Iron Sat%, Iron, and TIBC); Future  -     PTH,  intact; Future  -     Vitamin A; Future  -     Vitamin B1, whole blood; Future  -     Vitamin B12; Future  -     Vitamin D 25 hydroxy; Future  -     Zinc; Future    Postsurgical malabsorption  -     CBC; Future  -     Comprehensive metabolic panel; Future  -     Folate; Future  -     Iron Panel (Includes Ferritin, Iron Sat%, Iron, and TIBC); Future  -     PTH, intact; Future  -     Vitamin A; Future  -     Vitamin B1, whole blood; Future  -     Vitamin B12; Future  -     Vitamin D 25 hydroxy; Future  -     Zinc; Future    Obesity, Class III, BMI 40-49.9 (morbid obesity) ([facility])  -     naltrexone (REVIA) 50 mg tablet; Take 1 tablet (50 mg total) by mouth daily  -     CBC; Future  -     Comprehensive metabolic panel; Future  -     Folate; Future  -     Iron Panel (Includes Ferritin, Iron Sat%, Iron, and TIBC); Future  -     PTH, intact; Future  -     Vitamin A; Future  -     Vitamin B1, whole blood; Future  -     Vitamin B12; Future  -     Vitamin D 25 hydroxy; Future  -     Zinc; Future  -     Insulin, fasting; Future  -     Lipid panel; Future  -     Hemoglobin A1C; Future  -     buPROPion (Wellbutrin SR) 150 mg 12 hr tablet; Take 1 tablet (150 mg total) by mouth 2 (two) times a day    BMI 45.0-49.9, adult ([facility])  -     naltrexone (REVIA) 50 mg tablet; Take 1 tablet (50 mg total) by mouth daily  -     Insulin, fasting; Future  -     Lipid panel; Future  -     Hemoglobin A1C; Future  -     buPROPion (Wellbutrin SR) 150 mg 12 hr tablet; Take 1 tablet (150 mg total) by mouth 2 (two) times a day        Subjective:   Chief Complaint   Patient presents with    Follow-up     OD annual / PO weight gain consult     Patient ID: Allison Gamboa  is a 39 y.o. female with excess weight/obesity here to pursue medical weight management. -s/p Yuko-En-Y Gastric Bypass with Dr. Charles Pool on 12/05/2022 here for a surgical annual with concerns of weight regain.   Past Medical History:   Diagnosis Date    Allergic     Anxiety     Bruises  easily     Cancer (HCC)     Cellulitis 12/2024    in [place], given bactrim    COVID-19 10/10/2022    CPAP (continuous positive airway pressure) dependence     Depression     Disease of thyroid gland     DELVALLE (dyspnea on exertion)     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    Endometriosis     Follicular thyroid cancer (HCC)     Gastroenteritis     Hashimoto's disease     Hepatic steatosis     Hyperlipidemia     Hypertension     Hypothyroid     IBS (irritable bowel syndrome)     Kidney lesion, native, left     Kidney stone     Lymphedema     left leg    Obesity     Palpitation     Pancreatitis     Pneumonia     Polycystic ovarian disease     Polyuria     RUQ abdominal pain 11/01/2021    Sleep apnea     Stroke (HCC) 12/26/2023    Discovered on CT Scan    Thyroid nodule     Thyromegaly     Tinea corporis     Tinea pedis     Visual impairment        HPI:  Bariatric surgery status   - -s/p Yuko-En-Y Gastric Bypass with Dr. Charles Pool on 12/05/2022. Presents to the office today for annual visit. Concerns for weight regain. Had multiple medical events since last seen - was able to reach to 270 lbs but shortly regained this after being hospitalized for cellulitis. Interested in GLP-1 as her  is also on this.  She is tolerating a regular diet. Denies having any abdominal pain, N/V/D/C, regurgitation, reflux or dysphagia.      Initial: 378 LBS   Current: 294.5 lbs   EWL: (Weight loss is ahead of schedule at this post surgical period.)  Gerald: 270 lbs.   Current BMI is Body mass index is 49.01 kg/m².     Tolerating a regular diet-yes  Eating at least 60 grams of protein per day-yes  Following 30/60 minute rule with liquids-yes  Drinking at least 64 ounces of fluid per day-yes  Drinking carbonated beverages-no  Sufficient exercise-yes  Using NSAIDs regularly-no  Using nicotine-no  Using alcohol-no  Supplements: Multivitamins, Iron, and Calcium + vitamin D3 [id number] iu     Obesity/Excess Weight:   BMI  48.26  Severity: Very Severe  Onset:  LIFELONG  around puberty time frame.   Modifiers: Diet and Exercise and bariatric surgery   Contributing factors: Poor Food Choices, Lack of knowledge of appropriate lifestyle changes, Medications, and Insufficient time to make appropriate lifestyle changes  Associated symptoms: comorbid conditions, fatigue, increased joint pain, decreased exercise capacity, body image issues, decreased self esteem, increased shortness of breath, decreased mobility, inability to do certain activities, and clothes do not fit  Colonoscopy-Not applicable    Highest weight  378 LBS   Current Weight  290 Lbs   Gerald : 270 lbs   Goal - 130-140 lbs; at least to be under 200 lbs.   5% weight loss - 14.5 LBS (275.5 lbs)      Hydration: coffee - with oatmilk and low gabrielle sugar and tea,> 64 OZ of water in   Alcohol: none  Exercise:  walking, doing stairs at work, and strength training at times at the gym.   Dining out: was ordering out often, trying to cut down.   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   Sleep:   STOPBANG:    B: tends to skip breakfast due to taking her medications, at times yogurt or cottage cheese  S: egg, and veggies; at times fruit or protein shake.   L: salad bar, chicken  S: wild chips - 10 gm of protein chips; Fruit and at times yogurt.   D: Protein, Veggies - typically ground chicken   S:  at times popsicle - no sugar added.      Wt Readings from Last 3 Encounters:   01/16/25 132 kg (290 lb)   01/07/25 131 kg (289 lb 9.6 oz)   12/23/24 133 kg (293 lb 6.4 oz)       Patient is not pregnant/breastfeeding (metformin only)  Patient denies personal and family history of  pancreatitis, thyroid cancer, MEN-2 tumors.- undergoing a thyroid work up for nodules (AVOID GLP-1 FOR NOW; Consideration for GLP-1 Agonists if her thyroid studies come back negative for medullary thyroid ca)  Has a hx of kidney stones - would avoid topamax. Denies any hx of glaucoma,  seizures, gallstones. (Contraindicated for topamax)  Denies Hx of CAD, PAD, palpitations, arrhythmia, uncontrolled HTN, hyperthyroidism or use of stimulant medications     Has a hx of depression - currently on wellbutrin 150 mg/day. Denies uncontrolled anxiety or depression, suicidal behavior or thinking , insomnia or sleep disturbance.      Denies use of opioids - CONSIDERATION FOR NALTREXONE.     The following portions of the patient's history were reviewed and updated as appropriate: allergies, current medications, past family history, past medical history, past social history, past surgical history, and problem list.    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    Endometriosis     Follicular thyroid cancer (HCC)     Gastroenteritis     Hashimoto's disease     Hepatic steatosis     Hyperlipidemia     Hypertension     Hypothyroid     IBS (irritable bowel syndrome)     Kidney lesion, native, left     Kidney stone     Lymphedema     left leg    Obesity     Palpitation     Pancreatitis     Pneumonia     Polycystic ovarian disease     Polyuria     RUQ abdominal pain 11/01/2021    Sleep apnea     Stroke (HCC) 12/26/2023    Discovered on CT Scan    Thyroid nodule     Thyromegaly     Tinea corporis     Tinea pedis     Visual impairment      Past Surgical History:   Procedure Laterality Date    ADENOIDECTOMY      DILATION AND CURETTAGE OF UTERUS  2020    HYSTERECTOMY  02/2020    left ovaries    IR LUMBAR PUNCTURE  12/28/2023    MO LAPS GSTR RSTCV PX W/BYP ELKIN-EN-Y LIMB <150 CM N/A 12/5/2022    Procedure: BYPASS GASTRIC ELKIN-EN-Y LAPAROSCOPIC ROBOT & INTRAOPERATIVE EGD;  Surgeon: Graeme Pool MD;   Location: [facility];  Service: Bariatrics    THYROIDECTOMY, PARTIAL Left 06/2019    benign    TONSILLECTOMY      US GUIDED THYROID BIOPSY  03/01/2019       Current Outpatient Medications:     acetaZOLAMIDE (DIAMOX) 250 mg tablet, TAKE THREE TABLETS BY MOUTH TWICE A DAY, Disp: 180 tablet, Rfl: 0    albuterol (Ventolin HFA) 90 mcg/act inhaler, Inhale 2 puffs every 6 (six) hours as needed for wheezing or shortness of breath, Disp: 18 g, Rfl: 0    ALPRAZolam (XANAX) 0.25 mg tablet, Take 1 tablet (0.25 mg total) by mouth 3 (three) times a day as needed for anxiety, Disp: 60 tablet, Rfl: 0    atorvastatin (LIPITOR) 40 mg tablet, TAKE ONE TABLET BY MOUTH EVERY EVENING, Disp: 30 tablet, Rfl: 5    buPROPion (Wellbutrin SR) 150 mg 12 hr tablet, Take 1 tablet (150 mg total) by mouth 2 (two) times a day, Disp: 60 tablet, Rfl: 2    buPROPion (WELLBUTRIN) 75 mg tablet, TAKE ONE TABLET BY MOUTH TWICE A DAY, Disp: 60 tablet, Rfl: 0    Calcium Carbonate (CALCIUM 500 PO), Take 500 mg by mouth daily, Disp: , Rfl:     citalopram (CeleXA) 20 mg tablet, TAKE ONE TABLET BY MOUTH EVERY DAY, Disp: 90 tablet, Rfl: 1    clopidogrel (PLAVIX) 75 mg tablet, TAKE ONE TABLET BY MOUTH EVERY DAY, Disp: 30 tablet, Rfl: 5    levothyroxine 200 mcg tablet, TAKE 1 TABLET  MONDAY THROUGH FRIDAY AND 1.5 TABLETS ON SATURDAY AND SUNDAY, Disp: 108 tablet, Rfl: 1    levothyroxine 25 mcg tablet, TAKE ONE TABLET BY MOUTH EVERY DAY, Disp: 30 tablet, Rfl: 0    loratadine (CLARITIN) 10 mg tablet, Take 10 mg by mouth if needed, Disp: , Rfl:     losartan (COZAAR) 50 mg tablet, TAKE ONE TABLET BY MOUTH EVERY DAY, Disp: 90 tablet, Rfl: 1    Multiple Vitamins-Minerals (Bariatric Fusion) CHEW, Chew 4 tablets daily, Disp: , Rfl:     naltrexone (REVIA) 50 mg tablet, Take 1 tablet (50 mg total) by mouth daily, Disp: 30 tablet, Rfl: 3    omeprazole (PriLOSEC) 20 mg delayed release capsule, TAKE ONE CAPSULE BY MOUTH EVERY OTHER DAY, Disp: 30 capsule, Rfl: 0    pantoprazole  "(PROTONIX) 40 mg tablet, Take 1 tablet (40 mg total) by mouth daily, Disp: 90 tablet, Rfl: 1    rimegepant sulfate (NURTEC) 75 mg TBDP, Take 1 tablet (75 mg) by mouth once at the onset of a headache. Max dose: 75 mg/day., Disp: 16 tablet, Rfl: 3    methylPREDNISolone 4 MG tablet therapy pack, Use as directed on package (Patient not taking: Reported on 1/7/2025), Disp: 21 each, Rfl: 0    Review of Systems   Constitutional:  Positive for activity change, appetite change, fatigue and unexpected weight change.   Respiratory: Negative.     Cardiovascular: Negative.    Gastrointestinal: Negative.    Musculoskeletal:  Positive for arthralgias.   Neurological:  Positive for headaches.   Psychiatric/Behavioral:  Positive for dysphoric mood.        Objective:    /86 (BP Location: Left arm, Patient Position: Sitting, Cuff Size: Large)   Pulse 72   Temp 97.9 °F (36.6 °C) (Tympanic)   Ht 5' 5\" (1.651 m)   Wt 132 kg (290 lb)   LMP 12/17/2019   SpO2 99%   BMI 48.26 kg/m²     Physical Exam  Vitals and nursing note reviewed.   Constitutional:       Appearance: Normal appearance. She is obese.   Cardiovascular:      Rate and Rhythm: Normal rate and regular rhythm.      Pulses: Normal pulses.      Heart sounds: Normal heart sounds.   Pulmonary:      Effort: Pulmonary effort is normal.      Breath sounds: Normal breath sounds.   Abdominal:      General: Bowel sounds are normal.      Palpations: Abdomen is soft.      Tenderness: There is no abdominal tenderness.   Musculoskeletal:         General: Normal range of motion.   Skin:     General: Skin is warm and dry.   Neurological:      General: No focal deficit present.      Mental Status: She is alert and oriented to person, place, and time.   Psychiatric:         Mood and Affect: Mood normal.         Behavior: Behavior normal.         Thought Content: Thought content normal.         Judgment: Judgment normal.         "

## 2025-01-17 RX ORDER — ACETAZOLAMIDE 250 MG/1
TABLET ORAL
Qty: 180 TABLET | Refills: 0 | Status: SHIPPED | OUTPATIENT
Start: 2025-01-17

## 2025-01-21 ENCOUNTER — RESULTS FOLLOW-UP (OUTPATIENT)
Dept: ENDOCRINOLOGY | Facility: CLINIC | Age: 40
End: 2025-01-21

## 2025-02-05 DIAGNOSIS — K21.9 GASTROESOPHAGEAL REFLUX DISEASE WITHOUT ESOPHAGITIS: ICD-10-CM

## 2025-02-05 DIAGNOSIS — E06.3 HYPOTHYROIDISM DUE TO HASHIMOTO'S THYROIDITIS: ICD-10-CM

## 2025-02-06 RX ORDER — LEVOTHYROXINE SODIUM 25 UG/1
25 TABLET ORAL DAILY
Qty: 30 TABLET | Refills: 0 | Status: SHIPPED | OUTPATIENT
Start: 2025-02-06

## 2025-02-08 DIAGNOSIS — F41.8 MIXED ANXIETY DEPRESSIVE DISORDER: ICD-10-CM

## 2025-02-10 RX ORDER — BUPROPION HYDROCHLORIDE 75 MG/1
75 TABLET ORAL 2 TIMES DAILY
Qty: 60 TABLET | Refills: 0 | Status: SHIPPED | OUTPATIENT
Start: 2025-02-10

## 2025-02-13 ENCOUNTER — 6 MONTH FOLLOW UP (OUTPATIENT)
Dept: URBAN - METROPOLITAN AREA CLINIC 6 | Facility: CLINIC | Age: 40
End: 2025-02-13

## 2025-02-13 DIAGNOSIS — H47.10: ICD-10-CM

## 2025-02-13 DIAGNOSIS — G93.2: ICD-10-CM

## 2025-02-13 DIAGNOSIS — H53.453: ICD-10-CM

## 2025-02-13 PROCEDURE — 92012 INTRM OPH EXAM EST PATIENT: CPT

## 2025-02-13 PROCEDURE — 92015 DETERMINE REFRACTIVE STATE: CPT

## 2025-02-13 PROCEDURE — 92083 EXTENDED VISUAL FIELD XM: CPT

## 2025-02-13 PROCEDURE — 92133 CPTRZD OPH DX IMG PST SGM ON: CPT

## 2025-02-13 ASSESSMENT — VISUAL ACUITY
OS_PH: 20/40
OD_CC: 20/30
OS_CC: 20/40

## 2025-02-13 ASSESSMENT — TONOMETRY
OS_IOP_MMHG: 9
OD_IOP_MMHG: 9

## 2025-03-07 DIAGNOSIS — I10 ESSENTIAL HYPERTENSION: ICD-10-CM

## 2025-03-07 DIAGNOSIS — F41.8 MIXED ANXIETY DEPRESSIVE DISORDER: ICD-10-CM

## 2025-03-07 RX ORDER — BUPROPION HYDROCHLORIDE 75 MG/1
75 TABLET ORAL 2 TIMES DAILY
Qty: 60 TABLET | Refills: 0 | Status: SHIPPED | OUTPATIENT
Start: 2025-03-07

## 2025-03-07 RX ORDER — LOSARTAN POTASSIUM 50 MG/1
50 TABLET ORAL DAILY
Qty: 90 TABLET | Refills: 1 | Status: SHIPPED | OUTPATIENT
Start: 2025-03-07

## 2025-03-07 RX ORDER — CITALOPRAM HYDROBROMIDE 20 MG/1
20 TABLET ORAL DAILY
Qty: 90 TABLET | Refills: 1 | Status: SHIPPED | OUTPATIENT
Start: 2025-03-07

## 2025-03-10 ENCOUNTER — TELEPHONE (OUTPATIENT)
Dept: BARIATRICS | Facility: CLINIC | Age: 40
End: 2025-03-10

## 2025-03-10 DIAGNOSIS — E06.3 HYPOTHYROIDISM DUE TO HASHIMOTO'S THYROIDITIS: ICD-10-CM

## 2025-03-11 RX ORDER — LEVOTHYROXINE SODIUM 25 UG/1
25 TABLET ORAL DAILY
Qty: 30 TABLET | Refills: 0 | Status: SHIPPED | OUTPATIENT
Start: 2025-03-11

## 2025-03-31 DIAGNOSIS — G93.2 IIH (IDIOPATHIC INTRACRANIAL HYPERTENSION): ICD-10-CM

## 2025-04-01 ENCOUNTER — TELEPHONE (OUTPATIENT)
Age: 40
End: 2025-04-01

## 2025-04-01 RX ORDER — ACETAZOLAMIDE 250 MG/1
750 TABLET ORAL 2 TIMES DAILY
Qty: 180 TABLET | Refills: 5 | Status: SHIPPED | OUTPATIENT
Start: 2025-04-01

## 2025-04-01 NOTE — TELEPHONE ENCOUNTER
----- Message from Mckenzie VIDALES sent at 4/1/2025  7:27 AM EDT -----  [facility] prior auth started

## 2025-04-01 NOTE — TELEPHONE ENCOUNTER
Prior auth submitted through [facility], [place] [id number]. Received immediate approval. Med is approved through 3/31/2026.   Called [facility] and left a message making the pharmacist aware of the approval. Sent pt a message through [employer].

## 2025-04-06 DIAGNOSIS — E06.3 HYPOTHYROIDISM DUE TO HASHIMOTO'S THYROIDITIS: ICD-10-CM

## 2025-04-06 DIAGNOSIS — K21.9 GASTROESOPHAGEAL REFLUX DISEASE WITHOUT ESOPHAGITIS: ICD-10-CM

## 2025-04-07 RX ORDER — OMEPRAZOLE 20 MG/1
20 CAPSULE, DELAYED RELEASE ORAL EVERY OTHER DAY
Qty: 30 CAPSULE | Refills: 0 | Status: SHIPPED | OUTPATIENT
Start: 2025-04-07

## 2025-04-09 DIAGNOSIS — E06.3 HYPOTHYROIDISM DUE TO HASHIMOTO'S THYROIDITIS: ICD-10-CM

## 2025-04-09 RX ORDER — LEVOTHYROXINE SODIUM 25 UG/1
25 TABLET ORAL DAILY
Qty: 30 TABLET | Refills: 0 | OUTPATIENT
Start: 2025-04-09

## 2025-04-09 RX ORDER — LEVOTHYROXINE SODIUM 200 UG/1
TABLET ORAL
Qty: 108 TABLET | Refills: 1 | Status: SHIPPED | OUTPATIENT
Start: 2025-04-09

## 2025-04-28 ENCOUNTER — TELEPHONE (OUTPATIENT)
Dept: BARIATRICS | Facility: CLINIC | Age: 40
End: 2025-04-28

## 2025-04-28 NOTE — TELEPHONE ENCOUNTER
Contacted pt in regards to an appt 4/28/25 at 1 pm with Candy. Provider will be out of office and appt needed to be rescheduled. Pt was rescheduled 5/2/25 at 1 pm with Candy.

## 2025-04-28 NOTE — TELEPHONE ENCOUNTER
Injection  B12 Injection performed in right deltoid by Aminah Anders RN. Patient tolerated the procedure well without complications.  04/28/25   Aminah Anders RN    Spoke to patient and reviewed sleep study results  Advised CPAP study ordered  Patient aware 3/11/22 DME set up arron goyal cancelled  Scheduled CPAP study 4/29/22 in [place]  Instructions provided  Verbalized understanding  Added to wait list   Patient willing to go to any location

## 2025-04-30 ENCOUNTER — APPOINTMENT (OUTPATIENT)
Age: 40
End: 2025-04-30
Payer: COMMERCIAL

## 2025-04-30 DIAGNOSIS — Z98.84 BARIATRIC SURGERY STATUS: ICD-10-CM

## 2025-04-30 DIAGNOSIS — G93.2 IDIOPATHIC INTRACRANIAL HYPERTENSION: ICD-10-CM

## 2025-04-30 DIAGNOSIS — E06.3 HYPOTHYROIDISM DUE TO HASHIMOTO THYROIDITIS: ICD-10-CM

## 2025-04-30 DIAGNOSIS — K91.2 POSTSURGICAL MALABSORPTION: ICD-10-CM

## 2025-04-30 DIAGNOSIS — Z48.815 ENCOUNTER FOR SURGICAL AFTERCARE FOLLOWING SURGERY OF DIGESTIVE SYSTEM: ICD-10-CM

## 2025-04-30 DIAGNOSIS — E66.813 OBESITY, CLASS III, BMI 40-49.9 (MORBID OBESITY): ICD-10-CM

## 2025-04-30 LAB
25(OH)D3 SERPL-MCNC: 25.5 NG/ML (ref 30–100)
ALBUMIN SERPL BCG-MCNC: 4.1 G/DL (ref 3.5–5)
ALP SERPL-CCNC: 112 U/L (ref 34–104)
ALT SERPL W P-5'-P-CCNC: 28 U/L (ref 7–52)
ANION GAP SERPL CALCULATED.3IONS-SCNC: 6 MMOL/L (ref 4–13)
AST SERPL W P-5'-P-CCNC: 23 U/L (ref 13–39)
BASOPHILS # BLD AUTO: 0.07 THOUSANDS/ÂΜL (ref 0–0.1)
BASOPHILS NFR BLD AUTO: 1 % (ref 0–1)
BILIRUB SERPL-MCNC: 0.49 MG/DL (ref 0.2–1)
BUN SERPL-MCNC: 18 MG/DL (ref 5–25)
CALCIUM SERPL-MCNC: 9.3 MG/DL (ref 8.4–10.2)
CHLORIDE SERPL-SCNC: 111 MMOL/L (ref 96–108)
CHOLEST SERPL-MCNC: 138 MG/DL (ref ?–200)
CO2 SERPL-SCNC: 22 MMOL/L (ref 21–32)
CREAT SERPL-MCNC: 0.9 MG/DL (ref 0.6–1.3)
EOSINOPHIL # BLD AUTO: 0.16 THOUSAND/ÂΜL (ref 0–0.61)
EOSINOPHIL NFR BLD AUTO: 2 % (ref 0–6)
ERYTHROCYTE [DISTWIDTH] IN BLOOD BY AUTOMATED COUNT: 14.2 % (ref 11.6–15.1)
EST. AVERAGE GLUCOSE BLD GHB EST-MCNC: 108 MG/DL
FERRITIN SERPL-MCNC: 62 NG/ML (ref 30–307)
FOLATE SERPL-MCNC: >22.3 NG/ML
GFR SERPL CREATININE-BSD FRML MDRD: 81 ML/MIN/1.73SQ M
GLUCOSE P FAST SERPL-MCNC: 89 MG/DL (ref 65–99)
HBA1C MFR BLD: 5.4 %
HCT VFR BLD AUTO: 40.6 % (ref 34.8–46.1)
HDLC SERPL-MCNC: 63 MG/DL
HGB BLD-MCNC: 12.2 G/DL (ref 11.5–15.4)
IMM GRANULOCYTES # BLD AUTO: 0.01 THOUSAND/UL (ref 0–0.2)
IMM GRANULOCYTES NFR BLD AUTO: 0 % (ref 0–2)
INSULIN SERPL-ACNC: 4.73 UIU/ML (ref 1.9–23)
IRON SATN MFR SERPL: 13 % (ref 15–50)
IRON SERPL-MCNC: 48 UG/DL (ref 50–212)
LDLC SERPL CALC-MCNC: 59 MG/DL (ref 0–100)
LYMPHOCYTES # BLD AUTO: 2.14 THOUSANDS/ÂΜL (ref 0.6–4.47)
LYMPHOCYTES NFR BLD AUTO: 27 % (ref 14–44)
MCH RBC QN AUTO: 29 PG (ref 26.8–34.3)
MCHC RBC AUTO-ENTMCNC: 30 G/DL (ref 31.4–37.4)
MCV RBC AUTO: 97 FL (ref 82–98)
MONOCYTES # BLD AUTO: 0.6 THOUSAND/ÂΜL (ref 0.17–1.22)
MONOCYTES NFR BLD AUTO: 8 % (ref 4–12)
NEUTROPHILS # BLD AUTO: 4.84 THOUSANDS/ÂΜL (ref 1.85–7.62)
NEUTS SEG NFR BLD AUTO: 62 % (ref 43–75)
NONHDLC SERPL-MCNC: 75 MG/DL
NRBC BLD AUTO-RTO: 0 /100 WBCS
PLATELET # BLD AUTO: 218 THOUSANDS/UL (ref 149–390)
PMV BLD AUTO: 12.2 FL (ref 8.9–12.7)
POTASSIUM SERPL-SCNC: 4.2 MMOL/L (ref 3.5–5.3)
PROT SERPL-MCNC: 7.3 G/DL (ref 6.4–8.4)
PTH-INTACT SERPL-MCNC: 38.6 PG/ML (ref 12–88)
RBC # BLD AUTO: 4.2 MILLION/UL (ref 3.81–5.12)
SODIUM SERPL-SCNC: 139 MMOL/L (ref 135–147)
TIBC SERPL-MCNC: 357 UG/DL (ref 250–450)
TRANSFERRIN SERPL-MCNC: 255 MG/DL (ref 203–362)
TRIGL SERPL-MCNC: 81 MG/DL (ref ?–150)
TSH SERPL DL<=0.05 MIU/L-ACNC: 1.94 UIU/ML (ref 0.45–4.5)
UIBC SERPL-MCNC: 309 UG/DL (ref 155–355)
VIT B12 SERPL-MCNC: 586 PG/ML (ref 180–914)
WBC # BLD AUTO: 7.82 THOUSAND/UL (ref 4.31–10.16)

## 2025-04-30 PROCEDURE — 80053 COMPREHEN METABOLIC PANEL: CPT

## 2025-04-30 PROCEDURE — 80061 LIPID PANEL: CPT

## 2025-04-30 PROCEDURE — 82728 ASSAY OF FERRITIN: CPT

## 2025-04-30 PROCEDURE — 84425 ASSAY OF VITAMIN B-1: CPT

## 2025-04-30 PROCEDURE — 84590 ASSAY OF VITAMIN A: CPT

## 2025-04-30 PROCEDURE — 82746 ASSAY OF FOLIC ACID SERUM: CPT

## 2025-04-30 PROCEDURE — 83970 ASSAY OF PARATHORMONE: CPT

## 2025-04-30 PROCEDURE — 84630 ASSAY OF ZINC: CPT

## 2025-04-30 PROCEDURE — 85025 COMPLETE CBC W/AUTO DIFF WBC: CPT

## 2025-04-30 PROCEDURE — 36415 COLL VENOUS BLD VENIPUNCTURE: CPT

## 2025-04-30 PROCEDURE — 82306 VITAMIN D 25 HYDROXY: CPT

## 2025-04-30 PROCEDURE — 83525 ASSAY OF INSULIN: CPT

## 2025-04-30 PROCEDURE — 83036 HEMOGLOBIN GLYCOSYLATED A1C: CPT

## 2025-04-30 PROCEDURE — 83550 IRON BINDING TEST: CPT

## 2025-04-30 PROCEDURE — 83540 ASSAY OF IRON: CPT

## 2025-04-30 PROCEDURE — 82607 VITAMIN B-12: CPT

## 2025-04-30 PROCEDURE — 84443 ASSAY THYROID STIM HORMONE: CPT

## 2025-05-02 ENCOUNTER — OFFICE VISIT (OUTPATIENT)
Dept: BARIATRICS | Facility: CLINIC | Age: 40
End: 2025-05-02
Payer: COMMERCIAL

## 2025-05-02 VITALS
BODY MASS INDEX: 48.4 KG/M2 | HEART RATE: 74 BPM | TEMPERATURE: 98.6 F | HEIGHT: 65 IN | DIASTOLIC BLOOD PRESSURE: 90 MMHG | SYSTOLIC BLOOD PRESSURE: 134 MMHG | WEIGHT: 290.5 LBS

## 2025-05-02 DIAGNOSIS — Z98.84 BARIATRIC SURGERY STATUS: ICD-10-CM

## 2025-05-02 DIAGNOSIS — E66.813 OBESITY, CLASS III, BMI 40-49.9 (MORBID OBESITY): ICD-10-CM

## 2025-05-02 DIAGNOSIS — Z48.815 ENCOUNTER FOR SURGICAL AFTERCARE FOLLOWING SURGERY OF DIGESTIVE SYSTEM: Primary | ICD-10-CM

## 2025-05-02 DIAGNOSIS — G47.33 OSA ON CPAP: ICD-10-CM

## 2025-05-02 PROCEDURE — 99214 OFFICE O/P EST MOD 30 MIN: CPT | Performed by: NURSE PRACTITIONER

## 2025-05-02 NOTE — PROGRESS NOTES
Date of surgery:12/5/2022  Procedure:RNY  Performing surgeon:Dr. JESSI Pool    Initial Weight - 378 lb  Current Weight -290.5  Gerald Weight - now   Total Body Weight Loss (EWL)- 88%  EWL% - 39%  TWB % -23%

## 2025-05-02 NOTE — PATIENT INSTRUCTIONS
- taper off naltrexone over the next 2 weeks  Start with 25 mg daily for one week then 25 mg every other day for 1 week then stop.     - I have sent Zebound to your pharmacy. The prior authorization process will been done through our prior authorization team and can take up to 3-4 weeks to process through the insurance.     - Start Zepbound 2.5 mg subcutaneously weekly. After you have taken the second pen, please give me an update, as we will likely increase the dose the next month if you are tolerating it well.     - Side effects of Zepbound include nausea, vomiting, diarrhea, or constipation. Keep an eye on your heart rate while on Zepbound. If you resting heart rate is greater than 100 beats per minutes, please notify me. If you develop severe abdominal pain, stop Zepbound and go to the emergency room, as that could be a sign of pancreatitis.     - Please notify me if you have surgery, upper endoscopy, or colonoscopy scheduled, as we typically hold Zepbound for one week prior to the procedure.     - Zepbound can reduce the effectiveness of oral hormonal birth control (birth control pills). Recommend a barrier backup method such as condoms to prevent pregnancy.

## 2025-05-02 NOTE — PROGRESS NOTES
Assessment/Plan:    OBESITY III/BMI 48  - She is on wellbutrin and naltrexone however there has no changes with her weight.   - she did have an ultrasound of her thyroid due to concerns of nodules. She did get this done showing no nodules; no concerns with thyroid cancer.   - patient is highly interested in GLP-1 injectables .   - Patient is interested in pursuing Zepbound  Discussed medication options  Recommend treatment with Zepbound  Medication Contract Signed   Discussed expected weight loss of approximately 20% along with lifestyle modifications  Discussed risks/side effects of medication and demonstrated pen device  Recommend small/low fat meals and stop eating when full to avoid side effects.  Discussed importance of adequate protein intake and strength training to reduce risk of muscle loss.   Discussed need to stop medication for at least 1 week prior to planned surgery/endoscopy  Denies hx Pancreatitis or FH of Medullary cell Thyroid CA/MEN2 syndrome  Start Zepbound  2.5mg weekly x 4 weeks and titrate   Advised to contact office in 2 weeks with update regarding tolerability and at that time will increase to 5mg dose if tolerating medication with no significant side effects.           - Follow up in approximately 3 months with Surgical Advanced Practitioner.    Goals:  Food log (ie.) [email or web address],[email or web address],[email or web address],etc. baritastic  No sugary beverages. At least 64oz of water daily.  Increase physical activity by 10 minutes daily. Gradually increase physical activity to a goal of 5 days per week for 30 minutes of MODERATE intensity PLUS 2 days per week of FULL BODY resistance training  5-10 servings of fruits and vegetables per day and 25-35 grams of dietary fiber per day, gradually increasing  No sugary beverages. At least 64oz of water daily., Increase physical activity by 10 minutes daily, Practice lesson plans 1-6 in bariatric manual , Practice 30/60 rule, Gradually  increase physical activity to a goal of 5 days per week for 30 minutes of MODERATE intensity PLUS 2 days per week of FULL BODY resistance training, Continue with dietary and behavioral recommendations outlined in bariatric manual, Continue to take recommended bariatric vitamins as directed, Goal protein intake of 60-80 grams per day, 5-10 servings of fruits and vegetables per day, 25-35 grams of dietary fiber per day, and 8422-8289 calories per day    ANA LAURA - uses CPAP most of the time. At times she doesn't use this d/t headaches. Anticipate improvement with weight loss. Recommended zepbound as this is now indicated for ANA LAURA.     Bariatric surgery status - s/p RNYGB with Dr. Charles Garsia in 2022 - following with our bariatric team annually. We've reviewed labs - has vitamin D deficiency thus far - she recently increased her vitamin D3 5000 IU to 10,000 IU daily. Awaiting for other labs to result and recommended to repeat in 3 moths to monitor for improvement.     Diagnoses and all orders for this visit:    Encounter for surgical aftercare following surgery of digestive system    Bariatric surgery status    ANA LAURA on CPAP    Obesity, Class III, BMI 40-49.9 (morbid obesity)    BMI 45.0-49.9, adult (HCC)        Subjective:   Chief Complaint   Patient presents with    Follow-up     3 month Wt / Gain F/U      Patient ID: Allison Gamboa  is a 39 y.o. female with excess weight/obesity here to pursue medical weight management.  -s/p Yuko-En-Y Gastric Bypass with Dr. Charles Pool on 12/05/2022 here for [facility] follow up.   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    Endometriosis     Follicular thyroid cancer (HCC)      Gastroenteritis     Hashimoto's disease     Hepatic steatosis     Hyperlipidemia     Hypertension     Hypothyroid     IBS (irritable bowel syndrome)     Kidney lesion, native, left     Kidney stone     Lymphedema     left leg    Obesity     Palpitation     Pancreatitis     Pneumonia     Polycystic ovarian disease     Polyuria     RUQ abdominal pain 11/01/2021    Sleep apnea     Stroke (HCC) 12/26/2023    Discovered on CT Scan    Thyroid nodule     Thyromegaly     Tinea corporis     Tinea pedis     Visual impairment        HPI:  Obesity/Excess Weight:   Here for MWM follow up. She had an increase in her wellbutrin from 75 mg/day to 300 mg/day (150 mg PO BID) and added naltrexone to mimic contrave. She admits to decrease cravings however there has been no major changes in weight. She reports having such an improvement in her mood since the change of her wellbutrin and she is happy with this change. Has been working out, going to the gym. Trying to make many healthy changes but frustrated that she has not been able to lose any weight.     BMI 48.34  Severity: Very Severe  Onset:  LIFELONG  around puberty time frame.   Modifiers: Diet and Exercise and bariatric surgery   Contributing factors: Poor Food Choices, Lack of knowledge of appropriate lifestyle changes, Medications, and Insufficient time to make appropriate lifestyle changes  Associated symptoms: comorbid conditions, fatigue, increased joint pain, decreased exercise capacity, body image issues, decreased self esteem, increased shortness of breath, decreased mobility, inability to do certain activities, and clothes do not fit  Colonoscopy-Not applicable  Contraception - s/p hysterectomy.      Highest weight  378 LBS   Initial weight with MWM/wellbutrin+naltrexone - 290 LBS - 01/16/2025  Current Weight  290.5 Lbs (0.5 LBS)  Gerald : 270 lbs   Goal - 130-140 lbs; at least to be under 200 lbs.   5% weight loss - 14.5 LBS (276 lbs)  20% WEIGHT LOSS - 58.1 lbs (232.1  lbs)         Hydration: coffee - with oatmilk and low gabrielle sugar and tea,> 64 OZ of water in   Alcohol: none  Exercise:  walking, doing stairs at work, and strength training at times at the gym.   Dining out: was ordering out often, trying to cut down.   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   Sleep: 5-6 hrs  STOPBANG: has ANA LAURA - uses CPAP intermittently.      B: tends to skip breakfast due to taking her medications, at times yogurt or cottage cheese  S: egg, and veggies; at times fruit or protein shake.   L: salad bar, chicken  S: wild chips - 10 gm of protein chips; Fruit and at times yogurt.   D: Protein, Veggies - typically ground chicken   S:  at times popsicle - no sugar added.      Wt Readings from Last 3 Encounters:   05/02/25 132 kg (290 lb 8 oz)   01/16/25 132 kg (290 lb)   01/07/25 131 kg (289 lb 9.6 oz)        Patient is not pregnant/breastfeeding (metformin only)    Patient denies personal and family history of  pancreatitis, thyroid cancer, MEN-2 tumors)  Has a hx of kidney stones - would avoid topamax. Denies any hx of glaucoma, seizures, gallstones. (Contraindicated for topamax)    Denies Hx of CAD, PAD, palpitations, arrhythmia, uncontrolled HTN, hyperthyroidism or use of stimulant medications      Has a hx of depression - currently on wellbutrin 150 mg/day. Denies uncontrolled anxiety or depression, suicidal behavior or thinking , insomnia or sleep disturbance.       Denies use of opioids - CONSIDERATION FOR NALTREXONE.     The following portions of the patient's history were reviewed and updated as appropriate: allergies, current medications, past family history, past medical history, past social history, past surgical history, and problem list.    Past Medical History:   Diagnosis Date    Allergic     Anxiety     Bruises easily     Cancer (HCC)     Cellulitis 12/2024    in [place], given bactrim    COVID-19 10/10/2022    CPAP (continuous positive airway  pressure) dependence     Depression     Disease of thyroid gland     DELVALLE (dyspnea on exertion)     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    Endometriosis     Follicular thyroid cancer (HCC)     Gastroenteritis     Hashimoto's disease     Hepatic steatosis     Hyperlipidemia     Hypertension     Hypothyroid     IBS (irritable bowel syndrome)     Kidney lesion, native, left     Kidney stone     Lymphedema     left leg    Obesity     Palpitation     Pancreatitis     Pneumonia     Polycystic ovarian disease     Polyuria     RUQ abdominal pain 11/01/2021    Sleep apnea     Stroke (HCC) 12/26/2023    Discovered on CT Scan    Thyroid nodule     Thyromegaly     Tinea corporis     Tinea pedis     Visual impairment      Past Surgical History:   Procedure Laterality Date    ADENOIDECTOMY      DILATION AND CURETTAGE OF UTERUS  2020    HYSTERECTOMY  02/2020    left ovaries    IR LUMBAR PUNCTURE  12/28/2023    AR LAPS GSTR RSTCV PX W/BYP ELKIN-EN-Y LIMB <150 CM N/A 12/5/2022    Procedure: BYPASS GASTRIC ELKIN-EN-Y LAPAROSCOPIC ROBOT & INTRAOPERATIVE EGD;  Surgeon: Graeme Pool MD;  Location: [facility];  Service: Bariatrics    THYROIDECTOMY, PARTIAL Left 06/2019    benign    TONSILLECTOMY      US GUIDED THYROID BIOPSY  03/01/2019       Current Outpatient Medications:     acetaZOLAMIDE (DIAMOX) 250 mg tablet, TAKE THREE TABLETS BY MOUTH TWICE A DAY, Disp: 180 tablet, Rfl: 5    albuterol (Ventolin HFA) 90 mcg/act inhaler, Inhale 2 puffs every 6 (six) hours as needed for wheezing or shortness of breath, Disp: 18 g, Rfl: 0    ALPRAZolam (XANAX) 0.25 mg tablet, Take 1 tablet (0.25 mg total) by mouth 3 (three) times a day as needed for anxiety, Disp: 60 tablet, Rfl: 0    atorvastatin (LIPITOR) 40 mg tablet, TAKE ONE TABLET BY MOUTH EVERY EVENING, Disp: 30 tablet, Rfl: 5    buPROPion (Wellbutrin SR) 150 mg 12 hr tablet, Take 1 tablet (150 mg total) by mouth 2 (two) times a day, Disp: 60 tablet, Rfl: 2    Calcium  "Carbonate (CALCIUM 500 PO), Take 500 mg by mouth daily, Disp: , Rfl:     citalopram (CeleXA) 20 mg tablet, TAKE ONE TABLET BY MOUTH EVERY DAY, Disp: 90 tablet, Rfl: 1    clopidogrel (PLAVIX) 75 mg tablet, TAKE ONE TABLET BY MOUTH EVERY DAY, Disp: 30 tablet, Rfl: 5    levothyroxine 200 mcg tablet, TAKE 1 TABLET MONDAY THROUGH FRIDAY, AND 1 & 1/2 TABLETS ON SATURDAY AND SUNDAY, Disp: 108 tablet, Rfl: 1    levothyroxine 25 mcg tablet, TAKE ONE TABLET BY MOUTH EVERY DAY, Disp: 30 tablet, Rfl: 0    loratadine (CLARITIN) 10 mg tablet, Take 10 mg by mouth if needed, Disp: , Rfl:     losartan (COZAAR) 50 mg tablet, TAKE ONE TABLET BY MOUTH EVERY DAY, Disp: 90 tablet, Rfl: 1    Multiple Vitamins-Minerals (Bariatric Fusion) CHEW, Chew 4 tablets daily, Disp: , Rfl:     omeprazole (PriLOSEC) 20 mg delayed release capsule, TAKE ONE CAPSULE BY MOUTH EVERY OTHER DAY, Disp: 30 capsule, Rfl: 0    pantoprazole (PROTONIX) 40 mg tablet, Take 1 tablet (40 mg total) by mouth daily, Disp: 90 tablet, Rfl: 1    rimegepant sulfate (NURTEC) 75 mg TBDP, Take 1 tablet (75 mg) by mouth once at the onset of a headache. Max dose: 75 mg/day., Disp: 16 tablet, Rfl: 3    buPROPion (WELLBUTRIN) 75 mg tablet, TAKE ONE TABLET BY MOUTH TWICE A DAY (Patient not taking: Reported on 5/2/2025), Disp: 60 tablet, Rfl: 0    Review of Systems   Constitutional:  Positive for unexpected weight change.   Respiratory: Negative.     Cardiovascular: Negative.    Gastrointestinal: Negative.    Musculoskeletal: Negative.    Neurological: Negative.    Psychiatric/Behavioral: Negative.         Objective:    /90 (Patient Position: Sitting, Cuff Size: Standard)   Pulse 74   Temp 98.6 °F (37 °C) (Tympanic)   Ht 5' 5\" (1.651 m)   Wt 132 kg (290 lb 8 oz)   LMP 12/17/2019   BMI 48.34 kg/m²     Physical Exam  Vitals and nursing note reviewed.   Constitutional:       Appearance: Normal appearance. She is obese.   Cardiovascular:      Rate and Rhythm: Normal rate and " regular rhythm.      Pulses: Normal pulses.      Heart sounds: Normal heart sounds.   Pulmonary:      Effort: Pulmonary effort is normal.      Breath sounds: Normal breath sounds.   Abdominal:      General: Bowel sounds are normal.      Palpations: Abdomen is soft.      Tenderness: There is no abdominal tenderness.   Musculoskeletal:         General: Normal range of motion.   Skin:     General: Skin is warm and dry.   Neurological:      General: No focal deficit present.      Mental Status: She is alert and oriented to person, place, and time.   Psychiatric:         Mood and Affect: Mood normal.         Behavior: Behavior normal.         Thought Content: Thought content normal.         Judgment: Judgment normal.

## 2025-05-04 LAB — VIT A SERPL-MCNC: 37.8 UG/DL (ref 18.9–57.3)

## 2025-05-05 ENCOUNTER — TELEPHONE (OUTPATIENT)
Dept: BARIATRICS | Facility: CLINIC | Age: 40
End: 2025-05-05

## 2025-05-05 LAB
VIT B1 BLD-SCNC: 153.9 NMOL/L (ref 66.5–200)
ZINC SERPL-MCNC: 73 UG/DL (ref 44–115)

## 2025-05-05 RX ORDER — TIRZEPATIDE 2.5 MG/.5ML
2.5 INJECTION, SOLUTION SUBCUTANEOUS WEEKLY
Qty: 2 ML | Refills: 0 | Status: SHIPPED | OUTPATIENT
Start: 2025-05-05 | End: 2025-06-02

## 2025-05-05 NOTE — TELEPHONE ENCOUNTER
PA for Zepbound  DENIED    Reason:(Screenshot if applicable)        Message sent to office clinical pool Yes    Denial letter scanned into Media No      We can gladly do an appeal but the process can take about 30-60 days to provide determination. Please Schedule a Peer to Peer at phone  . If an appeal is truly warranted please have Provider send clinical documentation to the PA department to support the appeal.     **Please follow up with your patient regarding denial and next steps**

## 2025-05-05 NOTE — TELEPHONE ENCOUNTER
PA for Zepbound 2.5 mg SUBMITTED to  [employer]     via    [x]CMM-KEY: [id number] )  [x]Surescripts-Case ID #   []Availity-Auth ID # NDC #   []Faxed to plan   []Other website   []Phone call Case ID #     [x]PA sent as URGENT    All office notes, labs and other pertaining documents and studies sent. Clinical questions answered. Awaiting determination from insurance company.     Turnaround time for your insurance to make a decision on your Prior Authorization can take 7-21 business days.

## 2025-05-06 NOTE — TELEPHONE ENCOUNTER
Patient called to follow up on the prior auth. Told her that we had received a denial and she asked if that anything to do with her insurance under her maiden name, Love. Please call patient to discuss options.

## 2025-05-07 ENCOUNTER — RESULTS FOLLOW-UP (OUTPATIENT)
Dept: BARIATRICS | Facility: CLINIC | Age: 40
End: 2025-05-07

## 2025-05-07 DIAGNOSIS — E55.9 VITAMIN D DEFICIENCY: Primary | ICD-10-CM

## 2025-05-07 DIAGNOSIS — E61.1 IRON DEFICIENCY: ICD-10-CM

## 2025-05-09 DIAGNOSIS — E06.3 HYPOTHYROIDISM DUE TO HASHIMOTO'S THYROIDITIS: ICD-10-CM

## 2025-05-09 RX ORDER — LEVOTHYROXINE SODIUM 25 UG/1
25 TABLET ORAL DAILY
Qty: 30 TABLET | Refills: 2 | Status: SHIPPED | OUTPATIENT
Start: 2025-05-09

## 2025-05-29 DIAGNOSIS — E06.3 HYPOTHYROIDISM DUE TO HASHIMOTO'S THYROIDITIS: ICD-10-CM

## 2025-05-29 RX ORDER — LEVOTHYROXINE SODIUM 25 UG/1
25 TABLET ORAL DAILY
Qty: 30 TABLET | Refills: 5 | Status: SHIPPED | OUTPATIENT
Start: 2025-05-29

## 2025-06-10 ENCOUNTER — TELEPHONE (OUTPATIENT)
Dept: BARIATRICS | Facility: CLINIC | Age: 40
End: 2025-06-10

## 2025-06-18 ENCOUNTER — TELEPHONE (OUTPATIENT)
Age: 40
End: 2025-06-18

## 2025-06-18 ENCOUNTER — TELEPHONE (OUTPATIENT)
Dept: NEUROLOGY | Facility: CLINIC | Age: 40
End: 2025-06-18

## 2025-06-18 NOTE — TELEPHONE ENCOUNTER
LMOM for pt to confirm their 8am    appt on  6/23   w/ Lobo Kapadia. Reminded pt to arrive 15 mins prior to appt to check in with . Gave call back number [phone] to cancel/reschedule.

## 2025-06-18 NOTE — TELEPHONE ENCOUNTER
Guerda called and stated she received a fax from the office regarding a surgical shea for tooth extraction scheduled for 6/30/Office stated that the Plavix hold is unclear under media tab 4/23/25.Office would like to know is it a seven or two day hold.Please fax information to [phone].

## 2025-06-19 NOTE — TELEPHONE ENCOUNTER
[facility] called to say they could not discern the number on the fax. I read to her that it is a 2 day hold.

## 2025-07-08 DIAGNOSIS — F41.8 MIXED ANXIETY DEPRESSIVE DISORDER: ICD-10-CM

## 2025-07-08 RX ORDER — BUPROPION HYDROCHLORIDE 75 MG/1
75 TABLET ORAL 2 TIMES DAILY
Qty: 60 TABLET | Refills: 0 | Status: SHIPPED | OUTPATIENT
Start: 2025-07-08

## 2025-07-19 DIAGNOSIS — G43.109 MIGRAINE WITH AURA AND WITHOUT STATUS MIGRAINOSUS, NOT INTRACTABLE: ICD-10-CM

## 2025-07-20 ENCOUNTER — HOSPITAL ENCOUNTER (EMERGENCY)
Facility: HOSPITAL | Age: 40
Discharge: HOME | End: 2025-07-20
Attending: EMERGENCY MEDICINE
Payer: COMMERCIAL

## 2025-07-20 ENCOUNTER — APPOINTMENT (EMERGENCY)
Dept: RADIOLOGY | Facility: HOSPITAL | Age: 40
End: 2025-07-20
Payer: COMMERCIAL

## 2025-07-20 VITALS
RESPIRATION RATE: 18 BRPM | SYSTOLIC BLOOD PRESSURE: 120 MMHG | TEMPERATURE: 97.8 F | DIASTOLIC BLOOD PRESSURE: 63 MMHG | OXYGEN SATURATION: 96 % | HEART RATE: 78 BPM

## 2025-07-20 DIAGNOSIS — M79.671 RIGHT FOOT PAIN: Primary | ICD-10-CM

## 2025-07-20 PROCEDURE — 73564 X-RAY EXAM KNEE 4 OR MORE: CPT

## 2025-07-20 PROCEDURE — 90471 IMMUNIZATION ADMIN: CPT

## 2025-07-20 PROCEDURE — 99283 EMERGENCY DEPT VISIT LOW MDM: CPT

## 2025-07-20 PROCEDURE — 90715 TDAP VACCINE 7 YRS/> IM: CPT

## 2025-07-20 PROCEDURE — 73630 X-RAY EXAM OF FOOT: CPT

## 2025-07-20 RX ORDER — ACETAMINOPHEN 325 MG/1
650 TABLET ORAL ONCE
Status: COMPLETED | OUTPATIENT
Start: 2025-07-20 | End: 2025-07-20

## 2025-07-20 RX ADMIN — ACETAMINOPHEN 650 MG: 325 TABLET ORAL at 16:13

## 2025-07-20 RX ADMIN — TETANUS TOXOID, REDUCED DIPHTHERIA TOXOID AND ACELLULAR PERTUSSIS VACCINE, ADSORBED 0.5 ML: 5; 2.5; 8; 8; 2.5 SUSPENSION INTRAMUSCULAR at 16:14

## 2025-07-20 NOTE — DISCHARGE INSTRUCTIONS
Follow up with orthopaedics in the outpatient clinic. Minimize weight bearing using the walking boot. Return to ED if symptoms severely worsen.

## 2025-07-20 NOTE — Clinical Note
Allison Gamboa was seen and treated in our emergency department on 7/20/2025.            Weight bearing activity    Diagnosis:     Allison  may return to work on return date.    She may return on this date: 07/24/2025    Limit work to non-weight bearing activities until cleared by orthopaedic surgeon. Work remotely as needed.     If you have any questions or concerns, please don't hesitate to call.      Singh Tsai MD    ______________________________           _______________          _______________  Hospital Representative                              Date                                Time

## 2025-07-20 NOTE — ED ATTENDING ATTESTATION
7/20/2025  ITobias MD, saw and evaluated the patient. I have discussed the patient with the resident/non-physician practitioner and agree with the resident's/non-physician practitioner's findings, Plan of Care, and MDM as documented in the resident's/non-physician practitioner's note, except where noted. All available labs and Radiology studies were reviewed.  I was present for key portions of any procedure(s) performed by the resident/non-physician practitioner and I was immediately available to provide assistance.       At this point I agree with the current assessment done in the Emergency Department.  I have conducted an independent evaluation of this patient a history and physical is as follows:.      This is a 39-year-old female without any significantly related past medical history presenting to the ED today for complaint of right foot pain.  Patient states that she was at a concert yesterday, tripped, had a mechanical fall, and twisted her right foot, felt a crack, and fell onto her left knee.  Patient denies hitting her head, denies losing consciousness, denies any other significantly associated symptoms.  On exam she has bruising overlying the base of the fifth metatarsal, with some significant tenderness in that area.  She has intact Achilles tendon, with no significant tenderness or laxity.  She has a nontender medial and lateral malleoli.  She is neurovascularly intact distal to her injury.  She has some bruising overlying the anterior tibia, at the most proximal section.  She does not have any fibular tenderness on the left side.  She has decreased range of motion due to pain.  She has medial joint line tenderness of the left knee.  Her exam otherwise is unremarkable.  Her differential diagnosis includes fracture versus location versus contusion versus other.  Patient also has an abrasion to her right hand, not meeting any repairs.  Patient underwent x-ray showing evidence for fifth  "metatarsal fracture.  She had her tetanus updated.  Her knee x-ray did not show any acute abnormalities.  She did not have any indication for ankle x-rays on the right side.  She was placed in a walking boot, given crutches, will follow-up with orthopedics as an outpatient.  She was instructed to be nonweightbearing until she sees them.  The management plan was discussed in detail with the patient at bedside and all questions were answered. Strict ED return instructions were discussed at bedside. Prior to discharge, both verbal and written instructions were provided. We discussed the signs and symptoms that should prompt the patient to return to the ED. All questions were answered and the patient was comfortable with the plan of care and discharged home. The patient agrees to return to the Emergency Department for concerns and/or progression of illness.    Portions of the above record have been created with voice recognition software.  Occasional wrong word or \"sound alike\" substitutions may have occurred due to the inherent limitations of voice recognition software.  Read the chart carefully and recognize, using context, where substitutions may have occurred.    ED Course         Critical Care Time  Procedures      "

## 2025-07-20 NOTE — ED PROVIDER NOTES
Time reflects when diagnosis was documented in both MDM as applicable and the Disposition within this note       Time User Action Codes Description Comment    7/20/2025  4:03 PM Tobias Brown Add [M79.671] Right foot pain           ED Disposition       ED Disposition   Discharge    Condition   Stable    Date/Time   Sun Jul 20, 2025  4:24 PM    Comment   Allison Gamboa discharge to home/self care.                   Assessment & Plan       Medical Decision Making  Fx of 5th R metatarsal noted on XRAY, possibly Juarez fx. Posterior short leg splint applied and tolerated well. No acute osseous process on L knee xray. Pt agreed to Tdap vaccine administration and tolerated it well. Pt stable for discharge home with outpatient follow-up in ortho clinic. Questions were answered to patient's understanding. Pt agreed with plan for outpatient follow up.     Amount and/or Complexity of Data Reviewed  Radiology: ordered.    Risk  OTC drugs.  Prescription drug management.        ED Course as of 07/21/25 0408   Sun Jul 20, 2025   1629 Fx of 5th R metatarsal noted on XRAY. Pt stable for dc home with outpatient fu in ortho clinic. Pt agrees with plan       Medications       ED Risk Strat Scores                    No data recorded                            History of Present Illness       Chief Complaint   Patient presents with    Ankle Pain     R ankle pain since yesterday. Was @ concert lastnight and tripped and landed on R ankle. No headstrike, no loc. Ibuprofen taken @2pm       Past Medical History[1]   Past Surgical History[2]   Family History[3]   Social History[4]   E-Cigarette/Vaping    E-Cigarette Use Never User       E-Cigarette/Vaping Substances    Nicotine No     THC No     CBD No     Flavoring No     Other No     Unknown No       I have reviewed and agree with the history as documented.     40 yo F presenting with R foot pain s/p fall last night. She states that she had a poor step of the R foot while walking on wood  chips late last night when attending a concert and fell onto her L knee and R hand. Last tetanus was about 8 yrs ago per patient. She denies feeling dizzy or lightheaded prior to fall and denies head strike. She has pre-existing hx of lymphedema of LLE. She denies chest pain, SOB, and headache.      Ankle Pain      Review of Systems   Constitutional: Negative.    HENT: Negative.     Respiratory: Negative.     Cardiovascular: Negative.    Genitourinary: Negative.    Musculoskeletal:         R foot pain and L knee pain per HPI   Skin:         New bruising of R foot and L knee s/p fall   Neurological: Negative.            Objective       ED Triage Vitals [07/20/25 1515]   Temperature Pulse Blood Pressure Respirations SpO2 Patient Position - Orthostatic VS   97.8 °F (36.6 °C) 78 120/63 18 96 % --      Temp Source Heart Rate Source BP Location FiO2 (%) Pain Score    Oral Monitor -- -- --      Vitals      Date and Time Temp Pulse SpO2 Resp BP Pain Score FACES Pain Rating User   07/20/25 1515 97.8 °F (36.6 °C) 78 96 % 18 120/63 -- -- AD            Physical Exam  Constitutional:       Appearance: She is obese. She is not ill-appearing or diaphoretic.   HENT:      Head: Normocephalic and atraumatic.      Nose: Nose normal.      Mouth/Throat:      Mouth: Mucous membranes are moist.     Eyes:      Extraocular Movements: Extraocular movements intact.      Conjunctiva/sclera: Conjunctivae normal.       Cardiovascular:      Rate and Rhythm: Normal rate and regular rhythm.      Pulses: Normal pulses.      Heart sounds: Normal heart sounds.   Pulmonary:      Effort: Pulmonary effort is normal.      Breath sounds: Normal breath sounds.   Abdominal:      Palpations: Abdomen is soft.      Tenderness: There is no abdominal tenderness.     Musculoskeletal:      Cervical back: Normal range of motion.      Comments: R foot and ankle: Bruising 4 cm over lateral metatarsals w/ TTP over 5th metatarsal. Pain in that same area with pushing  against plantar pressure. Foot warm and dry. Ankle and achilles intact and nontender to palpation. No calcaneal tenderness.   LLE: multiple bruises around knee. Tender lymphedema (pre-existing per patient).     Skin:     General: Skin is warm and dry.      Capillary Refill: Capillary refill takes less than 2 seconds.      Comments: Abrasions of R palm.      Neurological:      General: No focal deficit present.      Mental Status: She is alert.         Results Reviewed       None            XR foot 3+ views RIGHT    (Results Pending)   XR knee 4+ vw left injury    (Results Pending)       Splint application    Date/Time: 7/20/2025 4:45 PM    Performed by: Singh Tsai MD  Authorized by: Singh Tsai MD    Verbal consent obtained?: Yes    Risks and benefits: Risks, benefits and alternatives were discussed    Consent given by:  Patient  Time Out:     Time out performed at:  7/20/2025 4:45 PM  Patient states understanding of procedure being performed: Yes    Patient's understanding of procedure matches consent: Yes    Patient identity confirmed:  Verbally with patient and arm band  Pre-procedure details:     Sensation:  Normal    Skin color:  Baseline  Procedure details:     Laterality:  Right    Location:  Foot    Foot:  R foot    Splint composition: static      Splint type:  Short leg    Supplies:  Fiberglass, cotton padding and elastic bandage  Post-procedure details:     Pain:  Improved    Sensation:  Normal    Skin color:  Baseline    Patient tolerance of procedure:  Tolerated well, no immediate complications      ED Medication and Procedure Management   Prior to Admission Medications   Prescriptions Last Dose Informant Patient Reported? Taking?   ALPRAZolam (XANAX) 0.25 mg tablet  Self No No   Sig: Take 1 tablet (0.25 mg total) by mouth 3 (three) times a day as needed for anxiety   Calcium Carbonate (CALCIUM 500 PO)  Self Yes No   Sig: Take 500 mg by mouth daily   Multiple Vitamins-Minerals (Bariatric  Fusion) CHEW  Self Yes No   Sig: Chew 4 tablets daily   acetaZOLAMIDE (DIAMOX) 250 mg tablet   No No   Sig: TAKE THREE TABLETS BY MOUTH TWICE A DAY   albuterol (Ventolin HFA) 90 mcg/act inhaler  Self No No   Sig: Inhale 2 puffs every 6 (six) hours as needed for wheezing or shortness of breath   atorvastatin (LIPITOR) 40 mg tablet  Self No No   Sig: TAKE ONE TABLET BY MOUTH EVERY EVENING   buPROPion (WELLBUTRIN) 75 mg tablet   No No   Sig: TAKE ONE TABLET BY MOUTH TWICE A DAY   citalopram (CeleXA) 20 mg tablet   No No   Sig: TAKE ONE TABLET BY MOUTH EVERY DAY   clopidogrel (PLAVIX) 75 mg tablet  Self No No   Sig: TAKE ONE TABLET BY MOUTH EVERY DAY   levothyroxine 200 mcg tablet   No No   Sig: TAKE 1 TABLET MONDAY THROUGH FRIDAY, AND 1 & 1/2 TABLETS ON SATURDAY AND SUNDAY   levothyroxine 25 mcg tablet   No No   Sig: TAKE ONE TABLET BY MOUTH EVERY DAY   loratadine (CLARITIN) 10 mg tablet  Self Yes No   Sig: Take 10 mg by mouth if needed   losartan (COZAAR) 50 mg tablet   No No   Sig: TAKE ONE TABLET BY MOUTH EVERY DAY   omeprazole (PriLOSEC) 20 mg delayed release capsule   No No   Sig: TAKE ONE CAPSULE BY MOUTH EVERY OTHER DAY   pantoprazole (PROTONIX) 40 mg tablet  Self No No   Sig: Take 1 tablet (40 mg total) by mouth daily   rimegepant sulfate (NURTEC) 75 mg TBDP  Self No No   Sig: Take 1 tablet (75 mg) by mouth once at the onset of a headache. Max dose: 75 mg/day.      Facility-Administered Medications: None     Discharge Medication List as of 7/20/2025  4:27 PM        CONTINUE these medications which have NOT CHANGED    Details   acetaZOLAMIDE (DIAMOX) 250 mg tablet TAKE THREE TABLETS BY MOUTH TWICE A DAY, Starting Tue 4/1/2025, Normal      albuterol (Ventolin HFA) 90 mcg/act inhaler Inhale 2 puffs every 6 (six) hours as needed for wheezing or shortness of breath, Starting Wed 7/19/2023, Normal      ALPRAZolam (XANAX) 0.25 mg tablet Take 1 tablet (0.25 mg total) by mouth 3 (three) times a day as needed for  anxiety, Starting Tue 8/17/2021, Normal      atorvastatin (LIPITOR) 40 mg tablet TAKE ONE TABLET BY MOUTH EVERY EVENING, Starting Fri 12/20/2024, Normal      buPROPion (WELLBUTRIN) 75 mg tablet TAKE ONE TABLET BY MOUTH TWICE A DAY, Starting Tue 7/8/2025, Normal      Calcium Carbonate (CALCIUM 500 PO) Take 500 mg by mouth daily, Historical Med      citalopram (CeleXA) 20 mg tablet TAKE ONE TABLET BY MOUTH EVERY DAY, Starting Fri 3/7/2025, Normal      clopidogrel (PLAVIX) 75 mg tablet TAKE ONE TABLET BY MOUTH EVERY DAY, Starting Fri 12/20/2024, Normal      !! levothyroxine 200 mcg tablet TAKE 1 TABLET MONDAY THROUGH FRIDAY, AND 1 & 1/2 TABLETS ON SATURDAY AND SUNDAY, Normal      !! levothyroxine 25 mcg tablet TAKE ONE TABLET BY MOUTH EVERY DAY, Starting Thu 5/29/2025, Normal      loratadine (CLARITIN) 10 mg tablet Take 10 mg by mouth if needed, Historical Med      losartan (COZAAR) 50 mg tablet TAKE ONE TABLET BY MOUTH EVERY DAY, Starting Fri 3/7/2025, Normal      Multiple Vitamins-Minerals (Bariatric Fusion) CHEW Chew 4 tablets daily, Historical Med      omeprazole (PriLOSEC) 20 mg delayed release capsule TAKE ONE CAPSULE BY MOUTH EVERY OTHER DAY, Starting Mon 4/7/2025, Normal      pantoprazole (PROTONIX) 40 mg tablet Take 1 tablet (40 mg total) by mouth daily, Starting Thu 4/25/2024, Normal      rimegepant sulfate (NURTEC) 75 mg TBDP Take 1 tablet (75 mg) by mouth once at the onset of a headache. Max dose: 75 mg/day., Normal       !! - Potential duplicate medications found. Please discuss with provider.          ED SEPSIS DOCUMENTATION   Time reflects when diagnosis was documented in both MDM as applicable and the Disposition within this note       Time User Action Codes Description Comment    7/20/2025  4:03 PM Tobias Brown Add [M79.671] Right foot pain                        [1]   Past Medical History:  Diagnosis Date    Allergic     Anxiety     Bruises easily     Cancer (HCC)     Cellulitis 12/2024    in [place],  given bactrim    COVID-19 10/10/2022    CPAP (continuous positive airway pressure) dependence     Depression     Disease of thyroid gland     DELVALLE (dyspnea on exertion)     EIN (endometrial intraepithelial neoplasia) 2019    Endometriosis     Follicular thyroid cancer (HCC)     Gastroenteritis     Hashimoto's disease     Hepatic steatosis     Hyperlipidemia     Hypertension     Hypothyroid     IBS (irritable bowel syndrome)     Kidney lesion, native, left     Kidney stone     Lymphedema     left leg    Obesity     Palpitation     Pancreatitis     Pneumonia     Polycystic ovarian disease     Polyuria     RUQ abdominal pain 2021    Sleep apnea     Stroke (HCC) 2023    Discovered on CT Scan    Thyroid nodule     Thyromegaly     Tinea corporis     Tinea pedis     Visual impairment    [2]   Past Surgical History:  Procedure Laterality Date    ADENOIDECTOMY      DILATION AND CURETTAGE OF UTERUS      HYSTERECTOMY  2020    left ovaries    IR LUMBAR PUNCTURE  2023    MT LAPS GSTR RSTCV PX W/BYP ELKIN-EN-Y LIMB <150 CM N/A 2022    Procedure: BYPASS GASTRIC ELKIN-EN-Y LAPAROSCOPIC ROBOT & INTRAOPERATIVE EGD;  Surgeon: Graeme Pool MD;  Location: [facility];  Service: Bariatrics    THYROIDECTOMY, PARTIAL Left 2019    benign    TONSILLECTOMY      US GUIDED THYROID BIOPSY  2019   [3]   Family History  Problem Relation Name Age of Onset    Hyperlipidemia Mother Elissa Campa     Restless legs syndrome Mother Elissa Campa     Alcohol abuse Mother Elissa Campa             Drug abuse Mother Elissa Campa     Substance Abuse Mother Elissa Campa     Self-Injury Mother Elissa Campa     Suicide Attempts Mother Elissa Campa     Sleep apnea Father Payam Love     Hyperlipidemia Father Payam Love     Hypertension Father Payam Love     Alcohol abuse Father Payam Love             Drug abuse Father Payam Love     Substance Abuse Father Payam VELAZQUEZ  Ivan     Autoimmune disease Father Payam Love         Not sure if he had any, but they run on my fathers side of the family. My aunt has lupus and a few others.    Hypertension Maternal Grandmother Tammy Bender     Restless legs syndrome Maternal Grandmother Tammy Bender     Ovarian cancer Maternal Grandmother Tammy Bender     Arthritis Maternal Grandmother Tammy Bender     Cancer Maternal Grandmother Tammy Bender         My grandmother had cervical cancer    COPD Maternal Grandmother Tammy Bender     Coronary artery disease Maternal Grandmother Tammy Bender             Heart disease Maternal Grandmother Tammy Bender         It runs on this side of the family. My grandmother didn't have it, but others on her side of the family did.    Cancer Paternal Grandmother Charis Melendez     Breast cancer Paternal Grandmother Charis Melendez         My father told me she had breast cancer at one point.    Drug abuse Paternal Uncle Ion Love     Substance Abuse Paternal Uncle Ion Love     Suicide Attempts Paternal Uncle Ion Love     Diabetes type II Paternal Aunt Mary    [4]   Social History  Tobacco Use    Smoking status: Never    Smokeless tobacco: Never   Vaping Use    Vaping status: Never Used   Substance Use Topics    Alcohol use: Not Currently    Drug use: Never        Singh Tsai MD  [phone]

## 2025-07-21 RX ORDER — RIMEGEPANT SULFATE 75 MG/75MG
TABLET, ORALLY DISINTEGRATING ORAL
Qty: 16 TABLET | Refills: 3 | Status: SHIPPED | OUTPATIENT
Start: 2025-07-21

## 2025-07-24 ENCOUNTER — OFFICE VISIT (OUTPATIENT)
Age: 40
End: 2025-07-24
Attending: EMERGENCY MEDICINE
Payer: COMMERCIAL

## 2025-07-24 VITALS — WEIGHT: 290 LBS | BODY MASS INDEX: 48.32 KG/M2 | HEIGHT: 65 IN

## 2025-07-24 DIAGNOSIS — S92.354A NONDISPLACED FRACTURE OF FIFTH METATARSAL BONE, RIGHT FOOT, INITIAL ENCOUNTER FOR CLOSED FRACTURE: Primary | ICD-10-CM

## 2025-07-24 PROCEDURE — 29405 APPL SHORT LEG CAST: CPT | Performed by: PHYSICIAN ASSISTANT

## 2025-07-24 PROCEDURE — 99203 OFFICE O/P NEW LOW 30 MIN: CPT | Performed by: PHYSICIAN ASSISTANT

## 2025-07-24 NOTE — LETTER
July 24, 2025     Patient: Allison Gamboa  YOB: 1985  Date of Visit: 7/24/2025      To Whom it May Concern:    Allison Gamboa is under my professional care. Allison was seen in my office on 7/24/2025. Allison is nonweightbearing to the right lower extremity in a cast.  Patient is unable to drive.  Patient must work remotely.  Restrictions and accommodations in place until next evaluation in 4 weeks.    If you have any questions or concerns, please don't hesitate to call.         Sincerely,          Tom Archer PA-C        CC: No Recipients

## 2025-07-24 NOTE — PROGRESS NOTES
Assessment:  Assessment & Plan  Nondisplaced fracture of fifth metatarsal bone, right foot, initial encounter for closed fracture    Orders:    Ambulatory Referral to Orthopedic Surgery  Right fifth metatarsal fracture (Juarez fracture) after twisting injury from fall 7/19/2025.    Short leg cast applied in the office today.  Explained proper cast care.  Nonweightbearing right lower extremity in cast.  Tylenol as needed.  Work note provided.  Parking placard form completed today.  Follow-up in 4 weeks with podiatry.       To do next visit:  Return for 4 weeks with Dr. Jimenez.    The above stated was discussed in layman's terms and the patient expressed understanding.  All questions were answered to the patient's satisfaction.         Subjective:   Allison Gamboa is a 39 y.o. female who presents for evaluation of her right foot.  Patient sustained an inversion type injury to her right foot while leaving a concert on the evening of 7/19/2025.  After the fall she noted significant pain in the right foot with swelling and bruising.  Pain was worse with weightbearing.  She did report to the emergency department the following day where x-rays were performed revealing 1/5 metatarsal fracture.  She was placed in a short leg splint at that time.  She still notes persistent pain with any direct pressure.  She currently takes Tylenol with improvement.  No numbness or tingling.  No fevers or chills.  She does have a history of gastric bypass surgery and was advised against taking oral NSAIDs.      Review of systems negative unless otherwise specified in HPI      Past Medical History[1]    Past Surgical History[2]    Family History[3]    Social History     Occupational History    Not on file   Tobacco Use    Smoking status: Never    Smokeless tobacco: Never   Vaping Use    Vaping status: Never Used   Substance and Sexual Activity    Alcohol use: Not Currently    Drug use: Never    Sexual activity: Yes     Partners: Female,  "Male     Birth control/protection: Condom Male, Other     Comment: Hysterectomy in February 2020 for pre cervical cancer       Current Medications[4]    Allergies[5]       There were no vitals filed for this visit.    Objective:  Gen: No acute distress, resting comfortably in bed  HEENT: Eyes clear, moist mucus membranes, hearing intact  Respiratory: No audible wheezing or stridor  Cardiovascular: Well Perfused peripherally, 2+ distal pulse  Abdomen: nondistended, no peritoneal signs                     Right foot: Skin intact.  No erythema.  There is ecchymosis and swelling.  There is tenderness at the base of the fifth metatarsal.  No significant tenderness over the distal fibula and medial malleolus.  No tenderness ATFL and deltoid ligaments.  No tenderness in the region of the Lisfranc ligament.  Ankle range of motion is intact.  Toes warm and mobile.  Sensation intact distally.  2+ DP pulse.    Diagnostics, reviewed and taken today if performed as documented:  I personally reviewed the pertinent films in PACS and interpretation is as follows:    X-rays right foot 7/20/2025: Nondisplaced fracture of the fifth metatarsal base extending into the metatarsal cuboid articulation.    Cast application    Date/Time: 7/24/2025 3:00 PM    Performed by: Tom Archer PA-C  Authorized by: Tom Archer PA-C    Procedure details:     Laterality:  Right    Location:  Foot    Foot:  R foot    Cast type:  Short leg    Splint composition: static    Post-procedure details:     Pain:  Improved    Sensation:  Normal    Patient tolerance of procedure:  Tolerated well, no immediate complications        Portions of the record may have been created with voice recognition software.  Occasional wrong word or \"sound a like\" substitutions may have occurred due to the inherent limitations of voice recognition software.  Read the chart carefully and recognize, using context, where substitutions have occurred.         [1]   Past " Medical History:  Diagnosis Date    Allergic     Anxiety     Bruises easily     Cancer (HCC)     Cellulitis 2024    in [place], given bactrim    COVID-19 10/10/2022    CPAP (continuous positive airway pressure) dependence     Depression     Disease of thyroid gland     DELVALLE (dyspnea on exertion)     EIN (endometrial intraepithelial neoplasia) 2019    Endometriosis     Follicular thyroid cancer (HCC)     Gastroenteritis     Hashimoto's disease     Hepatic steatosis     Hyperlipidemia     Hypertension     Hypothyroid     IBS (irritable bowel syndrome)     Kidney lesion, native, left     Kidney stone     Lymphedema     left leg    Obesity     Palpitation     Pancreatitis     Pneumonia     Polycystic ovarian disease     Polyuria     RUQ abdominal pain 2021    Sleep apnea     Stroke (HCC) 2023    Discovered on CT Scan    Thyroid nodule     Thyromegaly     Tinea corporis     Tinea pedis     Visual impairment    [2]   Past Surgical History:  Procedure Laterality Date    ADENOIDECTOMY      DILATION AND CURETTAGE OF UTERUS      HYSTERECTOMY  2020    left ovaries    IR LUMBAR PUNCTURE  2023    IN LAPS GSTR RSTCV PX W/BYP ELKIN-EN-Y LIMB <150 CM N/A 2022    Procedure: BYPASS GASTRIC ELKIN-EN-Y LAPAROSCOPIC ROBOT & INTRAOPERATIVE EGD;  Surgeon: Graeme Pool MD;  Location: [facility];  Service: Bariatrics    THYROIDECTOMY, PARTIAL Left 2019    benign    TONSILLECTOMY      US GUIDED THYROID BIOPSY  2019   [3]   Family History  Problem Relation Name Age of Onset    Hyperlipidemia Mother Elissa Campa     Restless legs syndrome Mother Elissa Campa     Alcohol abuse Mother Elissa Campa             Drug abuse Mother Elissa Campa     Substance Abuse Mother Elissa Campa     Self-Injury Mother Elissa Campa     Suicide Attempts Mother Elissa MADRIGALLise Lokesh     Sleep apnea Father Payam Love     Hyperlipidemia Father Payam Love     Hypertension Father Payam Love      Alcohol abuse Father Payam Love             Drug abuse Father Payam Love     Substance Abuse Father Payam Love     Autoimmune disease Father Payam Love         Not sure if he had any, but they run on my fathers side of the family. My aunt has lupus and a few others.    Hypertension Maternal Grandmother Tammy Bender     Restless legs syndrome Maternal Grandmother Tammy Bender     Ovarian cancer Maternal Grandmother Tammy Bender     Arthritis Maternal Grandmother Tammy Bender     Cancer Maternal Grandmother Tammy Bender         My grandmother had cervical cancer    COPD Maternal Grandmother Tammy Bender     Coronary artery disease Maternal Grandmother Tammy Bender             Heart disease Maternal Grandmother Tammy Bender         It runs on this side of the family. My grandmother didn't have it, but others on her side of the family did.    Cancer Paternal Grandmother Charis Melendez     Breast cancer Paternal Grandmother Charis Melendez         My father told me she had breast cancer at one point.    Drug abuse Paternal Uncle Ion Love     Substance Abuse Paternal Uncle Ion Love     Suicide Attempts Paternal Uncle Ion Love     Diabetes type II Paternal Aunt Mary    [4]   Current Outpatient Medications:     acetaZOLAMIDE (DIAMOX) 250 mg tablet, TAKE THREE TABLETS BY MOUTH TWICE A DAY, Disp: 180 tablet, Rfl: 5    albuterol (Ventolin HFA) 90 mcg/act inhaler, Inhale 2 puffs every 6 (six) hours as needed for wheezing or shortness of breath, Disp: 18 g, Rfl: 0    ALPRAZolam (XANAX) 0.25 mg tablet, Take 1 tablet (0.25 mg total) by mouth 3 (three) times a day as needed for anxiety, Disp: 60 tablet, Rfl: 0    atorvastatin (LIPITOR) 40 mg tablet, TAKE ONE TABLET BY MOUTH EVERY EVENING, Disp: 30 tablet, Rfl: 5    buPROPion (WELLBUTRIN) 75 mg tablet, TAKE ONE TABLET BY MOUTH TWICE A DAY, Disp: 60 tablet, Rfl: 0    Calcium Carbonate (CALCIUM 500 PO), Take 500 mg by mouth in the  morning., Disp: , Rfl:     citalopram (CeleXA) 20 mg tablet, TAKE ONE TABLET BY MOUTH EVERY DAY, Disp: 90 tablet, Rfl: 1    clopidogrel (PLAVIX) 75 mg tablet, TAKE ONE TABLET BY MOUTH EVERY DAY, Disp: 30 tablet, Rfl: 5    levothyroxine 200 mcg tablet, TAKE 1 TABLET MONDAY THROUGH FRIDAY, AND 1 & 1/2 TABLETS ON SATURDAY AND SUNDAY, Disp: 108 tablet, Rfl: 1    levothyroxine 25 mcg tablet, TAKE ONE TABLET BY MOUTH EVERY DAY, Disp: 30 tablet, Rfl: 5    loratadine (CLARITIN) 10 mg tablet, Take 10 mg by mouth if needed, Disp: , Rfl:     losartan (COZAAR) 50 mg tablet, TAKE ONE TABLET BY MOUTH EVERY DAY, Disp: 90 tablet, Rfl: 1    Multiple Vitamins-Minerals (Bariatric Fusion) CHEW, Chew 4 tablets in the morning., Disp: , Rfl:     Nurtec 75 MG TBDP, TAKE 1 TABLET BY MOUTH  ONCE AT ONSET OF  A HEADACHE  .  MAX DOSE 75 MG PER DAY, Disp: 16 tablet, Rfl: 3    omeprazole (PriLOSEC) 20 mg delayed release capsule, TAKE ONE CAPSULE BY MOUTH EVERY OTHER DAY, Disp: 30 capsule, Rfl: 0    pantoprazole (PROTONIX) 40 mg tablet, Take 1 tablet (40 mg total) by mouth daily, Disp: 90 tablet, Rfl: 1  [5]   Allergies  Allergen Reactions    Sulfa Antibiotics Hives    Dust Mite Extract Other (See Comments)     Sinus inflammation    Latex Blisters    Molds & Smuts Other (See Comments)     Sinus inflammation    Penicillins Rash     Skin rash and sheds    Jorge Luis Grass Pollen Allergen Other (See Comments)     Sinus inflammation

## 2025-07-29 ENCOUNTER — TELEPHONE (OUTPATIENT)
Dept: OBGYN CLINIC | Facility: CLINIC | Age: 40
End: 2025-07-29

## 2025-08-04 ENCOUNTER — TELEPHONE (OUTPATIENT)
Age: 40
End: 2025-08-04

## 2025-08-04 DIAGNOSIS — E06.3 HYPOTHYROIDISM DUE TO HASHIMOTO'S THYROIDITIS: Primary | ICD-10-CM

## 2025-08-12 ENCOUNTER — OFFICE VISIT (OUTPATIENT)
Age: 40
End: 2025-08-12
Payer: COMMERCIAL

## 2025-08-18 ENCOUNTER — OFFICE VISIT (OUTPATIENT)
Dept: PODIATRY | Facility: CLINIC | Age: 40
End: 2025-08-18
Payer: COMMERCIAL

## 2025-08-18 VITALS — HEIGHT: 65 IN | BODY MASS INDEX: 47.48 KG/M2 | WEIGHT: 285 LBS

## 2025-08-18 DIAGNOSIS — S99.191A CLOSED FRACTURE OF BASE OF FIFTH METATARSAL BONE OF RIGHT FOOT AT METAPHYSEAL-DIAPHYSEAL JUNCTION, INITIAL ENCOUNTER: Primary | ICD-10-CM

## 2025-08-18 PROCEDURE — 99204 OFFICE O/P NEW MOD 45 MIN: CPT | Performed by: PODIATRIST

## 2025-08-20 ENCOUNTER — TELEMEDICINE (OUTPATIENT)
Dept: NEUROLOGY | Facility: CLINIC | Age: 40
End: 2025-08-20
Payer: COMMERCIAL

## 2025-08-20 ENCOUNTER — TELEPHONE (OUTPATIENT)
Age: 40
End: 2025-08-20

## 2025-08-20 DIAGNOSIS — G47.33 OSA ON CPAP: ICD-10-CM

## 2025-08-20 DIAGNOSIS — G43.109 MIGRAINE WITH AURA AND WITHOUT STATUS MIGRAINOSUS, NOT INTRACTABLE: ICD-10-CM

## 2025-08-20 DIAGNOSIS — G93.2 IDIOPATHIC INTRACRANIAL HYPERTENSION: Primary | ICD-10-CM

## 2025-08-20 DIAGNOSIS — I63.9 STROKE (CEREBRUM) (HCC): ICD-10-CM

## 2025-08-20 DIAGNOSIS — I10 ESSENTIAL HYPERTENSION: ICD-10-CM

## 2025-08-20 PROCEDURE — 99213 OFFICE O/P EST LOW 20 MIN: CPT

## (undated) DEVICE — ASTOUND STANDARD SURGICAL GOWN, XL: Brand: CONVERTORS

## (undated) DEVICE — 3000CC GUARDIAN II: Brand: GUARDIAN

## (undated) DEVICE — ADHESIVE SKIN CLSR DERMABOND NX

## (undated) DEVICE — STAPLER 60 RELOAD BLUE: Brand: SUREFORM

## (undated) DEVICE — STAPLER 60: Brand: SUREFORM

## (undated) DEVICE — VIOLET BRAIDED (POLYGLACTIN 910), SYNTHETIC ABSORBABLE SUTURE: Brand: COATED VICRYL

## (undated) DEVICE — TROCAR: Brand: KII FIOS FIRST ENTRY

## (undated) DEVICE — BLADELESS OBTURATOR: Brand: WECK VISTA

## (undated) DEVICE — WEBRIL 6 IN UNSTERILE

## (undated) DEVICE — VISIGI 3D®  CALIBRATION SYSTEM  SIZE 36FR BYPASS/SHORT: Brand: BOEHRINGER® VISIGI 3D®  CALIBRATION SYSTEM  SIZE 36FR BYPASS/SHORT

## (undated) DEVICE — SUT MONOCRYL 4-0 PS-2 27 IN Y426H

## (undated) DEVICE — CADIERE FORCEPS: Brand: ENDOWRIST

## (undated) DEVICE — SUT ETHIBOND 0 CT-1 30 IN X424H

## (undated) DEVICE — STAPLER 60 RELOAD WHITE: Brand: SUREFORM

## (undated) DEVICE — ABSORBABLE WOUND CLOSURE DEVICE: Brand: V-LOC 90

## (undated) DEVICE — 2000CC GUARDIAN II: Brand: GUARDIAN

## (undated) DEVICE — KIT, ROBOTIC BARIATRIC: Brand: CARDINAL HEALTH

## (undated) DEVICE — REDUCER: Brand: ENDOWRIST

## (undated) DEVICE — MEGA SUTURECUT ND: Brand: ENDOWRIST

## (undated) DEVICE — ARM DRAPE

## (undated) DEVICE — STRL COTTON TIP APPLCTR 6IN PK: Brand: CARDINAL HEALTH

## (undated) DEVICE — PBT NON ABSORBABLE WOUND CLOSURE DEVICE: Brand: V-LOC

## (undated) DEVICE — SCD SEQUENTIAL COMPRESSION COMFORT SLEEVE MEDIUM KNEE LENGTH: Brand: KENDALL SCD

## (undated) DEVICE — LUBRICANT INST ELECTROLUBE ANTISTK WO PAD

## (undated) DEVICE — MONOPOLAR CURVED SCISSORS: Brand: ENDOWRIST

## (undated) DEVICE — COLUMN DRAPE

## (undated) DEVICE — PLUMEPEN PRO 10FT

## (undated) DEVICE — VESSEL SEALER EXTEND: Brand: ENDOWRIST

## (undated) DEVICE — STAPLER CANNULA SEAL: Brand: ENDOWRIST

## (undated) DEVICE — BAG DECANTER

## (undated) DEVICE — DRAPE EQUIPMENT RF WAND

## (undated) DEVICE — URETERAL CATHETER ADAPTOR TIP

## (undated) DEVICE — TIP COVER ACCESSORY

## (undated) DEVICE — CANNULA SEAL

## (undated) DEVICE — TRAVELKIT CONTAINS FIRST STEP KIT (200ML EP-4 KIT) AND SOILED SCOPE BAG - 1 KIT: Brand: TRAVELKIT CONTAINS FIRST STEP KIT AND SOILED SCOPE BAG